# Patient Record
Sex: MALE | Race: WHITE | Employment: FULL TIME | URBAN - METROPOLITAN AREA
[De-identification: names, ages, dates, MRNs, and addresses within clinical notes are randomized per-mention and may not be internally consistent; named-entity substitution may affect disease eponyms.]

---

## 2018-06-09 ENCOUNTER — HOSPITAL ENCOUNTER (OUTPATIENT)
Dept: ULTRASOUND IMAGING | Age: 43
Discharge: HOME | End: 2018-06-09
Attending: INTERNAL MEDICINE
Payer: COMMERCIAL

## 2018-06-09 DIAGNOSIS — R79.89 ABNORMAL LIVER FUNCTION TESTS: ICD-10-CM

## 2018-06-09 PROCEDURE — 76705 ECHO EXAM OF ABDOMEN: CPT

## 2018-08-13 ENCOUNTER — OFFICE VISIT (OUTPATIENT)
Dept: INTERNAL MEDICINE CLINIC | Age: 43
End: 2018-08-13

## 2018-08-13 VITALS
HEART RATE: 74 BPM | SYSTOLIC BLOOD PRESSURE: 118 MMHG | DIASTOLIC BLOOD PRESSURE: 88 MMHG | TEMPERATURE: 98.3 F | OXYGEN SATURATION: 92 % | WEIGHT: 281 LBS | BODY MASS INDEX: 38.06 KG/M2 | HEIGHT: 72 IN | RESPIRATION RATE: 14 BRPM

## 2018-08-13 DIAGNOSIS — E78.5 HYPERLIPIDEMIA, UNSPECIFIED HYPERLIPIDEMIA TYPE: ICD-10-CM

## 2018-08-13 DIAGNOSIS — K76.0 FATTY LIVER: ICD-10-CM

## 2018-08-13 DIAGNOSIS — D17.9 LIPOMA, UNSPECIFIED SITE: ICD-10-CM

## 2018-08-13 DIAGNOSIS — Z00.00 WELLNESS EXAMINATION: ICD-10-CM

## 2018-08-13 DIAGNOSIS — R79.89 ELEVATED LFTS: ICD-10-CM

## 2018-08-13 DIAGNOSIS — E11.9 CONTROLLED TYPE 2 DIABETES MELLITUS WITHOUT COMPLICATION, WITHOUT LONG-TERM CURRENT USE OF INSULIN (HCC): ICD-10-CM

## 2018-08-13 DIAGNOSIS — L40.50 PSORIATIC ARTHRITIS (HCC): ICD-10-CM

## 2018-08-13 DIAGNOSIS — D86.9 SARCOIDOSIS: ICD-10-CM

## 2018-08-13 DIAGNOSIS — E66.01 MORBID OBESITY (HCC): Primary | ICD-10-CM

## 2018-08-13 RX ORDER — PRAVASTATIN SODIUM 20 MG/1
20 TABLET ORAL
COMMUNITY
End: 2019-02-15 | Stop reason: ALTCHOICE

## 2018-08-13 RX ORDER — MELOXICAM 15 MG/1
15 TABLET ORAL DAILY
COMMUNITY
End: 2021-02-19 | Stop reason: ALTCHOICE

## 2018-08-13 RX ORDER — LISINOPRIL 20 MG/1
TABLET ORAL DAILY
COMMUNITY
End: 2019-02-01 | Stop reason: SDUPTHER

## 2018-08-13 RX ORDER — METAXALONE 800 MG/1
800 TABLET ORAL 3 TIMES DAILY
COMMUNITY
End: 2021-02-19 | Stop reason: ALTCHOICE

## 2018-08-13 RX ORDER — PILOCARPINE HYDROCHLORIDE 5 MG/1
5 TABLET, FILM COATED ORAL 3 TIMES DAILY
COMMUNITY
End: 2018-10-02

## 2018-08-13 NOTE — PROGRESS NOTES
Chief Complaint   Patient presents with   [address] patient present to establish care with a PCP, due because the last PCP retired. Patient would also like to be on the weight loss program.       Health Maintenance Due   Topic Date Due    DTaP/Tdap/Td series (1 - Tdap) 02/15/1996    Influenza Age 5 to Adult  08/01/2018     Patient states that he got a tetanus vaccine 10 years ago due to him being bit by a dog. 1. Have you been to the ER, urgent care clinic or hospitalized within the last 6 months? NO.     2. Have you seen or consulted any other health care providers outside of the [address] [place] [address] within the last 6 months (Include any pap smears or colon screening)? YES, Dr. Lina Parker, rheumatologist, patient states he was last seen 2 weeks ago. Do you have an Advanced Directive? NO    Would you like information on Advanced Directives?  NO

## 2018-08-13 NOTE — PROGRESS NOTES
ALISON Monahan is a 37 y.o. male with relevant past medical history of morbid obesity, DM2, HTN, HLD, psoriatic arthritis, sarcoidosis, who presents today to establish care. The patient reports no acute symptoms. He is concerned about palpable nodules in his abdomen, left flank, back and arm. He tells me the one in his flank and back bother him some time with movement, he noticed this lesions multiple years ago, does not believe they are bigger in size. He says he once had a large LN biosied on his left elbow that was consistent with sarcoidosis. He tells me given his h/o arthritis he has had to take multiple courses of steroids which have contributed to him developing diabetes and gaining weight. He says he was diagnosed with psoriatic arthritis a few years ago (approx 5-7 yo). He used to be on Embrel, but for the past 3 months he has been taking Humira. He follows with rheumatology. He also take mobic, tramadol and metaxalone with adequate control of his pain. He states the joints that most often affected are his hands, shoulders and knees. He takes metformin for diabetes, pravastatin for cholesterol, and lisinopril for HTN. He reports compliance with all medications. He does not take aspirin. He has his eyes checked every year, last 3 months ago, denies any eye disorders due to DM2 or HTN. He denies any kidney problems in the past.  He reports h/o GIB due to stomach ulcers, had a colonoscopy about 2 years ago, per patient unremarkable. He takes OTC PPIs as needed. Reports great affection for eating spicy food. Patient is interested in losing weight and would like medical assistance. He has h/o elevated LFTs presumably secondary to fatty liver. He denies any abdominal pain, N/V/D/C. He denies any CP, SOB, dizziness, HA, leg swelling. He denies any h/o tobacco, drinks socially occasionally and denies h/o illicit drug use currently. 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  He says many years ago he had a hand infection due to exposure to sewage water. He has FH of CVA (Father), colon CA ( MGF in his 76s) and DM (MGM). He has history of childhood asthma resolved in adulthood. He has surgical history of CTS release. ROS  As above included in HPI. Otherwise 11 point review of systems negative including constitutional, skin, HENT, eyes, respiratory, cardiovascular, gastrointestinal, genitourinary, musculoskeletal, endocrine, hematologic, allergy, and neurologic. Past Medical History  Past Medical History:   Diagnosis Date    Arthritis     Asthma     Diabetes mellitus     Essential hypertension      Past Surgical History:   Procedure Laterality Date    HX CARPAL TUNNEL RELEASE          Family History  Family History   Problem Relation Age of Onset    Stroke Father     Diabetes Maternal Grandmother     Cancer Maternal Grandfather 79     prostate cancer       Social History  He  reports that he has never smoked. He has never used smokeless tobacco. History   Alcohol Use    1.2 oz/week    2 Shots of liquor per week       Immunization History    There is no immunization history on file for this patient. Allergies  No Known Allergies    Medications  Current Outpatient Prescriptions   Medication Sig    metaxalone (SKELAXIN) 800 mg tablet Take 800 mg by mouth three (3) times daily.  pilocarpine (SALAGEN) 5 mg tablet Take 5 mg by mouth three (3) times daily.  pravastatin (PRAVACHOL) 20 mg tablet Take 20 mg by mouth nightly.  lisinopril (PRINIVIL, ZESTRIL) 20 mg tablet Take  by mouth daily.  meloxicam (MOBIC) 15 mg tablet Take 15 mg by mouth daily.  adalimumab (HUMIRA PEN SC) by SubCUTAneous route.  metFORMIN (GLUCOPHAGE) 500 mg tablet     traMADol (ULTRAM) 50 mg tablet Take 50 mg by mouth. Indications: 2 pills three times daily     No current facility-administered medications for this visit.           Visit Vitals    /88 (BP 1 Location: Left arm, BP Patient Position: Sitting)    Pulse 74    Temp 98.3 °F (36.8 °C) (Oral)    Resp 14    Ht 6' (1.829 m)    Wt 281 lb (127.5 kg)    SpO2 92%    BMI 38.11 kg/m2     Body mass index is 38.11 kg/(m^2). Physical Exam   Constitutional: He is oriented to person, place, and time and well-developed, well-nourished, and in no distress. HENT:   Head: Normocephalic and atraumatic. Right Ear: External ear normal.   Left Ear: External ear normal.   Nose: Nose normal.   Mouth/Throat: Oropharynx is clear and moist.   Eyes: Conjunctivae and EOM are normal. Pupils are equal, round, and reactive to light. Neck: Normal range of motion. Neck supple. Cardiovascular: Normal rate, regular rhythm, normal heart sounds and intact distal pulses. Pulmonary/Chest: Effort normal and breath sounds normal. He exhibits no tenderness. Abdominal: Soft. Bowel sounds are normal.   Musculoskeletal: Normal range of motion. He exhibits no edema, tenderness or deformity. Neurological: He is alert and oriented to person, place, and time. Skin: Skin is warm. No rash noted. Psychiatric: Mood and affect normal.   Vitals reviewed. REVIEW OF DATA    Labs  No visits with results within 1 Month(s) from this visit. Latest known visit with results is:    Results on 01/19/2010   Component Date Value Ref Range Status    BUN 01/19/2010 25* 7 - 18 MG/DL Final    Creatinine 01/19/2010 1.3  0.6 - 1.3 MG/DL Final    GFR est AA 01/19/2010 >60  >60 ml/min/1.73m2 Final    GFR est non-AA 01/19/2010 >60  >60 ml/min/1.73m2 Final    Hemoglobin A1c 01/19/2010 5.5  4.8 - 6.0 % Final         CT Results (most recent):  No results found for this or any previous visit. XR Results (most recent):    Results from Hospital Encounter encounter on 05/31/16   XR HAND RT AP/LAT   Narrative EXAM:  XR HAND BILATERAL AP/LAT    INDICATION:  arthritis    COMPARISON: None. FINDINGS: Bilateral AP and slightly oblique views of the hands were performed.   Mild bilateral degenerative change at the radial carpal joint as well as DIP and  PIP joints. Normal bone mineralization. No erosive changes. No significant  hyperostotic changes. No acute fracture or dislocation. Impression IMPRESSION:  Mild bilateral degenerative changes. No erosive or hyperostotic  changes. CT   All Micro Results     None             HCM  Screening:    Colorectal 2 years ago, done due to +FOBT due to PUD, per patient colonoscopy was unremarkable    DIAGNOSIS AND PLAN  Patient Active Problem List   Diagnosis Code    Neuropathy G62.9    Severe obesity (BMI 35.0-39.9) ([address] E66.01     1. Morbid obesity ([address]  Weight loss encouraged, diet and regular exercise recommended, literature recommendation given for a healthier diet. Per patient request referred to weight loss clinic.   - TSH AND FREE T4  - REFERRAL TO WEIGHT LOSS    2. Hyperlipidemia, unspecified hyperlipidemia type  Will adjust medication as needed. Weight loss encouraged. Recommended to start ASA 81 mg daily given multiple risk factors for cardiovascular disease.  - LIPID PANEL  - REFERRAL TO WEIGHT LOSS    3. Controlled type 2 diabetes mellitus without complication, without long-term current use of insulin ([facility])  Will check HbA1c, adjust medication as needed  Weight loss recommended, regular exercise and ADA diet recommended. Literature recommendations given to improve diet quality. 4. Psoriatic arthritis ([address]  On Humira (on embrel in the past). Takes mobic, tramadol and muscle relaxants as well. Reports adequate control. 5. Sarcoidosis  Per patient diagnosed on LN biopsy in the past.  Following with rheumatology. Denies any pulmonary compromise    6. Lipoma, unspecified site   Patient does report some local discomfort, has lesions in back, and abdomen near waist line. Another lesion in his left forearm.  - REFERRAL TO SURGERY    7. Elevated LFTs  8. Fatty liver  Will monitor LFTs, lipid panel, weight loss encouraged.  Will check hep panel    9. Wellness examination  Physical exam done, including feet- minor calluses, good distal perfusion and gross sensory. Screening labs as ordered  Up to date with immunizations including Tdap and influenza last season      Follow-up Disposition: Not on File     Sherrell Gil MD

## 2018-08-13 NOTE — MR AVS SNAPSHOT
[address] [id number] [address] 
[phone] Patient: Scottie Price MRN: [id number] UCO:6/80/[id number] Visit Information Date & Time Provider Department Dept. Phone Encounter #  
 8/13/2018 12:30 PM Abbi Ward MD [facility] [address] [place] [address] [phone] Upcoming Health Maintenance Date Due DTaP/Tdap/Td series (1 - Tdap) 2/15/1996 Influenza Age 5 to Adult 8/1/2018 Allergies as of 8/13/2018  Review Complete On: 8/13/2018 By: Apolonia Cabello No Known Allergies Current Immunizations  Never Reviewed No immunizations on file. Not reviewed this visit You Were Diagnosed With   
  
 Codes Comments Morbid obesity ([facility]    -  Primary ICD-10-CM: E66.01 
ICD-9-CM: 278.01 Hyperlipidemia, unspecified hyperlipidemia type     ICD-10-CM: E78.5 ICD-9-CM: 272.4 Controlled type 2 diabetes mellitus without complication, without long-term current use of insulin ([facility]     ICD-10-CM: E11.9 ICD-9-CM: 250.00 Psoriatic arthritis ([facility]     ICD-10-CM: L40.50 ICD-9-CM: 696.0 Sarcoidosis     ICD-10-CM: D86.9 ICD-9-CM: 135 Lipoma, unspecified site     ICD-10-CM: D17.9 ICD-9-CM: 214.9 Elevated LFTs     ICD-10-CM: R79.89 ICD-9-CM: 790.6 Fatty liver     ICD-10-CM: K76.0 ICD-9-CM: 571.8 Wellness examination     ICD-10-CM: Z00.00 ICD-9-CM: V70.0 Vitals BP Pulse Temp Resp Height(growth percentile) Weight(growth percentile) 118/88 (BP 1 Location: Left arm, BP Patient Position: Sitting) 74 98.3 °F (36.8 °C) (Oral) 14 6' (1.829 m) 281 lb (127.5 kg) SpO2 BMI Smoking Status 92% 38.11 kg/m2 Never Smoker Vitals History BMI and BSA Data Body Mass Index Body Surface Area  
 38.11 kg/m 2 2.54 m 2 Your Updated Medication List  
  
   
This list is accurate as of 8/13/18  1:46 PM.  Always use your most recent med list.  
  
  
  
 HUMIRA PEN SC  
by SubCUTAneous route. lisinopril 20 mg tablet Commonly known as:  Bowen Edman Take  by mouth daily. meloxicam 15 mg tablet Commonly known as:  MOBIC Take 15 mg by mouth daily. metaxalone 800 mg tablet Commonly known as:  SKELAXIN Take 800 mg by mouth three (3) times daily. metFORMIN 500 mg tablet Commonly known as:  GLUCOPHAGE  
  
 pilocarpine 5 mg tablet Commonly known as:  Chiquis Clock Take 5 mg by mouth three (3) times daily. pravastatin 20 mg tablet Commonly known as:  PRAVACHOL Take 20 mg by mouth nightly. traMADol 50 mg tablet Commonly known as:  ULTRAM  
Take 50 mg by mouth. Indications: 2 pills three times daily We Performed the Following CBC WITH AUTOMATED DIFF [33370 CPT(R)] HEPATITIS PANEL, ACUTE [64975 CPT(R)] HIV 1/2 AG/AB, 4TH GENERATION,W RFLX CONFIRM [id number] CPT(R)] LIPID PANEL [52063 CPT(R)] METABOLIC PANEL, COMPREHENSIVE [95453 CPT(R)] REFERRAL TO SURGERY [THK537 Custom] REFERRAL TO WEIGHT LOSS [KRY185 Custom] TSH AND FREE T4 [84522 CPT(R)] To-Do List   
 Around 08/13/2018 Lab:  HEMOGLOBIN A1C W/O EAG   
  
 08/13/2018 Lab:  MICROALBUMIN, UR, RAND W/ MICROALB/CREAT RATIO   
  
 08/13/2018 Lab:  URINALYSIS W/ RFLX MICROSCOPIC Referral Information Referral ID Referred By Referred To  
  
 [id number] ANANT ROSSI Not Available Visits Status Start Date End Date 1 New Request 8/13/18 8/13/19 If your referral has a status of pending review or denied, additional information will be sent to support the outcome of this decision. Referral ID Referred By Referred To  
 [id number] ANANT ROSSI Not Available Visits Status Start Date End Date 1 New Request 8/13/18 8/13/19  If your referral has a status of pending review or denied, additional information will be sent to support the outcome of this decision. Introducing [facility] & HE[facility]! [employer] introduces [employer] patient portal. Now you can access parts of your medical record, email your doctor's office, and request medication refills online. 1. In your internet browser, go to [email or web address] 2. Click on the First Time User? Click Here link in the Sign In box. You will see the New Member Sign Up page. 3. Enter your [employer] Access Code exactly as it appears below. You will not need to use this code after youve completed the sign-up process. If you do not sign up before the expiration date, you must request a new code. · [employer] Access Code: [email or web address] Expires: 11/11/2018 12:10 PM 
 
4. Enter the last four digits of your Social Security Number (xxxx) and Date of Birth (mm/dd/yyyy) as indicated and click Submit. You will be taken to the next sign-up page. 5. Create a [employer]ler ID. This will be your [employer]ler login ID and cannot be changed, so think of one that is secure and easy to remember. 6. Create a [employer]ler password. You can change your password at any time. 7. Enter your Password Reset Question and Answer. This can be used at a later time if you forget your password. 8. Enter your e-mail address. You will receive e-mail notification when new information is available in [address] 9. Click Sign Up. You can now view and download portions of your medical record. 10. Click the Download Summary menu link to download a portable copy of your medical information. If you have questions, please visit the Frequently Asked Questions section of the [employer] website. Remember, [employer] is NOT to be used for urgent needs. For medical emergencies, dial 911. Now available from your iPhone and Android! Please provide this summary of care documentation to your next provider. Your primary care clinician is listed as Ana Capps.  If you have any questions after today's visit, please call [phone].

## 2018-08-14 LAB
ALBUMIN SERPL-MCNC: 5.2 G/DL (ref 3.5–5.5)
ALBUMIN/CREAT UR: 5.9 MG/G CREAT (ref 0–30)
ALBUMIN/GLOB SERPL: 2.2 {RATIO} (ref 1.2–2.2)
ALP SERPL-CCNC: 83 IU/L (ref 39–117)
ALT SERPL-CCNC: 116 IU/L (ref 0–44)
APPEARANCE UR: CLEAR
AST SERPL-CCNC: 70 IU/L (ref 0–40)
BASOPHILS # BLD AUTO: 0 X10E3/UL (ref 0–0.2)
BASOPHILS NFR BLD AUTO: 0 %
BILIRUB SERPL-MCNC: 0.5 MG/DL (ref 0–1.2)
BILIRUB UR QL STRIP: NEGATIVE
BUN SERPL-MCNC: 9 MG/DL (ref 6–24)
BUN/CREAT SERPL: 8 (ref 9–20)
CALCIUM SERPL-MCNC: 10.1 MG/DL (ref 8.7–10.2)
CHLORIDE SERPL-SCNC: 97 MMOL/L (ref 96–106)
CHOLEST SERPL-MCNC: 182 MG/DL (ref 100–199)
CO2 SERPL-SCNC: 25 MMOL/L (ref 20–29)
COLOR UR: YELLOW
CREAT SERPL-MCNC: 1.17 MG/DL (ref 0.76–1.27)
CREAT UR-MCNC: 102.9 MG/DL
EOSINOPHIL # BLD AUTO: 0.2 X10E3/UL (ref 0–0.4)
EOSINOPHIL NFR BLD AUTO: 3 %
ERYTHROCYTE [DISTWIDTH] IN BLOOD BY AUTOMATED COUNT: 13.5 % (ref 12.3–15.4)
GLOBULIN SER CALC-MCNC: 2.4 G/DL (ref 1.5–4.5)
GLUCOSE SERPL-MCNC: 197 MG/DL (ref 65–99)
GLUCOSE UR QL: ABNORMAL
HAV IGM SERPL QL IA: NEGATIVE
HBA1C MFR BLD: 9.5 % (ref 4.8–5.6)
HBV CORE IGM SERPL QL IA: NEGATIVE
HBV SURFACE AG SERPL QL IA: NEGATIVE
HCT VFR BLD AUTO: 46.5 % (ref 37.5–51)
HCV AB S/CO SERPL IA: <0.1 S/CO RATIO (ref 0–0.9)
HDLC SERPL-MCNC: 47 MG/DL
HGB BLD-MCNC: 15.9 G/DL (ref 13–17.7)
HGB UR QL STRIP: NEGATIVE
HIV 1+2 AB+HIV1 P24 AG SERPL QL IA: NON REACTIVE
IMM GRANULOCYTES # BLD: 0 X10E3/UL (ref 0–0.1)
IMM GRANULOCYTES NFR BLD: 0 %
INTERPRETATION, 910389: NORMAL
KETONES UR QL STRIP: NEGATIVE
LDLC SERPL CALC-MCNC: 100 MG/DL (ref 0–99)
LEUKOCYTE ESTERASE UR QL STRIP: NEGATIVE
LYMPHOCYTES # BLD AUTO: 3.9 X10E3/UL (ref 0.7–3.1)
LYMPHOCYTES NFR BLD AUTO: 45 %
Lab: NORMAL
MCH RBC QN AUTO: 31.2 PG (ref 26.6–33)
MCHC RBC AUTO-ENTMCNC: 34.2 G/DL (ref 31.5–35.7)
MCV RBC AUTO: 91 FL (ref 79–97)
MICRO URNS: ABNORMAL
MICROALBUMIN UR-MCNC: 6.1 UG/ML
MONOCYTES # BLD AUTO: 0.3 X10E3/UL (ref 0.1–0.9)
MONOCYTES NFR BLD AUTO: 3 %
NEUTROPHILS # BLD AUTO: 4.3 X10E3/UL (ref 1.4–7)
NEUTROPHILS NFR BLD AUTO: 49 %
NITRITE UR QL STRIP: NEGATIVE
PH UR STRIP: 5 [PH] (ref 5–7.5)
PLATELET # BLD AUTO: 273 X10E3/UL (ref 150–379)
POTASSIUM SERPL-SCNC: 4.6 MMOL/L (ref 3.5–5.2)
PROT SERPL-MCNC: 7.6 G/DL (ref 6–8.5)
PROT UR QL STRIP: NEGATIVE
RBC # BLD AUTO: 5.09 X10E6/UL (ref 4.14–5.8)
SODIUM SERPL-SCNC: 137 MMOL/L (ref 134–144)
SP GR UR: 1.02 (ref 1–1.03)
T4 FREE SERPL-MCNC: 0.98 NG/DL (ref 0.82–1.77)
TRIGL SERPL-MCNC: 174 MG/DL (ref 0–149)
TSH SERPL DL<=0.005 MIU/L-ACNC: 1.93 UIU/ML (ref 0.45–4.5)
UROBILINOGEN UR STRIP-MCNC: 0.2 MG/DL (ref 0.2–1)
VLDLC SERPL CALC-MCNC: 35 MG/DL (ref 5–40)
WBC # BLD AUTO: 8.7 X10E3/UL (ref 3.4–10.8)

## 2018-08-15 NOTE — PROGRESS NOTES
Labs discussed with patient. Recommended to increase metformin dose to 1000mg PO BID. Weight loss encouraged, healthy diet and portion control discussed. Regular exercise as tolerated recommended. Patient was referred by his request to weight loss clinic. Will monitor HbA1c in 3 months.

## 2018-08-24 ENCOUNTER — TELEPHONE (OUTPATIENT)
Dept: INTERNAL MEDICINE CLINIC | Age: 43
End: 2018-08-24

## 2018-08-27 ENCOUNTER — TELEPHONE (OUTPATIENT)
Dept: INTERNAL MEDICINE CLINIC | Age: 43
End: 2018-08-27

## 2018-08-27 DIAGNOSIS — E66.01 SEVERE OBESITY (BMI 35.0-39.9): Primary | ICD-10-CM

## 2018-08-27 NOTE — TELEPHONE ENCOUNTER
Hi, could you please give him a call back. It is reassuring an eye specialist examined him, most likely presenting with subconjunctival erythema. If he is having headaches due to elevated BP. Please have him schedule a visit with us. If he is feeling fine otherwise, just let him know it usually takes about a week for the blood to go away. If it doesn't to return to the eye specialist for further evaluation.  Thanks

## 2018-08-27 NOTE — TELEPHONE ENCOUNTER
Pt called and stated tht when he woke up his right eye was blood red and he has some pain in his right leg/sensitive to touch in his lower leg he went to the eye dr and was told he just has a broken vein in his eye, pls call to discuss, MARC

## 2018-08-28 NOTE — TELEPHONE ENCOUNTER
Spoke with pt, he said he isnt too worried about his eye but his right lower leg is painful and the eye doctor mentioned he needed to get checked to be sure he doesn't have a clot. He said its not really swollen but it hurts from his ankle upward about 8 inches or so. No redness, no known injury. He also asked about the referral to the weight loss program, did you want him to see a bariatric surgeon or our supervised weight loss program here?

## 2018-08-29 NOTE — TELEPHONE ENCOUNTER
Sounds like the problem in his leg is a new problem. I don't recall discussing this. He may need to make an appointment to be evaluated. In terms of the weight loss clinic, I new referral was placed yesterday to [facility], as our clinic here is no longer active. He should receive a call soon for scheduling.  Thanks

## 2018-09-05 ENCOUNTER — OFFICE VISIT (OUTPATIENT)
Dept: INTERNAL MEDICINE CLINIC | Age: 43
End: 2018-09-05

## 2018-09-05 VITALS
WEIGHT: 278 LBS | HEART RATE: 89 BPM | SYSTOLIC BLOOD PRESSURE: 110 MMHG | TEMPERATURE: 97.8 F | DIASTOLIC BLOOD PRESSURE: 74 MMHG | RESPIRATION RATE: 14 BRPM | BODY MASS INDEX: 37.65 KG/M2 | OXYGEN SATURATION: 97 % | HEIGHT: 72 IN

## 2018-09-05 DIAGNOSIS — E11.8 UNCONTROLLED TYPE 2 DIABETES MELLITUS WITH COMPLICATION, UNSPECIFIED WHETHER LONG TERM INSULIN USE: ICD-10-CM

## 2018-09-05 DIAGNOSIS — E11.65 UNCONTROLLED TYPE 2 DIABETES MELLITUS WITH COMPLICATION, UNSPECIFIED WHETHER LONG TERM INSULIN USE: ICD-10-CM

## 2018-09-05 DIAGNOSIS — R20.2 PARESTHESIA OF BOTH LOWER EXTREMITIES: Primary | ICD-10-CM

## 2018-09-05 NOTE — PROGRESS NOTES
HPI/History Maxine Allen is a 37 y.o.  male who presents for evaluation. Pt reports intermittent \"pins and needles\" sensation of right distal leg near ankle starting 8/29 then left lower leg from knee to about ankle on 9/2, both of which have resolved. Denies any signs of thrombosis. Pt with uncontrolled DM with A1c of 9.5 on 8/13; electrolytes normal, TSH/FT4 normal, no anemia at the time. DM regimen was adjusted and pt referred for weight loss. Noted hx of neuropathy but no specifics known, after questioning, may have been due to past ulnar neuropathy. Pt admits to more sitting than usual at work which may suggest some compressive effects, but again, no signs of thrombosis. No other sxs or complaints. Patient Active Problem List  
Diagnosis Code  Neuropathy G62.9  Severe obesity (BMI 35.0-39.9) ([facility]) E66.01 Past Medical History:  
Diagnosis Date  Arthritis  Asthma  Diabetes mellitus  Essential hypertension Past Surgical History:  
Procedure Laterality Date  HX CARPAL TUNNEL RELEASE Social History Social History  Marital status: UNKNOWN Spouse name: N/A  
 Number of children: N/A  
 Years of education: N/A Occupational History  Not on file. Social History Main Topics  Smoking status: Never Smoker  Smokeless tobacco: Never Used  Alcohol use 1.2 oz/week 2 Shots of liquor per week  Drug use: No  
 Sexual activity: Yes  
  Partners: Female Birth control/ protection: Condom Other Topics Concern  Not on file Social History Narrative Family History Problem Relation Age of Onset  Stroke Father  Diabetes Maternal Grandmother  Cancer Maternal Grandfather 79  
  prostate cancer Current Outpatient Prescriptions Medication Sig  
 metaxalone (SKELAXIN) 800 mg tablet Take 800 mg by mouth three (3) times daily.  pilocarpine (SALAGEN) 5 mg tablet Take 5 mg by mouth three (3) times daily.  pravastatin (PRAVACHOL) 20 mg tablet Take 20 mg by mouth nightly.  lisinopril (PRINIVIL, ZESTRIL) 20 mg tablet Take  by mouth daily.  meloxicam (MOBIC) 15 mg tablet Take 15 mg by mouth daily.  adalimumab (HUMIRA PEN SC) by SubCUTAneous route.  metFORMIN (GLUCOPHAGE) 500 mg tablet 1,000 mg two (2) times daily (with meals).  traMADol (ULTRAM) 50 mg tablet Take 50 mg by mouth. Indications: 2 pills three times daily No current facility-administered medications for this visit. No Known Allergies Review of Systems Aside from those included in HPI, remainder of complete ROS negative. Physical Examination Visit Vitals  /74 (BP 1 Location: Right arm, BP Patient Position: Sitting)  Pulse 89  Temp 97.8 °F (36.6 °C) (Oral)  Resp 14  
 Ht 6' (1.829 m)  Wt 278 lb (126.1 kg)  SpO2 97%  BMI 37.7 kg/m2 General - Alert and in no acute distress. Pt appears well, comfortable, and in good spirits. Pleasant, engaging. Nontoxic. Not anxious, non-diaphoretic. Mental status - Appropriate mood, behavior, speech content, dress, and thought processes. Pulm - No tachypnea, retractions, or cyanosis. Good respiratory effort. Clear to auscultation bilat. Cardiovascular - Normal rate, regular rhythm. LEs - No swelling, erythema/discoloration, warmth. No tenderness. Negative Lamont's bilat. Good hip/knee/ankle ROM. Good and symmetric strength. Normal gait and station. Sensation intact. Performed diabetic foot exam as noted. Left Foot: 
 Visual Exam: normal  
 Pulse DP: 2+ (normal) Filament test: normal sensation Vibratory sensation: normal 
Right Foot: 
 Visual Exam: normal  
 Pulse DP: 2+ (normal) Filament test: normal sensation Vibratory sensation: normal 
 
 
Assessment and Plan 1. Paresthesias of bilat lower extremities - Right started 8/29 and left 9/2; both have now resolved. Discussed differentials.  Uncontrolled diabetes a strong potential but regimen recently adjusted and was also referred for weight loss. Self-limited and compressive effects also strong possibilities. Recent labs unremarkable from electrolyte, thyroid, and anemia standpoints. Given the short duration and above considerations/changes/etc, will continue above and plan to monitor for now. Discussed considerations and conservative measures. He will address with Dr. Meredith Mustafa if recurs/persists or developments, further planning at her discretion. Pt happily agrees with plan. More than 25 mins spent during visit with more than 50% discussing above issues, potential causes/contributing factors, eval/tx, results, plan, and questions. PLEASE NOTE:  
This document has been produced using voice recognition software. Unrecognized errors in transcription may be present. Torito Fay PA-C [facility] 
[phone] 
9/5/2018

## 2018-09-05 NOTE — MR AVS SNAPSHOT
[address] [facility] 
 
 
 [address] [facility], [address] [facility] 
[phone] Patient: Johnnie Langston MRN: [id number] SPQ:[id number] Visit Information Date & Time Provider Department Dept. Phone Encounter #  
 9/5/2018  3:30 PM [facility] [address] [place] [address] [phone] [id number] Your Appointments 9/28/2018  2:30 PM  
New Patient with Layne Irizarry MD  
[facility] ([address] [place] [address]) Appt Note: Re: Lipoma, unspecified site / Referred by Addi Jackson MD / [id number] [facility] [id number]  
  
    
 10/4/2018 10:00 AM  
Nurse Visit with [address] [facility]  
[facility] ([address] [place] [address]) Appt Note: Ref from Dr Choudhury [facility] ([address] [place] [address]) [phone] Upcoming Health Maintenance Date Due DTaP/Tdap/Td series (1 - Tdap) 2/15/1996 Influenza Age 5 to Adult 8/1/2018 Allergies as of 9/5/2018  Review Complete On: 9/5/2018 By: Maria Ines Castillo LPN No Known Allergies Current Immunizations  Never Reviewed No immunizations on file. Not reviewed this visit Vitals BP Pulse Temp Resp Height(growth percentile) Weight(growth percentile) 110/74 (BP 1 Location: Right arm, BP Patient Position: Sitting) 89 97.8 °F (36.6 °C) (Oral) 14 6' (1.829 m) 278 lb (126.1 kg) SpO2 BMI Smoking Status 97% 37.7 kg/m2 Never Smoker Vitals History BMI and BSA Data Body Mass Index Body Surface Area 37.7 kg/m 2 2.53 m 2 Your Updated Medication List  
  
   
This list is accurate as of 9/5/18  3:59 PM.  Always use your most recent med list.  
  
  
  
  
 HUMIRA PEN SC  
by SubCUTAneous route. lisinopril 20 mg tablet Commonly known as:  Zamudio Alonzo Take  by mouth daily. meloxicam 15 mg tablet Commonly known as:  MOBIC Take 15 mg by mouth daily. metaxalone 800 mg tablet Commonly known as:  SKELAXIN Take 800 mg by mouth three (3) times daily. metFORMIN 500 mg tablet Commonly known as:  GLUCOPHAGE  
1,000 mg two (2) times daily (with meals). pilocarpine 5 mg tablet Commonly known as:  Voncile Dynes Take 5 mg by mouth three (3) times daily. pravastatin 20 mg tablet Commonly known as:  PRAVACHOL Take 20 mg by mouth nightly. traMADol 50 mg tablet Commonly known as:  ULTRAM  
Take 50 mg by mouth. Indications: 2 pills three times daily Introducing [facility]! [employer] introduces [email or web address] patient portal. Now you can access parts of your medical record, email your doctor's office, and request medication refills online. 1. In your internet browser, go to [email or web address] 2. Click on the First Time User? Click Here link in the Sign In box. You will see the New Member Sign Up page. 3. Enter your [email or web address] Access Code exactly as it appears below. You will not need to use this code after youve completed the sign-up process. If you do not sign up before the expiration date, you must request a new code. · [email or web address] Access Code: [email or web address] Expires: 11/11/2018 12:10 PM 
 
4. Enter the last four digits of your Social Security Number (xxxx) and Date of Birth (mm/dd/yyyy) as indicated and click Submit. You will be taken to the next sign-up page. 5. Create a [email or web address] ID. This will be your [email or web address] login ID and cannot be changed, so think of one that is secure and easy to remember. 6. Create a [email or web address] password. You can change your password at any time. 7. Enter your Password Reset Question and Answer. This can be used at a later time if you forget your password. 8. Enter your e-mail address.  You will receive e-mail notification when new information is available in [employer]. 9. Click Sign Up. You can now view and download portions of your medical record. 10. Click the Download Summary menu link to download a portable copy of your medical information. If you have questions, please visit the Frequently Asked Questions section of the [employer] website. Remember, [employer] is NOT to be used for urgent needs. For medical emergencies, dial 911. Now available from your iPhone and Android! Please provide this summary of care documentation to your next provider. Your primary care clinician is listed as Armando Charmco. If you have any questions after today's visit, please call [phone].

## 2018-09-05 NOTE — PROGRESS NOTES
1. Have you been to the ER, urgent care clinic or hospitalized since your last visit? NO.  
 
2. Have you seen or consulted any other health care providers outside of the [address] [place] [address] since your last visit (Include any pap smears or colon screening)? NO Do you have an Advanced Directive? NO Would you like information on Advanced Directives?  NO

## 2018-09-20 ENCOUNTER — TELEPHONE (OUTPATIENT)
Dept: INTERNAL MEDICINE CLINIC | Age: 43
End: 2018-09-20

## 2018-09-20 NOTE — TELEPHONE ENCOUNTER
I have not declined giving the patient tramadol to my knowledge. If he is in need for a refill have him contact our office.  Thanks

## 2018-09-20 NOTE — TELEPHONE ENCOUNTER
Pt calling, says Dr. Jatin Coronado is asking if Dr. Heidi Martin will start giving the patient tramadol? He says he takes if for pain for his arthritis. He says he is not sure why she doesn't want to give it anymore. Something about they don't run the test that needs to be done for him to keep taking the med. Says Dr. Heidi Martin can call [facility] if she needs more information.

## 2018-09-28 ENCOUNTER — OFFICE VISIT (OUTPATIENT)
Dept: SURGERY | Age: 43
End: 2018-09-28

## 2018-09-28 VITALS
HEART RATE: 89 BPM | WEIGHT: 284 LBS | DIASTOLIC BLOOD PRESSURE: 68 MMHG | RESPIRATION RATE: 18 BRPM | SYSTOLIC BLOOD PRESSURE: 112 MMHG | HEIGHT: 72 IN | BODY MASS INDEX: 38.47 KG/M2 | TEMPERATURE: 98.2 F

## 2018-09-28 DIAGNOSIS — M79.89 SOFT TISSUE MASS: Primary | ICD-10-CM

## 2018-09-28 RX ORDER — SODIUM CHLORIDE 0.9 % (FLUSH) 0.9 %
5-10 SYRINGE (ML) INJECTION AS NEEDED
Status: CANCELLED | OUTPATIENT
Start: 2018-09-28

## 2018-09-28 RX ORDER — SODIUM CHLORIDE 0.9 % (FLUSH) 0.9 %
5-10 SYRINGE (ML) INJECTION EVERY 8 HOURS
Status: CANCELLED | OUTPATIENT
Start: 2018-09-28

## 2018-10-02 RX ORDER — BISMUTH SUBSALICYLATE 262 MG
1 TABLET,CHEWABLE ORAL DAILY
COMMUNITY
End: 2021-02-19 | Stop reason: ALTCHOICE

## 2018-10-04 ENCOUNTER — CLINICAL SUPPORT (OUTPATIENT)
Dept: FAMILY MEDICINE CLINIC | Age: 43
End: 2018-10-04

## 2018-10-04 DIAGNOSIS — E66.9 OBESITY, UNSPECIFIED CLASSIFICATION, UNSPECIFIED OBESITY TYPE, UNSPECIFIED WHETHER SERIOUS COMORBIDITY PRESENT: Primary | ICD-10-CM

## 2018-10-04 NOTE — PROGRESS NOTES
Patient attended a Medically Supervised Weight Loss New Patient Orientation today where we discussed:  - New Direction Very Low Calorie Diet details  - Medical Supervision  - Nutrition education  - Cost of Meal Replacements  - Policies and compliance required for program enrollment.      Patients initial consultation with physician is tentatively scheduled for:  Future Appointments  Date Time Provider Kamari Rodriguez   11/9/2018 9:00 AM Nelda Jackson NP [facility]

## 2018-10-05 DIAGNOSIS — Z01.812 BLOOD TESTS PRIOR TO TREATMENT OR PROCEDURE: ICD-10-CM

## 2018-10-05 DIAGNOSIS — E66.9 OBESITY (BMI 30-39.9): Primary | ICD-10-CM

## 2018-10-05 NOTE — PROGRESS NOTES
Labs and EKG ordered in preparation for medical weight loss consult. Would not recommend restricting calorie intake until pt has recovered from upcoming procedure with Dr. Mimi Cornell.

## 2018-10-09 ENCOUNTER — HOSPITAL ENCOUNTER (OUTPATIENT)
Dept: LAB | Age: 43
Discharge: HOME | End: 2018-10-09
Payer: COMMERCIAL

## 2018-10-09 ENCOUNTER — TELEPHONE (OUTPATIENT)
Dept: SURGERY | Age: 43
End: 2018-10-09

## 2018-10-09 DIAGNOSIS — E66.9 OBESITY (BMI 30-39.9): ICD-10-CM

## 2018-10-09 DIAGNOSIS — Z01.812 BLOOD TESTS PRIOR TO TREATMENT OR PROCEDURE: ICD-10-CM

## 2018-10-09 LAB
ALBUMIN SERPL-MCNC: 4.5 G/DL (ref 3.4–5)
ALBUMIN/GLOB SERPL: 1.6 {RATIO} (ref 0.8–1.7)
ALP SERPL-CCNC: 84 U/L (ref 45–117)
ALT SERPL-CCNC: 148 U/L (ref 16–61)
ANION GAP SERPL CALC-SCNC: 10 MMOL/L (ref 3–18)
APPEARANCE UR: CLEAR
AST SERPL-CCNC: 80 U/L (ref 15–37)
ATRIAL RATE: 80 BPM
BACTERIA URNS QL MICRO: NEGATIVE /HPF
BASOPHILS # BLD: 0 K/UL (ref 0–0.1)
BASOPHILS NFR BLD: 0 % (ref 0–2)
BILIRUB SERPL-MCNC: 0.7 MG/DL (ref 0.2–1)
BILIRUB UR QL: NEGATIVE
BUN SERPL-MCNC: 15 MG/DL (ref 7–18)
BUN/CREAT SERPL: 13 (ref 12–20)
CALCIUM SERPL-MCNC: 9.6 MG/DL (ref 8.5–10.1)
CALCULATED P AXIS, ECG09: 38 DEGREES
CALCULATED R AXIS, ECG10: -6 DEGREES
CALCULATED T AXIS, ECG11: 28 DEGREES
CHLORIDE SERPL-SCNC: 99 MMOL/L (ref 100–108)
CHOLEST SERPL-MCNC: 172 MG/DL
CO2 SERPL-SCNC: 28 MMOL/L (ref 21–32)
COLOR UR: YELLOW
CREAT SERPL-MCNC: 1.12 MG/DL (ref 0.6–1.3)
DIAGNOSIS, 93000: NORMAL
DIFFERENTIAL METHOD BLD: ABNORMAL
EOSINOPHIL # BLD: 0.2 K/UL (ref 0–0.4)
EOSINOPHIL NFR BLD: 2 % (ref 0–5)
EPITH CASTS URNS QL MICRO: NEGATIVE /LPF (ref 0–5)
ERYTHROCYTE [DISTWIDTH] IN BLOOD BY AUTOMATED COUNT: 12.4 % (ref 11.6–14.5)
GLOBULIN SER CALC-MCNC: 2.9 G/DL (ref 2–4)
GLUCOSE SERPL-MCNC: 280 MG/DL (ref 74–99)
GLUCOSE UR STRIP.AUTO-MCNC: >1000 MG/DL
HCT VFR BLD AUTO: 45.3 % (ref 36–48)
HDLC SERPL-MCNC: 40 MG/DL (ref 40–60)
HDLC SERPL: 4.3 {RATIO} (ref 0–5)
HGB BLD-MCNC: 16.3 G/DL (ref 13–16)
HGB UR QL STRIP: NEGATIVE
KETONES UR QL STRIP.AUTO: ABNORMAL MG/DL
LDLC SERPL CALC-MCNC: 91 MG/DL (ref 0–100)
LEUKOCYTE ESTERASE UR QL STRIP.AUTO: NEGATIVE
LIPID PROFILE,FLP: ABNORMAL
LYMPHOCYTES # BLD: 4.7 K/UL (ref 0.9–3.6)
LYMPHOCYTES NFR BLD: 46 % (ref 21–52)
MAGNESIUM SERPL-MCNC: 2 MG/DL (ref 1.6–2.6)
MCH RBC QN AUTO: 31.2 PG (ref 24–34)
MCHC RBC AUTO-ENTMCNC: 36 G/DL (ref 31–37)
MCV RBC AUTO: 86.8 FL (ref 74–97)
MONOCYTES # BLD: 0.6 K/UL (ref 0.05–1.2)
MONOCYTES NFR BLD: 6 % (ref 3–10)
NEUTS SEG # BLD: 4.7 K/UL (ref 1.8–8)
NEUTS SEG NFR BLD: 46 % (ref 40–73)
NITRITE UR QL STRIP.AUTO: NEGATIVE
P-R INTERVAL, ECG05: 140 MS
PH UR STRIP: 5 [PH] (ref 5–8)
PLATELET # BLD AUTO: 272 K/UL (ref 135–420)
PMV BLD AUTO: 10.8 FL (ref 9.2–11.8)
POTASSIUM SERPL-SCNC: 4.4 MMOL/L (ref 3.5–5.5)
PROT SERPL-MCNC: 7.4 G/DL (ref 6.4–8.2)
PROT UR STRIP-MCNC: NEGATIVE MG/DL
Q-T INTERVAL, ECG07: 378 MS
QRS DURATION, ECG06: 84 MS
QTC CALCULATION (BEZET), ECG08: 435 MS
RBC # BLD AUTO: 5.22 M/UL (ref 4.7–5.5)
RBC #/AREA URNS HPF: NEGATIVE /HPF (ref 0–5)
SODIUM SERPL-SCNC: 137 MMOL/L (ref 136–145)
SP GR UR REFRACTOMETRY: >1.03 (ref 1–1.03)
TRIGL SERPL-MCNC: 205 MG/DL (ref ?–150)
TSH SERPL DL<=0.05 MIU/L-ACNC: 1.33 UIU/ML (ref 0.36–3.74)
URATE CRY URNS QL MICRO: ABNORMAL
URATE SERPL-MCNC: 4.3 MG/DL (ref 2.6–7.2)
UROBILINOGEN UR QL STRIP.AUTO: 1 EU/DL (ref 0.2–1)
VENTRICULAR RATE, ECG03: 80 BPM
VLDLC SERPL CALC-MCNC: 41 MG/DL
WBC # BLD AUTO: 10.2 K/UL (ref 4.6–13.2)
WBC URNS QL MICRO: NEGATIVE /HPF (ref 0–4)

## 2018-10-09 PROCEDURE — 93005 ELECTROCARDIOGRAM TRACING: CPT

## 2018-10-09 PROCEDURE — 80053 COMPREHEN METABOLIC PANEL: CPT | Performed by: NURSE PRACTITIONER

## 2018-10-09 PROCEDURE — 36415 COLL VENOUS BLD VENIPUNCTURE: CPT | Performed by: NURSE PRACTITIONER

## 2018-10-09 PROCEDURE — 83735 ASSAY OF MAGNESIUM: CPT | Performed by: NURSE PRACTITIONER

## 2018-10-09 PROCEDURE — 80061 LIPID PANEL: CPT | Performed by: NURSE PRACTITIONER

## 2018-10-09 PROCEDURE — 85025 COMPLETE CBC W/AUTO DIFF WBC: CPT | Performed by: NURSE PRACTITIONER

## 2018-10-09 PROCEDURE — 81001 URINALYSIS AUTO W/SCOPE: CPT | Performed by: NURSE PRACTITIONER

## 2018-10-09 PROCEDURE — 84550 ASSAY OF BLOOD/URIC ACID: CPT | Performed by: NURSE PRACTITIONER

## 2018-10-09 PROCEDURE — 84443 ASSAY THYROID STIM HORMONE: CPT | Performed by: NURSE PRACTITIONER

## 2018-10-10 ENCOUNTER — ANESTHESIA EVENT (OUTPATIENT)
Dept: SURGERY | Age: 43
End: 2018-10-10
Payer: COMMERCIAL

## 2018-10-10 ENCOUNTER — OFFICE VISIT (OUTPATIENT)
Dept: INTERNAL MEDICINE CLINIC | Age: 43
End: 2018-10-10

## 2018-10-10 VITALS
TEMPERATURE: 98.3 F | RESPIRATION RATE: 14 BRPM | WEIGHT: 277.8 LBS | DIASTOLIC BLOOD PRESSURE: 74 MMHG | HEART RATE: 80 BPM | OXYGEN SATURATION: 94 % | BODY MASS INDEX: 36.82 KG/M2 | HEIGHT: 73 IN | SYSTOLIC BLOOD PRESSURE: 100 MMHG

## 2018-10-10 DIAGNOSIS — Z01.818 PREOP EXAM FOR INTERNAL MEDICINE: Primary | ICD-10-CM

## 2018-10-10 DIAGNOSIS — R22.9 SUBCUTANEOUS NODULES: ICD-10-CM

## 2018-10-10 NOTE — TELEPHONE ENCOUNTER
I called and left a message on Mr. Sirisha Guerra on 10/10/2018 at 6:16 pm, to inform him that his surgery was rescheduled for an earlier time on 10/11/2018.  Mr. Abdirizak Mccurdy arrival time is scheduled for 05:30 am on 10/11/2018 and surgery will begin at 07:30 am.

## 2018-10-10 NOTE — PROGRESS NOTES
1. Have you been to the ER, urgent care clinic or hospitalized since your last visit? NO           2. Have you seen or consulted any other health care providers outside of the [address] [place] [address] since your last visit (Include any pap smears or colon screening)? NO    Do you have an Advanced Directive? NO    Would you like information on Advanced Directives?  NO

## 2018-10-10 NOTE — PROGRESS NOTES
Jazmín [place] 1975, is a 37 y.o. male, who is seen today for preoperative evaluation prior to surgery to be done tomorrow by Dr. Beverley Ca for removal of subcutaneous nodules. He has noticed several subcutaneous nodules fairly recently. He feels well in general.  He is diabetic and is starting to work much more in earnest on weight control and his metformin was increased recently. He has no history of any type of heart problem. He has started exercising recently and is having no dyspnea or chest pain with that. Past Medical History:   Diagnosis Date    Asthma     Diabetes mellitus     Essential hypertension     Ill-defined condition     neuropathy    Psoriatic arthritis, destructive type ([facility] [address]     Sarcoidosis      Past Surgical History:   Procedure Laterality Date    HX CARPAL TUNNEL RELEASE      HX ORTHOPAEDIC      fx skull/ broken jaw     HX OTHER SURGICAL      lipoma removed      Current Outpatient Prescriptions   Medication Sig Dispense Refill    multivitamin (ONE A DAY) tablet Take 1 Tab by mouth daily.  metaxalone (SKELAXIN) 800 mg tablet Take 800 mg by mouth three (3) times daily.  pravastatin (PRAVACHOL) 20 mg tablet Take 20 mg by mouth nightly.  lisinopril (PRINIVIL, ZESTRIL) 20 mg tablet Take  by mouth daily.  meloxicam (MOBIC) 15 mg tablet Take 15 mg by mouth daily.  adalimumab (HUMIRA PEN SC) by SubCUTAneous route every seven (7) days.  metFORMIN (GLUCOPHAGE) 500 mg tablet 1,000 mg two (2) times daily (with meals).  traMADol (ULTRAM) 50 mg tablet Take 50 mg by mouth.  Indications: 2 pills three times daily       No Known Allergies    Social History     Social History    Marital status: SINGLE     Spouse name: N/A    Number of children: N/A    Years of education: N/A     Social History Main Topics    Smoking status: Never Smoker    Smokeless tobacco: Never Used    Alcohol use No    Drug use: No    Sexual activity: Yes Partners: Female     Birth control/ protection: Condom     Other Topics Concern    None     Social History Narrative     Review of systems: He denies exertional discomfort in the chest neck jaw back or arm. He denies dyspnea on exertion PND orthopnea or edema. He denies syncope or presyncope. He has never had any type of chest pain. Visit Vitals    /74 (BP 1 Location: Left arm, BP Patient Position: Sitting)    Pulse 80    Temp 98.3 °F (36.8 °C) (Oral)    Resp 14    Ht 6' 1\" (1.854 m)    Wt 277 lb 12.8 oz (126 kg)    SpO2 94%    BMI 36.65 kg/m2     Carotids are 2+ without bruits. Lungs are clear to percussion. Good breath sounds with no wheezing or crackles. Heart reveals a regular rhythm with somewhat distant heart tones, no murmur gallop click or rub. Apical impulse is nonpalpable. Abdomen is obese soft nontender with no hepatosplenomegaly or masses but he has a few small somewhat firm subcutaneous nodules in the back and abdomen. Extremities reveal no clubbing cyanosis or edema. Pulses are 2+. Results for orders placed or performed during the hospital encounter of 10/09/18   CBC WITH AUTOMATED DIFF   Result Value Ref Range    WBC 10.2 4.6 - 13.2 K/uL    RBC 5.22 4.70 - 5.50 M/uL    HGB 16.3 (H) 13.0 - 16.0 g/dL    HCT 45.3 36.0 - 48.0 %    MCV 86.8 74.0 - 97.0 FL    MCH 31.2 24.0 - 34.0 PG    MCHC 36.0 31.0 - 37.0 g/dL    RDW 12.4 11.6 - 14.5 %    PLATELET 123 058 - 951 K/uL    MPV 10.8 9.2 - 11.8 FL    NEUTROPHILS 46 40 - 73 %    LYMPHOCYTES 46 21 - 52 %    MONOCYTES 6 3 - 10 %    EOSINOPHILS 2 0 - 5 %    BASOPHILS 0 0 - 2 %    ABS. NEUTROPHILS 4.7 1.8 - 8.0 K/UL    ABS. LYMPHOCYTES 4.7 (H) 0.9 - 3.6 K/UL    ABS. MONOCYTES 0.6 0.05 - 1.2 K/UL    ABS. EOSINOPHILS 0.2 0.0 - 0.4 K/UL    ABS.  BASOPHILS 0.0 0.0 - 0.1 K/UL    DF AUTOMATED     LIPID PANEL   Result Value Ref Range    LIPID PROFILE          Cholesterol, total 172 <200 MG/DL    Triglyceride 205 (H) <150 MG/DL    HDL Cholesterol 40 40 - 60 MG/DL    LDL, calculated 91 0 - 100 MG/DL    VLDL, calculated 41 MG/DL    CHOL/HDL Ratio 4.3 0 - 5.0     MAGNESIUM   Result Value Ref Range    Magnesium 2.0 1.6 - 2.6 mg/dL   METABOLIC PANEL, COMPREHENSIVE   Result Value Ref Range    Sodium 137 136 - 145 mmol/L    Potassium 4.4 3.5 - 5.5 mmol/L    Chloride 99 (L) 100 - 108 mmol/L    CO2 28 21 - 32 mmol/L    Anion gap 10 3.0 - 18 mmol/L    Glucose 280 (H) 74 - 99 mg/dL    BUN 15 7.0 - 18 MG/DL    Creatinine 1.12 0.6 - 1.3 MG/DL    BUN/Creatinine ratio 13 12 - 20      GFR est AA >60 >60 ml/min/1.73m2    GFR est non-AA >60 >60 ml/min/1.73m2    Calcium 9.6 8.5 - 10.1 MG/DL    Bilirubin, total 0.7 0.2 - 1.0 MG/DL    ALT (SGPT) 148 (H) 16 - 61 U/L    AST (SGOT) 80 (H) 15 - 37 U/L    Alk. phosphatase 84 45 - 117 U/L    Protein, total 7.4 6.4 - 8.2 g/dL    Albumin 4.5 3.4 - 5.0 g/dL    Globulin 2.9 2.0 - 4.0 g/dL    A-G Ratio 1.6 0.8 - 1.7     TSH 3RD GENERATION   Result Value Ref Range    TSH 1.33 0.36 - 3.74 uIU/mL   URIC ACID   Result Value Ref Range    Uric acid 4.3 2.6 - 7.2 MG/DL   URINALYSIS W/MICROSCOPIC   Result Value Ref Range    Color YELLOW      Appearance CLEAR      Specific gravity >1.030 (H) 1.005 - 1.030    pH (UA) 5.0 5.0 - 8.0      Protein NEGATIVE  NEG mg/dL    Glucose >1000 (A) NEG mg/dL    Ketone TRACE (A) NEG mg/dL    Bilirubin NEGATIVE  NEG      Blood NEGATIVE  NEG      Urobilinogen 1.0 0.2 - 1.0 EU/dL    Nitrites NEGATIVE  NEG      Leukocyte Esterase NEGATIVE  NEG      WBC NEGATIVE  0 - 4 /hpf    RBC NEGATIVE  0 - 5 /hpf    Epithelial cells NEGATIVE  0 - 5 /lpf    Bacteria NEGATIVE  NEG /hpf    Uric acid crystals 1+ (A) NEG     Laboratory from yesterday shows fasting glucose 280, electrolytes are normal, hemoglobin 16.3,  and AST 80. EKG shows normal sinus rhythm at 80 bpm, very low voltage QRSs in lead aVF, very slight upstroke and very slight downstroke in different beats.   This finding is not good evidence for prior inferior infarction. I would read the EKG is borderline and not abnormal.    Assessment: Patient is at low risk for upcoming surgery tomorrow and is medically cleared for removal of subcutaneous nodules under general anesthetic. #2. Diabetes at this time is fairly poorly controlled, it is not a contraindication for his surgery and he will be working on diet and medication adjustments postop. #3. Severe obesity, he is starting a new diet and starting to exercise. #4.  Increasing ALT and AST probably related to fatty liver, and an ultrasound of the liver a few months ago that showed probable steatosis, I will defer further evaluation of elevated liver enzymes to his primary care doctor, Dr. Borrero Head. Spencer Ramsey MD FACP    Please note: This document has been produced using voice recognition software. Unrecognized errors in transcription may be present.

## 2018-10-11 ENCOUNTER — HOSPITAL ENCOUNTER (OUTPATIENT)
Age: 43
Setting detail: OUTPATIENT SURGERY
Discharge: HOME | End: 2018-10-11
Attending: SURGERY | Admitting: SURGERY
Payer: COMMERCIAL

## 2018-10-11 ENCOUNTER — ANESTHESIA (OUTPATIENT)
Dept: SURGERY | Age: 43
End: 2018-10-11
Payer: COMMERCIAL

## 2018-10-11 VITALS
TEMPERATURE: 96.3 F | HEIGHT: 72 IN | DIASTOLIC BLOOD PRESSURE: 63 MMHG | RESPIRATION RATE: 15 BRPM | HEART RATE: 65 BPM | OXYGEN SATURATION: 94 % | SYSTOLIC BLOOD PRESSURE: 114 MMHG | BODY MASS INDEX: 37.52 KG/M2 | WEIGHT: 277 LBS

## 2018-10-11 DIAGNOSIS — G89.18 POSTOPERATIVE PAIN: Primary | ICD-10-CM

## 2018-10-11 DIAGNOSIS — M79.89 SOFT TISSUE MASS: ICD-10-CM

## 2018-10-11 LAB
GLUCOSE BLD STRIP.AUTO-MCNC: 219 MG/DL (ref 70–110)
GLUCOSE BLD STRIP.AUTO-MCNC: 221 MG/DL (ref 70–110)
GLUCOSE BLD STRIP.AUTO-MCNC: 235 MG/DL (ref 70–110)
GLUCOSE BLD STRIP.AUTO-MCNC: 250 MG/DL (ref 70–110)

## 2018-10-11 PROCEDURE — 77030031139 HC SUT VCRL2 J&J -A: Performed by: SURGERY

## 2018-10-11 PROCEDURE — 77030020782 HC GWN BAIR PAWS FLX 3M -B: Performed by: SURGERY

## 2018-10-11 PROCEDURE — 77030002933 HC SUT MCRYL J&J -A: Performed by: SURGERY

## 2018-10-11 PROCEDURE — 74011250637 HC RX REV CODE- 250/637: Performed by: NURSE ANESTHETIST, CERTIFIED REGISTERED

## 2018-10-11 PROCEDURE — 74011000250 HC RX REV CODE- 250

## 2018-10-11 PROCEDURE — 76010000153 HC OR TIME 1.5 TO 2 HR: Performed by: SURGERY

## 2018-10-11 PROCEDURE — 74011636637 HC RX REV CODE- 636/637: Performed by: NURSE ANESTHETIST, CERTIFIED REGISTERED

## 2018-10-11 PROCEDURE — 77030039266 HC ADH SKN EXOFIN S2SG -A: Performed by: SURGERY

## 2018-10-11 PROCEDURE — 76210000006 HC OR PH I REC 0.5 TO 1 HR: Performed by: SURGERY

## 2018-10-11 PROCEDURE — 74011250636 HC RX REV CODE- 250/636: Performed by: NURSE ANESTHETIST, CERTIFIED REGISTERED

## 2018-10-11 PROCEDURE — 88307 TISSUE EXAM BY PATHOLOGIST: CPT | Performed by: SURGERY

## 2018-10-11 PROCEDURE — 74011250636 HC RX REV CODE- 250/636

## 2018-10-11 PROCEDURE — 77030008683 HC TU ET CUF COVD -A: Performed by: ANESTHESIOLOGY

## 2018-10-11 PROCEDURE — 74011000250 HC RX REV CODE- 250: Performed by: SURGERY

## 2018-10-11 PROCEDURE — 77030013079 HC BLNKT BAIR HGGR 3M -A: Performed by: ANESTHESIOLOGY

## 2018-10-11 PROCEDURE — 76210000021 HC REC RM PH II 0.5 TO 1 HR: Performed by: SURGERY

## 2018-10-11 PROCEDURE — 82962 GLUCOSE BLOOD TEST: CPT

## 2018-10-11 PROCEDURE — 76060000034 HC ANESTHESIA 1.5 TO 2 HR: Performed by: SURGERY

## 2018-10-11 PROCEDURE — 74011250636 HC RX REV CODE- 250/636: Performed by: SURGERY

## 2018-10-11 RX ORDER — MAGNESIUM SULFATE 100 %
4 CRYSTALS MISCELLANEOUS AS NEEDED
Status: DISCONTINUED | OUTPATIENT
Start: 2018-10-11 | End: 2018-10-11 | Stop reason: HOSPADM

## 2018-10-11 RX ORDER — MIDAZOLAM HYDROCHLORIDE 1 MG/ML
INJECTION, SOLUTION INTRAMUSCULAR; INTRAVENOUS AS NEEDED
Status: DISCONTINUED | OUTPATIENT
Start: 2018-10-11 | End: 2018-10-11 | Stop reason: HOSPADM

## 2018-10-11 RX ORDER — INSULIN LISPRO 100 [IU]/ML
INJECTION, SOLUTION INTRAVENOUS; SUBCUTANEOUS ONCE
Status: COMPLETED | OUTPATIENT
Start: 2018-10-11 | End: 2018-10-11

## 2018-10-11 RX ORDER — SODIUM CHLORIDE 0.9 % (FLUSH) 0.9 %
5-10 SYRINGE (ML) INJECTION AS NEEDED
Status: DISCONTINUED | OUTPATIENT
Start: 2018-10-11 | End: 2018-10-11 | Stop reason: HOSPADM

## 2018-10-11 RX ORDER — NALOXONE HYDROCHLORIDE 0.4 MG/ML
0.04 INJECTION, SOLUTION INTRAMUSCULAR; INTRAVENOUS; SUBCUTANEOUS AS NEEDED
Status: DISCONTINUED | OUTPATIENT
Start: 2018-10-11 | End: 2018-10-11 | Stop reason: HOSPADM

## 2018-10-11 RX ORDER — ONDANSETRON 2 MG/ML
INJECTION INTRAMUSCULAR; INTRAVENOUS AS NEEDED
Status: DISCONTINUED | OUTPATIENT
Start: 2018-10-11 | End: 2018-10-11 | Stop reason: HOSPADM

## 2018-10-11 RX ORDER — HYDROMORPHONE HYDROCHLORIDE 2 MG/ML
0.5 INJECTION, SOLUTION INTRAMUSCULAR; INTRAVENOUS; SUBCUTANEOUS
Status: DISCONTINUED | OUTPATIENT
Start: 2018-10-11 | End: 2018-10-11 | Stop reason: HOSPADM

## 2018-10-11 RX ORDER — DEXAMETHASONE SODIUM PHOSPHATE 4 MG/ML
INJECTION, SOLUTION INTRA-ARTICULAR; INTRALESIONAL; INTRAMUSCULAR; INTRAVENOUS; SOFT TISSUE AS NEEDED
Status: DISCONTINUED | OUTPATIENT
Start: 2018-10-11 | End: 2018-10-11 | Stop reason: HOSPADM

## 2018-10-11 RX ORDER — NEOSTIGMINE METHYLSULFATE 1 MG/ML
INJECTION INTRAVENOUS AS NEEDED
Status: DISCONTINUED | OUTPATIENT
Start: 2018-10-11 | End: 2018-10-11 | Stop reason: HOSPADM

## 2018-10-11 RX ORDER — DOCUSATE SODIUM 100 MG/1
100 CAPSULE, LIQUID FILLED ORAL 2 TIMES DAILY
Qty: 60 CAP | Refills: 2 | Status: SHIPPED | OUTPATIENT
Start: 2018-10-11 | End: 2019-01-09

## 2018-10-11 RX ORDER — SODIUM CHLORIDE, SODIUM LACTATE, POTASSIUM CHLORIDE, CALCIUM CHLORIDE 600; 310; 30; 20 MG/100ML; MG/100ML; MG/100ML; MG/100ML
50 INJECTION, SOLUTION INTRAVENOUS CONTINUOUS
Status: DISCONTINUED | OUTPATIENT
Start: 2018-10-11 | End: 2018-10-11 | Stop reason: HOSPADM

## 2018-10-11 RX ORDER — PROPOFOL 10 MG/ML
INJECTION, EMULSION INTRAVENOUS AS NEEDED
Status: DISCONTINUED | OUTPATIENT
Start: 2018-10-11 | End: 2018-10-11 | Stop reason: HOSPADM

## 2018-10-11 RX ORDER — FAMOTIDINE 20 MG/1
20 TABLET, FILM COATED ORAL ONCE
Status: COMPLETED | OUTPATIENT
Start: 2018-10-11 | End: 2018-10-11

## 2018-10-11 RX ORDER — SODIUM CHLORIDE 0.9 % (FLUSH) 0.9 %
5-10 SYRINGE (ML) INJECTION EVERY 8 HOURS
Status: DISCONTINUED | OUTPATIENT
Start: 2018-10-11 | End: 2018-10-11 | Stop reason: HOSPADM

## 2018-10-11 RX ORDER — GLYCOPYRROLATE 0.2 MG/ML
INJECTION INTRAMUSCULAR; INTRAVENOUS AS NEEDED
Status: DISCONTINUED | OUTPATIENT
Start: 2018-10-11 | End: 2018-10-11 | Stop reason: HOSPADM

## 2018-10-11 RX ORDER — DIPHENHYDRAMINE HYDROCHLORIDE 50 MG/ML
12.5 INJECTION, SOLUTION INTRAMUSCULAR; INTRAVENOUS
Status: DISCONTINUED | OUTPATIENT
Start: 2018-10-11 | End: 2018-10-11 | Stop reason: HOSPADM

## 2018-10-11 RX ORDER — BUPIVACAINE HYDROCHLORIDE AND EPINEPHRINE 2.5; 5 MG/ML; UG/ML
INJECTION, SOLUTION EPIDURAL; INFILTRATION; INTRACAUDAL; PERINEURAL AS NEEDED
Status: DISCONTINUED | OUTPATIENT
Start: 2018-10-11 | End: 2018-10-11 | Stop reason: HOSPADM

## 2018-10-11 RX ORDER — LIDOCAINE HYDROCHLORIDE 20 MG/ML
INJECTION, SOLUTION EPIDURAL; INFILTRATION; INTRACAUDAL; PERINEURAL AS NEEDED
Status: DISCONTINUED | OUTPATIENT
Start: 2018-10-11 | End: 2018-10-11 | Stop reason: HOSPADM

## 2018-10-11 RX ORDER — HYDROCODONE BITARTRATE AND ACETAMINOPHEN 5; 325 MG/1; MG/1
TABLET ORAL
Qty: 40 TAB | Refills: 0 | Status: SHIPPED | OUTPATIENT
Start: 2018-10-11 | End: 2018-11-09

## 2018-10-11 RX ORDER — METOPROLOL TARTRATE 5 MG/5ML
INJECTION INTRAVENOUS AS NEEDED
Status: DISCONTINUED | OUTPATIENT
Start: 2018-10-11 | End: 2018-10-11 | Stop reason: HOSPADM

## 2018-10-11 RX ORDER — ROCURONIUM BROMIDE 10 MG/ML
INJECTION, SOLUTION INTRAVENOUS AS NEEDED
Status: DISCONTINUED | OUTPATIENT
Start: 2018-10-11 | End: 2018-10-11 | Stop reason: HOSPADM

## 2018-10-11 RX ORDER — ALBUTEROL SULFATE 0.83 MG/ML
2.5 SOLUTION RESPIRATORY (INHALATION) AS NEEDED
Status: DISCONTINUED | OUTPATIENT
Start: 2018-10-11 | End: 2018-10-11 | Stop reason: HOSPADM

## 2018-10-11 RX ORDER — SODIUM CHLORIDE, SODIUM LACTATE, POTASSIUM CHLORIDE, CALCIUM CHLORIDE 600; 310; 30; 20 MG/100ML; MG/100ML; MG/100ML; MG/100ML
75 INJECTION, SOLUTION INTRAVENOUS CONTINUOUS
Status: DISCONTINUED | OUTPATIENT
Start: 2018-10-11 | End: 2018-10-11 | Stop reason: HOSPADM

## 2018-10-11 RX ORDER — SUCCINYLCHOLINE CHLORIDE 20 MG/ML
INJECTION INTRAMUSCULAR; INTRAVENOUS AS NEEDED
Status: DISCONTINUED | OUTPATIENT
Start: 2018-10-11 | End: 2018-10-11 | Stop reason: HOSPADM

## 2018-10-11 RX ORDER — FENTANYL CITRATE 50 UG/ML
INJECTION, SOLUTION INTRAMUSCULAR; INTRAVENOUS AS NEEDED
Status: DISCONTINUED | OUTPATIENT
Start: 2018-10-11 | End: 2018-10-11 | Stop reason: HOSPADM

## 2018-10-11 RX ORDER — DEXTROSE 50 % IN WATER (D50W) INTRAVENOUS SYRINGE
25-50 AS NEEDED
Status: DISCONTINUED | OUTPATIENT
Start: 2018-10-11 | End: 2018-10-11 | Stop reason: HOSPADM

## 2018-10-11 RX ORDER — ONDANSETRON 2 MG/ML
4 INJECTION INTRAMUSCULAR; INTRAVENOUS ONCE
Status: DISCONTINUED | OUTPATIENT
Start: 2018-10-11 | End: 2018-10-11 | Stop reason: HOSPADM

## 2018-10-11 RX ADMIN — MIDAZOLAM HYDROCHLORIDE 2 MG: 1 INJECTION, SOLUTION INTRAMUSCULAR; INTRAVENOUS at 07:27

## 2018-10-11 RX ADMIN — FENTANYL CITRATE 50 MCG: 50 INJECTION, SOLUTION INTRAMUSCULAR; INTRAVENOUS at 08:22

## 2018-10-11 RX ADMIN — NEOSTIGMINE METHYLSULFATE 3 MG: 1 INJECTION INTRAVENOUS at 09:07

## 2018-10-11 RX ADMIN — PROPOFOL 50 MG: 10 INJECTION, EMULSION INTRAVENOUS at 07:44

## 2018-10-11 RX ADMIN — INSULIN LISPRO 6 UNITS: 100 INJECTION, SOLUTION INTRAVENOUS; SUBCUTANEOUS at 06:58

## 2018-10-11 RX ADMIN — SODIUM CHLORIDE, SODIUM LACTATE, POTASSIUM CHLORIDE, AND CALCIUM CHLORIDE 50 ML/HR: 600; 310; 30; 20 INJECTION, SOLUTION INTRAVENOUS at 09:35

## 2018-10-11 RX ADMIN — ROCURONIUM BROMIDE 15 MG: 10 INJECTION, SOLUTION INTRAVENOUS at 07:50

## 2018-10-11 RX ADMIN — ONDANSETRON 4 MG: 2 INJECTION INTRAMUSCULAR; INTRAVENOUS at 07:27

## 2018-10-11 RX ADMIN — GLYCOPYRROLATE 0.2 MG: 0.2 INJECTION INTRAMUSCULAR; INTRAVENOUS at 07:27

## 2018-10-11 RX ADMIN — FENTANYL CITRATE 150 MCG: 50 INJECTION, SOLUTION INTRAMUSCULAR; INTRAVENOUS at 07:33

## 2018-10-11 RX ADMIN — GLYCOPYRROLATE 0.6 MG: 0.2 INJECTION INTRAMUSCULAR; INTRAVENOUS at 09:07

## 2018-10-11 RX ADMIN — CEFAZOLIN SODIUM IN SODIUM CHLORIDE 0.9% IV SOLN 3 GM/100ML 2 G: 3-0.9/1 SOLUTION at 07:27

## 2018-10-11 RX ADMIN — ROCURONIUM BROMIDE 5 MG: 10 INJECTION, SOLUTION INTRAVENOUS at 07:33

## 2018-10-11 RX ADMIN — LIDOCAINE HYDROCHLORIDE 100 MG: 20 INJECTION, SOLUTION EPIDURAL; INFILTRATION; INTRACAUDAL; PERINEURAL at 07:33

## 2018-10-11 RX ADMIN — FENTANYL CITRATE 50 MCG: 50 INJECTION, SOLUTION INTRAMUSCULAR; INTRAVENOUS at 08:52

## 2018-10-11 RX ADMIN — SODIUM CHLORIDE, SODIUM LACTATE, POTASSIUM CHLORIDE, AND CALCIUM CHLORIDE 75 ML/HR: 600; 310; 30; 20 INJECTION, SOLUTION INTRAVENOUS at 06:27

## 2018-10-11 RX ADMIN — SUCCINYLCHOLINE CHLORIDE 140 MG: 20 INJECTION INTRAMUSCULAR; INTRAVENOUS at 07:33

## 2018-10-11 RX ADMIN — FAMOTIDINE 20 MG: 20 TABLET ORAL at 06:28

## 2018-10-11 RX ADMIN — METOPROLOL TARTRATE 2.5 MG: 5 INJECTION INTRAVENOUS at 07:41

## 2018-10-11 RX ADMIN — INSULIN LISPRO 6 UNITS: 100 INJECTION, SOLUTION INTRAVENOUS; SUBCUTANEOUS at 09:58

## 2018-10-11 RX ADMIN — PROPOFOL 200 MG: 10 INJECTION, EMULSION INTRAVENOUS at 07:33

## 2018-10-11 RX ADMIN — FENTANYL CITRATE 50 MCG: 50 INJECTION, SOLUTION INTRAMUSCULAR; INTRAVENOUS at 07:44

## 2018-10-11 RX ADMIN — DEXAMETHASONE SODIUM PHOSPHATE 4 MG: 4 INJECTION, SOLUTION INTRA-ARTICULAR; INTRALESIONAL; INTRAMUSCULAR; INTRAVENOUS; SOFT TISSUE at 07:33

## 2018-10-11 RX ADMIN — SODIUM CHLORIDE, SODIUM LACTATE, POTASSIUM CHLORIDE, AND CALCIUM CHLORIDE: 600; 310; 30; 20 INJECTION, SOLUTION INTRAVENOUS at 07:27

## 2018-10-11 NOTE — ANESTHESIA PREPROCEDURE EVALUATION
Anesthetic History No history of anesthetic complications Review of Systems / Medical History Patient summary reviewed and pertinent labs reviewed Pulmonary Asthma Neuro/Psych Within defined limits Cardiovascular Hypertension GI/Hepatic/Renal 
Within defined limits Endo/Other Diabetes: well controlled, type 2 Morbid obesity and arthritis Other Findings Physical Exam 
 
Airway Mallampati: II 
TM Distance: 4 - 6 cm Neck ROM: normal range of motion Mouth opening: Normal 
 
 Cardiovascular Dental 
No notable dental hx Pulmonary Abdominal 
GI exam deferred Other Findings Anesthetic Plan ASA: 3 Anesthesia type: general 
 
 
 
 
Induction: Intravenous Anesthetic plan and risks discussed with: Patient

## 2018-10-11 NOTE — IP AVS SNAPSHOT
[address] [id number] [address] Patient: Ward Camp MRN: [id number] About your hospitalization You were admitted on:  October 11, 2018 You last received care in the:  [facility] PHASE 2 RECOVERY You were discharged on:  October 11, 2018 Why you were hospitalized Your primary diagnosis was:  Not on File Your diagnoses also included:  Soft Tissue Mass Follow-up Information Follow up With Details Comments Contact Info MD Karlie Carmen [address] [facility] 
[phone] Adamaris Givens MD Follow up in 2 week(s)  [address] [facility] [address] 200 [facility] 
[phone] Your Scheduled Appointments Friday November 09, 2018  9:00 AM EST METABOLIC PROGRAM 60 with Talisha Bolden NP [facility] ([facility]) [address] [facility] Syble Dragon Maximiliano [address] 200 [facility]  
[phone] Discharge Orders None A check harman indicates which time of day the medication should be taken. My Medications START taking these medications Instructions Each Dose to Equal  
 Morning Noon Evening Bedtime  
 docusate sodium 100 mg capsule Commonly known as:  Tomma Hamel Your last dose was: Your next dose is: Take 1 Cap by mouth two (2) times a day for 90 days. 100 mg HYDROcodone-acetaminophen 5-325 mg per tablet Commonly known as:  Liza Travis Your last dose was: Your next dose is:    
   
   
 1-2 tablets every 4-6 hours prn pain CONTINUE taking these medications Instructions Each Dose to Equal  
 Morning Noon Evening Bedtime HUMIRA PEN SC Your last dose was: Your next dose is:    
   
   
 by SubCUTAneous route every seven (7) days. lisinopril 20 mg tablet Commonly known as:  Yamileth Gaines Your last dose was: Your next dose is: Take  by mouth daily. meloxicam 15 mg tablet Commonly known as:  MOBIC Your last dose was: Your next dose is: Take 15 mg by mouth daily. 15 mg  
    
   
   
   
  
 metaxalone 800 mg tablet Commonly known as:  SKELAXIN Your last dose was: Your next dose is: Take 800 mg by mouth three (3) times daily. 800 mg  
    
   
   
   
  
 metFORMIN 500 mg tablet Commonly known as:  GLUCOPHAGE Your last dose was: Your next dose is:    
   
   
 1,000 mg two (2) times daily (with meals). 1000 mg  
    
   
   
   
  
 multivitamin tablet Commonly known as:  ONE A DAY Your last dose was: Your next dose is: Take 1 Tab by mouth daily. 1 Tab  
    
   
   
   
  
 pravastatin 20 mg tablet Commonly known as:  PRAVACHOL Your last dose was: Your next dose is: Take 20 mg by mouth nightly. 20 mg  
    
   
   
   
  
 traMADol 50 mg tablet Commonly known as:  ULTRAM  
   
Your last dose was: Your next dose is: Take 50 mg by mouth. Indications: 2 pills three times daily 50 mg Where to Get Your Medications Information on where to get these meds will be given to you by the nurse or doctor. ! Ask your nurse or doctor about these medications  
  docusate sodium 100 mg capsule HYDROcodone-acetaminophen 5-325 mg per tablet Opioid Education Prescription Opioids: What You Need to Know: 
 
Prescription opioids can be used to help relieve moderate-to-severe pain and are often prescribed following a surgery or injury, or for certain health conditions. These medications can be an important part of treatment but also come with serious risks.   Opioids are strong pain medicines. Examples include hydrocodone, oxycodone, fentanyl, and morphine. Heroin is an example of an illegal opioid. It is important to work with your health care provider to make sure you are getting the safest, most effective care. WHAT ARE THE RISKS AND SIDE EFFECTS OF OPIOID USE? Prescription opioids carry serious risks of addiction and overdose, especially with prolonged use. An opioid overdose, often marked by slow breathing, can cause sudden death. The use of prescription opioids can have a number of side effects as well, even when taken as directed. · Tolerance-meaning you might need to take more of a medication for the same pain relief · Physical dependence-meaning you have symptoms of withdrawal when the medication is stopped. Withdrawal symptoms can include nausea, sweating, chills, diarrhea, stomach cramps, and muscle aches. Withdrawal can last up to several weeks, depending on which drug you took and how long you took it. · Increased sensitivity to pain · Constipation · Nausea, vomiting, and dry mouth · Sleepiness and dizziness · Confusion · Depression · Low levels of testosterone that can result in lower sex drive, energy, and strength · Itching and sweating RISKS ARE GREATER WITH:      
· History of drug misuse, substance use disorder, or overdose · Mental health conditions (such as depression or anxiety) · Sleep apnea · Older age (72 years or older) · Pregnancy Avoid alcohol while taking prescription opioids. Also, unless specifically advised by your health care provider, medications to avoid include: · Benzodiazepines (such as Xanax or Valium) · Muscle relaxants (such as Soma or Flexeril) · Hypnotics (such as Ambien or Lunesta) · Other prescription opioids KNOW YOUR OPTIONS Talk to your health care provider about ways to manage your pain that don't involve prescription opioids.   Some of these options may actually work better and have fewer risks and side effects. Consult your physician before adding or stopping any medications, treatments, or physical activity. Options may include: 
· Pain relievers such as acetaminophen, ibuprofen, and naproxen · Some medications that are also used for depression or seizures · Physical therapy and exercise · Counseling to help patients learn how to cope better with triggers of pain and stress. · Application of heat or cold compress · Massage therapy · Relaxation techniques Be Informed Make sure you know the name of your medication, how much and how often to take it, and its potential risks & side effects. IF YOU ARE PRESCRIBED OPIOIDS FOR PAIN: 
· Never take opioids in greater amounts or more often than prescribed. Remember the goal is not to be pain-free but to manage your pain at a tolerable level. · Follow up with your primary care provider to: · Work together to create a plan on how to manage your pain. · Talk about ways to help manage your pain that don't involve prescription opioids. · Talk about any and all concerns and side effects. · Help prevent misuse and abuse. · Never sell or share prescription opioids · Help prevent misuse and abuse. · Store prescription opioids in a secure place and out of reach of others (this may include visitors, children, friends, and family). · Safely dispose of unused/unwanted prescription opioids: Find your community drug take-back program or your pharmacy mail-back program, or flush them down the toilet, following guidance from the Food and Drug Administration (www.fda.gov/Drugs/ResourcesForYou). · Visit www.cdc.gov/drugoverdose to learn about the risks of opioid abuse and overdose. · If you believe you may be struggling with addiction, tell your health care provider and ask for guidance or call [address] [place], [place] [address] at 1-183-675-TTAE. Discharge Instructions [employer] [facility] Anila Alvarez MD, FACS General Surgery Pt may shower. Allow soap and water to run over the incision. No driving or operating heavy machinery while on narcotic pain medications. No strenuous activity or contact sports for two weeks. No lifting greater than 15 lbs for 2 weeks. Call MD for any redness, swelling, bleeding or pus at the incision. Also call for any nausea, vomiting, increased pain or pain uncontrolled by pain medicine. Lipoma: Care Instructions Your Care Instructions A lipoma is a growth of fat just below the skin. It may feel soft and rubbery. Lipomas can occur anywhere on the body. But they are most common on the torso, neck, upper thighs, upper arms, and armpits. A lipoma does not turn into cancer. Lipomas usually are not treated, because most of them don't hurt or cause problems. But your doctor may remove a lipoma if it is painful, gets infected, or bothers you. Follow-up care is a key part of your treatment and safety. Be sure to make and go to all appointments, and call your doctor if you are having problems. It's also a good idea to know your test results and keep a list of the medicines you take. How can you care for yourself at home? · A lipoma usually needs no care at home unless your doctor made a cut (incision) to remove it. · If your doctor told you how to care for your incision, follow your doctor's instructions. If you did not get instructions, follow this general advice: ¨ Wash around the incision with clean water 2 times a day. Don't use hydrogen peroxide or alcohol. These can slow healing. ¨ You may cover the incision with a thin layer of petroleum jelly, such as Vaseline, and a nonstick bandage. ¨ Apply more petroleum jelly and replace the bandage as needed. When should you call for help? Call your doctor now or seek immediate medical care if:   · You have signs of infection, such as: 
¨ Increased pain, swelling, warmth, or redness. ¨ Red streaks leading from the lipoma. ¨ Pus draining from the lipoma. ¨ A fever.  
 Watch closely for changes in your health, and be sure to contact your doctor if: 
  · The lipoma is growing or changing.  
  · You do not get better as expected. Where can you learn more? Go to http://dahlia-duke.info/. Enter Y220 in the search box to learn more about \"Lipoma: Care Instructions. \" Current as of: April 18, 2018 Content Version: 11.8 © 1816-2670 [employer]. Care instructions adapted under license by [employer] (which disclaims liability or warranty for this information). If you have questions about a medical condition or this instruction, always ask your healthcare professional. [address] any warranty or liability for your use of this information. Narcotic-Analgesic/Acetaminophen (Percocet, Norco, Lorcet HD, Lortab 10/325) - (By mouth) Why this medicine is used:  
Relieves pain. Contact a nurse or doctor right away if you have: 
· Extreme weakness, shallow breathing, slow heartbeat · Severe confusion, lightheadedness, dizziness, fainting · Yellow skin or eyes, dark urine or pale stools · Severe constipation, severe stomach pain, nausea, vomiting, loss of appetite · Sweating or cold, clammy skin Common side effects: · Mild constipation, nausea, vomiting · Sleepiness, tiredness · Itching, rash © 2017 [address] Information is for End User's use only and may not be sold, redistributed or otherwise used for commercial purposes. Laxative, Stool Softeners (Doculax, Colace, Colace Clear, DSS) - (By mouth) Why this medicine is used:  
Treats constipation by helping you have a bowel movement. Contact a nurse or doctor right away if you have: · Dark urine or pale stools · Vomiting, loss of appetite, stomach pain · Yellow skin or eyes Common side effects: 
· Nausea, diarrhea, stomach cramps, bitter taste in mouth © 2017 [facility] Information is for End User's use only and may not be sold, redistributed or otherwise used for commercial purposes. DISCHARGE SUMMARY from Nurse PATIENT INSTRUCTIONS: 
 
 
F-face looks uneven A-arms unable to move or move unevenly S-speech slurred or non-existent T-time-call 911 as soon as signs and symptoms begin-DO NOT go Back to bed or wait to see if you get better-TIME IS BRAIN. Warning Signs of HEART ATTACK Call 911 if you have these symptoms: 
? Chest discomfort. Most heart attacks involve discomfort in the center of the chest that lasts more than a few minutes, or that goes away and comes back. It can feel like uncomfortable pressure, squeezing, fullness, or pain. ? Discomfort in other areas of the upper body. Symptoms can include pain or discomfort in one or both arms, the back, neck, jaw, or stomach. ? Shortness of breath with or without chest discomfort. ? Other signs may include breaking out in a cold sweat, nausea, or lightheadedness. Don't wait more than five minutes to call [address]! Fast action can save your life. Calling 911 is almost always the fastest way to get lifesaving treatment. Emergency Medical Services staff can begin treatment when they arrive  up to an hour sooner than if someone gets to the hospital by car. The discharge information has been reviewed with the patient and parent. The patient and parent verbalized understanding. Discharge medications reviewed with the patient and appropriate educational materials and side effects teaching were provided. ___________________________________________________________________________________________________________________________________ MyChart Announcement We are excited to announce that we are making your provider's discharge notes available to you in [employer]. You will see these notes when they are completed and signed by the physician that discharged you from your recent hospital stay. If you have any questions or concerns about any information you see in [employer], please call the Health Information Department where you were seen or reach out to your Primary Care Provider for more information about your plan of care. Introducing [facility]! [employer] introduces [employer] patient portal. Now you can access parts of your medical record, email your doctor's office, and request medication refills online. 1. In your internet browser, go to [email or web address] 2. Click on the First Time User? Click Here link in the Sign In box. You will see the New Member Sign Up page. 3. Enter your [employer] Access Code exactly as it appears below. You will not need to use this code after youve completed the sign-up process. If you do not sign up before the expiration date, you must request a new code. · [employer] Access Code: [email or web address] Expires: 11/11/2018 12:10 PM 
 
4. Enter the last four digits of your Social Security Number (xxxx) and Date of Birth (mm/dd/yyyy) as indicated and click Submit. You will be taken to the next sign-up page. 5. Create a [employer] ID. This will be your [employer] login ID and cannot be changed, so think of one that is secure and easy to remember. 6. Create a [employer] password. You can change your password at any time. 7. Enter your Password Reset Question and Answer. This can be used at a later time if you forget your password. 8. Enter your e-mail address. You will receive e-mail notification when new information is available in [address] 9. Click Sign Up. You can now view and download portions of your medical record.  
10. Click the Download Summary menu link to download a portable copy of your medical information. If you have questions, please visit the Frequently Asked Questions section of the [email or web address] website. Remember, [employer] is NOT to be used for urgent needs. For medical emergencies, dial 911. Now available from your iPhone and Android! Introducing Chris Carty As a [facility] patient, I wanted to make you aware of our electronic visit tool called Chris Carty. [employer] Oy allows you to connect within minutes with a medical provider 24 hours a day, seven days a week via a mobile device or tablet or logging into a secure website from your computer. You can access Chris Carty from anywhere in the [place]. A virtual visit might be right for you when you have a simple condition and feel like you just dont want to get out of bed, or cant get away from work for an appointment, when your regular [facility] provider is not available (evenings, weekends or holidays), or when youre out of town and need minor care. Electronic visits cost only $49 and if the [employer] provider determines a prescription is needed to treat your condition, one can be electronically transmitted to a nearby pharmacy*. Please take a moment to enroll today if you have not already done so. The enrollment process is free and takes just a few minutes. To enroll, please download the BeMyGuest samara to your tablet or phone, or visit [email or web address] to enroll on your computer. And, as an [address] [place] [address] patient with a [employer] account, the results of your visits will be scanned into your electronic medical record and your primary care provider will be able to view the scanned results. We urge you to continue to see your regular [facility] provider for your ongoing medical care.   And while your primary care provider may not be the one available when you seek a Chris Carty virtual visit, the peace of mind you get from getting a real diagnosis real time can be priceless. For more information on Chris Carty, view our Frequently Asked Questions (FAQs) at [email or web address]. Sincerely, 
 
Twanna Lefort, MD 
Chief Medical Officer Ricarda Lopes *:  certain medications cannot be prescribed via Chris Carty Providers Seen During Your Hospitalization Provider Specialty Primary office phone Jj Adler, [address] [facility] [phone] Your Primary Care Physician (PCP) Primary Care Physician Office Phone Office Fax [address], [address] [facility] [phone] You are allergic to the following No active allergies Recent Documentation Height Weight BMI Smoking Status 1.829 m 125.6 kg 36.55 kg/m2 Never Smoker Emergency Contacts Name Discharge Info Relation Home Work Mobile NereydaPiqqualsivan  CAREGIVER [3] Mother [14] [phone] Patient Belongings The following personal items are in your possession at time of discharge: 
  Dental Appliances: None  Visual Aid: Glasses      Home Medications: None   Jewelry: None  Clothing: Shorts, Shirt, Undergarments, Footwear    Other Valuables: Eyeglasses, [employer] Please provide this summary of care documentation to your next provider. Signatures-by signing, you are acknowledging that this After Visit Summary has been reviewed with you and you have received a copy. Patient Signature:  ____________________________________________________________ Date:  ____________________________________________________________  
  
Mae Myers Provider Signature:  ____________________________________________________________ Date:  ____________________________________________________________

## 2018-10-11 NOTE — PROGRESS NOTES
[facility]  General Surgery    Subjective:      HPI:  Pt is a very pleasant 38 yo male with a PMHx htn, dm, sarcoidosis and asthma. He is referred by Dr. Lorena Lopez with c/o multiple soft tissue masses of the trunk. The patient reports that the masses have been present for several years but they are increasing in size. Patient Active Problem List    Diagnosis Date Noted    Soft tissue mass 10/11/2018    Severe obesity (BMI 35.0-39.9) 08/13/2018    Neuropathy 02/02/2012     Past Medical History:   Diagnosis Date    Asthma     Diabetes mellitus     Essential hypertension     Ill-defined condition     neuropathy    Psoriatic arthritis, destructive type ([facility] [address]     Sarcoidosis       Past Surgical History:   Procedure Laterality Date    HX CARPAL TUNNEL RELEASE      HX ORTHOPAEDIC      fx skull/ broken jaw     HX OTHER SURGICAL      lipoma removed       Family History   Problem Relation Age of Onset    Stroke Father     Diabetes Maternal Grandmother     Cancer Maternal Grandfather 79     prostate cancer      Social History   Substance Use Topics    Smoking status: Never Smoker    Smokeless tobacco: Never Used    Alcohol use No      No Known Allergies    Prior to Admission medications    Medication Sig Start Date End Date Taking? Authorizing Provider   metaxalone (SKELAXIN) 800 mg tablet Take 800 mg by mouth three (3) times daily. Yes Historical Provider   pravastatin (PRAVACHOL) 20 mg tablet Take 20 mg by mouth nightly. Yes Historical Provider   lisinopril (PRINIVIL, ZESTRIL) 20 mg tablet Take  by mouth daily. Yes Historical Provider   meloxicam (MOBIC) 15 mg tablet Take 15 mg by mouth daily. Yes Historical Provider   adalimumab (HUMIRA PEN SC) by SubCUTAneous route every seven (7) days. Yes Historical Provider   metFORMIN (GLUCOPHAGE) 500 mg tablet 1,000 mg two (2) times daily (with meals).  1/31/12  Yes Historical Provider   traMADol (ULTRAM) 50 mg tablet Take 50 mg by mouth. Indications: 2 pills three times daily 1/31/12  Yes Historical Provider   multivitamin (ONE A DAY) tablet Take 1 Tab by mouth daily. Historical Provider       Review of Systems:    14 systems were reviewed. The results are as above in the HPI and otherwise negative. Objective:     Vitals:    09/28/18 1439   BP: 112/68   Pulse: 89   Resp: 18   Temp: 98.2 °F (36.8 °C)   Weight: 128.8 kg (284 lb)   Height: 6' (1.829 m)       Physical Exam:  GENERAL: alert, cooperative, no distress, appears stated age,   EYE: conjunctivae/corneas clear. PERRL, EOM's intact. THROAT & NECK: normal and no erythema or exudates noted. ,    LYMPHATIC: Cervical, supraclavicular, and axillary nodes normal. ,   LUNG: clear to auscultation bilaterally,   HEART: regular rate and rhythm, S1, S2 normal, no murmur, click, rub or gallop,   ABDOMEN: soft, non-tender. Bowel sounds normal. No masses,  no organomegaly,   EXTREMITIES:  extremities normal, atraumatic, no cyanosis or edema,   SKIN: Multiple soft tissue nodules of the left upper abdominal wall, left flank and left back  NEUROLOGIC: AOx3. Cranial nerves 2-12 and sensation grossly intact. ,     Data Review:  to be done    Mr. Manuel Vasquez has a reminder for a \"due or due soon\" health maintenance. I have asked that he contact his primary care provider for follow-up on this health maintenance. Impression:     · Pt with multiple soft tissue nodules of uncertain etiology in a patient with sarcoidosis.      Plan:     · Excisional biopsy of soft tissue masses of the abdominal wall, torso and back  · Consent on chart   · Preoperative orders written    Signed By: Juan Mckeon MD     October 11, 2018

## 2018-10-11 NOTE — H&P (VIEW-ONLY)
[facility] General Surgery Subjective: HPI:  Pt is a very pleasant 36 yo male with a PMHx htn, dm, sarcoidosis and asthma. He is referred by Dr. Frantz Causey with c/o multiple soft tissue masses of the trunk. The patient reports that the masses have been present for several years but they are increasing in size. Patient Active Problem List  
 Diagnosis Date Noted  Soft tissue mass 10/11/2018  Severe obesity (BMI 35.0-39.9) 08/13/2018  Neuropathy 02/02/2012 Past Medical History:  
Diagnosis Date  Asthma  Diabetes mellitus  Essential hypertension  Ill-defined condition   
 neuropathy  Psoriatic arthritis, destructive type ([facility] [address]  Sarcoidosis Past Surgical History:  
Procedure Laterality Date  HX CARPAL TUNNEL RELEASE  HX ORTHOPAEDIC    
 fx skull/ broken jaw  HX OTHER SURGICAL    
 lipoma removed Family History Problem Relation Age of Onset  Stroke Father  Diabetes Maternal Grandmother  Cancer Maternal Grandfather 79  
  prostate cancer Social History Substance Use Topics  Smoking status: Never Smoker  Smokeless tobacco: Never Used  Alcohol use No  
  
No Known Allergies Prior to Admission medications Medication Sig Start Date End Date Taking? Authorizing Provider  
metaxalone (SKELAXIN) 800 mg tablet Take 800 mg by mouth three (3) times daily. Yes Historical Provider  
pravastatin (PRAVACHOL) 20 mg tablet Take 20 mg by mouth nightly. Yes Historical Provider  
lisinopril (PRINIVIL, ZESTRIL) 20 mg tablet Take  by mouth daily. Yes Historical Provider  
meloxicam (MOBIC) 15 mg tablet Take 15 mg by mouth daily. Yes Historical Provider  
adalimumab (HUMIRA PEN SC) by SubCUTAneous route every seven (7) days. Yes Historical Provider  
metFORMIN (GLUCOPHAGE) 500 mg tablet 1,000 mg two (2) times daily (with meals). 1/31/12  Yes Historical Provider traMADol (ULTRAM) 50 mg tablet Take 50 mg by mouth. Indications: 2 pills three times daily 1/31/12  Yes Historical Provider  
multivitamin (ONE A DAY) tablet Take 1 Tab by mouth daily. Historical Provider Review of Systems:   
14 systems were reviewed. The results are as above in the HPI and otherwise negative. Objective:  
 
Vitals:  
 09/28/18 1439 BP: 112/68 Pulse: 89 Resp: 18 Temp: 98.2 °F (36.8 °C) Weight: 128.8 kg (284 lb) Height: 6' (1.829 m) Physical Exam: 
GENERAL: alert, cooperative, no distress, appears stated age, EYE: conjunctivae/corneas clear. PERRL, EOM's intact. THROAT & NECK: normal and no erythema or exudates noted. ,   
LYMPHATIC: Cervical, supraclavicular, and axillary nodes normal. ,  
LUNG: clear to auscultation bilaterally, HEART: regular rate and rhythm, S1, S2 normal, no murmur, click, rub or gallop, ABDOMEN: soft, non-tender. Bowel sounds normal. No masses,  no organomegaly, EXTREMITIES:  extremities normal, atraumatic, no cyanosis or edema, SKIN: Multiple soft tissue nodules of the left upper abdominal wall, left flank and left back NEUROLOGIC: AOx3. Cranial nerves 2-12 and sensation grossly intact. ,  
 
Data Review:  to be done Mr. Ej Flores has a reminder for a \"due or due soon\" health maintenance. I have asked that he contact his primary care provider for follow-up on this health maintenance. Impression: · Pt with multiple soft tissue nodules of uncertain etiology in a patient with sarcoidosis. Plan:  
 
· Excisional biopsy of soft tissue masses of the abdominal wall, torso and back · Consent on chart · Preoperative orders written Signed By: Violet Hutchinson MD   
 October 11, 2018

## 2018-10-11 NOTE — DISCHARGE SUMMARY
[facility]  Tiffany Adames MD, [facility]  General Surgery  Discharge Summary     Patient ID:  Debbie Rae  [id number]  52 y.o.  1975    Admit Date: 10/11/2018    Discharge Date: 10/11/2018    Admission Diagnoses: Soft tissue mass [M79.9]    Discharge Diagnoses:    Problem List as of 10/11/2018  Date Reviewed: 10/11/2018          Codes Class Noted - Resolved    Soft tissue mass ICD-10-CM: M79.9  ICD-9-CM: 729.90  10/11/2018 - Present        Severe obesity (BMI 35.0-39. 9) ICD-10-CM: E66.01  ICD-9-CM: 278.01  8/13/2018 - Present        Neuropathy ICD-10-CM: G62.9  ICD-9-CM: 355.9  2/2/2012 - Present               Admission Condition: Good    Discharge Condition: Good    Last Procedure: Procedure(s):  EXCISIONAL BIOPSY OF SOFT TISSUE MASSES UPPER LEFT ABDOMEN, LEFT FLANK, LUMBAR SPINE (LEFT LATERAL DECUBITUS POSITION)    Hospital Course:   Normal hospital course for this procedure. Consults: None    Significant Diagnostic Studies: None    Disposition: home    Patient Instructions:   Current Discharge Medication List      START taking these medications    Details   HYDROcodone-acetaminophen (NORCO) 5-325 mg per tablet 1-2 tablets every 4-6 hours prn pain  Qty: 40 Tab, Refills: 0    Associated Diagnoses: Postoperative pain      docusate sodium (COLACE) 100 mg capsule Take 1 Cap by mouth two (2) times a day for 90 days. Qty: 60 Cap, Refills: 2    Associated Diagnoses: Postoperative pain         CONTINUE these medications which have NOT CHANGED    Details   multivitamin (ONE A DAY) tablet Take 1 Tab by mouth daily. metaxalone (SKELAXIN) 800 mg tablet Take 800 mg by mouth three (3) times daily. pravastatin (PRAVACHOL) 20 mg tablet Take 20 mg by mouth nightly. lisinopril (PRINIVIL, ZESTRIL) 20 mg tablet Take  by mouth daily. meloxicam (MOBIC) 15 mg tablet Take 15 mg by mouth daily. adalimumab (HUMIRA PEN SC) by SubCUTAneous route every seven (7) days. metFORMIN (GLUCOPHAGE) 500 mg tablet 1,000 mg two (2) times daily (with meals). traMADol (ULTRAM) 50 mg tablet Take 50 mg by mouth. Indications: 2 pills three times daily           Activity: See surgical instructions  Diet: Low fat, Low cholesterol  Wound Care: As directed    Follow-up with Dr. Toney Liu in 2 weeks.   Follow-up tests/labs None    Signed:  Jefferson Soto MD  10/11/2018  9:13 AM

## 2018-10-11 NOTE — DISCHARGE INSTRUCTIONS
[facility]  Jesus Leo MD, FACS  General Surgery    Pt may shower. Allow soap and water to run over the incision. No driving or operating heavy machinery while on narcotic pain medications. No strenuous activity or contact sports for two weeks. No lifting greater than 15 lbs for 2 weeks. Call MD for any redness, swelling, bleeding or pus at the incision. Also call for any nausea, vomiting, increased pain or pain uncontrolled by pain medicine. Lipoma: Care Instructions  Your Care Instructions  A lipoma is a growth of fat just below the skin. It may feel soft and rubbery. Lipomas can occur anywhere on the body. But they are most common on the torso, neck, upper thighs, upper arms, and armpits. A lipoma does not turn into cancer. Lipomas usually are not treated, because most of them don't hurt or cause problems. But your doctor may remove a lipoma if it is painful, gets infected, or bothers you. Follow-up care is a key part of your treatment and safety. Be sure to make and go to all appointments, and call your doctor if you are having problems. It's also a good idea to know your test results and keep a list of the medicines you take. How can you care for yourself at home? · A lipoma usually needs no care at home unless your doctor made a cut (incision) to remove it. · If your doctor told you how to care for your incision, follow your doctor's instructions. If you did not get instructions, follow this general advice:  ¨ Wash around the incision with clean water 2 times a day. Don't use hydrogen peroxide or alcohol. These can slow healing. ¨ You may cover the incision with a thin layer of petroleum jelly, such as Vaseline, and a nonstick bandage. ¨ Apply more petroleum jelly and replace the bandage as needed. When should you call for help?   Call your doctor now or seek immediate medical care if:    · You have signs of infection, such as:  ¨ Increased pain, swelling, warmth, or redness. ¨ Red streaks leading from the lipoma. ¨ Pus draining from the lipoma. ¨ A fever.    Watch closely for changes in your health, and be sure to contact your doctor if:    · The lipoma is growing or changing.     · You do not get better as expected. Where can you learn more? Go to http://dahlia-duke.info/. Enter C758 in the search box to learn more about \"Lipoma: Care Instructions. \"  Current as of: April 18, 2018  Content Version: 11.8  © 5416-9580 [employer]. Care instructions adapted under license by [employer] (which disclaims liability or warranty for this information). If you have questions about a medical condition or this instruction, always ask your healthcare professional. [address] any warranty or liability for your use of this information. Narcotic-Analgesic/Acetaminophen (Percocet, Norco, Lorcet HD, Lortab 10/325) - (By mouth)   Why this medicine is used:   Relieves pain. Contact a nurse or doctor right away if you have:  · Extreme weakness, shallow breathing, slow heartbeat  · Severe confusion, lightheadedness, dizziness, fainting  · Yellow skin or eyes, dark urine or pale stools  · Severe constipation, severe stomach pain, nausea, vomiting, loss of appetite  · Sweating or cold, clammy skin     Common side effects:  · Mild constipation, nausea, vomiting  · Sleepiness, tiredness  · Itching, rash  © 2017 [facility] Information is for End User's use only and may not be sold, redistributed or otherwise used for commercial purposes. Laxative, Stool Softeners (Doculax, Colace, Colace Clear, DSS) - (By mouth)   Why this medicine is used:   Treats constipation by helping you have a bowel movement.   Contact a nurse or doctor right away if you have:  · Dark urine or pale stools  · Vomiting, loss of appetite, stomach pain  · Yellow skin or eyes     Common side effects:  · Nausea, diarrhea, stomach cramps, bitter taste in mouth  © 2017 [facility] Information is for End User's use only and may not be sold, redistributed or otherwise used for commercial purposes. DISCHARGE SUMMARY from Nurse    PATIENT INSTRUCTIONS:    After general anesthesia or intravenous sedation, for 24 hours or while taking prescription Narcotics:  · Limit your activities  · Do not drive and operate hazardous machinery  · Do not make important personal or business decisions  · Do  not drink alcoholic beverages  · If you have not urinated within 8 hours after discharge, please contact your surgeon on call. Report the following to your surgeon:  · Excessive pain, swelling, redness or odor of or around the surgical area  · Temperature over 100.5  · Nausea and vomiting lasting longer than 4 hours or if unable to take medications  · Any signs of decreased circulation or nerve impairment to extremity: change in color, persistent  numbness, tingling, coldness or increase pain  · Any questions      *  Please give a list of your current medications to your Primary Care Provider. *  Please update this list whenever your medications are discontinued, doses are      changed, or new medications (including over-the-counter products) are added. *  Please carry medication information at all times in case of emergency situations. These are general instructions for a healthy lifestyle:    No smoking/ No tobacco products/ Avoid exposure to second hand smoke  Surgeon General's Warning:  Quitting smoking now greatly reduces serious risk to your health.     Obesity, smoking, and sedentary lifestyle greatly increases your risk for illness    A healthy diet, regular physical exercise & weight monitoring are important for maintaining a healthy lifestyle    You may be retaining fluid if you have a history of heart failure or if you experience any of the following symptoms:  Weight gain of 3 pounds or more overnight or 5 pounds in a week, increased swelling in our hands or feet or shortness of breath while lying flat in bed. Please call your doctor as soon as you notice any of these symptoms; do not wait until your next office visit. Recognize signs and symptoms of STROKE:    F-face looks uneven    A-arms unable to move or move unevenly    S-speech slurred or non-existent    T-time-call 911 as soon as signs and symptoms begin-DO NOT go       Back to bed or wait to see if you get better-TIME IS BRAIN. Warning Signs of HEART ATTACK     Call 911 if you have these symptoms:   Chest discomfort. Most heart attacks involve discomfort in the center of the chest that lasts more than a few minutes, or that goes away and comes back. It can feel like uncomfortable pressure, squeezing, fullness, or pain.  Discomfort in other areas of the upper body. Symptoms can include pain or discomfort in one or both arms, the back, neck, jaw, or stomach.  Shortness of breath with or without chest discomfort.  Other signs may include breaking out in a cold sweat, nausea, or lightheadedness. Don't wait more than five minutes to call 911 - MINUTES MATTER! Fast action can save your life. Calling 911 is almost always the fastest way to get lifesaving treatment. Emergency Medical Services staff can begin treatment when they arrive -- up to an hour sooner than if someone gets to the hospital by car. The discharge information has been reviewed with the patient and parent. The patient and parent verbalized understanding. Discharge medications reviewed with the patient and appropriate educational materials and side effects teaching were provided.   ___________________________________________________________________________________________________________________________________

## 2018-10-11 NOTE — ANESTHESIA POSTPROCEDURE EVALUATION
Post-Anesthesia Evaluation and Assessment Patient: Tung Gong MRN: [id number]  SSN: [id number] YOB: 1975  Age: 37 y.o. Sex: male Data from PACU flowsheet Cardiovascular Function/Vital Signs Visit Vitals  /70  Pulse 67  Temp 37 °C (98.6 °F)  Resp 16  
 Ht 6' (1.829 m)  Wt 125.6 kg (277 lb)  SpO2 95%  BMI 36.55 kg/m2 Patient is status post general anesthesia for Procedure(s): EXCISIONAL BIOPSY OF SOFT TISSUE MASSES UPPER LEFT ABDOMEN, LEFT FLANK, LUMBAR SPINE (LEFT LATERAL DECUBITUS POSITION). Nausea/Vomiting: controlled Postoperative hydration reviewed and adequate. Pain: 
Pain Scale 1: Numeric (0 - 10) (10/11/18 7416) Pain Intensity 1: 0 (10/11/18 4994) Managed Mental Status and Level of Consciousness: Alert and oriented Pulmonary Status:  
O2 Device: Room air (10/11/18 8861) Adequate oxygenation and airway patent Complications related to anesthesia: None Post-anesthesia assessment completed. No concerns Signed By: Oracio Villagomez MD   
 October 11, 2018

## 2018-10-11 NOTE — OP NOTES
[address] [place] [address] Dr Velez [facility], [address]                                 OPERATIVE REPORT    PATIENT:    Alex Hutchins  MRN:           [id number]   DATE:   10/11/2018  BILLIN  ROOM:       OR/PL  ATTENDING:   Anurag Andrews MD  DICTATING:   Anurag Andrews MD      Preoperative Dx: Soft tissue masses of the trunk-anterior abdominal wall, left flank, back    Postoperative Dx: Same     Procedure: Excisional biopsy of soft tissue masses of the trunk-anterior abdominal wall x3, left flank x2, back x2    Surgeon:  Dara Shelley MD    Assistant: Constantino Funk SA    Anesthesia:  General and Local -  .25% Marcaine plain    Findings:   Fatty tissue tumors of the trunk    Specimen: Fatty tissue tumors of the trunk x7    EBL: 10  mL    Fluid: 0964 mL    Complications:  None    Brief Operative Indication:   Soft tissue tumors of the trunk    Description of operation :  The patient was identified in the preoperative area. Informed consent was obtained from the patient. The patient was positioned right decubitus position on the table. The skin was prepped with chlorhexidine and sterile drape applied. Time out was performed to insure correct procedure. The local anesthetic was infiltrated into the skin and subcutaneous tissues at each incision prior to creating the incision. The mass in the right lower abdominal wall was removed first.  A 2 cm incision was created using the 15 blade. Electrocautery was used to dissect through subcutaneous tissue and expose the lipoma. Electrocautery and blunt dissection were used to remove the fatty tissue tumor which was approximately 1-1/2 cm x 1/2 cm in size. Electrocautery was used for hemostasis within the wound. Attention was then turned to the left upper quadrant abdominal wall soft tissue mass.   Again a 15 blade was used to create an incision approximately 1 cm in length through skin and subcutaneous tissue. The lipoma was grasped with a Allis clamp. Electrocautery was used to excise the lipoma which was 1 cm x 1 cm. Electrocautery was used for hemostasis within the incision. The mass in the left mid abdomen was then removed. The 15 blade was used to create incision through skin and subcutaneous tissue which was approximately 1-1/2 cm in length. This was carried down through skin and subcutaneous tissue with a 15 blade. The electrocautery was then used to dissect the lipoma from the surrounding fatty tissues. The lipoma was approximately 1.5 cm x 1.5 cm in size. Attention was then turned to the excision of the 2.5 x 1.5 cm lipoma from the flank in the mid axillary line left side. The 15 blade was used to create incision through skin and subcutaneous tissues into the lipoma. Electrocautery was then used to remove the lipoma from the surrounding tissues down to the underlying muscle fascia. Electrocautery was used for hemostasis within the incision. A second lipoma was located in the flank near the posterior axillary line. A 15 blade was used to create a 1.5 cm incision through skin and subcutaneous tissue. The electrocautery was used to excise the 1/2 cm lipoma from the surrounding fatty tissue. Electrocautery was used for hemostasis within the wound. The patient had a tattoo of his last name Dony Lópezing on his lumbar lower back. The soft tissue mass located under the Z was removed using the 15 blade to create a 2 cm incision through skin and subcutaneous tissue. The Allis was used to clamp the lipoma. The 2 cm x 1 cm lipoma was excised using electrocautery. Electrocautery was also used for hemostasis. The second lipoma in the lower back was located within the R of fistula. This was on the right side of the spine. The 15 blade was used to create incision through skin and subcutaneous tissue.   The 2-1/2 cm incision was used to remove the 2.5 cm x 2.5 cm lipoma from the underlying fatty tissue with electrocautery. The wounds were cleaned with sterile saline. The deep dermal tissues at each incision were re approximated using 3-0 Vicryl suture with interrupted simple stitches. The skin at each incision was re approximated using 4-0 Vicryl suture in a running subcuticular closure. Dermabond was used as a wound sealant. He tolerated the procedure well. He was stable transport to the recovery.

## 2018-10-11 NOTE — INTERVAL H&P NOTE
H&P Update:  Zayra Ward was seen and examined. History and physical has been reviewed. The patient has been examined.  There have been no significant clinical changes since the completion of the originally dated History and Physical.    Signed By: Ina Hicks MD     October 11, 2018 7:13 AM

## 2018-10-12 ENCOUNTER — TELEPHONE (OUTPATIENT)
Dept: SURGERY | Age: 43
End: 2018-10-12

## 2018-10-12 NOTE — PROGRESS NOTES
Please advise pt will need PCP clearance prior to starting VLCD. Lab results forwarded to PCP office.

## 2018-10-12 NOTE — TELEPHONE ENCOUNTER
Patient called complaining of pain at all the incision sites. He has been taking the Norco, 2 tabs, Q4hrs, pain level 4 to an 8. Dr. Vahe Corrales advised that the patient use Ice to those areas with a barrier for 30 minutes at a time, 3 times a day. He was also advised to obtain an abdominal binder from a Medical Supplier, such as [employer]. He understood and will call back if no relief.   PGC,LPN, CN, BC

## 2018-10-17 ENCOUNTER — TELEPHONE (OUTPATIENT)
Dept: INTERNAL MEDICINE CLINIC | Age: 43
End: 2018-10-17

## 2018-10-17 NOTE — TELEPHONE ENCOUNTER
The weight loss program wants you to look over his labs and see if its ok for him to do the program. Please advise

## 2018-10-19 NOTE — TELEPHONE ENCOUNTER
Please let them know it should be fine to start the weight loss program after review of his recent lab work. Thanks.

## 2018-11-05 ENCOUNTER — TELEPHONE (OUTPATIENT)
Dept: INTERNAL MEDICINE CLINIC | Age: 43
End: 2018-11-05

## 2018-11-05 RX ORDER — TRAMADOL HYDROCHLORIDE 50 MG/1
25-100 TABLET ORAL
Qty: 180 TAB | Refills: 0 | OUTPATIENT
Start: 2018-11-05

## 2018-11-05 NOTE — TELEPHONE ENCOUNTER
1 [facility] reports the last fill date for Ultram as 10/15/2018. The patient received this medication from Lexie Angeles DO in the past. There appears to be no inconsistencies in regards to the prescribing of this medication. Last Visit: 10/10/2018 with MD Lily Le; 08/13/2018 with MD Kadie Walker   Next Appointment: noted to f/u in 4 months     Requested Prescriptions     Pending Prescriptions Disp Refills    traMADol (ULTRAM) 50 mg tablet       Sig: Take 1 Tab by mouth.

## 2018-11-05 NOTE — TELEPHONE ENCOUNTER
Patient is calling back stating they need a letter from Dr. Sheldon Engel stating it is ok for him to do the weight loss program.

## 2018-11-06 NOTE — TELEPHONE ENCOUNTER
PT had a 30 day supply filled on 10/15 not due to fill until 11/14  He can request it next week     LM on pt voicemail to call us Monday or Tuesday to request it again

## 2018-11-07 NOTE — TELEPHONE ENCOUNTER
Patient needs follow up in clinic. Please provide assistance with appointment.  After exam we will be able to assess if patient is ready for weight loss program.     Thanks    Ghanshyam Salas MD

## 2018-11-09 ENCOUNTER — OFFICE VISIT (OUTPATIENT)
Dept: SURGERY | Age: 43
End: 2018-11-09

## 2018-11-09 VITALS
OXYGEN SATURATION: 95 % | RESPIRATION RATE: 18 BRPM | TEMPERATURE: 98.1 F | HEIGHT: 72 IN | HEART RATE: 75 BPM | BODY MASS INDEX: 37.9 KG/M2 | SYSTOLIC BLOOD PRESSURE: 114 MMHG | WEIGHT: 279.8 LBS | DIASTOLIC BLOOD PRESSURE: 88 MMHG

## 2018-11-09 DIAGNOSIS — K21.9 GASTROESOPHAGEAL REFLUX DISEASE, ESOPHAGITIS PRESENCE NOT SPECIFIED: ICD-10-CM

## 2018-11-09 DIAGNOSIS — E66.01 SEVERE OBESITY WITH BODY MASS INDEX (BMI) OF 35.0 TO 39.9 WITH SERIOUS COMORBIDITY (HCC): Primary | ICD-10-CM

## 2018-11-09 DIAGNOSIS — E78.00 HYPERCHOLESTEROLEMIA: ICD-10-CM

## 2018-11-09 DIAGNOSIS — Z79.1 LONG TERM CURRENT USE OF NON-STEROIDAL ANTI-INFLAMMATORIES (NSAID): ICD-10-CM

## 2018-11-09 DIAGNOSIS — E11.8 TYPE 2 DIABETES MELLITUS WITH COMPLICATION, WITHOUT LONG-TERM CURRENT USE OF INSULIN (HCC): ICD-10-CM

## 2018-11-09 DIAGNOSIS — R10.9 ABDOMINAL PAIN, UNSPECIFIED ABDOMINAL LOCATION: ICD-10-CM

## 2018-11-09 RX ORDER — SUCRALFATE 1 G/10ML
1 SUSPENSION ORAL 4 TIMES DAILY
Qty: 1200 ML | Refills: 1 | Status: SHIPPED | OUTPATIENT
Start: 2018-11-09 | End: 2019-01-08

## 2018-11-09 NOTE — PROGRESS NOTES
Chief Complaint   Patient presents with    Weight Management     consult     Pt waist measurement 48.5\" has questions about diet vs diabetes.

## 2018-11-09 NOTE — PROGRESS NOTES
Initial Consultation for Weight Loss    Teodora Johnson is a 37 y.o. male who comes into the office today for initial consultation for the options for the treatment of obesity. The patient initially identified obesity at the age of 45 and at age 25 weighed 190 lbs. He has tried a variety of unsupervised weight-loss attempts including self imposed, but has yet to meet with lasting success. Maximum weight lost on a diet is about 40 lbs, but that the weight loss always seems to return. Today, the patient is  Height: 6' (182.9 cm) tall, Weight: 126.9 kg (279 lb 12.8 oz) lbs for a Body mass index is 37.95 kg/m². It is due to the patient's obesity, which is further complicated by diabetes, hypertension, hyperlipidemia, GERD and weight related arthopathies  that the patient is now seeking out medically supervised VLCD. Ideal body weight: 77.6 kg (171 lb 1.2 oz)  Adjusted ideal body weight: 97.3 kg (214 lb 9.1 oz) (Based on [employer] Tables)    Highest wt:  286lbs   Goal wt: 200lbs   Start wt: 279lbs   EKG due: 229lbs     Goals: come off DM medications   Starting A1c:    Ref. Range 8/13/2018 13:40   Hemoglobin A1c, (calculated) Latest Ref Range: 4.8 - 5.6 % 9.5 (H)       Wt Readings from Last 10 Encounters:   11/09/18 126.9 kg (279 lb 12.8 oz)   10/11/18 125.6 kg (277 lb)   10/10/18 126 kg (277 lb 12.8 oz)   09/28/18 128.8 kg (284 lb)   09/05/18 126.1 kg (278 lb)   08/13/18 127.5 kg (281 lb)   02/01/12 113.4 kg (250 lb)       Weight Metrics 11/9/2018 11/9/2018 10/11/2018 10/10/2018 9/28/2018 9/5/2018 8/13/2018   Weight - 279 lb 12.8 oz 277 lb 277 lb 12.8 oz 284 lb 278 lb 281 lb   Waist Measure Inches 48.5 - - - - - -   BMI - 37.95 kg/m2 36.55 kg/m2 36.65 kg/m2 38.52 kg/m2 37.7 kg/m2 38.11 kg/m2     History of binge eating: no    History of purging: no    Major lifestyle changes: no   Other commitments: yes;  Xmas party   Any potential unsupportive: no; work goes to eat a lot     Has HIPPERHOLME ever been told by a physician not to exercise: yes; just post op from mass removal   Does RADHA know of any reason they shouldn't exercise: no  If yes, why? Does RADHA have any food allergies or sensitivities: no      MWL questionnaire reviewed. If female:  No LMP for male patient. Past Medical History:   Diagnosis Date    Asthma     Diabetes mellitus     Essential hypertension     Ill-defined condition     neuropathy    Psoriatic arthritis, destructive type ([address]     Sarcoidosis        Past Surgical History:   Procedure Laterality Date    HX CARPAL TUNNEL RELEASE      HX ORTHOPAEDIC      fx skull/ broken jaw     HX OTHER SURGICAL      lipoma removed     [facility] EXC SKIN BENIG 0.6-1CM TRUNK,ARM,LEG N/A 10/11/2018    Dr. Reid Gutierrez 1.1-2CM TRUNK,ARM,LEG N/A 10/11/2018    Dr. Reid Gutierrez 2.1-3CM TRUNK,ARM,LEG N/A 10/11/2018    Dr. Jaxson Fortune       Current Outpatient Medications   Medication Sig Dispense Refill    docusate sodium (COLACE) 100 mg capsule Take 1 Cap by mouth two (2) times a day for 90 days. 60 Cap 2    multivitamin (ONE A DAY) tablet Take 1 Tab by mouth daily.  metaxalone (SKELAXIN) 800 mg tablet Take 800 mg by mouth three (3) times daily.  pravastatin (PRAVACHOL) 20 mg tablet Take 20 mg by mouth nightly.  lisinopril (PRINIVIL, ZESTRIL) 20 mg tablet Take  by mouth daily.  meloxicam (MOBIC) 15 mg tablet Take 15 mg by mouth daily.  adalimumab (HUMIRA PEN SC) by SubCUTAneous route every seven (7) days.  metFORMIN (GLUCOPHAGE) 500 mg tablet 1,000 mg two (2) times daily (with meals).  traMADol (ULTRAM) 50 mg tablet Take 50 mg by mouth.  Indications: 2 pills three times daily      HYDROcodone-acetaminophen (NORCO) 5-325 mg per tablet 1-2 tablets every 4-6 hours prn pain 40 Tab 0       No Known Allergies    Social History     Tobacco Use    Smoking status: Never Smoker    Smokeless tobacco: Never Used   Substance Use Topics    Alcohol use: Yes     Alcohol/week: 1.2 oz     Types: 2 Shots of liquor per week     Comment: social    Drug use: No       Family History   Problem Relation Age of Onset    Stroke Father     Diabetes Maternal Grandmother     Cancer Maternal Grandfather 79        prostate cancer       Family Status   Relation Name Status    Mother  Alive    Father      Sister  Alive    MGM  Alive    MGF  Alive       Review of Systems:   Positive in BOLD  CONST: Fever, weight loss, fatigue or chills  GI: Nausea, vomiting, abdominal pain- med side effect, change in bowel habits constipation/loose stool-- meds, hematochezia, melena, and GERD   INTEG: Dermatitis, abnormal moles  HEENT: Recent changes in vision, vertigo, epistaxis, dysphagia and hoarseness  CV: Chest pain, palpitations, HTN, edema and varicosities  RESP: Cough, shortness of breath, wheezing, hemoptysis, snoring and reactive airway disease  : Hematuria, dysuria, frequency, urgency, nocturia and stress urinary incontinence   MS: Weakness, joint pain and arthritis  ENDO: Diabetes, thyroid disease, polyuria, polydipsia, polyphagia, poor wound healing, heat intolerance, cold intolerance  LYMPH/HEME: Anemia, bruising and history of blood transfusions  NEURO: Dizziness, headache, fainting, seizures and stroke  PSYCH: Anxiety and depression      Physical Exam    Visit Vitals  /88 (BP 1 Location: Right arm, BP Patient Position: Sitting)   Pulse 75   Temp 98.1 °F (36.7 °C) (Oral)   Resp 18   Ht 6' (1.829 m)   Wt 126.9 kg (279 lb 12.8 oz)   SpO2 95%   BMI 37.95 kg/m²         Physical Exam   Constitutional: He is oriented to person, place, and time and well-developed, well-nourished, and in no distress. HENT:   Head: Normocephalic. Cardiovascular: Normal rate, regular rhythm and normal heart sounds. Pulmonary/Chest: Effort normal and breath sounds normal.   Abdominal: Soft. Bowel sounds are normal. He exhibits no distension.  There is no tenderness. Incisions healing well post op for Dr. Mary Rawls; mild redness from itching/ glue no signs of infection    Musculoskeletal: Normal range of motion. Neurological: He is alert and oriented to person, place, and time. Skin: Skin is warm and dry. Psychiatric: Affect normal.       Lab results reviewed. For significant abnormal values and values requiring intervention, see assessment and plan. Assessment/Plan  Carol Lowe is a 37 y.o. male who is suffering from obesity with a BMI of 37.95  and comorbidities including diabetes, hypertension, dyslipidemia, GERD and weight related arthopathies  who would benefit from weight loss. Elevated LFT's watch these closely. PCP cleared to start VLCD   Diet regimen   # of meal replacements prescribed: 4-5 MR or 4 MR + chicken/boiled egg/greek yogurt     Monthly Goal   -15lbs     Medical monitoring schedule:   Weekly BP/Weight checks   Monthly provider appointments              Monthly CMP, uric acid checks    DM: check BS daily, monitor for s/s hypoglycemia-- follow up asap if s/s or low BS     Abdominal pain/longer term use of NSAID's: rx carafate for 6-8 weeks follow up 1 mo if s/s persist will consult with GI-- recommend long term use of PPI     I have reviewed/discussed the above normal BMI with the patient. I have recommended the following interventions: dietary management education, guidance, and counseling, monitor weight and prescribed dietary intake . .      Mr. Oracio Kirk has a reminder for a \"due or due soon\" health maintenance. I have asked that he contact his primary care provider for follow-up on this health maintenance.     Esther Grimalod, ROBER-BC

## 2018-11-09 NOTE — PATIENT INSTRUCTIONS
Monthly goal:      4 -5meal replacements daily, 4oz chicken/greek yogurt/boiled egg. First one within about 1 hour of waking up to get metabolism started. Don't go more than 6 hours between meals. No more than 1 soup a day, this is too much salt. No more than 1 bar a day, this not enough protein. You are allotted 10 carbs extra a day. Recommendations       - Consume your 4 daily meal replacements equally spaced over the day. Dont go more than 6 hours between each meal. Breakfast is especially important!      - Get the support of family and friends. - Snack-proof your home. - Have strategies for social situations, meetings that run over or vacation.       - If you fall off the plan; just start right back. Reflect on those days into examples of what to change or avoid next time. Program Compliance      We do not expect perfection. However, we do ask for your persistence and your willingness to do the work of growing yourself. You will hit plateaus in your weight loss. You will run into situations that test your will power. You will encounter times when you feel frustrated. How your respond to your slip ups and, the adjustments you make to prevent future slip ups will determine you long-term success. If you find yourself temporarily slipping in the program we will gently nudge you forward and encourage you to do the work of identifying and move beyond your stuck areas. However, if you find yourself wavering in the program for a prolonged time (more than 3 months) we will ask that you take a break from the program. At that time you will have 3 options:       1. Consult with one of our weight loss specialists to explore additional weight loss options. 2. Work with a counselor to do some focused work in order to identify and break the patterns that are holding you back. 3. A combination of both these.       Once you, your counselor and/or your weight loss specialist feel that you have moved past those patterns and want to give the program another chance, we will gladly work with you to determine if you're ready to start back in the program.      When returning after a break, if it has been over 3 months you will required to repeat an orientation and, if greater than 6 months, you will be required to repeat an orientation, labs and an EKG. Homework for [employer]        Exercise:   - Daily starting slow, gradually increase your time by 10- 20 % per week     - To prevent injury, take a recovery week every 4 weeks (reduce your exercise time and intensity by 1/2 during this time)     - Your goal is to work up to 150 min a week; hard enough that you can't whistle or sing. It may take 6 months to work up to this. - Call the Health  at [facility] labs for exercise ideas ([phone])       Diet:                Common Side Effects     -Constipation  -Fatigue  -Hunger  -Low sodium  -Hair Loss        1. Constipation: It can be prevented by Drinking at least 8-10 glasses of water a day, fiber, and exercise. If you're prone to constipation:  - Take a Stool Softener daily. Example: Dulcolax or Miralax   - Eat Plenty of Fiber                        Get the [employer] products with fiber added                        Keep your fiber intake to at least 20 grams a day                        Use SUGAR-FREE products: Fiber One, Benefiber or Metamucil     If you get constipated:  1. Start with a Stool Softener (produces a bowel movement in 72 hr)              2.  If you still have constipation after 2 to 3 days, add a Stimulant Laxative to the Stool Softener (results usually in 6-12 hr):  Examples:  Exlax (1 small square) for up to 4 days, Dulcolax stimulant laxative, or Magnesium citrate pill 500 mg start once a day and increase to 3 times a day if needed. 3. If conditions or symptoms persist contact your provider.      2.  Fatigue, most people experience this:  More common during the first 2 weeks, associated with your body adjusting to the Ketogenic diet. If symptoms persist contact your provider. 3.  Hunger:  More common in the first few days often disappears after body adjusts to the Ketogenic diet. 4.  Low blood levels of sodium, less common:   If you develop a headache, feel fatigued, light-headed or dizziness try adding 1/2 cup of bouillon broth every day. If symptoms persist contact your provider. 5.  Hair loss, you may see more than a usual amount of hair in your brush or the shower drain:   This can happen with physical stress (surgery, trauma or calorie restriction) or psychological stress. Although is it very concerning, it is often temporary and will resolve within 3 months. Some people notice a benefit from taking a daily dose of Biotin 2,500 mcg and increasing their Fish Oil intake. If symptoms persist contact your provider. For further information on where carbs hide in our foods:  1. Our Registered Dietitians     2. Www. Atkins. com/tools     3. Read food labels     4. Books       - The Calorie Sheyla Parmar       - The Mario System Diet for a New You       - Heena Burkett's NEW Carb and Calorie 4th Edition (my favorite)     5. Smart phone and Internet-based apps       - [employer]        - Carb Manager     Helpful Information on behavior change:   Change Anything by Modesto Delarosa, Craig Nevarez, and Rebecca Delay     Mindless Eating by Yesenia Noel     Perfectly Yourself by Yakelin Cabello     Me, Myself and I - 28 Days to Self-Love by Cal Hoskins       Long-term Healthy Weight / Body Fat  Different ways to determining your ideal weight:  1. Keep your waist to less than 1/2 of your height   2. Keep your % body fat to under 30% for female and under 20% if male  3.  Keep your BMI around 25      Fun Facts About Carbs     - The Typical American Diet = 450 grams a day     - The Reducing Phase of the VLCD = 40 grams a day     - Target for weight loss maintenance:                        - Eat no more than 30 grams per meal                        - Eat no more than 10 grams per snack (mid-morning and mid-afternoon snack)

## 2018-11-13 NOTE — TELEPHONE ENCOUNTER
PHONE IN Stephanie Massey    Per Jayshree's message below, the patient previously received Ultram from Dr Oralia Barkley office. He states the medication should be continued through Dr[facility] Myrtle Hammond. [facility]/2018 for a 30 d/s (#180). There appears to be no inconsistencies in regards to the prescribing of this medication. Please input desired frequency and quantity. Last Visit: 10/10/2018 with MD Myrtle Hammond; 08/13/2018 with MD Staci Deal  Next Appointment: noted to RTC around 12/13/2018    Requested Prescriptions     Pending Prescriptions Disp Refills    traMADol (ULTRAM) 50 mg tablet  0     Sig: Take  by mouth.

## 2018-11-14 ENCOUNTER — CLINICAL SUPPORT (OUTPATIENT)
Dept: FAMILY MEDICINE CLINIC | Age: 43
End: 2018-11-14

## 2018-11-14 DIAGNOSIS — E66.9 OBESITY, UNSPECIFIED CLASSIFICATION, UNSPECIFIED OBESITY TYPE, UNSPECIFIED WHETHER SERIOUS COMORBIDITY PRESENT: Primary | ICD-10-CM

## 2018-11-14 RX ORDER — TRAMADOL HYDROCHLORIDE 50 MG/1
50 TABLET ORAL
Qty: 90 TAB | Refills: 0 | OUTPATIENT
Start: 2018-11-14

## 2018-11-14 NOTE — TELEPHONE ENCOUNTER
The patient needed a later appointment because of work. I put him on her schedule on Wednesday, 12/19/18 @ 2pm.    [facility] class at [facility], prescribed by Dr. Abran Davila.

## 2018-11-15 ENCOUNTER — OFFICE VISIT (OUTPATIENT)
Dept: INTERNAL MEDICINE CLINIC | Age: 43
End: 2018-11-15

## 2018-11-15 VITALS
WEIGHT: 275 LBS | HEART RATE: 82 BPM | HEIGHT: 72 IN | DIASTOLIC BLOOD PRESSURE: 77 MMHG | BODY MASS INDEX: 37.25 KG/M2 | SYSTOLIC BLOOD PRESSURE: 110 MMHG

## 2018-11-15 VITALS
TEMPERATURE: 98.3 F | WEIGHT: 275 LBS | RESPIRATION RATE: 14 BRPM | OXYGEN SATURATION: 95 % | BODY MASS INDEX: 37.25 KG/M2 | HEART RATE: 83 BPM | SYSTOLIC BLOOD PRESSURE: 100 MMHG | DIASTOLIC BLOOD PRESSURE: 80 MMHG | HEIGHT: 72 IN

## 2018-11-15 DIAGNOSIS — Z23 ENCOUNTER FOR IMMUNIZATION: ICD-10-CM

## 2018-11-15 DIAGNOSIS — L40.50 PSORIATIC ARTHRITIS (HCC): ICD-10-CM

## 2018-11-15 DIAGNOSIS — E66.01 SEVERE OBESITY WITH BODY MASS INDEX (BMI) OF 35.0 TO 39.9 WITH SERIOUS COMORBIDITY (HCC): ICD-10-CM

## 2018-11-15 DIAGNOSIS — E11.8 TYPE 2 DIABETES MELLITUS WITH COMPLICATION, WITHOUT LONG-TERM CURRENT USE OF INSULIN (HCC): Primary | ICD-10-CM

## 2018-11-15 LAB — HBA1C MFR BLD HPLC: 8.5 %

## 2018-11-15 RX ORDER — TRAMADOL HYDROCHLORIDE 50 MG/1
100 TABLET ORAL
Qty: 180 TAB | Refills: 3 | Status: SHIPPED | OUTPATIENT
Start: 2018-11-15 | End: 2019-03-11 | Stop reason: SDUPTHER

## 2018-11-15 NOTE — PROGRESS NOTES
ROOM # 6 
 
Tung Gong presents today for Chief Complaint Patient presents with  Medication Evaluation Tung Gong preferred language for health care discussion is english/other. Is someone accompanying this pt? No 
 
Is the patient using any DME equipment during OV? No 
 
Depression Screening: PHQ over the last two weeks 11/15/2018 9/5/2018 8/13/2018 Little interest or pleasure in doing things Not at all Not at all Not at all Feeling down, depressed, irritable, or hopeless Not at all Not at all Not at all Total Score PHQ 2 0 0 0 Learning Assessment: 
Learning Assessment 11/9/2018 PRIMARY LEARNER Patient HIGHEST LEVEL OF EDUCATION - PRIMARY LEARNER  4 YEARS OF COLLEGE  
BARRIERS PRIMARY LEARNER NONE  
CO-LEARNER CAREGIVER No  
PRIMARY LANGUAGE ENGLISH  
LEARNER PREFERENCE PRIMARY DEMONSTRATION  
ANSWERED BY patient RELATIONSHIP SELF Abuse Screening: No flowsheet data found. Fall Risk No flowsheet data found. Visit Vitals /80 (BP 1 Location: Left arm, BP Patient Position: Sitting) Pulse 83 Temp 98.3 °F (36.8 °C) (Oral) Resp 14 Ht 6' (1.829 m) Wt 275 lb (124.7 kg) SpO2 95% BMI 37.30 kg/m² Health Maintenance reviewed and discussed per provider. Yes 
 
Tung Gong is due for Health Maintenance Due Topic Date Due  
 EYE EXAM RETINAL OR DILATED Q1  02/15/1985  Pneumococcal 19-64 Medium Risk (1 of 1 - PPSV23) 02/15/1994  
 DTaP/Tdap/Td series (1 - Tdap) 02/15/1996  Influenza Age 5 to Adult  08/01/2018 Please order/place referral if appropriate. Advance Directive: 1. Do you have an advance directive in place? Patient Reply: No 
 
2. If not, would you like material regarding how to put one in place? Patient Reply: NO 
 
Coordination of Care: 1. Have you been to the ER, urgent care clinic since your last visit? Hospitalized since your last visit?  No 
 
 2. Have you seen or consulted any other health care providers outside of the [address] [place] [address] since your last visit? Include any pap smears or colon screening.  No

## 2018-11-15 NOTE — PATIENT INSTRUCTIONS
Vaccine Information Statement Influenza (Flu) Vaccine (Inactivated or Recombinant): What you need to know Many Vaccine Information Statements are available in [place] and other languages. See www.immunize.org/vis Hojas de Información Sobre Vacunas están disponibles en Español y en muchos otros idiomas. Visite www.immunize.org/vis 1. Why get vaccinated? Influenza (flu) is a contagious disease that spreads around the [place] every year, usually between October and May. Flu is caused by influenza viruses, and is spread mainly by coughing, sneezing, and close contact. Anyone can get flu. Flu strikes suddenly and can last several days. Symptoms vary by age, but can include: 
 fever/chills  sore throat  muscle aches  fatigue  cough  headache  runny or stuffy nose Flu can also lead to pneumonia and blood infections, and cause diarrhea and seizures in children. If you have a medical condition, such as heart or lung disease, flu can make it worse. Flu is more dangerous for some people. Infants and young children, people 72years of age and older, pregnant women, and people with certain health conditions or a weakened immune system are at greatest risk. Each year thousands of people in the [facility] die from flu, and many more are hospitalized. Flu vaccine can: 
 keep you from getting flu, 
 make flu less severe if you do get it, and 
 keep you from spreading flu to your family and other people. 2. Inactivated and recombinant flu vaccines A dose of flu vaccine is recommended every flu season. Children 6 months through 6years of age may need two doses during the same flu season. Everyone else needs only one dose each flu season.   
 
 
Some inactivated flu vaccines contain a very small amount of a mercury-based preservative called thimerosal. Studies have not shown thimerosal in vaccines to be harmful, but flu vaccines that do not contain thimerosal are available. There is no live flu virus in flu shots. They cannot cause the flu. There are many flu viruses, and they are always changing. Each year a new flu vaccine is made to protect against three or four viruses that are likely to cause disease in the upcoming flu season. But even when the vaccine doesnt exactly match these viruses, it may still provide some protection Flu vaccine cannot prevent: 
 flu that is caused by a virus not covered by the vaccine, or 
 illnesses that look like flu but are not. It takes about 2 weeks for protection to develop after vaccination, and protection lasts through the flu season. 3. Some people should not get this vaccine Tell the person who is giving you the vaccine:  If you have any severe, life-threatening allergies. If you ever had a life-threatening allergic reaction after a dose of flu vaccine, or have a severe allergy to any part of this vaccine, you may be advised not to get vaccinated. Most, but not all, types of flu vaccine contain a small amount of egg protein.  If you ever had Guillain-Barré Syndrome (also called GBS). Some people with a history of GBS should not get this vaccine. This should be discussed with your doctor.  If you are not feeling well. It is usually okay to get flu vaccine when you have a mild illness, but you might be asked to come back when you feel better. 4. Risks of a vaccine reaction With any medicine, including vaccines, there is a chance of reactions. These are usually mild and go away on their own, but serious reactions are also possible. Most people who get a flu shot do not have any problems with it. Minor problems following a flu shot include:  
 soreness, redness, or swelling where the shot was given  hoarseness  sore, red or itchy eyes  cough  fever  aches  headache  itching  fatigue If these problems occur, they usually begin soon after the shot and last 1 or 2 days. More serious problems following a flu shot can include the following:  There may be a small increased risk of Guillain-Barré Syndrome (GBS) after inactivated flu vaccine. This risk has been estimated at 1 or 2 additional cases per million people vaccinated. This is much lower than the risk of severe complications from flu, which can be prevented by flu vaccine.  Young children who get the flu shot along with pneumococcal vaccine (PCV13) and/or DTaP vaccine at the same time might be slightly more likely to have a seizure caused by fever. Ask your doctor for more information. Tell your doctor if a child who is getting flu vaccine has ever had a seizure. Problems that could happen after any injected vaccine:  People sometimes faint after a medical procedure, including vaccination. Sitting or lying down for about 15 minutes can help prevent fainting, and injuries caused by a fall. Tell your doctor if you feel dizzy, or have vision changes or ringing in the ears.  Some people get severe pain in the shoulder and have difficulty moving the arm where a shot was given. This happens very rarely.  Any medication can cause a severe allergic reaction. Such reactions from a vaccine are very rare, estimated at about 1 in a million doses, and would happen within a few minutes to a few hours after the vaccination. As with any medicine, there is a very remote chance of a vaccine causing a serious injury or death. The safety of vaccines is always being monitored. For more information, visit: www.cdc.gov/vaccinesafety/ 
 
5. What if there is a serious reaction? What should I look for?  Look for anything that concerns you, such as signs of a severe allergic reaction, very high fever, or unusual behavior.  
 
Signs of a severe allergic reaction can include hives, swelling of the face and throat, difficulty breathing, a fast heartbeat, dizziness, and weakness  usually within a few minutes to a few hours after the vaccination. What should I do?  If you think it is a severe allergic reaction or other emergency that cant wait, call 9-1-1 and get the person to the nearest hospital. Otherwise, call your doctor.  Reactions should be reported to the Vaccine Adverse Event Reporting System (VAERS). Your doctor should file this report, or you can do it yourself through  the VAERS web site at www.vaers. [facility].gov, or by calling 0-539.817.9164. VAERS does not give medical advice. 6. The National Vaccine Injury Compensation Program 
 
The [facility], located within [facility] Vaccine Injury Compensation Program (VICP) is a federal program that was created to compensate people who may have been injured by certain vaccines. Persons who believe they may have been injured by a vaccine can learn about the program and about filing a claim by calling 5-328.914.7033 or visiting the [address] [place] [employer] website at www.[employer].gov/vaccinecompensation. There is a time limit to file a claim for compensation. 7. How can I learn more?  Ask your healthcare provider. He or she can give you the vaccine package insert or suggest other sources of information.  Call your local or state health department.  Contact the Centers for Disease Control and Prevention (CDC): 
- Call 4-116.172.9149 (1-800-CDC-INFO) or 
- Visit CDCs website at www.cdc.gov/flu Vaccine Information Statement Inactivated Influenza Vaccine 8/7/2015 
[address] [id number] Department of Health and Click Contact Centers for Disease Control and Prevention Office Use Only

## 2018-11-15 NOTE — PROGRESS NOTES
Chief Complaint   Patient presents with    Weight Management     Progress Note: Weekly Medical Monitoring in the [facility] Loss Program    Is there anything that you or the patient needs to let the supervising provider know about? no    Over the past week, have you experienced any side-effects? no    Wily Andrew is a 37 y.o. male who is enrolled in [facility]    Wily Andrew was prescribed the VLCD / LCD. Visit Vitals  /77   Pulse 82   Ht 6' (1.829 m)   Wt 275 lb (124.7 kg)   BMI 37.30 kg/m²     Weight Metrics 11/15/2018 11/14/2018 11/9/2018 11/9/2018 10/11/2018 10/10/2018 9/28/2018   Weight - 275 lb - 279 lb 12.8 oz 277 lb 277 lb 12.8 oz 284 lb   Waist Measure Inches 48.5 - 48.5 - - - -   BMI - 37.3 kg/m2 - 37.95 kg/m2 36.55 kg/m2 36.65 kg/m2 38.52 kg/m2         Have you received any other medical care this week? no  If yes, where and for what? Have you had any change in your medications since your last visit? no  If yes what? Did you have any problems adhering to the program last week? no  If yes, please explain:       Eating Habits Over Last Week:  Did you take in 64 oz of non-caloric fluids?  yes     Did you consume your prescribed meal replacement regimen each day? no       Physical Activity Over the Past Week:    Aerobic exercise: 120 min  Resistance exercise: no workouts / week

## 2018-11-28 ENCOUNTER — CLINICAL SUPPORT (OUTPATIENT)
Dept: FAMILY MEDICINE CLINIC | Age: 43
End: 2018-11-28

## 2018-11-28 DIAGNOSIS — E66.9 OBESITY, UNSPECIFIED CLASSIFICATION, UNSPECIFIED OBESITY TYPE, UNSPECIFIED WHETHER SERIOUS COMORBIDITY PRESENT: Primary | ICD-10-CM

## 2018-11-28 RX ORDER — METFORMIN HYDROCHLORIDE 500 MG/1
1000 TABLET ORAL 2 TIMES DAILY WITH MEALS
Qty: 360 TAB | Refills: 3 | Status: SHIPPED | OUTPATIENT
Start: 2018-11-28 | End: 2019-02-15 | Stop reason: ALTCHOICE

## 2018-11-28 NOTE — TELEPHONE ENCOUNTER
Last Visit: 11/15/2018 with MD Malika Alcala  Next Appointment: 02/15/2019 with MD Laury Mendenhall Prescriptions     Pending Prescriptions Disp Refills    metFORMIN (GLUCOPHAGE) 500 mg tablet 360 Tab 3     Sig: Take 2 Tabs by mouth two (2) times daily (with meals).

## 2018-11-29 VITALS
SYSTOLIC BLOOD PRESSURE: 106 MMHG | WEIGHT: 268.2 LBS | HEIGHT: 72 IN | DIASTOLIC BLOOD PRESSURE: 72 MMHG | BODY MASS INDEX: 36.33 KG/M2 | HEART RATE: 86 BPM

## 2018-12-05 ENCOUNTER — CLINICAL SUPPORT (OUTPATIENT)
Dept: FAMILY MEDICINE CLINIC | Age: 43
End: 2018-12-05

## 2018-12-05 DIAGNOSIS — E66.9 OBESITY, UNSPECIFIED CLASSIFICATION, UNSPECIFIED OBESITY TYPE, UNSPECIFIED WHETHER SERIOUS COMORBIDITY PRESENT: Primary | ICD-10-CM

## 2018-12-05 NOTE — PROGRESS NOTES
HPI  
 
Tomer Childs is a 37 y.o. male with relevant past medical history of  Psoriatic arthritis, asthma, DM2, morbid obesity, here for follow up. The patient was seen today as he was requesting pain medication- tramadol refill, and was due for medical follow up of diabetes as well. Her reports feeling well, except for chronic pain in multiple joints that he attributes to his psoriatic arthritis. No active skin lesions at this time. No flares of arthritis at this time. No systemic symptoms reported including CP, SOB, malaise, F/C, N/V. The patient has started a weight loss program, that is restricting calories to 800 a day, and close weekly follow up for progress and motivation. He reports no adverse events to efforts so far. He is optimistic and working hard to maintain compliance. ROS As above included in HPI. Otherwise 11 point review of systems negative including constitutional, skin, HENT, eyes, respiratory, cardiovascular, gastrointestinal, genitourinary, musculoskeletal, endocrine, hematologic, allergy, and neurologic. Past Medical History Past Medical History:  
Diagnosis Date  Asthma  Diabetes mellitus  Dupuytren's contracture  Essential hypertension  Ill-defined condition   
 neuropathy  Psoriatic arthritis, destructive type ([facility] [address]  Sarcoidosis Past Surgical History:  
Procedure Laterality Date  HX CARPAL TUNNEL RELEASE  HX ORTHOPAEDIC    
 fx skull/ broken jaw  HX OTHER SURGICAL    
 lipoma removed  [place] EXC SKIN BENIG 0.6-1CM TRUNK,ARM,LEG N/A 10/11/2018 Dr. Kulwinder Huynh  [place] EXC SKIN BENIG 1.1-2CM TRUNK,ARM,LEG N/A 10/11/2018 Dr. Kulwinder Huynh  [place] EXC SKIN BENIG 2.1-3CM TRUNK,ARM,LEG N/A 10/11/2018 Dr. Kulwinder Huynh Family History Family History Problem Relation Age of Onset  Stroke Father  Diabetes Maternal Grandmother  Cancer Maternal Grandfather 79  
     prostate cancer Social History He  reports that  has never smoked. he has never used smokeless tobacco.  
Social History Substance and Sexual Activity Alcohol Use Yes  Alcohol/week: 1.2 oz  Types: 2 Shots of liquor per week Comment: social  
 
 
Immunization History Immunization History Administered Date(s) Administered  Influenza Vaccine 10/04/2017  Influenza Vaccine (Quad) PF 11/15/2018 Allergies No Known Allergies Medications Current Outpatient Medications Medication Sig  
 traMADol (ULTRAM) 50 mg tablet Take 2 Tabs by mouth every eight (8) hours as needed for Pain. Max Daily Amount: 300 mg.  
 sucralfate (CARAFATE) 100 mg/mL suspension Take 10 mL by mouth four (4) times daily for 60 days.  multivitamin (ONE A DAY) tablet Take 1 Tab by mouth daily.  metaxalone (SKELAXIN) 800 mg tablet Take 800 mg by mouth three (3) times daily.  pravastatin (PRAVACHOL) 20 mg tablet Take 20 mg by mouth nightly.  lisinopril (PRINIVIL, ZESTRIL) 20 mg tablet Take  by mouth daily.  meloxicam (MOBIC) 15 mg tablet Take 15 mg by mouth daily.  adalimumab (HUMIRA PEN SC) by SubCUTAneous route every seven (7) days.  metFORMIN (GLUCOPHAGE) 500 mg tablet Take 2 Tabs by mouth two (2) times daily (with meals).  docusate sodium (COLACE) 100 mg capsule Take 1 Cap by mouth two (2) times a day for 90 days. No current facility-administered medications for this visit. Visit Vitals /80 (BP 1 Location: Left arm, BP Patient Position: Sitting) Pulse 83 Temp 98.3 °F (36.8 °C) (Oral) Resp 14 Ht 6' (1.829 m) Wt 275 lb (124.7 kg) SpO2 95% BMI 37.30 kg/m² Body mass index is 37.3 kg/m². Physical Exam  
Constitutional: He is well-developed, well-nourished, and in no distress. HENT:  
Head: Normocephalic and atraumatic. Eyes: Pupils are equal, round, and reactive to light. Neck: Neck supple. Cardiovascular: Normal rate and regular rhythm. Pulmonary/Chest: Effort normal and breath sounds normal.  
Musculoskeletal: Normal range of motion. He exhibits no edema. Neurological: He is alert. Nursing note and vitals reviewed. REVIEW OF DATA Labs No visits with results within 1 Month(s) from this visit. Latest known visit with results is:  
Admission on 10/11/2018, Discharged on 10/11/2018 Component Date Value Ref Range Status  Glucose (POC) 10/11/2018 221* 70 - 110 mg/dL Final  
 Glucose (POC) 10/11/2018 219* 70 - 110 mg/dL Final  
 Glucose (POC) 10/11/2018 235* 70 - 110 mg/dL Final  
 Glucose (POC) 10/11/2018 250* 70 - 110 mg/dL Final  
 
 
 
CT Results (most recent): No results found for this or any previous visit. XR Results (most recent): 
Results from Hospital Encounter encounter on 05/31/16 XR HAND RT AP/LAT Narrative EXAM:  XR HAND BILATERAL AP/LAT INDICATION:  arthritis COMPARISON: None. FINDINGS: Bilateral AP and slightly oblique views of the hands were performed. Mild bilateral degenerative change at the radial carpal joint as well as DIP and 
PIP joints. Normal bone mineralization. No erosive changes. No significant 
hyperostotic changes. No acute fracture or dislocation. Impression IMPRESSION:  Mild bilateral degenerative changes. No erosive or hyperostotic 
changes. CT All Micro Results None DIAGNOSIS AND PLAN Patient Active Problem List  
Diagnosis Code  Neuropathy G62.9  Severe obesity with body mass index (BMI) of 35.0 to 39.9 with serious comorbidity (HCC) E66.01  
 Soft tissue mass M79.9  Diabetes mellitus ([address] E11.9  Hypercholesterolemia E78.00  Psoriatic arthritis ([address] L40.50  Sarcoidosis D86.9 1. Type 2 diabetes mellitus with complication, without long-term current use of insulin ([address] Will adjust medication dose based on findings. - AMB POC HEMOGLOBIN A1C 
- traMADol (ULTRAM) 50 mg tablet;  Take 2 Tabs by mouth every eight (8) hours as needed for Pain. Max Daily Amount: 300 mg. Dispense: 180 Tab; Refill: 3 
 
2. Psoriatic arthritis ([facility] [address] Following with rheum 
- traMADol (ULTRAM) 50 mg tablet; Take 2 Tabs by mouth every eight (8) hours as needed for Pain. Max Daily Amount: 300 mg. Dispense: 180 Tab; Refill: 3 3. Severe obesity with body mass index (BMI) of 35.0 to 39.9 with serious comorbidity ([facility] [address] Patient is following with weight loss clinic. 4. Encounter for immunization 
- INFLUENZA VIRUS VAC QUAD,SPLIT,PRESV FREE SYRINGE IM Follow-up Disposition: Not on File Sherrell Taylor MD

## 2018-12-07 VITALS
HEART RATE: 80 BPM | BODY MASS INDEX: 36.35 KG/M2 | SYSTOLIC BLOOD PRESSURE: 102 MMHG | DIASTOLIC BLOOD PRESSURE: 68 MMHG | WEIGHT: 268.4 LBS | HEIGHT: 72 IN

## 2018-12-07 NOTE — PROGRESS NOTES
Progress Note: Weekly Medical Monitoring in the [facility] Weight Loss Program    Is there anything that you or the patient needs to let the supervising provider know about? no    Over the past week, have you experienced any side-effects? no    Aaron Bedolla is a 37 y.o. male who is enrolled in [facility]    Aaron Bedolla was prescribed the VLCD / LCD. Visit Vitals  /68   Pulse 80   Ht 6' (1.829 m)   Wt 121.7 kg (268 lb 6.4 oz)   BMI 36.40 kg/m²     Weight Metrics 12/7/2018 12/5/2018 11/29/2018 11/28/2018 11/15/2018 11/15/2018 11/14/2018   Weight - 268 lb 6.4 oz - 268 lb 3.2 oz - 275 lb 275 lb   Waist Measure Inches 48.5 - 48.5 - 48.5 - -   BMI - 36.4 kg/m2 - 36.37 kg/m2 - 37.3 kg/m2 37.3 kg/m2         Have you received any other medical care this week? no  If yes, where and for what? Have you had any change in your medications since your last visit? no  If yes what? Did you have any problems adhering to the program last week? no  If yes, please explain:       Eating Habits Over Last Week:  Did you take in 64 oz of non-caloric fluids?  yes    Did you consume your prescribed meal replacement regimen each day? no but all but one      Physical Activity Over the Past Week:    Aerobic exercise: 210 min  Resistance exercise:1 workouts / week

## 2018-12-12 ENCOUNTER — CLINICAL SUPPORT (OUTPATIENT)
Dept: FAMILY MEDICINE CLINIC | Age: 43
End: 2018-12-12

## 2018-12-12 DIAGNOSIS — E66.9 OBESITY, UNSPECIFIED CLASSIFICATION, UNSPECIFIED OBESITY TYPE, UNSPECIFIED WHETHER SERIOUS COMORBIDITY PRESENT: Primary | ICD-10-CM

## 2018-12-13 VITALS
WEIGHT: 268.4 LBS | BODY MASS INDEX: 36.4 KG/M2 | HEART RATE: 68 BPM | DIASTOLIC BLOOD PRESSURE: 74 MMHG | SYSTOLIC BLOOD PRESSURE: 118 MMHG

## 2018-12-13 NOTE — PROGRESS NOTES
Progress Note: Weekly Medical Monitoring in the [facility] Weight Loss Program    Is there anything that you or the patient needs to let the supervising provider know about? no    Over the past week, have you experienced any side-effects? no    Sly Britt is a 37 y.o. male who is enrolled in [facility]    Sly Britt was prescribed the VLCD / LCD. Visit Vitals  /74   Pulse 68   Wt 121.7 kg (268 lb 6.4 oz)   BMI 36.40 kg/m²     Weight Metrics 12/13/2018 12/12/2018 12/7/2018 12/5/2018 11/29/2018 11/28/2018 11/15/2018   Weight - 268 lb 6.4 oz - 268 lb 6.4 oz - 268 lb 3.2 oz -   Waist Measure Inches 48.75 - 48.5 - 48.5 - 48.5   BMI - 36.4 kg/m2 - 36.4 kg/m2 - 36.37 kg/m2 -         Have you received any other medical care this week? no  If yes, where and for what? Have you had any change in your medications since your last visit? no  If yes what? Did you have any problems adhering to the program last week? no  If yes, please explain:       Eating Habits Over Last Week:  Did you take in 64 oz of non-caloric fluids? no     Did you consume your prescribed meal replacement regimen each day?  yes       Physical Activity Over the Past Week:    Aerobic exercise: 145 min  Resistance exercise: 0 workouts / week

## 2018-12-14 ENCOUNTER — OFFICE VISIT (OUTPATIENT)
Dept: INTERNAL MEDICINE CLINIC | Age: 43
End: 2018-12-14

## 2018-12-14 VITALS
RESPIRATION RATE: 14 BRPM | TEMPERATURE: 98.8 F | HEART RATE: 69 BPM | BODY MASS INDEX: 36.57 KG/M2 | WEIGHT: 270 LBS | SYSTOLIC BLOOD PRESSURE: 114 MMHG | DIASTOLIC BLOOD PRESSURE: 78 MMHG | OXYGEN SATURATION: 97 % | HEIGHT: 72 IN

## 2018-12-14 DIAGNOSIS — J06.9 ACUTE URI: Primary | ICD-10-CM

## 2018-12-14 RX ORDER — DOXYCYCLINE 100 MG/1
100 CAPSULE ORAL 2 TIMES DAILY
Qty: 20 CAP | Refills: 0 | Status: SHIPPED | OUTPATIENT
Start: 2018-12-14 | End: 2018-12-24

## 2018-12-14 NOTE — PROGRESS NOTES
HPI/History  Jeannine Ji is a 37 y.o.  male who presents for evaluation. Pt with nasal congestion starting about 3 days ago and cough starting 1-1.5 days ago. Some popping mostly left ear. Some bloody nasal secretions which has tinged cough production infrequently; no signs of actual hemoptysis. Nasal congestion disturbing sleep. No fevers or other sxs. Hx noted below. Taking humira but stopped during this time.      Patient Active Problem List   Diagnosis Code    Neuropathy G62.9    Severe obesity with body mass index (BMI) of 35.0 to 39.9 with serious comorbidity (HCC) E66.01    Soft tissue mass M79.9    Diabetes mellitus (HCC) E11.9    Hypercholesterolemia E78.00    Psoriatic arthritis ([address] L40.50    Sarcoidosis D86.9     Past Medical History:   Diagnosis Date    Asthma     Diabetes mellitus     Dupuytren's contracture     Essential hypertension     Ill-defined condition     neuropathy    Psoriatic arthritis, destructive type ([facility] [address]     Sarcoidosis      Past Surgical History:   Procedure Laterality Date    HX CARPAL TUNNEL RELEASE      HX ORTHOPAEDIC      fx skull/ broken jaw     HX OTHER SURGICAL      lipoma removed     [place] EXC SKIN BENIG 0.6-1CM TRUNK,ARM,LEG N/A 10/11/2018    Dr. Corbin Richards 1.1-2CM TRUNK,ARM,LEG N/A 10/11/2018    Dr. Corbin Richards 2.1-3CM Michelle Buffy N/A 10/11/2018    Dr. Gabriel Cantu History     Socioeconomic History    Marital status: SINGLE     Spouse name: Not on file    Number of children: Not on file    Years of education: Not on file    Highest education level: Not on file   Social Needs    Financial resource strain: Not on file    Food insecurity - worry: Not on file    Food insecurity - inability: Not on file   [email or web address] needs - medical: Not on file   [email or web address] needs - non-medical: Not on file   Occupational History    Not on file   Tobacco Use    Smoking status: Never Smoker    Smokeless tobacco: Never Used   Substance and Sexual Activity    Alcohol use: Yes     Alcohol/week: 1.2 oz     Types: 2 Shots of liquor per week     Comment: social    Drug use: No    Sexual activity: Yes     Partners: Female     Birth control/protection: Condom   Other Topics Concern    Not on file   Social History Narrative    Not on file     Family History   Problem Relation Age of Onset    Stroke Father     Diabetes Maternal Grandmother     Cancer Maternal Grandfather 79        prostate cancer     Current Outpatient Medications   Medication Sig    doxycycline (VIBRAMYCIN) 100 mg capsule Take 1 Cap by mouth two (2) times a day for 10 days.  metFORMIN (GLUCOPHAGE) 500 mg tablet Take 2 Tabs by mouth two (2) times daily (with meals).  traMADol (ULTRAM) 50 mg tablet Take 2 Tabs by mouth every eight (8) hours as needed for Pain. Max Daily Amount: 300 mg.    multivitamin (ONE A DAY) tablet Take 1 Tab by mouth daily.  metaxalone (SKELAXIN) 800 mg tablet Take 800 mg by mouth three (3) times daily.  pravastatin (PRAVACHOL) 20 mg tablet Take 20 mg by mouth nightly.  lisinopril (PRINIVIL, ZESTRIL) 20 mg tablet Take  by mouth daily.  meloxicam (MOBIC) 15 mg tablet Take 15 mg by mouth daily.  adalimumab (HUMIRA PEN SC) by SubCUTAneous route every seven (7) days.  sucralfate (CARAFATE) 100 mg/mL suspension Take 10 mL by mouth four (4) times daily for 60 days.  docusate sodium (COLACE) 100 mg capsule Take 1 Cap by mouth two (2) times a day for 90 days. No current facility-administered medications for this visit. No Known Allergies    Review of Systems  Aside from those included in HPI, remainder of ROS negative.     Physical Examination  Visit Vitals  /78 (BP 1 Location: Right arm, BP Patient Position: Sitting)   Pulse 69   Temp 98.8 °F (37.1 °C) (Oral)   Resp 14   Ht 6' (1.829 m)   Wt 270 lb (122.5 kg)   SpO2 97%   BMI 36.62 kg/m²       General - Alert and in no acute distress. Pt appears well, comfortable, and in good spirits. Pleasant, engaging. Nontoxic. Not anxious, non-diaphoretic. Mental status - Appropriate mood, behavior, speech content, dress, and thought processes. Eyes - No periorbital findings. Pupils equal and reactive, extraocular movements intact. No erythema or discharge. Ears - Auditory canals and TMs unremarkable. Nose - No erythema. Small amount of dried mucus. Evidence of past bleeding noted around external nares; no mucosal lesion/source identified. Mouth - Mucous membranes moist. Pharynx without lesions, swelling, erythema, or exudate. Normal phonation. No trismus. Neck - Supple without rigidity. Lymph - No periauricular, perimandibular, or ant/post cervical tenderness or swelling. Pulm - No cough during entire visit. Full, complete sentences. No tachypnea, retractions, or cyanosis. Good respiratory effort. Clear to auscultation bilat. No appreciable wheezes, rales, or rhonchi. Cardiovascular - Normal rate, regular rhythm. Assessment and Plan  1. Acute URI - Very early in course. Likely viral with no signs of bacterial process. Discussed supportive measures, course, and prognosis including potential for worsening before improvement. Printed doxycycline if continuous worsening or develops fevers given Humira. Return/call if needed. Further planning as warranted. Pt happily agrees with plan. PLEASE NOTE:   This document has been produced using voice recognition software. Unrecognized errors in transcription may be present.     Anila Mayer [employer] of [address] [place] [address]  [phone]  12/14/2018

## 2018-12-14 NOTE — PROGRESS NOTES
1. Have you been to the ER, urgent care clinic or hospitalized since your last visit? NO.     2. Have you seen or consulted any other health care providers outside of the [address] [place] [address] since your last visit (Include any pap smears or colon screening)? NO      Do you have an Advanced Directive? NO    Would you like information on Advanced Directives?  NO

## 2018-12-18 ENCOUNTER — OFFICE VISIT (OUTPATIENT)
Dept: SURGERY | Age: 43
End: 2018-12-18

## 2018-12-18 VITALS
HEIGHT: 72 IN | RESPIRATION RATE: 18 BRPM | HEART RATE: 88 BPM | TEMPERATURE: 98.4 F | BODY MASS INDEX: 35.49 KG/M2 | SYSTOLIC BLOOD PRESSURE: 114 MMHG | WEIGHT: 262 LBS | DIASTOLIC BLOOD PRESSURE: 82 MMHG

## 2018-12-18 DIAGNOSIS — R63.4 RAPID WEIGHT LOSS: ICD-10-CM

## 2018-12-18 DIAGNOSIS — E11.8 TYPE 2 DIABETES MELLITUS WITH COMPLICATION, WITHOUT LONG-TERM CURRENT USE OF INSULIN (HCC): ICD-10-CM

## 2018-12-18 DIAGNOSIS — E66.01 SEVERE OBESITY WITH BODY MASS INDEX (BMI) OF 35.0 TO 39.9 WITH SERIOUS COMORBIDITY (HCC): Primary | ICD-10-CM

## 2018-12-18 DIAGNOSIS — Z71.3 WEIGHT LOSS COUNSELING, ENCOUNTER FOR: ICD-10-CM

## 2018-12-18 NOTE — PROGRESS NOTES
Nursing Note for Medical Monitoring in the [facility] Weight Loss Program      Julisa Peacock is a 37 y.o. male who is enrolled in [facility]    Julisa Peacock was prescribed the VLCD / LCD. Did you have any problems adhering to the program last week? no  If yes, please explain:     Since your last visit, have you experienced any complications? no     Have you received any other medical care this week? yes  If yes, where and for what? pcp for upper respiratoru infection    Have you had any change in your medications since your last visit? yes  If yes what? Doxycycline added  Are you taking an appetite suppressant? no   If yes:  Do you need a refill? BP Readings from Last 3 Encounters:   12/18/18 114/82   12/14/18 114/78   12/13/18 118/74        Eating Habits Over Last Week:  Did you take in 64 oz of non-caloric fluids? yes     Did you consume your prescribed meal replacement regimen each day?  yes       Physical Activity Over the Past Week:    Aerobic exercise: 120 min  Resistance exercise: 3 workouts / week

## 2018-12-18 NOTE — PROGRESS NOTES
[facility] Progress Note:   F/up Provider Visit    CC: Weight Management    Yessica Cox is a 37 y.o. male who is here for his  f/up medical provider visit for the VLCD Program. he did attend class last week. Doing great, feels good, recovering from URI. Feels like has more energy and mental clarity increased. BS in low 100's   Cut metformin down form 2 BID to 1 BID. Weight History  Weight Metrics 12/18/2018 12/18/2018 12/14/2018 12/13/2018 12/12/2018 12/7/2018 12/5/2018   Weight - 262 lb 270 lb - 268 lb 6.4 oz - 268 lb 6.4 oz   Waist Measure Inches 48 - - 48.75 - 48.5 -   BMI - 35.53 kg/m2 36.62 kg/m2 - 36.4 kg/m2 - 36.4 kg/m2     Highest wt:  286lbs   Goal wt: 200lbs   Start wt: 279lbs   EKG due: 229lbs     Ideal body weight: 77.6 kg (171 lb 1.2 oz)  Adjusted ideal body weight: 94.1 kg (207 lb 7.1 oz)  Body mass index is 35.53 kg/m². History    Past Medical History:   Diagnosis Date    Asthma     Diabetes mellitus     Dupuytren's contracture     Essential hypertension     Ill-defined condition     neuropathy    Psoriatic arthritis, destructive type ([facility] [address]     sarcoidosis    Sarcoidosis        Past Surgical History:   Procedure Laterality Date    HX CARPAL TUNNEL RELEASE      HX ORTHOPAEDIC      fx skull/ broken jaw     HX ORTHOPAEDIC      carpal tunnel    HX OTHER SURGICAL      lipoma removed     [place] EXC SKIN JIM[place] 0.6-1CM TRUNK,ARM,LEG N/A 10/11/2018    Dr. Codey Hobson 1.1-2CM TRUNK,ARM,LEG N/A 10/11/2018    Dr. Codey Hobson 2.1-3CM TRUNK,ARM,LEG N/A 10/11/2018    Dr. Otoniel Kwon       Current Outpatient Medications   Medication Sig Dispense Refill    doxycycline (VIBRAMYCIN) 100 mg capsule Take 1 Cap by mouth two (2) times a day for 10 days. 20 Cap 0    metFORMIN (GLUCOPHAGE) 500 mg tablet Take 2 Tabs by mouth two (2) times daily (with meals).  (Patient taking differently: Take 1,000 mg by mouth two (2) times daily (with meals). ) 360 Tab 3    traMADol (ULTRAM) 50 mg tablet Take 2 Tabs by mouth every eight (8) hours as needed for Pain. Max Daily Amount: 300 mg. 180 Tab 3    multivitamin (ONE A DAY) tablet Take 1 Tab by mouth daily.  metaxalone (SKELAXIN) 800 mg tablet Take 800 mg by mouth three (3) times daily.  pravastatin (PRAVACHOL) 20 mg tablet Take 20 mg by mouth nightly.  lisinopril (PRINIVIL, ZESTRIL) 20 mg tablet Take  by mouth daily.  meloxicam (MOBIC) 15 mg tablet Take 15 mg by mouth daily.  adalimumab (HUMIRA PEN SC) by SubCUTAneous route every seven (7) days.  sucralfate (CARAFATE) 100 mg/mL suspension Take 10 mL by mouth four (4) times daily for 60 days. 1200 mL 1    docusate sodium (COLACE) 100 mg capsule Take 1 Cap by mouth two (2) times a day for 90 days. 60 Cap 2       No Known Allergies    Social History     Tobacco Use    Smoking status: Never Smoker    Smokeless tobacco: Never Used   Substance Use Topics    Alcohol use: No     Alcohol/week: 1.2 oz     Types: 2 Shots of liquor per week     Frequency: Never     Comment: social    Drug use: No       Family History   Problem Relation Age of Onset    Stroke Father     Diabetes Maternal Grandmother     Cancer Maternal Grandfather 79        prostate cancer       Family Status   Relation Name Status    Mother  Alive    Father      Sister  Alive    MGM  Alive    MGF  Alive         Medications:  Outpatient Medications Marked as Taking for the 18 encounter (Office Visit) with Shannon Pisano NP   Medication Sig Dispense Refill    doxycycline (VIBRAMYCIN) 100 mg capsule Take 1 Cap by mouth two (2) times a day for 10 days. 20 Cap 0    metFORMIN (GLUCOPHAGE) 500 mg tablet Take 2 Tabs by mouth two (2) times daily (with meals). (Patient taking differently: Take 1,000 mg by mouth two (2) times daily (with meals). ) 360 Tab 3    traMADol (ULTRAM) 50 mg tablet Take 2 Tabs by mouth every eight (8) hours as needed for Pain. Max Daily Amount: 300 mg. 180 Tab 3    multivitamin (ONE A DAY) tablet Take 1 Tab by mouth daily.  metaxalone (SKELAXIN) 800 mg tablet Take 800 mg by mouth three (3) times daily.  pravastatin (PRAVACHOL) 20 mg tablet Take 20 mg by mouth nightly.  lisinopril (PRINIVIL, ZESTRIL) 20 mg tablet Take  by mouth daily.  meloxicam (MOBIC) 15 mg tablet Take 15 mg by mouth daily.  adalimumab (HUMIRA PEN SC) by SubCUTAneous route every seven (7) days. Review of Systems  Review of Systems   Constitutional: Positive for weight loss. Negative for malaise/fatigue. Neurological: Negative for weakness. All other systems reviewed and are negative. Objective  Visit Vitals  /82   Pulse 88   Temp 98.4 °F (36.9 °C)   Resp 18   Ht 6' (1.829 m)   Wt 118.8 kg (262 lb)   BMI 35.53 kg/m²     No LMP for male patient. Physical Exam  Physical Exam   Constitutional: He is oriented to person, place, and time. He appears well-developed and well-nourished. HENT:   Head: Normocephalic. Cardiovascular: Normal rate and normal heart sounds. Pulmonary/Chest: Effort normal and breath sounds normal.   Musculoskeletal: Normal range of motion. Neurological: He is alert and oriented to person, place, and time. Skin: Skin is warm and dry. Psychiatric: He has a normal mood and affect. His behavior is normal.   Vitals reviewed. Assessment / Plan      1. Weight management    Degree of control: great    Progress was reviewed with patient. Goal(s) for next appointment:   -10lbs       2.   Labs    Labs pending    Routine monitoring labs ordered      Follow up 1 mo or sooner prn.      >50% of 25 min visit spent counseling     ROBER Camargo-BC

## 2019-01-02 ENCOUNTER — CLINICAL SUPPORT (OUTPATIENT)
Dept: FAMILY MEDICINE CLINIC | Age: 44
End: 2019-01-02

## 2019-01-02 VITALS
WEIGHT: 256 LBS | DIASTOLIC BLOOD PRESSURE: 70 MMHG | SYSTOLIC BLOOD PRESSURE: 104 MMHG | HEIGHT: 72 IN | HEART RATE: 88 BPM | BODY MASS INDEX: 34.67 KG/M2

## 2019-01-02 DIAGNOSIS — E66.9 OBESITY, UNSPECIFIED CLASSIFICATION, UNSPECIFIED OBESITY TYPE, UNSPECIFIED WHETHER SERIOUS COMORBIDITY PRESENT: Primary | ICD-10-CM

## 2019-01-02 NOTE — PROGRESS NOTES
Progress Note: Weekly Medical Monitoring in the [facility] Weight Loss Program    Is there anything that you or the patient needs to let the supervising provider know about? no    Over the past week, have you experienced any side-effects? no    Mami Deleon is a 37 y.o. male who is enrolled in [facility] Weight Loss Program    Mami Deleon was prescribed the VLCD / LCD. Visit Vitals  /70   Pulse 88   Ht 6' (1.829 m)   Wt 116.1 kg (256 lb)   BMI 34.72 kg/m²     Weight Metrics 1/2/2019 12/18/2018 12/18/2018 12/14/2018 12/13/2018 12/12/2018 12/7/2018   Weight 256 lb - 262 lb 270 lb - 268 lb 6.4 oz -   Waist Measure Inches 46.75 48 - - 48.75 - 48.5   BMI 34.72 kg/m2 - 35.53 kg/m2 36.62 kg/m2 - 36.4 kg/m2 -         Have you received any other medical care this week? no  If yes, where and for what? Have you had any change in your medications since your last visit? no  If yes what? Did you have any problems adhering to the program last week? no  If yes, please explain:       Eating Habits Over Last Week:  Did you take in 64 oz of non-caloric fluids?  no     Did you consume your prescribed meal replacement regimen each day? no       Physical Activity Over the Past Week:    Aerobic exercise: 0 min  Resistance exercise: 0 workouts / week

## 2019-01-09 ENCOUNTER — CLINICAL SUPPORT (OUTPATIENT)
Dept: FAMILY MEDICINE CLINIC | Age: 44
End: 2019-01-09

## 2019-01-09 DIAGNOSIS — E66.9 OBESITY, UNSPECIFIED CLASSIFICATION, UNSPECIFIED OBESITY TYPE, UNSPECIFIED WHETHER SERIOUS COMORBIDITY PRESENT: Primary | ICD-10-CM

## 2019-01-10 VITALS
BODY MASS INDEX: 33.97 KG/M2 | WEIGHT: 250.8 LBS | DIASTOLIC BLOOD PRESSURE: 72 MMHG | SYSTOLIC BLOOD PRESSURE: 110 MMHG | HEIGHT: 72 IN | HEART RATE: 88 BPM

## 2019-01-10 NOTE — PROGRESS NOTES
Progress Note: Weekly Medical Monitoring in the [facility] Weight Loss Program    Is there anything that you or the patient needs to let the supervising provider know about? no    Over the past week, have you experienced any side-effects? no    Carola Root is a 37 y.o. male who is enrolled in [facility] Weight Loss Program    Carola Root was prescribed the VLCD / LCD. Visit Vitals  /72   Pulse 88   Ht 6' (1.829 m)   Wt 113.8 kg (250 lb 12.8 oz)   BMI 34.01 kg/m²     Weight Metrics 1/10/2019 1/9/2019 1/2/2019 12/18/2018 12/18/2018 12/14/2018 12/13/2018   Weight - 250 lb 12.8 oz 256 lb - 262 lb 270 lb -   Waist Measure Inches 46.5 - 46.75 48 - - 48.75   BMI - 34.01 kg/m2 34.72 kg/m2 - 35.53 kg/m2 36.62 kg/m2 -         Have you received any other medical care this week? no  If yes, where and for what? Have you had any change in your medications since your last visit? no  If yes what? Did you have any problems adhering to the program last week? no  If yes, please explain:       Eating Habits Over Last Week:  Did you take in 64 oz of non-caloric fluids? yes     Did you consume your prescribed meal replacement regimen each day?  yes       Physical Activity Over the Past Week:    Aerobic exercise: 90 min  Resistance exercise: 2 workouts / week

## 2019-01-16 ENCOUNTER — CLINICAL SUPPORT (OUTPATIENT)
Dept: FAMILY MEDICINE CLINIC | Age: 44
End: 2019-01-16

## 2019-01-16 DIAGNOSIS — E66.9 OBESITY, UNSPECIFIED CLASSIFICATION, UNSPECIFIED OBESITY TYPE, UNSPECIFIED WHETHER SERIOUS COMORBIDITY PRESENT: Primary | ICD-10-CM

## 2019-01-17 VITALS
BODY MASS INDEX: 34.27 KG/M2 | SYSTOLIC BLOOD PRESSURE: 108 MMHG | HEART RATE: 80 BPM | WEIGHT: 253 LBS | HEIGHT: 72 IN | DIASTOLIC BLOOD PRESSURE: 72 MMHG

## 2019-01-17 NOTE — PROGRESS NOTES
Progress Note: Weekly Medical Monitoring in the [facility] Weight Loss Program    Is there anything that you or the patient needs to let the supervising provider know about? no    Over the past week, have you experienced any side-effects? no    Mami Deleon is a 37 y.o. male who is enrolled in [facility] Loss [facility]    Mami Deleon was prescribed the VLCD / LCD. Visit Vitals  /72   Pulse 80   Ht 6' (1.829 m)   Wt 114.8 kg (253 lb)   BMI 34.31 kg/m²     Weight Metrics 1/16/2019 1/10/2019 1/9/2019 1/2/2019 12/18/2018 12/18/2018 12/14/2018   Weight 253 lb - 250 lb 12.8 oz 256 lb - 262 lb 270 lb   Waist Measure Inches - 46.5 - 46.75 48 - -   BMI 34.31 kg/m2 - 34.01 kg/m2 34.72 kg/m2 - 35.53 kg/m2 36.62 kg/m2         Have you received any other medical care this week? no  If yes, where and for what? Have you had any change in your medications since your last visit? no  If yes what? Did you have any problems adhering to the program last week? no  If yes, please explain:       Eating Habits Over Last Week:  Did you take in 64 oz of non-caloric fluids? yes    Did you consume your prescribed meal replacement regimen each day?  Yes, all but one      Physical Activity Over the Past Week:    Aerobic exercise: 90 min  Resistance exercise: 2 workouts / week

## 2019-01-23 ENCOUNTER — CLINICAL SUPPORT (OUTPATIENT)
Dept: FAMILY MEDICINE CLINIC | Age: 44
End: 2019-01-23

## 2019-01-23 DIAGNOSIS — E66.9 OBESITY, UNSPECIFIED CLASSIFICATION, UNSPECIFIED OBESITY TYPE, UNSPECIFIED WHETHER SERIOUS COMORBIDITY PRESENT: Primary | ICD-10-CM

## 2019-01-25 VITALS
SYSTOLIC BLOOD PRESSURE: 102 MMHG | BODY MASS INDEX: 33.69 KG/M2 | DIASTOLIC BLOOD PRESSURE: 70 MMHG | HEART RATE: 68 BPM | HEIGHT: 72 IN | WEIGHT: 248.7 LBS

## 2019-01-25 NOTE — PROGRESS NOTES
Progress Note: Weekly Medical Monitoring in the [facility] Weight Loss Program    Is there anything that you or the patient needs to let the supervising provider know about? no    Over the past week, have you experienced any side-effects? no    Kate Starks is a 37 y.o. male who is enrolled in [facility] Loss Program    Kate Starks was prescribed the VLCD / LCD. Visit Vitals  /70   Pulse 68   Ht 6' (1.829 m)   Wt 112.8 kg (248 lb 11.2 oz)   BMI 33.73 kg/m²     Weight Metrics 1/25/2019 1/23/2019 1/16/2019 1/10/2019 1/9/2019 1/2/2019 12/18/2018   Weight - 248 lb 11.2 oz 253 lb - 250 lb 12.8 oz 256 lb -   Waist Measure Inches 46.25 - - 46.5 - 46.75 48   BMI - 33.73 kg/m2 34.31 kg/m2 - 34.01 kg/m2 34.72 kg/m2 -         Have you received any other medical care this week? no  If yes, where and for what? Have you had any change in your medications since your last visit? no  If yes what? Did you have any problems adhering to the program last week? no  If yes, please explain:       Eating Habits Over Last Week:  Did you take in 64 oz of non-caloric fluids? yes     Did you consume your prescribed meal replacement regimen each day?  yes       Physical Activity Over the Past Week:    Aerobic exercise: 60 min  Resistance exercise: 3 workouts / week

## 2019-01-30 ENCOUNTER — CLINICAL SUPPORT (OUTPATIENT)
Dept: FAMILY MEDICINE CLINIC | Age: 44
End: 2019-01-30

## 2019-01-30 DIAGNOSIS — E66.9 OBESITY, UNSPECIFIED CLASSIFICATION, UNSPECIFIED OBESITY TYPE, UNSPECIFIED WHETHER SERIOUS COMORBIDITY PRESENT: Primary | ICD-10-CM

## 2019-02-01 VITALS
HEART RATE: 72 BPM | SYSTOLIC BLOOD PRESSURE: 100 MMHG | BODY MASS INDEX: 33.02 KG/M2 | WEIGHT: 243.5 LBS | DIASTOLIC BLOOD PRESSURE: 70 MMHG

## 2019-02-01 RX ORDER — LISINOPRIL 20 MG/1
20 TABLET ORAL DAILY
Qty: 90 TAB | Refills: 0 | Status: SHIPPED | OUTPATIENT
Start: 2019-02-01 | End: 2019-02-15 | Stop reason: DRUGHIGH

## 2019-02-01 NOTE — PROGRESS NOTES
Progress Note: Weekly Medical Monitoring in the [facility] Weight Loss Program    Is there anything that you or the patient needs to let the supervising provider know about? no    Over the past week, have you experienced any side-effects? no    Maura Quinteros is a 37 y.o. male who is enrolled in [facility] Weight Loss Program    Maura Quinteros was prescribed the VLCD / LCD. Visit Vitals  /70   Pulse 72   Wt 110.5 kg (243 lb 8 oz)   BMI 33.02 kg/m²     Weight Metrics 1/30/2019 1/25/2019 1/23/2019 1/16/2019 1/10/2019 1/9/2019 1/2/2019   Weight 243 lb 8 oz - 248 lb 11.2 oz 253 lb - 250 lb 12.8 oz 256 lb   Waist Measure Inches - 46.25 - - 46.5 - 46.75   BMI 33.02 kg/m2 - 33.73 kg/m2 34.31 kg/m2 - 34.01 kg/m2 34.72 kg/m2         Have you received any other medical care this week? no  If yes, where and for what? Have you had any change in your medications since your last visit? no  If yes what? Did you have any problems adhering to the program last week? no  If yes, please explain:       Eating Habits Over Last Week:  Did you take in 64 oz of non-caloric fluids? yes     Did you consume your prescribed meal replacement regimen each day?  yes       Physical Activity Over the Past Week:    Aerobic exercise: 90 min  Resistance exercise: 2 workouts / week

## 2019-02-01 NOTE — TELEPHONE ENCOUNTER
Last Visit: 12/14  Next Appt: 2/15  Previous Refill Encounter: 8/13    Requested Prescriptions     Pending Prescriptions Disp Refills    lisinopril (PRINIVIL, ZESTRIL) 20 mg tablet 90 Tab 0     Sig: Take 1 Tab by mouth daily.

## 2019-02-06 ENCOUNTER — CLINICAL SUPPORT (OUTPATIENT)
Dept: FAMILY MEDICINE CLINIC | Age: 44
End: 2019-02-06

## 2019-02-06 VITALS
WEIGHT: 245 LBS | DIASTOLIC BLOOD PRESSURE: 70 MMHG | HEIGHT: 72 IN | BODY MASS INDEX: 33.18 KG/M2 | SYSTOLIC BLOOD PRESSURE: 102 MMHG | HEART RATE: 78 BPM

## 2019-02-06 DIAGNOSIS — E66.9 OBESITY, UNSPECIFIED CLASSIFICATION, UNSPECIFIED OBESITY TYPE, UNSPECIFIED WHETHER SERIOUS COMORBIDITY PRESENT: Primary | ICD-10-CM

## 2019-02-06 NOTE — PROGRESS NOTES
Progress Note: Weekly Medical Monitoring in the [facility] Weight Loss Program    Is there anything that you or the patient needs to let the supervising provider know about? no    Over the past week, have you experienced any side-effects? no    Carola Root is a 37 y.o. male who is enrolled in [facility] Weight Loss Program    Carola Root was prescribed the VLCD / LCD. Visit Vitals  /70   Pulse 78   Ht 6' (1.829 m)   Wt 111.1 kg (245 lb)   BMI 33.23 kg/m²     Weight Metrics 2/6/2019 2/1/2019 1/30/2019 1/25/2019 1/23/2019 1/16/2019 1/10/2019   Weight 245 lb - 243 lb 8 oz - 248 lb 11.2 oz 253 lb -   Waist Measure Inches 45.5 45 - 46.25 - - 46.5   BMI 33.23 kg/m2 - 33.02 kg/m2 - 33.73 kg/m2 34.31 kg/m2 -         Have you received any other medical care this week? no  If yes, where and for what? Have you had any change in your medications since your last visit? no  If yes what? Did you have any problems adhering to the program last week? no  If yes, please explain:       Eating Habits Over Last Week:  Did you take in 64 oz of non-caloric fluids?  yes    Did you consume your prescribed meal replacement regimen each day? no       Physical Activity Over the Past Week:    Aerobic exercise: 120 min  Resistance exercise: 2 workouts / week

## 2019-02-13 ENCOUNTER — HOSPITAL ENCOUNTER (OUTPATIENT)
Dept: LAB | Age: 44
Discharge: HOME | End: 2019-02-13

## 2019-02-13 ENCOUNTER — CLINICAL SUPPORT (OUTPATIENT)
Dept: FAMILY MEDICINE CLINIC | Age: 44
End: 2019-02-13

## 2019-02-13 VITALS
HEART RATE: 66 BPM | HEIGHT: 72 IN | SYSTOLIC BLOOD PRESSURE: 98 MMHG | WEIGHT: 240.7 LBS | BODY MASS INDEX: 32.6 KG/M2 | DIASTOLIC BLOOD PRESSURE: 66 MMHG

## 2019-02-13 DIAGNOSIS — E66.9 OBESITY, UNSPECIFIED CLASSIFICATION, UNSPECIFIED OBESITY TYPE, UNSPECIFIED WHETHER SERIOUS COMORBIDITY PRESENT: Primary | ICD-10-CM

## 2019-02-13 LAB — XX-LABCORP SPECIMEN COL,LCBCF: NORMAL

## 2019-02-13 PROCEDURE — 99001 SPECIMEN HANDLING PT-LAB: CPT

## 2019-02-14 LAB
ALBUMIN SERPL-MCNC: 4.9 G/DL (ref 3.5–5.5)
ALBUMIN/GLOB SERPL: 1.9 {RATIO} (ref 1.2–2.2)
ALP SERPL-CCNC: 65 IU/L (ref 39–117)
ALT SERPL-CCNC: 33 IU/L (ref 0–44)
AST SERPL-CCNC: 23 IU/L (ref 0–40)
BILIRUB SERPL-MCNC: 0.4 MG/DL (ref 0–1.2)
BUN SERPL-MCNC: 23 MG/DL (ref 6–24)
BUN/CREAT SERPL: 24 (ref 9–20)
CALCIUM SERPL-MCNC: 10.5 MG/DL (ref 8.7–10.2)
CHLORIDE SERPL-SCNC: 99 MMOL/L (ref 96–106)
CO2 SERPL-SCNC: 25 MMOL/L (ref 20–29)
CREAT SERPL-MCNC: 0.97 MG/DL (ref 0.76–1.27)
GLOBULIN SER CALC-MCNC: 2.6 G/DL (ref 1.5–4.5)
GLUCOSE SERPL-MCNC: 99 MG/DL (ref 65–99)
POTASSIUM SERPL-SCNC: 4.8 MMOL/L (ref 3.5–5.2)
PROT SERPL-MCNC: 7.5 G/DL (ref 6–8.5)
SODIUM SERPL-SCNC: 141 MMOL/L (ref 134–144)
URATE SERPL-MCNC: 3.7 MG/DL (ref 3.7–8.6)

## 2019-02-15 ENCOUNTER — OFFICE VISIT (OUTPATIENT)
Dept: INTERNAL MEDICINE CLINIC | Age: 44
End: 2019-02-15

## 2019-02-15 VITALS
BODY MASS INDEX: 32.91 KG/M2 | HEART RATE: 71 BPM | OXYGEN SATURATION: 97 % | TEMPERATURE: 98.5 F | WEIGHT: 243 LBS | HEIGHT: 72 IN | RESPIRATION RATE: 18 BRPM | SYSTOLIC BLOOD PRESSURE: 102 MMHG | DIASTOLIC BLOOD PRESSURE: 64 MMHG

## 2019-02-15 DIAGNOSIS — R63.4 WEIGHT LOSS: ICD-10-CM

## 2019-02-15 DIAGNOSIS — E66.01 SEVERE OBESITY WITH BODY MASS INDEX (BMI) OF 35.0 TO 39.9 WITH SERIOUS COMORBIDITY (HCC): ICD-10-CM

## 2019-02-15 DIAGNOSIS — E78.5 HYPERLIPIDEMIA, UNSPECIFIED HYPERLIPIDEMIA TYPE: ICD-10-CM

## 2019-02-15 DIAGNOSIS — E66.9 OBESITY, UNSPECIFIED CLASSIFICATION, UNSPECIFIED OBESITY TYPE, UNSPECIFIED WHETHER SERIOUS COMORBIDITY PRESENT: ICD-10-CM

## 2019-02-15 DIAGNOSIS — I10 ESSENTIAL HYPERTENSION: ICD-10-CM

## 2019-02-15 DIAGNOSIS — L40.50 PSORIATIC ARTHRITIS (HCC): ICD-10-CM

## 2019-02-15 DIAGNOSIS — E11.8 TYPE 2 DIABETES MELLITUS WITH COMPLICATION, WITHOUT LONG-TERM CURRENT USE OF INSULIN (HCC): Primary | ICD-10-CM

## 2019-02-15 PROBLEM — E11.40 TYPE 2 DIABETES MELLITUS WITH DIABETIC NEUROPATHY (HCC): Status: ACTIVE | Noted: 2019-02-15

## 2019-02-15 LAB — HBA1C MFR BLD HPLC: 5.1 %

## 2019-02-15 RX ORDER — LISINOPRIL 10 MG/1
10 TABLET ORAL DAILY
Qty: 90 TAB | Refills: 2 | Status: SHIPPED | OUTPATIENT
Start: 2019-02-15 | End: 2019-05-14 | Stop reason: ALTCHOICE

## 2019-02-15 NOTE — PROGRESS NOTES
HPI     Ned Roper is a 40 y.o. male with relevant past medical history of DM2, HTN, HLD, obesity, psoriatic arthritis. Here for follow up. Undergoing weight loss clinic program, has lost 35lb since 3 months ago. Pt. Is motivated and very compliant with recommendations, exercising regularly. Last labs from a few weeks ago reviewed with patient. The patient tells me he has had to cut down on metformin dose due to low blood sugar values as he has been losing weight, currently on 500 mg PO BID. HbA1c today 5.3. Denies any CP, palpitations, dizziness, malaise, fevers, nausea, vomiting, abdominal pain, diarrhea, headaches, or any other symptoms. ROS  As above included in HPI. Otherwise 11 point review of systems negative including constitutional, skin, HENT, eyes, respiratory, cardiovascular, gastrointestinal, genitourinary, musculoskeletal, endocrine, hematologic, allergy, and neurologic. Past Medical History  Past Medical History:   Diagnosis Date    Asthma     Diabetes mellitus     Dupuytren's contracture     Essential hypertension     Ill-defined condition     neuropathy    Psoriatic arthritis, destructive type ([facility] [address]     sarcoidosis    Sarcoidosis      Past Surgical History:   Procedure Laterality Date    HX CARPAL TUNNEL RELEASE      HX ORTHOPAEDIC      fx skull/ broken jaw     HX ORTHOPAEDIC      carpal tunnel    HX OTHER SURGICAL      lipoma removed     [place] EXC SKIN BENIG 0.6-1CM TRUNK,ARM,LEG N/A 10/11/2018    Dr. Pedersen Ahr 1.1-2CM TRUNK,ARM,LEG N/A 10/11/2018    Dr. Pedersen Ahr 2.1-3CM TRUNK,ARM,LEG N/A 10/11/2018    Dr. Waldron Ran        Family History  Family History   Problem Relation Age of Onset    Stroke Father     Diabetes Maternal Grandmother     Cancer Maternal Grandfather 79        prostate cancer       Social History  He  reports that  has never smoked.  he has never used smokeless tobacco.   Social History     Substance and Sexual Activity   Alcohol Use No    Alcohol/week: 1.2 oz    Types: 2 Shots of liquor per week    Frequency: Never    Comment: social       Immunization History  Immunization History   Administered Date(s) Administered    Influenza Vaccine 10/04/2017    Influenza Vaccine (Quad) PF 11/15/2018       Allergies  No Known Allergies    Medications  Current Outpatient Medications   Medication Sig    lisinopril (PRINIVIL, ZESTRIL) 10 mg tablet Take 1 Tab by mouth daily.  traMADol (ULTRAM) 50 mg tablet Take 2 Tabs by mouth every eight (8) hours as needed for Pain. Max Daily Amount: 300 mg.    multivitamin (ONE A DAY) tablet Take 1 Tab by mouth daily.  metaxalone (SKELAXIN) 800 mg tablet Take 800 mg by mouth three (3) times daily.  meloxicam (MOBIC) 15 mg tablet Take 15 mg by mouth daily.  adalimumab (HUMIRA PEN SC) by SubCUTAneous route every seven (7) days. No current facility-administered medications for this visit. Visit Vitals  /64 (BP 1 Location: Right arm, BP Patient Position: Sitting)   Pulse 71   Temp 98.5 °F (36.9 °C) (Oral)   Resp 18   Ht 6' (1.829 m)   Wt 243 lb (110.2 kg)   SpO2 97%   BMI 32.96 kg/m²     Body mass index is 32.96 kg/m². Physical Exam   Constitutional: He is oriented to person, place, and time and well-developed, well-nourished, and in no distress. No distress. HENT:   Head: Normocephalic. Cardiovascular: Normal rate and regular rhythm. Pulmonary/Chest: Effort normal and breath sounds normal.   Abdominal: Soft. Musculoskeletal: Normal range of motion. He exhibits no edema. Neurological: He is alert and oriented to person, place, and time. Skin: Skin is warm. He is not diaphoretic. Psychiatric: Affect normal.   Nursing note and vitals reviewed.         [address] Outpatient Visit on 02/13/2019   Component Date Value Ref Range Status    [facility] SPEC[facility]. 02/13/2019 Specimens collected/sent to [facility]. Please direct inquiries to ([phone]). Final         CT Results (most recent):  No results found for this or any previous visit. XR Results (most recent):  Results from Hospital Encounter encounter on 05/31/16   XR HAND RT AP/LAT    Narrative EXAM:  XR HAND BILATERAL AP/LAT    INDICATION:  arthritis    COMPARISON: None. FINDINGS: Bilateral AP and slightly oblique views of the hands were performed. Mild bilateral degenerative change at the radial carpal joint as well as DIP and  PIP joints. Normal bone mineralization. No erosive changes. No significant  hyperostotic changes. No acute fracture or dislocation. Impression IMPRESSION:  Mild bilateral degenerative changes. No erosive or hyperostotic  changes. CT   All Micro Results     None                DIAGNOSIS AND PLAN  Patient Active Problem List   Diagnosis Code    Neuropathy G62.9    Severe obesity with body mass index (BMI) of 35.0 to 39.9 with serious comorbidity ([facility]) E66.01    Soft tissue mass M79.9    Diabetes mellitus ([facility]) E11.9    Hypercholesterolemia E78.00    Psoriatic arthritis ([facility] [address] L40.50    Sarcoidosis D86.9     1. Type 2 diabetes mellitus with complication, without long-term current use of insulin ([facility])  HbA1c today 5.3. Recommended to stop metformin, as patient is planning to continue losing weigh towards goal of 180 lb approx. Told to re-start metformin 500 mg once daily only if fasting BS is persistently above 150s. - AMB POC HEMOGLOBIN A1C  - CBC WITH AUTOMATED DIFF; Future  - METABOLIC PANEL, COMPREHENSIVE; Future  - HEMOGLOBIN A1C W/O EAG; Future    2. Hyperlipidemia, unspecified hyperlipidemia type  Stop low dose pravastatin, lipid panel before next visit  - LIPID PANEL; Future    3. Essential hypertension  Decrease lisinopril to 10 mg daily    4. Obesity, unspecified classification, unspecified obesity type, unspecified whether serious comorbidity present  5.  Weight loss  Patient has lost 35 lb since last visit in Nov. 2018 through weight loss clinic. Reports 100% adherence to diet recommendations and regular exercise. Feels well. Denies any CP, SOB, dizziness. Reports drinking water frequently. Trying to cut back on pain medication. Follow-up Disposition:  Return in about 3 months (around 5/15/2019). Sherrell Pop MD

## 2019-02-20 ENCOUNTER — CLINICAL SUPPORT (OUTPATIENT)
Dept: FAMILY MEDICINE CLINIC | Age: 44
End: 2019-02-20

## 2019-02-20 VITALS
HEIGHT: 72 IN | SYSTOLIC BLOOD PRESSURE: 116 MMHG | DIASTOLIC BLOOD PRESSURE: 74 MMHG | HEART RATE: 70 BPM | BODY MASS INDEX: 32.7 KG/M2 | WEIGHT: 241.4 LBS

## 2019-02-20 DIAGNOSIS — E66.9 OBESITY, UNSPECIFIED CLASSIFICATION, UNSPECIFIED OBESITY TYPE, UNSPECIFIED WHETHER SERIOUS COMORBIDITY PRESENT: Primary | ICD-10-CM

## 2019-02-20 NOTE — PROGRESS NOTES
Chief Complaint   Patient presents with    Weight Management     Progress Note: Weekly Medical Monitoring in the [facility] Weight Loss Program    Is there anything that you or the patient needs to let the supervising provider know about? no    Over the past week, have you experienced any side-effects? no    Khloe Lawler is a 40 y.o. male who is enrolled in [facility] Weight Loss Program    Khloe Lawler was prescribed the VLCD / LCD. Visit Vitals  /74   Pulse 70   Ht 6' (1.829 m)   Wt 241 lb 6.4 oz (109.5 kg)   BMI 32.74 kg/m²     Weight Metrics 2/20/2019 2/15/2019 2/13/2019 2/6/2019 2/1/2019 1/30/2019 1/25/2019   Weight 241 lb 6.4 oz 243 lb 240 lb 11.2 oz 245 lb - 243 lb 8 oz -   Waist Measure Inches 44.5 - 44.5 45.5 45 - 46.25   BMI 32.74 kg/m2 32.96 kg/m2 32.64 kg/m2 33.23 kg/m2 - 33.02 kg/m2 -         Have you received any other medical care this week? yes  If yes, where and for what? \"check up\"    Have you had any change in your medications since your last visit? yes  If yes what? \"no diabetes or HBP meds anymore\"    Did you have any problems adhering to the program last week? no  If yes, please explain:       Eating Habits Over Last Week:  Did you take in 64 oz of non-caloric fluids? no     Did you consume your prescribed meal replacement regimen each day?  yes       Physical Activity Over the Past Week:    Aerobic exercise: 90 min  Resistance exercise: 60 workouts / week

## 2019-02-22 NOTE — PROGRESS NOTES
Please remind Pt to hydrate, calcium slightly increased, is he taking any additional supplements? Thank you.

## 2019-02-26 ENCOUNTER — OFFICE VISIT (OUTPATIENT)
Dept: SURGERY | Age: 44
End: 2019-02-26

## 2019-02-26 VITALS
HEART RATE: 80 BPM | TEMPERATURE: 98 F | RESPIRATION RATE: 18 BRPM | DIASTOLIC BLOOD PRESSURE: 72 MMHG | WEIGHT: 242 LBS | OXYGEN SATURATION: 98 % | HEIGHT: 72 IN | BODY MASS INDEX: 32.78 KG/M2 | SYSTOLIC BLOOD PRESSURE: 96 MMHG

## 2019-02-26 DIAGNOSIS — Z91.119 DIETARY NONCOMPLIANCE: ICD-10-CM

## 2019-02-26 DIAGNOSIS — I10 HYPERTENSION, UNSPECIFIED TYPE: ICD-10-CM

## 2019-02-26 DIAGNOSIS — E66.9 OBESITY (BMI 30-39.9): Primary | ICD-10-CM

## 2019-02-26 NOTE — PROGRESS NOTES
Nursing Note for Medical Monitoring in the [facility] Weight Loss Program      Akhil Murray is a 40 y.o. male who is enrolled in [facility] Loss Program    Akhil Murray was prescribed the VLCD / LCD. Did you have any problems adhering to the program last week? no  If yes, please explain:     Since your last visit, have you experienced any complications? no    Have you received any other medical care this week? Yes If yes, where and for what? PCP    Have you had any change in your medications since your last visit? yes  If yes what? Lisinopril decreased to 10 milligrams  Are you taking an appetite suppressant? no   If yes:  Do you need a refill? BP Readings from Last 3 Encounters:   02/20/19 116/74   02/15/19 102/64   02/13/19 98/66        Eating Habits Over Last Week:  Did you take in 64 oz of non-caloric fluids? yes     Did you consume your prescribed meal replacement regimen each day?  yes       Physical Activity Over the Past Week:    Aerobic exercise: 0 min  Resistance exercise: 0 workouts / week

## 2019-02-26 NOTE — PROGRESS NOTES
[place] [facility] Progress Note:   F/up Provider Visit    CC: Weight Management    August Tejada is a 40 y.o. male who is here for his  f/up medical provider visit for the VLCD Program. he did attend class last week. He has reduced his lisinopril to 10mg. Maintaining     Weight History  Weight Metrics 2/26/2019 2/26/2019 2/20/2019 2/15/2019 2/13/2019 2/6/2019 2/1/2019   Weight - 242 lb 241 lb 6.4 oz 243 lb 240 lb 11.2 oz 245 lb -   Waist Measure Inches 44.5 - 44.5 - 44.5 45.5 45   BMI - 32.82 kg/m2 32.74 kg/m2 32.96 kg/m2 32.64 kg/m2 33.23 kg/m2 -       Highest wt:  286lbs   Goal wt: 200lbs (now wants to push to 180lbs)  Start wt: 279lbs   EKG due: 229lbs       Ideal body weight: 77.6 kg (171 lb 1.2 oz)  Adjusted ideal body weight: 90.5 kg (199 lb 7.1 oz)  Body mass index is 32.82 kg/m². 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 Bandar Irvin 1.1-2CM TRUNK,ARM,LEG N/A 10/11/2018    Dr. Bandar Irvin 2.1-3CM TRUNK,ARM,LEG N/A 10/11/2018    Dr. Maynor Jha       Current Outpatient Medications   Medication Sig Dispense Refill    lisinopril (PRINIVIL, ZESTRIL) 10 mg tablet Take 1 Tab by mouth daily. 90 Tab 2    traMADol (ULTRAM) 50 mg tablet Take 2 Tabs by mouth every eight (8) hours as needed for Pain. Max Daily Amount: 300 mg.  (Patient taking differently: Take 50 mg by mouth two (2) times a day.) 180 Tab 3    multivitamin (ONE A DAY) tablet Take 1 Tab by mouth daily.  metaxalone (SKELAXIN) 800 mg tablet Take 800 mg by mouth three (3) times daily.  meloxicam (MOBIC) 15 mg tablet Take 15 mg by mouth daily.  adalimumab (HUMIRA PEN SC) by SubCUTAneous route every seven (7) days. No Known Allergies    Social History     Tobacco Use    Smoking status: Never Smoker    Smokeless tobacco: Never Used   Substance Use Topics    Alcohol use: No     Alcohol/week: 1.2 oz     Types: 2 Shots of liquor per week     Frequency: Never     Comment: social    Drug use: No       Family History   Problem Relation Age of Onset    Stroke Father     Diabetes Maternal Grandmother     Cancer Maternal Grandfather 79        prostate cancer       Family Status   Relation Name Status    Mother  Alive    Father      Sister  Alive    MGM  Alive    MGF  Alive         Medications:  Outpatient Medications Marked as Taking for the 19 encounter (Office Visit) with Jerardo Hicks NP   Medication Sig Dispense Refill    lisinopril (PRINIVIL, ZESTRIL) 10 mg tablet Take 1 Tab by mouth daily. 90 Tab 2    traMADol (ULTRAM) 50 mg tablet Take 2 Tabs by mouth every eight (8) hours as needed for Pain. Max Daily Amount: 300 mg. (Patient taking differently: Take 50 mg by mouth two (2) times a day.) 180 Tab 3    multivitamin (ONE A DAY) tablet Take 1 Tab by mouth daily.  metaxalone (SKELAXIN) 800 mg tablet Take 800 mg by mouth three (3) times daily.  meloxicam (MOBIC) 15 mg tablet Take 15 mg by mouth daily.  adalimumab (HUMIRA PEN SC) by SubCUTAneous route every seven (7) days. Review of Systems  Review of Systems   Constitutional: Positive for weight loss. All other systems reviewed and are negative. Objective  Visit Vitals  BP 96/72   Pulse 80   Temp 98 °F (36.7 °C)   Resp 18   Ht 6' (1.829 m)   Wt 109.8 kg (242 lb)   SpO2 98%   BMI 32.82 kg/m²     No LMP for male patient.       Physical Exam  Physical Exam Constitutional: He is oriented to person, place, and time. He appears well-developed and well-nourished. HENT:   Head: Normocephalic. Cardiovascular: Normal rate and regular rhythm. Pulmonary/Chest: Effort normal and breath sounds normal.   Abdominal: Soft. Neurological: He is alert and oriented to person, place, and time. Skin: Skin is warm and dry. Psychiatric: He has a normal mood and affect. Nursing note and vitals reviewed. Assessment / Plan    1. Weight management    Degree of control: fair. Maintaining    Progress was reviewed with patient. Goal(s) for next appointment:   -5lbs       2. Labs    Latest results reviewed with patient   Routine monitoring labs ordered    BP consistently 90's/60's even after BP med reduced, trial 5mg daily for remainder of week, check bp am and pm follow up by phone on Friday with results .       Follow up 1 mo    >50% of 30 min visit spent counseling     AMANDA Krishnan

## 2019-03-06 ENCOUNTER — CLINICAL SUPPORT (OUTPATIENT)
Dept: FAMILY MEDICINE CLINIC | Age: 44
End: 2019-03-06

## 2019-03-06 VITALS
HEART RATE: 82 BPM | SYSTOLIC BLOOD PRESSURE: 112 MMHG | HEIGHT: 72 IN | BODY MASS INDEX: 31.87 KG/M2 | DIASTOLIC BLOOD PRESSURE: 77 MMHG | WEIGHT: 235.3 LBS

## 2019-03-06 DIAGNOSIS — E66.9 OBESITY, UNSPECIFIED CLASSIFICATION, UNSPECIFIED OBESITY TYPE, UNSPECIFIED WHETHER SERIOUS COMORBIDITY PRESENT: Primary | ICD-10-CM

## 2019-03-06 NOTE — PROGRESS NOTES
Progress Note: Weekly Medical Monitoring in the [facility] Weight Loss Program    Is there anything that you or the patient needs to let the supervising provider know about? no    Over the past week, have you experienced any side-effects? no    Edith Talavera is a 40 y.o. male who is enrolled in [facility] Loss Program    Edith Talavera was prescribed the VLCD / LCD. Visit Vitals  /77   Pulse 82   Ht 6' (1.829 m)   Wt 106.7 kg (235 lb 4.8 oz)   BMI 31.91 kg/m²     Weight Metrics 3/6/2019 2/26/2019 2/26/2019 2/20/2019 2/15/2019 2/13/2019 2/6/2019   Weight 235 lb 4.8 oz - 242 lb 241 lb 6.4 oz 243 lb 240 lb 11.2 oz 245 lb   Waist Measure Inches 44 44.5 - 44.5 - 44.5 45.5   BMI 31.91 kg/m2 - 32.82 kg/m2 32.74 kg/m2 32.96 kg/m2 32.64 kg/m2 33.23 kg/m2         Have you received any other medical care this week? no  If yes, where and for what? Have you had any change in your medications since your last visit? no  If yes what? Did you have any problems adhering to the program last week? no  If yes, please explain:       Eating Habits Over Last Week:  Did you take in 64 oz of non-caloric fluids? yes     Did you consume your prescribed meal replacement regimen each day?  yes       Physical Activity Over the Past Week:    Aerobic exercise: 90 min  Resistance exercise: two, 30 min workouts / week

## 2019-03-11 DIAGNOSIS — L40.50 PSORIATIC ARTHRITIS (HCC): ICD-10-CM

## 2019-03-11 DIAGNOSIS — E11.8 TYPE 2 DIABETES MELLITUS WITH COMPLICATION, WITHOUT LONG-TERM CURRENT USE OF INSULIN (HCC): ICD-10-CM

## 2019-03-11 NOTE — TELEPHONE ENCOUNTER
PHONE IN RX    Last Visit: 02/15/2019 with MD Daniela Alexandre  Next Appointment: 05/17/2019 with MD Daniela Alexandre  Previous Refill Encounter(s): 11/15/2018 per MD Daniela Alexandre #[id number] with 3 refills    Requested Prescriptions     Pending Prescriptions Disp Refills    traMADol (ULTRAM) 50 mg tablet 180 Tab 3     Sig: Take 2 Tabs by mouth every eight (8) hours as needed for Pain for up to 30 days. Max Daily Amount: 300 mg.

## 2019-03-12 RX ORDER — TRAMADOL HYDROCHLORIDE 50 MG/1
100 TABLET ORAL
Qty: 180 TAB | Refills: 3 | OUTPATIENT
Start: 2019-03-12 | End: 2019-12-24 | Stop reason: SDUPTHER

## 2019-03-13 ENCOUNTER — CLINICAL SUPPORT (OUTPATIENT)
Dept: FAMILY MEDICINE CLINIC | Age: 44
End: 2019-03-13

## 2019-03-13 VITALS
DIASTOLIC BLOOD PRESSURE: 68 MMHG | BODY MASS INDEX: 32.05 KG/M2 | HEART RATE: 72 BPM | WEIGHT: 236.3 LBS | SYSTOLIC BLOOD PRESSURE: 100 MMHG

## 2019-03-13 DIAGNOSIS — E66.9 OBESITY, UNSPECIFIED CLASSIFICATION, UNSPECIFIED OBESITY TYPE, UNSPECIFIED WHETHER SERIOUS COMORBIDITY PRESENT: Primary | ICD-10-CM

## 2019-03-13 NOTE — PROGRESS NOTES
Progress Note: Weekly Medical Monitoring in the [facility] Loss Program    Is there anything that you or the patient needs to let the supervising provider know about? no    Over the past week, have you experienced any side-effects? no    August Tejada is a 40 y.o. male who is enrolled in [facility]    August Tejada was prescribed the VLCD / LCD. Visit Vitals  /68   Pulse 72   Wt 107.2 kg (236 lb 4.8 oz)   BMI 32.05 kg/m²     Weight Metrics 3/13/2019 3/6/2019 2/26/2019 2/26/2019 2/20/2019 2/15/2019 2/13/2019   Weight 236 lb 4.8 oz 235 lb 4.8 oz - 242 lb 241 lb 6.4 oz 243 lb 240 lb 11.2 oz   Waist Measure Inches - 44 44.5 - 44.5 - 44.5   BMI 32.05 kg/m2 31.91 kg/m2 - 32.82 kg/m2 32.74 kg/m2 32.96 kg/m2 32.64 kg/m2         Have you received any other medical care this week? no  If yes, where and for what? Have you had any change in your medications since your last visit? no  If yes what? Did you have any problems adhering to the program last week? no  If yes, please explain:       Eating Habits Over Last Week:  Did you take in 64 oz of non-caloric fluids? yes     Did you consume your prescribed meal replacement regimen each day?  yes       Physical Activity Over the Past Week:    Aerobic exercise: 120 min  Resistance exercise: 3 workouts / week

## 2019-03-20 ENCOUNTER — CLINICAL SUPPORT (OUTPATIENT)
Dept: FAMILY MEDICINE CLINIC | Age: 44
End: 2019-03-20

## 2019-03-20 VITALS
HEART RATE: 68 BPM | WEIGHT: 234.4 LBS | BODY MASS INDEX: 31.75 KG/M2 | HEIGHT: 72 IN | SYSTOLIC BLOOD PRESSURE: 110 MMHG | DIASTOLIC BLOOD PRESSURE: 74 MMHG

## 2019-03-20 DIAGNOSIS — E66.9 OBESITY, UNSPECIFIED CLASSIFICATION, UNSPECIFIED OBESITY TYPE, UNSPECIFIED WHETHER SERIOUS COMORBIDITY PRESENT: Primary | ICD-10-CM

## 2019-03-20 NOTE — PROGRESS NOTES
Progress Note: Weekly Medical Monitoring in the [facility] Loss [facility]    Is there anything that you or the patient needs to let the supervising provider know about? no    Over the past week, have you experienced any side-effects? no    Regulo Craven is a 40 y.o. male who is enrolled in [facility]    Regulo Craven was prescribed the VLCD / LCD. Visit Vitals  /74   Pulse 68   Ht 6' (1.829 m)   Wt 106.3 kg (234 lb 6.4 oz)   BMI 31.79 kg/m²     Weight Metrics 3/20/2019 3/13/2019 3/6/2019 2/26/2019 2/26/2019 2/20/2019 2/15/2019   Weight 234 lb 6.4 oz 236 lb 4.8 oz 235 lb 4.8 oz - 242 lb 241 lb 6.4 oz 243 lb   Waist Measure Inches 43.5 43.75 44 44.5 - 44.5 -   BMI 31.79 kg/m2 32.05 kg/m2 31.91 kg/m2 - 32.82 kg/m2 32.74 kg/m2 32.96 kg/m2         Have you received any other medical care this week? no  If yes, where and for what? Have you had any change in your medications since your last visit? no  If yes what? Did you have any problems adhering to the program last week? no  If yes, please explain:       Eating Habits Over Last Week:  Did you take in 64 oz of non-caloric fluids?  yes     Did you consume your prescribed meal replacement regimen each day? yes all but one day      Physical Activity Over the Past Week:    Aerobic exercise: 180 min  Resistance exercise: 2 workouts / week

## 2019-03-27 ENCOUNTER — HOSPITAL ENCOUNTER (OUTPATIENT)
Dept: LAB | Age: 44
Discharge: HOME | End: 2019-03-27

## 2019-03-27 LAB — XX-LABCORP SPECIMEN COL,LCBCF: NORMAL

## 2019-03-27 PROCEDURE — 99001 SPECIMEN HANDLING PT-LAB: CPT

## 2019-03-28 LAB
ALBUMIN SERPL-MCNC: 4.6 G/DL (ref 3.5–5.5)
ALBUMIN/GLOB SERPL: 1.9 {RATIO} (ref 1.2–2.2)
ALP SERPL-CCNC: 60 IU/L (ref 39–117)
ALT SERPL-CCNC: 26 IU/L (ref 0–44)
AST SERPL-CCNC: 21 IU/L (ref 0–40)
BILIRUB SERPL-MCNC: 0.3 MG/DL (ref 0–1.2)
BUN SERPL-MCNC: 24 MG/DL (ref 6–24)
BUN/CREAT SERPL: 26 (ref 9–20)
CALCIUM SERPL-MCNC: 9.7 MG/DL (ref 8.7–10.2)
CHLORIDE SERPL-SCNC: 102 MMOL/L (ref 96–106)
CO2 SERPL-SCNC: 27 MMOL/L (ref 20–29)
CREAT SERPL-MCNC: 0.93 MG/DL (ref 0.76–1.27)
GLOBULIN SER CALC-MCNC: 2.4 G/DL (ref 1.5–4.5)
GLUCOSE SERPL-MCNC: 85 MG/DL (ref 65–99)
POTASSIUM SERPL-SCNC: 4.3 MMOL/L (ref 3.5–5.2)
PROT SERPL-MCNC: 7 G/DL (ref 6–8.5)
SODIUM SERPL-SCNC: 141 MMOL/L (ref 134–144)
URATE SERPL-MCNC: 4 MG/DL (ref 3.7–8.6)

## 2019-03-29 ENCOUNTER — OFFICE VISIT (OUTPATIENT)
Dept: SURGERY | Age: 44
End: 2019-03-29

## 2019-03-29 VITALS
SYSTOLIC BLOOD PRESSURE: 103 MMHG | TEMPERATURE: 98.6 F | WEIGHT: 236 LBS | BODY MASS INDEX: 31.97 KG/M2 | HEART RATE: 70 BPM | HEIGHT: 72 IN | OXYGEN SATURATION: 97 % | DIASTOLIC BLOOD PRESSURE: 69 MMHG | RESPIRATION RATE: 18 BRPM

## 2019-03-29 DIAGNOSIS — Z71.3 WEIGHT LOSS COUNSELING, ENCOUNTER FOR: ICD-10-CM

## 2019-03-29 DIAGNOSIS — E78.00 HYPERCHOLESTEROLEMIA: ICD-10-CM

## 2019-03-29 DIAGNOSIS — E66.9 OBESITY (BMI 30-39.9): ICD-10-CM

## 2019-03-29 DIAGNOSIS — R63.4 RAPID WEIGHT LOSS: ICD-10-CM

## 2019-03-29 DIAGNOSIS — E11.8 TYPE 2 DIABETES MELLITUS WITH COMPLICATION, WITHOUT LONG-TERM CURRENT USE OF INSULIN (HCC): ICD-10-CM

## 2019-03-29 DIAGNOSIS — E66.9 OBESITY (BMI 30-39.9): Primary | ICD-10-CM

## 2019-03-29 NOTE — PROGRESS NOTES
Chief Complaint   Patient presents with    Weight Management     follow up     Nursing Note for Medical Monitoring in the [facility] Loss Program      Citlali Springer is a 40 y.o. male who is enrolled in [facility]    Citlali Springer was prescribed the VLCD / LCD. Did you have any problems adhering to the program last week? no  If yes, please explain:     Since your last visit, have you experienced any complications? no    Have you received any other medical care this week? no  If yes, where and for what? Have you had any change in your medications since your last visit? no  If yes what? Are you taking an appetite suppressant? no   If yes:  Do you need a refill? BP Readings from Last 3 Encounters:   03/29/19 103/69   03/20/19 110/74   03/13/19 100/68        Eating Habits Over Last Week:  Did you take in 64 oz of non-caloric fluids? yes     Did you consume your prescribed meal replacement regimen each day? yes       Physical Activity Over the Past Week:    Aerobic exercise: 0 min  Resistance exercise: no workouts / week    Body mass index is 32.01 kg/m².

## 2019-03-29 NOTE — PROGRESS NOTES
[facility] Progress Note:   F/up Provider Visit    CC: Weight Management    Haresh Rivers is a 40 y.o. male who is here for his  f/up medical provider visit for the VLCD Program. he did attend class last week. -6 lbs back on track with weight loss, eating some outside foods due to job functions but otherwise doing well, continues to suffer from obesity complicated by DM, hypercolesterolemia     Weight History  Weight Metrics 3/29/2019 3/29/2019 3/20/2019 3/13/2019 3/6/2019 2/26/2019 2/26/2019   Weight - 236 lb 234 lb 6.4 oz 236 lb 4.8 oz 235 lb 4.8 oz - 242 lb   Waist Measure Inches 44 - 43.5 43.75 44 44.5 -   BMI - 32.01 kg/m2 31.79 kg/m2 32.05 kg/m2 31.91 kg/m2 - 32.82 kg/m2       Highest wt:  286lbs   Goal wt: 200lbs (now wants to push to 180lbs)  Start wt: 279lbs   EKG due: 229lbs       Ideal body weight: 77.6 kg (171 lb 1.2 oz)  Adjusted ideal body weight: 89.4 kg (197 lb 0.7 oz)  Body mass index is 32.01 kg/m². History    Past Medical History:   Diagnosis Date    Asthma     Diabetes mellitus     no meds    Dupuytren's contracture     Essential hypertension     Ill-defined condition     neuropathy    Psoriatic arthritis, destructive type ([facility] [address]     sarcoidosis    Sarcoidosis        Past Surgical History:   Procedure Laterality Date    HX CARPAL TUNNEL RELEASE      HX ORTHOPAEDIC      fx skull/ broken jaw     HX ORTHOPAEDIC      carpal tunnel    HX OTHER SURGICAL      lipoma removed     WI EXC SKIN BENIG 0.6-1CM TRUNK,ARM,LEG N/A 10/11/2018    Dr. Hilda Maria 1.1-2CM TRUNK,ARM,LEG N/A 10/11/2018    Dr. Hilda Maria 2.1-3CM TRUNK,ARM,LEG N/A 10/11/2018    Dr. Aaron Jackson       Current Outpatient Medications   Medication Sig Dispense Refill    traMADol (ULTRAM) 50 mg tablet Take 2 Tabs by mouth every eight (8) hours as needed for Pain for up to 30 days.  Max Daily Amount: 300 mg. 180 Tab 3    lisinopril (PRINIVIL, ZESTRIL) 10 mg tablet Take 1 Tab by mouth daily. (Patient taking differently: Take 10 mg by mouth daily. Indications: taking half tab per day) 90 Tab 2    multivitamin (ONE A DAY) tablet Take 1 Tab by mouth daily.  metaxalone (SKELAXIN) 800 mg tablet Take 800 mg by mouth three (3) times daily.  meloxicam (MOBIC) 15 mg tablet Take 15 mg by mouth daily.  adalimumab (HUMIRA PEN SC) by SubCUTAneous route every seven (7) days. No Known Allergies    Social History     Tobacco Use    Smoking status: Never Smoker    Smokeless tobacco: Never Used   Substance Use Topics    Alcohol use: No     Alcohol/week: 1.2 oz     Types: 2 Shots of liquor per week     Frequency: Never     Comment: social    Drug use: No       Family History   Problem Relation Age of Onset    Stroke Father     Diabetes Maternal Grandmother     Cancer Maternal Grandfather 79        prostate cancer       Family Status   Relation Name Status    Mother  Alive    Father      Sister  Alive    MGM  Alive    MGF  Alive         Medications:  Outpatient Medications Marked as Taking for the 3/29/19 encounter (Office Visit) with Robert Mast NP   Medication Sig Dispense Refill    traMADol (ULTRAM) 50 mg tablet Take 2 Tabs by mouth every eight (8) hours as needed for Pain for up to 30 days. Max Daily Amount: 300 mg. 180 Tab 3    lisinopril (PRINIVIL, ZESTRIL) 10 mg tablet Take 1 Tab by mouth daily. (Patient taking differently: Take 10 mg by mouth daily. Indications: taking half tab per day) 90 Tab 2    multivitamin (ONE A DAY) tablet Take 1 Tab by mouth daily.  metaxalone (SKELAXIN) 800 mg tablet Take 800 mg by mouth three (3) times daily.  meloxicam (MOBIC) 15 mg tablet Take 15 mg by mouth daily.  adalimumab (HUMIRA PEN SC) by SubCUTAneous route every seven (7) days. Review of Systems  Review of Systems   Constitutional: Positive for weight loss.    All other systems reviewed and are negative. Objective  Visit Vitals  /69 (BP 1 Location: Left arm, BP Patient Position: Sitting)   Pulse 70   Temp 98.6 °F (37 °C) (Oral)   Resp 18   Ht 6' (1.829 m)   Wt 107 kg (236 lb)   SpO2 97%   BMI 32.01 kg/m²     No LMP for male patient. Physical Exam  Physical Exam   Constitutional: He is oriented to person, place, and time. He appears well-developed and well-nourished. HENT:   Head: Normocephalic. Cardiovascular: Normal rate and regular rhythm. Pulmonary/Chest: Effort normal and breath sounds normal.   Abdominal: Soft. Neurological: He is alert and oriented to person, place, and time. Skin: Skin is warm and dry. Psychiatric: He has a normal mood and affect. Nursing note and vitals reviewed. Assessment / Plan    1. Weight management    Degree of control: great continue VLCD 4 MR    Progress was reviewed with patient. Goal(s) for next appointment:   -5lbs       2.   Labs    Latest results reviewed with patient   Routine monitoring labs ordered          Follow up 1 mo    >50% of 20 min visit spent counseling     AMANDA Peterson

## 2019-04-03 ENCOUNTER — CLINICAL SUPPORT (OUTPATIENT)
Dept: FAMILY MEDICINE CLINIC | Age: 44
End: 2019-04-03

## 2019-04-03 DIAGNOSIS — E66.9 OBESITY, UNSPECIFIED CLASSIFICATION, UNSPECIFIED OBESITY TYPE, UNSPECIFIED WHETHER SERIOUS COMORBIDITY PRESENT: Primary | ICD-10-CM

## 2019-04-04 VITALS
SYSTOLIC BLOOD PRESSURE: 108 MMHG | BODY MASS INDEX: 31.72 KG/M2 | DIASTOLIC BLOOD PRESSURE: 72 MMHG | HEART RATE: 76 BPM | HEIGHT: 72 IN | WEIGHT: 234.2 LBS

## 2019-04-04 NOTE — PROGRESS NOTES
Progress Note: Weekly Medical Monitoring in the [facility] Loss Program    Is there anything that you or the patient needs to let the supervising provider know about? no    Over the past week, have you experienced any side-effects? no    Mendel Canales is a 40 y.o. male who is enrolled in [facility]    Mendel Canales was prescribed the VLCD / LCD. Visit Vitals  /72   Pulse 76   Ht 6' (1.829 m)   Wt 106.2 kg (234 lb 3.2 oz)   BMI 31.76 kg/m²     Weight Metrics 4/4/2019 4/3/2019 3/29/2019 3/29/2019 3/20/2019 3/13/2019 3/6/2019   Weight - 234 lb 3.2 oz - 236 lb 234 lb 6.4 oz 236 lb 4.8 oz 235 lb 4.8 oz   Waist Measure Inches 43 - 44 - 43.5 43.75 44   BMI - 31.76 kg/m2 - 32.01 kg/m2 31.79 kg/m2 32.05 kg/m2 31.91 kg/m2         Have you received any other medical care this week? no  If yes, where and for what? Have you had any change in your medications since your last visit? no  If yes what? Did you have any problems adhering to the program last week? no  If yes, please explain:       Eating Habits Over Last Week:  Did you take in 64 oz of non-caloric fluids? yes     Did you consume your prescribed meal replacement regimen each day?  yes       Physical Activity Over the Past Week:    Aerobic exercise: 120 min  Resistance exercise: 2 workouts / week

## 2019-04-17 ENCOUNTER — CLINICAL SUPPORT (OUTPATIENT)
Dept: FAMILY MEDICINE CLINIC | Age: 44
End: 2019-04-17

## 2019-04-17 DIAGNOSIS — E66.9 OBESITY, UNSPECIFIED CLASSIFICATION, UNSPECIFIED OBESITY TYPE, UNSPECIFIED WHETHER SERIOUS COMORBIDITY PRESENT: Primary | ICD-10-CM

## 2019-04-18 VITALS
HEART RATE: 60 BPM | WEIGHT: 234.3 LBS | SYSTOLIC BLOOD PRESSURE: 112 MMHG | BODY MASS INDEX: 31.78 KG/M2 | DIASTOLIC BLOOD PRESSURE: 76 MMHG

## 2019-04-24 ENCOUNTER — OFFICE VISIT (OUTPATIENT)
Dept: SURGERY | Age: 44
End: 2019-04-24

## 2019-04-24 VITALS
SYSTOLIC BLOOD PRESSURE: 104 MMHG | WEIGHT: 233 LBS | BODY MASS INDEX: 31.56 KG/M2 | RESPIRATION RATE: 18 BRPM | DIASTOLIC BLOOD PRESSURE: 74 MMHG | TEMPERATURE: 98.3 F | HEIGHT: 72 IN | HEART RATE: 66 BPM

## 2019-04-24 DIAGNOSIS — E78.00 HYPERCHOLESTEROLEMIA: ICD-10-CM

## 2019-04-24 DIAGNOSIS — I10 HYPERTENSION, UNSPECIFIED TYPE: ICD-10-CM

## 2019-04-24 DIAGNOSIS — Z71.3 WEIGHT LOSS COUNSELING, ENCOUNTER FOR: ICD-10-CM

## 2019-04-24 DIAGNOSIS — E66.9 OBESITY (BMI 30-39.9): Primary | ICD-10-CM

## 2019-04-24 DIAGNOSIS — R63.4 RAPID WEIGHT LOSS: ICD-10-CM

## 2019-04-24 RX ORDER — TRAMADOL HYDROCHLORIDE 50 MG/1
100 TABLET ORAL
COMMUNITY
End: 2019-05-14 | Stop reason: SDUPTHER

## 2019-04-24 NOTE — PROGRESS NOTES
[facility] Progress Note:   F/up Provider Visit    CC: Weight Management    Jem Garcia is a 40 y.o. male who is here for his  f/up medical provider visit for the VLCD Program. he did attend class last week.   -3lbs slow eight loss, otherwise doing well, continues to suffer from obesity complicated by DM, hypercolesterolemia     EKG due now     Weight History  Weight Metrics 4/24/2019 4/18/2019 4/17/2019 4/4/2019 4/3/2019 3/29/2019 3/29/2019   Weight 233 lb - 234 lb 4.8 oz - 234 lb 3.2 oz - 236 lb   Waist Measure Inches 43.25 43 - 43 - 44 -   BMI 31.6 kg/m2 - 31.78 kg/m2 - 31.76 kg/m2 - 32.01 kg/m2       Highest wt:  286lbs   Goal wt: 200lbs (now wants to push to 180lbs)  Start wt: 279lbs   EKG due: 229lbs       Ideal body weight: 77.6 kg (171 lb 1.2 oz)  Adjusted ideal body weight: 88.8 kg (195 lb 13.5 oz)  Body mass index is 31.6 kg/m². History    Past Medical History:   Diagnosis Date    Asthma     Diabetes mellitus     no meds    Dupuytren's contracture     Essential hypertension     Ill-defined condition     neuropathy    Psoriatic arthritis, destructive type ([facility] [address]     sarcoidosis    Sarcoidosis        Past Surgical History:   Procedure Laterality Date    HX CARPAL TUNNEL RELEASE      HX ORTHOPAEDIC      fx skull/ broken jaw     HX ORTHOPAEDIC      carpal tunnel    HX OTHER SURGICAL      lipoma removed     [place] EXC SKIN BENIG 0.6-1CM TRUNK,ARM,LEG N/A 10/11/2018    Dr. Beth Mcmullen 1.1-2CM TRUNK,ARM,LEG N/A 10/11/2018    Dr. Beth Mcmullen 2.1-3CM TRUNK,ARM,LEG N/A 10/11/2018    Dr. Dee Page       Current Outpatient Medications   Medication Sig Dispense Refill    lisinopril (PRINIVIL, ZESTRIL) 10 mg tablet Take 1 Tab by mouth daily. (Patient taking differently: Take 10 mg by mouth daily. Indications: taking half tab per day) 90 Tab 2    multivitamin (ONE A DAY) tablet Take 1 Tab by mouth daily.       metaxalone (SKELAXIN) 800 mg tablet Take 800 mg by mouth three (3) times daily.  meloxicam (MOBIC) 15 mg tablet Take 15 mg by mouth daily.  adalimumab (HUMIRA PEN SC) by SubCUTAneous route every seven (7) days. No Known Allergies    Social History     Tobacco Use    Smoking status: Never Smoker    Smokeless tobacco: Never Used   Substance Use Topics    Alcohol use: No     Alcohol/week: 1.2 oz     Types: 2 Shots of liquor per week     Frequency: Never     Comment: social    Drug use: No       Family History   Problem Relation Age of Onset    Stroke Father     Diabetes Maternal Grandmother     Cancer Maternal Grandfather 79        prostate cancer       Family Status   Relation Name Status    Mother  Alive    Father     Vertie Amis Sister  Alive    MGM  Alive    MGF  Alive         Medications:        Review of Systems  Review of Systems   Constitutional: Positive for weight loss. All other systems reviewed and are negative. Objective  Visit Vitals  Temp 98.3 °F (36.8 °C)   Resp 18   Ht 6' (1.829 m)   Wt 105.7 kg (233 lb)   BMI 31.60 kg/m²     No LMP for male patient. Physical Exam  Physical Exam   Constitutional: He is oriented to person, place, and time. He appears well-developed and well-nourished. HENT:   Head: Normocephalic. Cardiovascular: Normal rate and regular rhythm. Pulmonary/Chest: Effort normal and breath sounds normal.   Abdominal: Soft. Neurological: He is alert and oriented to person, place, and time. Skin: Skin is warm and dry. Psychiatric: He has a normal mood and affect. Nursing note and vitals reviewed. Assessment / Plan    1. Weight management    Degree of control: great continue VLCD 4 MR    Progress was reviewed with patient. Goal(s) for next appointment:   -Eluterio Stabs!!       2.   Labs    Latest results reviewed with patient   Routine monitoring labs ordered  EKG due now for rapid weight loss; 50lbs over past 4-5 mo timeframe eval for QT interval changes Orders Placed This Encounter    METABOLIC PANEL, COMPREHENSIVE     Standing Status:   Future     Standing Expiration Date:   4/29/2020    URIC ACID     Standing Status:   Future     Standing Expiration Date:   4/29/2020    EKG, 12 LEAD, INITIAL     Standing Status:   Future     Standing Expiration Date:   7/24/2019     Order Specific Question:   Reason for Exam:     Answer:   medically mon. weight loss program, eval for changes to QT    traMADol (ULTRAM) 50 mg tablet     Sig: Take 100 mg by mouth.            Follow up 1 mo    >50% of 20 min visit spent counseling     AMANDA Diaz

## 2019-04-24 NOTE — PROGRESS NOTES
Chief Complaint   Patient presents with    Weight Management   1. Have you been to the ER, urgent care clinic since your last visit? Hospitalized since your last visit? No    2. Have you seen or consulted any other health care providers outside of the [address] [place] [address] since your last visit? Include any pap smears or colon screening. No               Nursing Note for Medical Monitoring in the [facility]      Myrtle Graf is a 40 y.o. male who is enrolled in [facility]    Myrtle Graf was prescribed the VLCD / LCD. Did you have any problems adhering to the program last week? no  If yes, please explain:     Since your last visit, have you experienced any complications? no    Have you received any other medical care this week? yesIf yes, where and for what? pcp  Have you had any change in your medications since your last visit? yes  If yes what? Metformin discontinued    Are you taking an appetite suppressant? no   If yes:  Do you need a refill? BP Readings from Last 3 Encounters:   04/24/19 104/74   04/18/19 112/76   04/04/19 108/72        Eating Habits Over Last Week:  Did you take in 64 oz of non-caloric fluids? yes     Did you consume your prescribed meal replacement regimen each day?  yes       Physical Activity Over the Past Week:    Aerobic exercise: 90 min  Resistance exercise: 2 workouts / week

## 2019-04-29 DIAGNOSIS — I10 HYPERTENSION, UNSPECIFIED TYPE: ICD-10-CM

## 2019-04-29 DIAGNOSIS — E78.00 HYPERCHOLESTEROLEMIA: ICD-10-CM

## 2019-04-29 DIAGNOSIS — Z71.3 WEIGHT LOSS COUNSELING, ENCOUNTER FOR: ICD-10-CM

## 2019-04-29 DIAGNOSIS — E66.9 OBESITY (BMI 30-39.9): ICD-10-CM

## 2019-05-01 ENCOUNTER — CLINICAL SUPPORT (OUTPATIENT)
Dept: FAMILY MEDICINE CLINIC | Age: 44
End: 2019-05-01

## 2019-05-01 ENCOUNTER — HOSPITAL ENCOUNTER (OUTPATIENT)
Dept: LAB | Age: 44
Discharge: HOME | End: 2019-05-01

## 2019-05-01 ENCOUNTER — HOSPITAL ENCOUNTER (OUTPATIENT)
Dept: LAB | Age: 44
Discharge: HOME | End: 2019-05-01
Payer: COMMERCIAL

## 2019-05-01 VITALS
HEART RATE: 64 BPM | BODY MASS INDEX: 31.48 KG/M2 | WEIGHT: 232.1 LBS | SYSTOLIC BLOOD PRESSURE: 108 MMHG | DIASTOLIC BLOOD PRESSURE: 72 MMHG

## 2019-05-01 DIAGNOSIS — E66.9 OBESITY, UNSPECIFIED CLASSIFICATION, UNSPECIFIED OBESITY TYPE, UNSPECIFIED WHETHER SERIOUS COMORBIDITY PRESENT: Primary | ICD-10-CM

## 2019-05-01 DIAGNOSIS — I10 HYPERTENSION, UNSPECIFIED TYPE: ICD-10-CM

## 2019-05-01 DIAGNOSIS — E66.9 OBESITY (BMI 30-39.9): ICD-10-CM

## 2019-05-01 DIAGNOSIS — E78.00 HYPERCHOLESTEROLEMIA: ICD-10-CM

## 2019-05-01 DIAGNOSIS — R63.4 RAPID WEIGHT LOSS: ICD-10-CM

## 2019-05-01 DIAGNOSIS — Z71.3 WEIGHT LOSS COUNSELING, ENCOUNTER FOR: ICD-10-CM

## 2019-05-01 LAB — XX-LABCORP SPECIMEN COL,LCBCF: NORMAL

## 2019-05-01 PROCEDURE — 99001 SPECIMEN HANDLING PT-LAB: CPT

## 2019-05-01 PROCEDURE — 93005 ELECTROCARDIOGRAM TRACING: CPT

## 2019-05-01 NOTE — PROGRESS NOTES
Progress Note: Weekly Medical Monitoring in the [facility] Loss Program    Is there anything that you or the patient needs to let the supervising provider know about? no    Over the past week, have you experienced any side-effects? no    Karolina Sandy is a 40 y.o. male who is enrolled in [facility]    Karolina Sandy was prescribed the VLCD / LCD. Visit Vitals  /72   Pulse 64   Wt 105.3 kg (232 lb 1.6 oz)   BMI 31.48 kg/m²     Weight Metrics 5/1/2019 4/24/2019 4/24/2019 4/18/2019 4/17/2019 4/4/2019 4/3/2019   Weight 232 lb 1.6 oz - 233 lb - 234 lb 4.8 oz - 234 lb 3.2 oz   Waist Measure Inches 43 43.25 - 43 - 43 -   BMI 31.48 kg/m2 - 31.6 kg/m2 - 31.78 kg/m2 - 31.76 kg/m2         Have you received any other medical care this week? no  If yes, where and for what? Have you had any change in your medications since your last visit? no  If yes what? Did you have any problems adhering to the program last week? no  If yes, please explain:       Eating Habits Over Last Week:  Did you take in 64 oz of non-caloric fluids?  yes     Did you consume your prescribed meal replacement regimen each day? no       Physical Activity Over the Past Week:    Aerobic exercise: 60 min  Resistance exercise: 45min x 4 days workouts / week

## 2019-05-02 LAB
ALBUMIN SERPL-MCNC: 4.8 G/DL (ref 3.5–5.5)
ALBUMIN/GLOB SERPL: 1.8 {RATIO} (ref 1.2–2.2)
ALP SERPL-CCNC: 60 IU/L (ref 39–117)
ALT SERPL-CCNC: 77 IU/L (ref 0–44)
AST SERPL-CCNC: 177 IU/L (ref 0–40)
ATRIAL RATE: 59 BPM
BILIRUB SERPL-MCNC: 0.6 MG/DL (ref 0–1.2)
BUN SERPL-MCNC: 25 MG/DL (ref 6–24)
BUN/CREAT SERPL: 27 (ref 9–20)
CALCIUM SERPL-MCNC: 9.9 MG/DL (ref 8.7–10.2)
CALCULATED P AXIS, ECG09: 20 DEGREES
CALCULATED R AXIS, ECG10: 14 DEGREES
CALCULATED T AXIS, ECG11: 43 DEGREES
CHLORIDE SERPL-SCNC: 99 MMOL/L (ref 96–106)
CO2 SERPL-SCNC: 24 MMOL/L (ref 20–29)
CREAT SERPL-MCNC: 0.91 MG/DL (ref 0.76–1.27)
DIAGNOSIS, 93000: NORMAL
GLOBULIN SER CALC-MCNC: 2.6 G/DL (ref 1.5–4.5)
GLUCOSE SERPL-MCNC: 98 MG/DL (ref 65–99)
P-R INTERVAL, ECG05: 130 MS
POTASSIUM SERPL-SCNC: 4.4 MMOL/L (ref 3.5–5.2)
PROT SERPL-MCNC: 7.4 G/DL (ref 6–8.5)
Q-T INTERVAL, ECG07: 428 MS
QRS DURATION, ECG06: 84 MS
QTC CALCULATION (BEZET), ECG08: 423 MS
SODIUM SERPL-SCNC: 140 MMOL/L (ref 134–144)
URATE SERPL-MCNC: 4.6 MG/DL (ref 3.7–8.6)
VENTRICULAR RATE, ECG03: 59 BPM

## 2019-05-08 ENCOUNTER — CLINICAL SUPPORT (OUTPATIENT)
Dept: FAMILY MEDICINE CLINIC | Age: 44
End: 2019-05-08

## 2019-05-08 DIAGNOSIS — E66.9 OBESITY, UNSPECIFIED CLASSIFICATION, UNSPECIFIED OBESITY TYPE, UNSPECIFIED WHETHER SERIOUS COMORBIDITY PRESENT: Primary | ICD-10-CM

## 2019-05-09 VITALS
WEIGHT: 236.5 LBS | SYSTOLIC BLOOD PRESSURE: 106 MMHG | HEIGHT: 72 IN | BODY MASS INDEX: 32.03 KG/M2 | HEART RATE: 74 BPM | DIASTOLIC BLOOD PRESSURE: 74 MMHG

## 2019-05-09 NOTE — PROGRESS NOTES
Progress Note: Weekly Medical Monitoring in the [facility] Program    Is there anything that you or the patient needs to let the supervising provider know about? no    Over the past week, have you experienced any side-effects? no    Ashley Garibay is a 40 y.o. male who is enrolled in [facility]    Ashley Garibay was prescribed the VLCD / LCD. Visit Vitals  /74   Pulse 74   Ht 6' (1.829 m)   Wt 107.3 kg (236 lb 8 oz)   BMI 32.08 kg/m²     Weight Metrics 5/9/2019 5/8/2019 5/1/2019 4/24/2019 4/24/2019 4/18/2019 4/17/2019   Weight - 236 lb 8 oz 232 lb 1.6 oz - 233 lb - 234 lb 4.8 oz   Waist Measure Inches 43 - 43 43.25 - 43 -   BMI - 32.08 kg/m2 31.48 kg/m2 - 31.6 kg/m2 - 31.78 kg/m2         Have you received any other medical care this week? no  If yes, where and for what? Have you had any change in your medications since your last visit? no  If yes what? Did you have any problems adhering to the program last week? no  If yes, please explain:       Eating Habits Over Last Week:  Did you take in 64 oz of non-caloric fluids? yes     Did you consume your prescribed meal replacement regimen each day?  yes       Physical Activity Over the Past Week:    Aerobic exercise: 210 min  Resistance exercise: 7 workouts / week

## 2019-05-14 ENCOUNTER — OFFICE VISIT (OUTPATIENT)
Dept: INTERNAL MEDICINE CLINIC | Age: 44
End: 2019-05-14

## 2019-05-14 VITALS
DIASTOLIC BLOOD PRESSURE: 70 MMHG | BODY MASS INDEX: 32.18 KG/M2 | TEMPERATURE: 98.2 F | WEIGHT: 237.6 LBS | SYSTOLIC BLOOD PRESSURE: 96 MMHG | RESPIRATION RATE: 12 BRPM | HEIGHT: 72 IN | OXYGEN SATURATION: 98 % | HEART RATE: 68 BPM

## 2019-05-14 DIAGNOSIS — L40.50 PSORIATIC ARTHRITIS (HCC): Primary | ICD-10-CM

## 2019-05-14 DIAGNOSIS — E78.5 HYPERLIPIDEMIA, UNSPECIFIED HYPERLIPIDEMIA TYPE: ICD-10-CM

## 2019-05-14 DIAGNOSIS — Z79.891 CHRONIC PRESCRIPTION OPIATE USE: ICD-10-CM

## 2019-05-14 DIAGNOSIS — E11.8 TYPE 2 DIABETES MELLITUS WITH COMPLICATION, WITHOUT LONG-TERM CURRENT USE OF INSULIN (HCC): ICD-10-CM

## 2019-05-14 DIAGNOSIS — I10 ESSENTIAL HYPERTENSION: ICD-10-CM

## 2019-05-14 DIAGNOSIS — E66.9 OBESITY, UNSPECIFIED CLASSIFICATION, UNSPECIFIED OBESITY TYPE, UNSPECIFIED WHETHER SERIOUS COMORBIDITY PRESENT: ICD-10-CM

## 2019-05-14 RX ORDER — TRAMADOL HYDROCHLORIDE 50 MG/1
100 TABLET ORAL
Qty: 180 TAB | Refills: 0 | Status: SHIPPED | OUTPATIENT
Start: 2019-05-14 | End: 2019-06-13

## 2019-05-15 ENCOUNTER — CLINICAL SUPPORT (OUTPATIENT)
Dept: FAMILY MEDICINE CLINIC | Age: 44
End: 2019-05-15

## 2019-05-15 DIAGNOSIS — E66.9 OBESITY, UNSPECIFIED CLASSIFICATION, UNSPECIFIED OBESITY TYPE, UNSPECIFIED WHETHER SERIOUS COMORBIDITY PRESENT: Primary | ICD-10-CM

## 2019-05-16 VITALS
WEIGHT: 237.1 LBS | DIASTOLIC BLOOD PRESSURE: 72 MMHG | HEART RATE: 60 BPM | BODY MASS INDEX: 32.12 KG/M2 | SYSTOLIC BLOOD PRESSURE: 108 MMHG | HEIGHT: 72 IN

## 2019-05-16 NOTE — PROGRESS NOTES
Patient attended a weekly class as part of the [facility] Weight Loss Program.  This class was facilitated by a Registered Dietitian. Topics taught at class focused on diet, particularly carbohydrate counting and portion control. Classes also focused on behavior changes and the importance of establishing a daily exercise routine. Progress Note: Weekly Medical Monitoring in the [facility]    Is there anything that you or the patient needs to let the supervising provider know about? no    Over the past week, have you experienced any side-effects? no    Scottie Price is a 40 y.o. male who is enrolled in [facility]    Scottie Price was prescribed the VLCD / LCD. Visit Vitals  /72   Pulse 60   Ht 6' (1.829 m)   Wt 107.5 kg (237 lb 1.6 oz)   BMI 32.16 kg/m²     Weight Metrics 5/16/2019 5/15/2019 5/14/2019 5/9/2019 5/8/2019 5/1/2019 4/24/2019   Weight - 237 lb 1.6 oz 237 lb 9.6 oz - 236 lb 8 oz 232 lb 1.6 oz -   Waist Measure Inches 43 - - 43 - 43 43.25   BMI - 32.16 kg/m2 32.22 kg/m2 - 32.08 kg/m2 31.48 kg/m2 -         Have you received any other medical care this week? yes  If yes, where and for what? PCP Jemma Burger    Have you had any change in your medications since your last visit? no  If yes what? Did you have any problems adhering to the program last week? no  If yes, please explain:       Eating Habits Over Last Week:  Did you take in 64 oz of non-caloric fluids? yes     Did you consume your prescribed meal replacement regimen each day?  yes       Physical Activity Over the Past Week:    Aerobic exercise: 60 min  Resistance exercise: 5 workouts / week

## 2019-05-17 DIAGNOSIS — E66.9 OBESITY (BMI 30-39.9): Primary | ICD-10-CM

## 2019-05-17 DIAGNOSIS — R79.89 ELEVATED LIVER FUNCTION TESTS: ICD-10-CM

## 2019-05-17 DIAGNOSIS — R63.4 WEIGHT LOSS: ICD-10-CM

## 2019-05-17 NOTE — PROGRESS NOTES
Please call and remind pt to have labs done prior to next visit, watching his liver fx closely, also received message his  would like to discuss care with me? I will need the pt to sign a HIPPA allowing me to discuss care with  if he wishes me to.    Thanks

## 2019-05-21 ENCOUNTER — TELEPHONE (OUTPATIENT)
Dept: SURGERY | Age: 44
End: 2019-05-21

## 2019-05-21 NOTE — TELEPHONE ENCOUNTER
Spoke to pt he had labs done 5/1/2019 and ekg and will sign release when here in orderto speak with

## 2019-05-21 NOTE — TELEPHONE ENCOUNTER
----- Message from Mansi Hill NP sent at 5/17/2019  5:50 PM EDT -----  Please call and remind pt to have labs done prior to next visit, watching his liver fx closely, also received message his  would like to discuss care with me? I will need the pt to sign a HIPPA allowing me to discuss care with  if he wishes me to.    Thanks

## 2019-05-24 ENCOUNTER — OFFICE VISIT (OUTPATIENT)
Dept: SURGERY | Age: 44
End: 2019-05-24

## 2019-05-24 DIAGNOSIS — E11.40 TYPE 2 DIABETES MELLITUS WITH DIABETIC NEUROPATHY, UNSPECIFIED WHETHER LONG TERM INSULIN USE (HCC): ICD-10-CM

## 2019-05-24 DIAGNOSIS — Z71.3 WEIGHT LOSS COUNSELING, ENCOUNTER FOR: ICD-10-CM

## 2019-05-24 DIAGNOSIS — E78.00 HYPERCHOLESTEROLEMIA: ICD-10-CM

## 2019-05-24 DIAGNOSIS — E66.9 OBESITY (BMI 30-39.9): Primary | ICD-10-CM

## 2019-05-24 NOTE — PROGRESS NOTES
Nursing Note for Medical Monitoring in the [facility] Weight Loss Program      Lisha Mendoza is a 40 y.o. male who is enrolled in [facility]    Lisha Mendoza was prescribed the VLCD / LCD. Did you have any problems adhering to the program last week? no  If yes, please explain:     Since your last visit, have you experienced any complications? Yes, fatigue    Have you received any other medical care this week? yes  If yes, where and for what? Eye doctor    Have you had any change in your medications since your last visit? yes  If yes what? Off blood pressure medication    Are you taking an appetite suppressant? no   If yes:  Do you need a refill? BP Readings from Last 3 Encounters:   05/24/19 107/72   05/16/19 108/72   05/14/19 96/70        Eating Habits Over Last Week:  Did you take in 64 oz of non-caloric fluids? yes     Did you consume your prescribed meal replacement regimen each day?  yes       Physical Activity Over the Past Week:    Aerobic exercise: 2 hours a day 5 x a week  Resistance exercise: 1 hour a day workouts / week

## 2019-05-24 NOTE — Clinical Note
Looking for some dietary advice exercising 1 to 3 hours cannot maintain V LCD alone would like to continue supplements has been adding 3 eggs I suggested adding 1 more protein and/or 1 more meal replacement while waiting for more specific instructions  Please advise

## 2019-05-24 NOTE — PATIENT INSTRUCTIONS
Recent Results (from the past 672 hour(s))   METABOLIC PANEL, COMPREHENSIVE    Collection Time: 05/01/19 12:00 AM   Result Value Ref Range    Glucose 98 65 - 99 mg/dL    BUN 25 (H) 6 - 24 mg/dL    Creatinine 0.91 0.76 - 1.27 mg/dL    GFR est non- >59 mL/min/1.73    GFR est  >59 mL/min/1.73    BUN/Creatinine ratio 27 (H) 9 - 20    Sodium 140 134 - 144 mmol/L    Potassium 4.4 3.5 - 5.2 mmol/L    Chloride 99 96 - 106 mmol/L    CO2 24 20 - 29 mmol/L    Calcium 9.9 8.7 - 10.2 mg/dL    Protein, total 7.4 6.0 - 8.5 g/dL    Albumin 4.8 3.5 - 5.5 g/dL    GLOBULIN, TOTAL 2.6 1.5 - 4.5 g/dL    A-G Ratio 1.8 1.2 - 2.2    Bilirubin, total 0.6 0.0 - 1.2 mg/dL    Alk. phosphatase 60 39 - 117 IU/L    AST (SGOT) 177 (H) 0 - 40 IU/L    ALT (SGPT) 77 (H) 0 - 44 IU/L   URIC ACID    Collection Time: 05/01/19 12:00 AM   Result Value Ref Range    Uric acid 4.6 3.7 - 8.6 mg/dL   [facility] SPECIMEN COL    Collection Time: 05/01/19  3:56 PM   Result Value Ref Range    [facility] SPEC[facility]. Specimens collected/sent to [facility]. Please direct inquiries to ([phone]).    EKG, 12 LEAD, INITIAL    Collection Time: 05/01/19  4:06 PM   Result Value Ref Range    Ventricular Rate 59 BPM    Atrial Rate 59 BPM    P-R Interval 130 ms    QRS Duration 84 ms    Q-T Interval 428 ms    QTC Calculation (Bezet) 423 ms    Calculated P Axis 20 degrees    Calculated R Axis 14 degrees    Calculated T Axis 43 degrees    Diagnosis       Poor data quality, interpretation may be adversely affected  Sinus bradycardia  Otherwise normal ECG  When compared with ECG of 09-OCT-2018 13:37,  Borderline criteria for Inferior infarct are no longer present  Confirmed by Kelsi Harper ([id number]) on 5/2/2019 4:09:18 PM

## 2019-05-28 VITALS
DIASTOLIC BLOOD PRESSURE: 72 MMHG | HEIGHT: 72 IN | BODY MASS INDEX: 31.8 KG/M2 | HEART RATE: 64 BPM | WEIGHT: 234.8 LBS | RESPIRATION RATE: 18 BRPM | TEMPERATURE: 98.5 F | OXYGEN SATURATION: 95 % | SYSTOLIC BLOOD PRESSURE: 107 MMHG

## 2019-05-28 NOTE — PROGRESS NOTES
[facility] Progress Note:   F/up Provider Visit    CC: Weight Management    Brady Lan is a 40 y.o. male who is here for his  f/up medical provider visit for the VLCD Program. he did attend class last week. + 1lb but doing extensive physical activity with professional . Will activity daily consist of 60 minutes of circuit/weight training followed later in the day by 1 to 2 hours of cardio. Body composition report evaluation performed today. Weight History  Weight Metrics 5/24/2019 5/16/2019 5/15/2019 5/14/2019 5/9/2019 5/8/2019 5/1/2019   Weight 234 lb 12.8 oz - 237 lb 1.6 oz 237 lb 9.6 oz - 236 lb 8 oz 232 lb 1.6 oz   Waist Measure Inches - 43 - - 43 - 43   BMI 31.84 kg/m2 - 32.16 kg/m2 32.22 kg/m2 - 32.08 kg/m2 31.48 kg/m2       Highest wt:  286lbs   Goal wt: 200lbs (now wants to push to 180lbs)  Start wt: 279lbs   EKG due: 183 pounds      Ideal body weight: 77.6 kg (171 lb 1.2 oz)  Adjusted ideal body weight: 89.2 kg (196 lb 9.1 oz)  Body mass index is 31.84 kg/m². History    Past Medical History:   Diagnosis Date    Asthma     Diabetes mellitus     no meds    Dupuytren's contracture     Essential hypertension     Ill-defined condition     neuropathy    Psoriatic arthritis, destructive type ([address]     sarcoidosis    Sarcoidosis        Past Surgical History:   Procedure Laterality Date    HX CARPAL TUNNEL RELEASE      HX ORTHOPAEDIC      fx skull/ broken jaw     HX ORTHOPAEDIC Right     carpal tunnel    HX OTHER SURGICAL      lipoma removed     OR EXC SKIN JIMMY 0.6-1CM TRUNK,ARM,LEG N/A 10/11/2018    Dr. Hilario Brown 1.1-2CM TRUNK,ARM,LEG N/A 10/11/2018    Dr. Hilario Brown 2.1-3CM TRUNK,ARM,LEG N/A 10/11/2018    Dr. Minna Michelle       Current Outpatient Medications   Medication Sig Dispense Refill    traMADol (ULTRAM) 50 mg tablet Take 2 Tabs by mouth every eight (8) hours as needed for Pain for up to 30 days.  Max Daily Amount: 300 mg. 180 Tab 0    multivitamin (ONE A DAY) tablet Take 1 Tab by mouth daily.  metaxalone (SKELAXIN) 800 mg tablet Take 800 mg by mouth three (3) times daily.  meloxicam (MOBIC) 15 mg tablet Take 15 mg by mouth daily.  adalimumab (HUMIRA PEN SC) by SubCUTAneous route every seven (7) days. No Known Allergies    Social History     Tobacco Use    Smoking status: Never Smoker    Smokeless tobacco: Never Used   Substance Use Topics    Alcohol use: No     Alcohol/week: 1.2 oz     Types: 2 Shots of liquor per week     Frequency: Never     Comment: social    Drug use: No       Family History   Problem Relation Age of Onset    Stroke Father     Diabetes Maternal Grandmother     Cancer Maternal Grandfather 79        prostate cancer       Family Status   Relation Name Status    Mother  Alive    Father      Sister  Alive    MGM  Alive    MGF  Alive       Review of Systems  Review of Systems   Constitutional: Positive for malaise/fatigue. Musculoskeletal: Positive for joint pain. All other systems reviewed and are negative. Objective  Visit Vitals  /72   Pulse 64   Temp 98.5 °F (36.9 °C) (Oral)   Resp 18   Ht 6' (1.829 m)   Wt 106.5 kg (234 lb 12.8 oz)   SpO2 95%   BMI 31.84 kg/m²     No LMP for male patient. Physical Exam  Physical Exam   Constitutional: He is oriented to person, place, and time. He appears well-developed and well-nourished. HENT:   Head: Normocephalic. Cardiovascular: Normal rate and regular rhythm. Pulmonary/Chest: Effort normal and breath sounds normal.   Abdominal: Soft. Neurological: He is alert and oriented to person, place, and time. Skin: Skin is warm and dry. Psychiatric: He has a normal mood and affect. Nursing note and vitals reviewed. Assessment / Plan    1.   Weight management    Degree of control: Doing well might need to adjust nutritional intake based on calorie expenditure during physical activity, 4-5 MR + 3 eggs (1-2 protein servings) will consult RD for additional recommendations. Progress was reviewed with patient. Goal(s) for next appointment:   -5lbs; monitor percent total body fat and muscle mass closely in conjunction with total weight, may not see changes body weight due to decrease of fat and increase of muscle both simultaneously       2. Labs    Latest results reviewed with patient   Routine monitoring labs ordered  EKG reviewed QT interval from 378 now 429, new directions literature review recommends adding magnesium, continue to monitor for QT interval change QT interval change she will not exceed 444 per literature of new directions program.     Closely monitor LFTs and BUN  Encourage hydration  Patient did report he was taking around-the-clock ibuprofen during the time of his last lab work was drawn he will discontinue his ibuprofen use at this time for recheck.      Follow up 1 mo    >50% of 30 min visit spent counseling     AMANDA Olea

## 2019-05-29 ENCOUNTER — CLINICAL SUPPORT (OUTPATIENT)
Dept: FAMILY MEDICINE CLINIC | Age: 44
End: 2019-05-29

## 2019-05-29 ENCOUNTER — DOCUMENTATION ONLY (OUTPATIENT)
Dept: BARIATRICS/WEIGHT MGMT | Age: 44
End: 2019-05-29

## 2019-05-29 VITALS
BODY MASS INDEX: 32.4 KG/M2 | DIASTOLIC BLOOD PRESSURE: 76 MMHG | WEIGHT: 238.9 LBS | SYSTOLIC BLOOD PRESSURE: 112 MMHG | HEART RATE: 60 BPM

## 2019-05-29 DIAGNOSIS — E66.9 OBESITY, UNSPECIFIED CLASSIFICATION, UNSPECIFIED OBESITY TYPE, UNSPECIFIED WHETHER SERIOUS COMORBIDITY PRESENT: Primary | ICD-10-CM

## 2019-05-29 NOTE — PROGRESS NOTES
5/29/19:  Patient is doing 1-3 hours of activity per day and can't sustain just being on VLCD. I have spoken to Shayla So NP, and have recommended the patient to do 3 MR per day and 2 meals focusing on 3 ounces of protein and a serving of vegetables. Patient may contact me with additional questions or clarifications.     Kendal Magallanes MS RD

## 2019-05-29 NOTE — PROGRESS NOTES
Patient attended a weekly class as part of the Medically Supervised Weight Loss Program.  This class was facilitated by a Registered Dietitian. Topics taught at class focused on diet, particularly carbohydrate counting and portion control. Classes also focused on behavior changes and the importance of establishing a daily exercise routine. Progress Note: Weekly Medical Monitoring in the [facility]    Is there anything that you or the patient needs to let the supervising provider know about? no    Over the past week, have you experienced any side-effects? no    Jem Garcia is a 40 y.o. male who is enrolled in [facility]    Jem Garcia was prescribed the VLCD / LCD. Visit Vitals  /76   Pulse 60   Wt 108.4 kg (238 lb 14.4 oz)   BMI 32.40 kg/m²     Weight Metrics 5/29/2019 5/24/2019 5/16/2019 5/15/2019 5/14/2019 5/9/2019 5/8/2019   Weight 238 lb 14.4 oz 234 lb 12.8 oz - 237 lb 1.6 oz 237 lb 9.6 oz - 236 lb 8 oz   Waist Measure Inches 44 - 43 - - 43 -   BMI 32.4 kg/m2 31.84 kg/m2 - 32.16 kg/m2 32.22 kg/m2 - 32.08 kg/m2         Have you received any other medical care this week? no  If yes, where and for what? Have you had any change in your medications since your last visit? no  If yes what? Did you have any problems adhering to the program last week? no  If yes, please explain:       Eating Habits Over Last Week:  Did you take in 64 oz of non-caloric fluids? yes     Did you consume your prescribed meal replacement regimen each day?  yes       Physical Activity Over the Past Week:    Aerobic exercise: 30 min a day x 6 days   Resistance exercise: 30 min workouts x 6 days / week

## 2019-06-05 ENCOUNTER — CLINICAL SUPPORT (OUTPATIENT)
Dept: FAMILY MEDICINE CLINIC | Age: 44
End: 2019-06-05

## 2019-06-05 DIAGNOSIS — E66.9 OBESITY, UNSPECIFIED CLASSIFICATION, UNSPECIFIED OBESITY TYPE, UNSPECIFIED WHETHER SERIOUS COMORBIDITY PRESENT: Primary | ICD-10-CM

## 2019-06-07 VITALS
WEIGHT: 242.3 LBS | DIASTOLIC BLOOD PRESSURE: 76 MMHG | HEIGHT: 72 IN | SYSTOLIC BLOOD PRESSURE: 118 MMHG | HEART RATE: 70 BPM | BODY MASS INDEX: 32.82 KG/M2

## 2019-06-07 NOTE — PROGRESS NOTES
Patient attended a weekly class as part of the Medically Supervised Weight Loss Program.  This class was facilitated by a Registered Dietitian. Topics taught at class focused on diet, particularly carbohydrate counting and portion control. Classes also focused on behavior changes and the importance of establishing a daily exercise routine. Progress Note: Weekly Medical Monitoring in the [facility] Program    Is there anything that you or the patient needs to let the supervising provider know about? no    Over the past week, have you experienced any side-effects? no    Jem Garcia is a 40 y.o. male who is enrolled in [facility]    Jem Garcia was prescribed the VLCD / LCD. Visit Vitals  /76   Pulse 70   Ht 6' (1.829 m)   Wt 109.9 kg (242 lb 4.8 oz)   BMI 32.86 kg/m²     Weight Metrics 6/7/2019 6/5/2019 5/29/2019 5/24/2019 5/16/2019 5/15/2019 5/14/2019   Weight - 242 lb 4.8 oz 238 lb 14.4 oz 234 lb 12.8 oz - 237 lb 1.6 oz 237 lb 9.6 oz   Waist Measure Inches 43.25 - 44 - 43 - -   BMI - 32.86 kg/m2 32.4 kg/m2 31.84 kg/m2 - 32.16 kg/m2 32.22 kg/m2         Have you received any other medical care this week? no  If yes, where and for what? Have you had any change in your medications since your last visit? no  If yes what? Did you have any problems adhering to the program last week? no  If yes, please explain:       Eating Habits Over Last Week:  Did you take in 64 oz of non-caloric fluids?  yes     Did you consume your prescribed meal replacement regimen each day? no       Physical Activity Over the Past Week:    Aerobic exercise: 150 min  Resistance exercise: 150 workouts / week

## 2019-06-12 ENCOUNTER — CLINICAL SUPPORT (OUTPATIENT)
Dept: FAMILY MEDICINE CLINIC | Age: 44
End: 2019-06-12

## 2019-06-12 VITALS
SYSTOLIC BLOOD PRESSURE: 114 MMHG | BODY MASS INDEX: 32.85 KG/M2 | WEIGHT: 242.2 LBS | HEART RATE: 73 BPM | DIASTOLIC BLOOD PRESSURE: 82 MMHG

## 2019-06-12 DIAGNOSIS — E66.9 OBESITY, UNSPECIFIED CLASSIFICATION, UNSPECIFIED OBESITY TYPE, UNSPECIFIED WHETHER SERIOUS COMORBIDITY PRESENT: Primary | ICD-10-CM

## 2019-06-12 NOTE — PROGRESS NOTES
Patient attended a weekly class as part of the Medically Supervised Weight Loss Program.  This class was facilitated by a Registered Dietitian. Topics taught at class focused on diet, particularly carbohydrate counting and portion control. Classes also focused on behavior changes and the importance of establishing a daily exercise routine. Progress Note: Weekly Medical Monitoring in the [facility] Program    Is there anything that you or the patient needs to let the supervising provider know about? no    Over the past week, have you experienced any side-effects? no    Jerson Keating is a 40 y.o. male who is enrolled in [facility]    Jerson Keating was prescribed the VLCD / LCD. Visit Vitals  /82   Pulse 73   Wt 109.9 kg (242 lb 3.2 oz)   BMI 32.85 kg/m²     Weight Metrics 6/12/2019 6/7/2019 6/5/2019 5/29/2019 5/24/2019 5/16/2019 5/15/2019   Weight 242 lb 3.2 oz - 242 lb 4.8 oz 238 lb 14.4 oz 234 lb 12.8 oz - 237 lb 1.6 oz   Waist Measure Inches 44 43.25 - 44 - 43 -   BMI 32.85 kg/m2 - 32.86 kg/m2 32.4 kg/m2 31.84 kg/m2 - 32.16 kg/m2         Have you received any other medical care this week? no  If yes, where and for what? Have you had any change in your medications since your last visit? no  If yes what? Did you have any problems adhering to the program last week? no  If yes, please explain:       Eating Habits Over Last Week:  Did you take in 64 oz of non-caloric fluids? yes     Did you consume your prescribed meal replacement regimen each day?  yes       Physical Activity Over the Past Week:    Aerobic exercise: 150 min  Resistance exercise: 150min workouts / week

## 2019-06-19 ENCOUNTER — CLINICAL SUPPORT (OUTPATIENT)
Dept: FAMILY MEDICINE CLINIC | Age: 44
End: 2019-06-19

## 2019-06-19 DIAGNOSIS — E66.9 OBESITY, UNSPECIFIED CLASSIFICATION, UNSPECIFIED OBESITY TYPE, UNSPECIFIED WHETHER SERIOUS COMORBIDITY PRESENT: Primary | ICD-10-CM

## 2019-06-20 VITALS
WEIGHT: 244 LBS | HEART RATE: 70 BPM | SYSTOLIC BLOOD PRESSURE: 114 MMHG | BODY MASS INDEX: 33.09 KG/M2 | DIASTOLIC BLOOD PRESSURE: 72 MMHG

## 2019-06-20 NOTE — PROGRESS NOTES
Patient attended a weekly class as part of the Medically Supervised Weight Loss Program.  This class was facilitated by a Registered Dietitian. Topics taught at class focused on diet, particularly carbohydrate counting and portion control. Classes also focused on behavior changes and the importance of establishing a daily exercise routine. Progress Note: Weekly Medical Monitoring in the [facility]    Is there anything that you or the patient needs to let the supervising provider know about? no    Over the past week, have you experienced any side-effects? no    Alo Cloud is a 40 y.o. male who is enrolled in [facility]    Alo Parent was prescribed the VLCD / LCD. Visit Vitals  /72   Pulse 70   Wt 110.7 kg (244 lb)   BMI 33.09 kg/m²     Weight Metrics 6/20/2019 6/19/2019 6/12/2019 6/7/2019 6/5/2019 5/29/2019 5/24/2019   Weight - 244 lb 242 lb 3.2 oz - 242 lb 4.8 oz 238 lb 14.4 oz 234 lb 12.8 oz   Waist Measure Inches 44 - 44 43.25 - 44 -   BMI - 33.09 kg/m2 32.85 kg/m2 - 32.86 kg/m2 32.4 kg/m2 31.84 kg/m2         Have you received any other medical care this week? no  If yes, where and for what? Have you had any change in your medications since your last visit? no  If yes what? Did you have any problems adhering to the program last week? no  If yes, please explain:       Eating Habits Over Last Week:  Did you take in 64 oz of non-caloric fluids? yes     Did you consume your prescribed meal replacement regimen each day?  yes       Physical Activity Over the Past Week:    Aerobic exercise: 90 min  Resistance exercise: 5 workouts / week

## 2019-06-26 ENCOUNTER — HOSPITAL ENCOUNTER (OUTPATIENT)
Dept: LAB | Age: 44
Discharge: HOME | End: 2019-06-26

## 2019-06-26 LAB — XX-LABCORP SPECIMEN COL,LCBCF: NORMAL

## 2019-06-26 PROCEDURE — 99001 SPECIMEN HANDLING PT-LAB: CPT

## 2019-06-27 LAB
ALBUMIN SERPL-MCNC: 4.6 G/DL (ref 3.5–5.5)
ALBUMIN/GLOB SERPL: 1.9 {RATIO} (ref 1.2–2.2)
ALP SERPL-CCNC: 66 IU/L (ref 39–117)
ALT SERPL-CCNC: 25 IU/L (ref 0–44)
AST SERPL-CCNC: 26 IU/L (ref 0–40)
BILIRUB SERPL-MCNC: 0.5 MG/DL (ref 0–1.2)
BUN SERPL-MCNC: 29 MG/DL (ref 6–24)
BUN/CREAT SERPL: 31 (ref 9–20)
CALCIUM SERPL-MCNC: 9.9 MG/DL (ref 8.7–10.2)
CHLORIDE SERPL-SCNC: 100 MMOL/L (ref 96–106)
CO2 SERPL-SCNC: 23 MMOL/L (ref 20–29)
CREAT SERPL-MCNC: 0.93 MG/DL (ref 0.76–1.27)
GLOBULIN SER CALC-MCNC: 2.4 G/DL (ref 1.5–4.5)
GLUCOSE SERPL-MCNC: 86 MG/DL (ref 65–99)
POTASSIUM SERPL-SCNC: 4.4 MMOL/L (ref 3.5–5.2)
PROT SERPL-MCNC: 7 G/DL (ref 6–8.5)
SODIUM SERPL-SCNC: 141 MMOL/L (ref 134–144)
URATE SERPL-MCNC: 4.9 MG/DL (ref 3.7–8.6)

## 2019-06-28 ENCOUNTER — OFFICE VISIT (OUTPATIENT)
Dept: SURGERY | Age: 44
End: 2019-06-28

## 2019-06-28 VITALS
HEIGHT: 72 IN | BODY MASS INDEX: 32.51 KG/M2 | SYSTOLIC BLOOD PRESSURE: 108 MMHG | HEART RATE: 86 BPM | DIASTOLIC BLOOD PRESSURE: 72 MMHG | WEIGHT: 240 LBS | RESPIRATION RATE: 18 BRPM | TEMPERATURE: 97.5 F

## 2019-06-28 DIAGNOSIS — E66.9 OBESITY (BMI 30-39.9): ICD-10-CM

## 2019-06-28 DIAGNOSIS — I10 HYPERTENSION, UNSPECIFIED TYPE: ICD-10-CM

## 2019-06-28 DIAGNOSIS — E11.40 TYPE 2 DIABETES MELLITUS WITH DIABETIC NEUROPATHY, UNSPECIFIED WHETHER LONG TERM INSULIN USE (HCC): ICD-10-CM

## 2019-06-28 DIAGNOSIS — Z71.3 WEIGHT LOSS COUNSELING, ENCOUNTER FOR: ICD-10-CM

## 2019-06-28 DIAGNOSIS — E66.9 OBESITY (BMI 30-39.9): Primary | ICD-10-CM

## 2019-06-28 RX ORDER — TRAMADOL HYDROCHLORIDE 50 MG/1
100 TABLET ORAL 3 TIMES DAILY
COMMUNITY
End: 2019-12-24

## 2019-06-28 NOTE — PROGRESS NOTES
[facility] Progress Note:   F/up Provider Visit    CC: Weight Management    Aaron Zapien is a 40 y.o. male who is here for his  f/up medical provider visit for the VL Program. he did attend class last week. + 6lb   Has gone back to 3 days/week lifting and 2 days cardio 30 mins/ workout and dec back to VLCD. Notes weight gain without known dietary indiscretions. Body composition report evaluation performed today. Weight History  Weight Metrics 6/28/2019 6/28/2019 6/20/2019 6/19/2019 6/12/2019 6/7/2019 6/5/2019   Weight - 240 lb - 244 lb 242 lb 3.2 oz - 242 lb 4.8 oz   Waist Measure Inches 43 - 44 - 44 43.25 -   BMI - 32.55 kg/m2 - 33.09 kg/m2 32.85 kg/m2 - 32.86 kg/m2       Highest wt:  286lbs   Goal wt: 200lbs (now wants to push to 180lbs)  Start wt: 279lbs   EKG due: 183 pounds      Ideal body weight: 77.6 kg (171 lb 1.2 oz)  Adjusted ideal body weight: 90.1 kg (198 lb 10.3 oz)  Body mass index is 32.55 kg/m². History    Past Medical History:   Diagnosis Date    Asthma     Diabetes mellitus     no meds    Dupuytren's contracture     Essential hypertension     Ill-defined condition     neuropathy    Psoriatic arthritis, destructive type ([facility] [address]     sarcoidosis    Sarcoidosis        Past Surgical History:   Procedure Laterality Date    HX CARPAL TUNNEL RELEASE      HX ORTHOPAEDIC      fx skull/ broken jaw     HX ORTHOPAEDIC Right     carpal tunnel    HX OTHER SURGICAL      lipoma removed     FL EXC SKIN BENIG 0.6-1CM TRUNK,ARM,LEG N/A 10/11/2018    Dr. George Becker 1.1-2CM TRUNK,ARM,LEG N/A 10/11/2018    Dr. George Becker 2.1-3CM TRUNK,ARM,LEG N/A 10/11/2018    Dr. Lyssa Graves       Current Outpatient Medications   Medication Sig Dispense Refill    traMADol (ULTRAM) 50 mg tablet Take  by mouth daily.  multivitamin (ONE A DAY) tablet Take 1 Tab by mouth daily.       metaxalone (SKELAXIN) 800 mg tablet Take 800 mg by mouth two (2) times a day.  meloxicam (MOBIC) 15 mg tablet Take 15 mg by mouth daily.  adalimumab (HUMIRA PEN SC) by SubCUTAneous route every seven (7) days. No Known Allergies    Social History     Tobacco Use    Smoking status: Never Smoker    Smokeless tobacco: Never Used   Substance Use Topics    Alcohol use: No     Alcohol/week: 1.2 oz     Types: 2 Shots of liquor per week     Frequency: Never     Comment: social    Drug use: No       Family History   Problem Relation Age of Onset    Stroke Father     Diabetes Maternal Grandmother     Cancer Maternal Grandfather 79        prostate cancer       Family Status   Relation Name Status    Mother  Alive    Father      Sister  Alive    MGM  Alive    MGF  Alive       Review of Systems  Review of Systems   Constitutional: Positive for malaise/fatigue. Weight gain      Musculoskeletal: Positive for joint pain. All other systems reviewed and are negative. Objective  Visit Vitals  /72   Pulse 86   Temp 97.5 °F (36.4 °C)   Resp 18   Ht 6' (1.829 m)   Wt 108.9 kg (240 lb)   BMI 32.55 kg/m²     No LMP for male patient. Physical Exam  Physical Exam   Constitutional: He is oriented to person, place, and time. He appears well-developed and well-nourished. HENT:   Head: Normocephalic. Cardiovascular: Normal rate and regular rhythm. Pulmonary/Chest: Effort normal and breath sounds normal.   Abdominal: Soft. Neurological: He is alert and oriented to person, place, and time. Skin: Skin is warm and dry. Psychiatric: He has a normal mood and affect. Nursing note and vitals reviewed. Assessment / Plan    1.   Weight management    Degree of control: Weight gain this month resulting of gain of 1 pound of fat, gain of 1 pound a muscle gain of body water, has reduced to 30 minutes a day of physical activity and gone back to very low calorie diet, I am concerned that the patient needs to come out of ketosis temporarily and return to ketosis in order to restart weight loss, patient is agreeable with this plan reviewed will take in 3 meals over the weekend that contain carbohydrates, patient does have a history of diabetes so appropriate quantity of carbs discussed with him, would love for him to follow-up with the dietitian phone email or in person visit the beginning of next week to evaluate how the patient is feeling, patient will return to return to  CD on Monday. Do not add an additional workouts at this time continue with 30 minutes daily. Progress was reviewed with patient. Goal(s) for next appointment:   -5lbs; monitor percent total body fat and muscle mass closely in conjunction with total weight, may not see changes body weight due to decrease of fat and increase of muscle both simultaneously       2.   Labs    Latest results reviewed with patient   Routine monitoring labs ordered  Repeat EKG   QT interval change not exceed 444 per literature of new directions program.     LFTs improved     Encourage hydration     Follow up 1 mo    >50% of 30 min visit spent counseling     Kori Sevilla, ROBER-BC

## 2019-06-28 NOTE — PROGRESS NOTES
Chief Complaint   Patient presents with    Weight Management   1. Have you been to the ER, urgent care clinic since your last visit? Hospitalized since your last visit? No    2. Have you seen or consulted any other health care providers outside of the [address] [place] [address] since your last visit? Include any pap smears or colon screening. No            Nursing Note for Medical Monitoring in the [facility] Program      Alirio Dumont is a 40 y.o. male who is enrolled in [facility]    Alirio Dumont was prescribed the VLCD / LCD. Did you have any problems adhering to the program last week? no  If yes, please explain:     Since your last visit, have you experienced any complications? no    Have you received any other medical care this week? no  If yes, where and for what? Have you had any change in your medications since your last visit? no  If yes what? Are you taking an appetite suppressant? no   If yes:  Do you need a refill? BP Readings from Last 3 Encounters:   06/28/19 108/72   06/20/19 114/72   06/12/19 114/82        Eating Habits Over Last Week:  Did you take in 64 oz of non-caloric fluids? yes     Did you consume your prescribed meal replacement regimen each day?  yes       Physical Activity Over the Past Week:    Aerobic exercise: 60 min  Resistance exercise: 3 workouts / week

## 2019-07-15 PROBLEM — M79.89 SOFT TISSUE MASS: Status: RESOLVED | Noted: 2018-10-11 | Resolved: 2019-07-15

## 2019-07-15 PROBLEM — M50.90 CERVICAL DISC DISEASE: Status: ACTIVE | Noted: 2019-07-15

## 2019-07-15 PROBLEM — K75.81 NASH (NONALCOHOLIC STEATOHEPATITIS): Status: ACTIVE | Noted: 2019-07-15

## 2019-07-15 PROBLEM — E55.9 VITAMIN D DEFICIENCY: Status: ACTIVE | Noted: 2019-07-15

## 2019-07-16 ENCOUNTER — OFFICE VISIT (OUTPATIENT)
Dept: INTERNAL MEDICINE CLINIC | Age: 44
End: 2019-07-16

## 2019-07-16 VITALS
OXYGEN SATURATION: 96 % | RESPIRATION RATE: 14 BRPM | WEIGHT: 246.6 LBS | BODY MASS INDEX: 33.4 KG/M2 | DIASTOLIC BLOOD PRESSURE: 79 MMHG | HEIGHT: 72 IN | HEART RATE: 70 BPM | SYSTOLIC BLOOD PRESSURE: 118 MMHG | TEMPERATURE: 98 F

## 2019-07-16 DIAGNOSIS — K75.81 NASH (NONALCOHOLIC STEATOHEPATITIS): ICD-10-CM

## 2019-07-16 DIAGNOSIS — E66.01 SEVERE OBESITY WITH BODY MASS INDEX (BMI) OF 35.0 TO 39.9 WITH SERIOUS COMORBIDITY (HCC): ICD-10-CM

## 2019-07-16 DIAGNOSIS — L40.50 PSORIATIC ARTHRITIS (HCC): ICD-10-CM

## 2019-07-16 DIAGNOSIS — E78.00 HYPERCHOLESTEROLEMIA: ICD-10-CM

## 2019-07-16 DIAGNOSIS — D86.9 SARCOIDOSIS: ICD-10-CM

## 2019-07-16 DIAGNOSIS — E11.40 TYPE 2 DIABETES MELLITUS WITH DIABETIC NEUROPATHY, UNSPECIFIED WHETHER LONG TERM INSULIN USE (HCC): Primary | ICD-10-CM

## 2019-07-16 DIAGNOSIS — Z23 ENCOUNTER FOR IMMUNIZATION: ICD-10-CM

## 2019-07-16 NOTE — PROGRESS NOTES
INTERNISTS OF [facility]:  7/16/2019, MRN: [id number]      Delta Gamboa is a 40 y.o. male and presents to clinic for Diabetes (follow up  ROOM 2) and Hypertension (follow up)    Subjective: The patient is a 55-year-old male with history of SHEPPARD, cervical disc disease, vitamin D deficiency, type 2 diabetes, obesity, HLD, psoriatic arthritis (not responsive to Enbrel per pt hx), and sarcoidosis (per skin biopsy) per EHR. 1. Type 2 DM: His last A1c was 5.1 earlier this year. He is working on losing weight. His weight today is 246lbs. He is not presently on any medication for diabetes. He exercises regularly. No formal eye exam. He is in the star maintenance program in the medically supervised weight loss program.    2.  Psoriatic Arthritis: Taking Humira. Followed by Yana Deal. No affected skin areas today. He reports pain along the following areas: hands, left shoulder, and b/l hip pain +H/o Dupuytren's contracture. Enbrel didn't help to relieve his sx in the past. No fever/chills. 3. SHEPPARD: No excessive alcoholic beverage consumption. His most recent LFTs were unremarkable. 4. HLD: His last cholesterol was checked in October 2018. Findings are listed below. He is not on cholesterol-lowering medication. 5. Sarcoidosis: Diagnoses skin biopsy. A chest x-ray in 2014 obtained and unremarkable incidentally. He denies shortness of breath and cough symptoms.       Patient Active Problem List    Diagnosis Date Noted    SHEPPARD (nonalcoholic steatohepatitis) 07/15/2019    Cervical disc disease 07/15/2019    Vitamin D deficiency 07/15/2019    Type 2 diabetes mellitus with diabetic neuropathy ([facility] [address] 02/15/2019    Severe obesity with body mass index (BMI) of 35.0 to 39.9 with serious comorbidity ([facility] [address] 08/13/2018    Hypercholesterolemia 11/01/2013    Psoriatic arthritis ([facility] [address] 11/01/2013    Sarcoidosis 11/01/2013       Current Outpatient Medications   Medication Sig Dispense Refill    traMADol (ULTRAM) 50 mg tablet Take 50 mg by mouth two (2) times a day.  multivitamin (ONE A DAY) tablet Take 1 Tab by mouth daily.  metaxalone (SKELAXIN) 800 mg tablet Take 800 mg by mouth two (2) times a day.  meloxicam (MOBIC) 15 mg tablet Take 15 mg by mouth daily.  adalimumab (HUMIRA PEN SC) by SubCUTAneous route every seven (7) days. No Known Allergies    Past Medical History:   Diagnosis Date    Asthma     Diabetes mellitus     no meds    Dupuytren's contracture     Essential hypertension     Ill-defined condition     neuropathy    Psoriatic arthritis, destructive type ([facility] [address]     sarcoidosis    Sarcoidosis        Past Surgical History:   Procedure Laterality Date    HX CARPAL TUNNEL RELEASE      HX ORTHOPAEDIC      fx skull/ broken jaw     HX ORTHOPAEDIC Right     carpal tunnel    HX OTHER SURGICAL      lipoma removed     MS EXC SKIN BENIG 0.6-1CM TRUNK,ARM,LEG N/A 10/11/2018    Dr. Cornejo Cleaves 1.1-2CM TRUNK,ARM,LEG N/A 10/11/2018    Dr. Chantal Ruth 2.1-3CM TRUNK,ARM,LEG N/A 10/11/2018    Dr. Alee Boldne       Family History   Problem Relation Age of Onset    Stroke Father     Diabetes Maternal Grandmother     Cancer Maternal Grandfather 79        prostate cancer       Social History     Tobacco Use    Smoking status: Never Smoker    Smokeless tobacco: Never Used   Substance Use Topics    Alcohol use: No     Alcohol/week: 1.2 oz     Types: 2 Shots of liquor per week     Frequency: Never     Comment: social       ROS   Review of Systems   Constitutional: Negative for chills and fever. HENT: Negative for ear pain and sore throat. Eyes: Negative for blurred vision and pain. Respiratory: Negative for cough and shortness of breath. Cardiovascular: Negative for chest pain. Gastrointestinal: Negative for abdominal pain. Genitourinary: Negative for dysuria and hematuria. Musculoskeletal: Positive for joint pain.  Negative for myalgias. Skin: Negative for rash. Neurological: Positive for tingling (off/on along hid right arm). Negative for focal weakness and headaches. Endo/Heme/Allergies: Does not bruise/bleed easily. Psychiatric/Behavioral: Negative for substance abuse. Objective     Vitals:    07/16/19 1346   BP: 118/79   Pulse: 70   Resp: 14   Temp: 98 °F (36.7 °C)   TempSrc: Oral   SpO2: 96%   Weight: 246 lb 9.6 oz (111.9 kg)   Height: 6' (1.829 m)   PainSc:   3   PainLoc: Hip       Physical Exam   Constitutional: He is oriented to person, place, and time and well-developed, well-nourished, and in no distress. HENT:   Head: Normocephalic and atraumatic. Right Ear: External ear normal.   Left Ear: External ear normal.   Nose: Nose normal.   Mouth/Throat: Oropharynx is clear and moist. No oropharyngeal exudate. Eyes: Conjunctivae and EOM are normal. Right eye exhibits no discharge. Left eye exhibits no discharge. No scleral icterus. Neck: Neck supple. Cardiovascular: Normal rate, regular rhythm, normal heart sounds and intact distal pulses. Pulmonary/Chest: Effort normal and breath sounds normal. No respiratory distress. He has no wheezes. He has no rales. Abdominal: Soft. Bowel sounds are normal. He exhibits no distension. There is no tenderness. There is no rebound and no guarding. Musculoskeletal: He exhibits no edema or tenderness (Bue except along MCP jts b/l). Lymphadenopathy:     He has no cervical adenopathy. Neurological: He is alert and oriented to person, place, and time. He exhibits normal muscle tone. Gait normal.   Skin: Skin is warm and dry. No erythema. Psychiatric: Affect normal.   Nursing note and vitals reviewed.       LABS   Data Review:   Lab Results   Component Value Date/Time    WBC 10.2 10/09/2018 01:13 PM    HGB 16.3 (H) 10/09/2018 01:13 PM    HCT 45.3 10/09/2018 01:13 PM    PLATELET 445 11/90/9820 01:13 PM    MCV 86.8 10/09/2018 01:13 PM       Lab Results   Component Value Date/Time    Sodium 141 06/26/2019 12:00 AM    Potassium 4.4 06/26/2019 12:00 AM    Chloride 100 06/26/2019 12:00 AM    CO2 23 06/26/2019 12:00 AM    Anion gap 10 10/09/2018 01:13 PM    Glucose 86 06/26/2019 12:00 AM    BUN 29 (H) 06/26/2019 12:00 AM    Creatinine 0.93 06/26/2019 12:00 AM    BUN/Creatinine ratio 31 (H) 06/26/2019 12:00 AM    GFR est  06/26/2019 12:00 AM    GFR est non-AA 99 06/26/2019 12:00 AM    Calcium 9.9 06/26/2019 12:00 AM       Lab Results   Component Value Date/Time    Cholesterol, total 172 10/09/2018 01:13 PM    HDL Cholesterol 40 10/09/2018 01:13 PM    LDL, calculated 91 10/09/2018 01:13 PM    VLDL, calculated 41 10/09/2018 01:13 PM    Triglyceride 205 (H) 10/09/2018 01:13 PM    CHOL/HDL Ratio 4.3 10/09/2018 01:13 PM       Lab Results   Component Value Date/Time    Hemoglobin A1c 9.5 (H) 08/13/2018 01:40 PM    Hemoglobin A1c (POC) 5.1 02/15/2019 01:50 PM       Assessment/Plan:   1. Type 2 diabetes mellitus with diabetic neuropathy: Well controlled. Placing a referral for a formal eye exam  Continue with dietary measures to control his diabetes. I encouraged him to continue with the medically supervised weight loss program.  I encouraged him to continue with his weight loss journey. I will recheck his weight at his follow-up appointment. Checking a urine protein screen. ORDERS:  - REFERRAL TO OPHTHALMOLOGY  - MICROALBUMIN, UR, RAND W/ MICROALB/CREAT RATIO; Future  - AMB POC HEMOGLOBIN A1C  - MICROALBUMIN, UR, RAND W/ MICROALB/CREAT RATIO    2. Hypercholesterolemia/SHEPPARD:   Elvis Gomez will check LFTs at least once a year which was discussed with him today.  I encouraged him to exercise but as tolerated.  We discussed the need for him to have a cholesterol panel checked. If he has not had this by the time of his follow-up appointment, I will order it. 3. General:   Pneumococcal 23 vaccine was administered today.     ORDERS:  - PNEUMOCOCCAL POLYSACCHARIDE VACCINE, 23-VALENT, ADULT OR IMMUNOSUPPRESSED PT DOSE,    4.  Psoriatic Arthritis:  Continue with Humira. Continue follow-up with Rheumatology. 5.  Sarcoidosis: Per skin biopsy in the past.  Observation. Health Maintenance Due   Topic Date Due    EYE EXAM RETINAL OR DILATED  02/15/1985    DTaP/Tdap/Td series (1 - Tdap) 02/15/1996    MICROALBUMIN Q1  08/13/2019    HEMOGLOBIN A1C Q6M  08/15/2019     Lab review: labs are reviewed in the EHR and ordered as mentioned above    I have discussed the diagnosis with the patient and the intended plan as seen in the above orders. The patient has received an after-visit summary and questions were answered concerning future plans. I have discussed medication side effects and warnings with the patient as well. I have reviewed the plan of care with the patient, accepted their input and they are in agreement with the treatment goals. All questions were answered. The patient understands the plan of care. Handouts provided today with above information. Pt instructed if symptoms worsen to call the office or report to the ED for continued care. Greater than 50% of the visit time was spent in counseling and/or coordination of care. Voice recognition was used to generate this report, which may have resulted in some phonetic based errors in grammar and contents. Even though attempts were made to correct all the mistakes, some may have been missed, and remained in the body of the document. Follow-up and Dispositions    · Return in about 6 months (around 1/16/2020) for DM check, weight check.          Eulalia Higuera MD

## 2019-07-16 NOTE — PROGRESS NOTES
Magdalene Corry 1975 male who presents for routine immunizations. Patient denies any symptoms , reactions or allergies that would exclude them from being immunized today. Risks and adverse reactions were discussed and the VIS was given to them. All questions were addressed. Order placed for PPSV23,  per Verbal Order from DR. Esteban Phoenix with read back. Patient was observed for 15 min post injection. There were no reactions observed.     Justin Harris LPN

## 2019-07-16 NOTE — PROGRESS NOTES
Chief Complaint   Patient presents with    Diabetes     follow up  ROOM 2    Hypertension     follow up       1. Have you been to the ER, urgent care clinic since your last visit? Hospitalized since your last visit? No    2. Have you seen or consulted any other health care providers outside of the [address] [place] [address] since your last visit? Include any pap smears or colon screening. No    Patient was given a copy of the Advanced Directive and understands to bring it in once completed.   Health Maintenance Due   Topic Date Due    Pneumococcal 0-64 years (1 of 1 - PPSV23) 02/15/1981    EYE EXAM RETINAL OR DILATED  02/15/1985    DTaP/Tdap/Td series (1 - Tdap) 02/15/1996    MICROALBUMIN Q1  08/13/2019    HEMOGLOBIN A1C Q6M  08/15/2019

## 2019-07-16 NOTE — PATIENT INSTRUCTIONS
Health Maintenance Due Topic Date Due  Pneumococcal 0-64 years (1 of 1 - PPSV23) 02/15/1981  
 EYE EXAM RETINAL OR DILATED  02/15/1985  
 DTaP/Tdap/Td series (1 - Tdap) 02/15/1996  MICROALBUMIN Q1  08/13/2019  
 HEMOGLOBIN A1C Q6M  08/15/2019 Body Mass Index: Care Instructions Your Care Instructions Body mass index (BMI) can help you see if your weight is raising your risk for health problems. It uses a formula to compare how much you weigh with how tall you are. · A BMI lower than 18.5 is considered underweight. · A BMI between 18.5 and 24.9 is considered healthy. · A BMI between 25 and 29.9 is considered overweight. A BMI of 30 or higher is considered obese. If your BMI is in the normal range, it means that you have a lower risk for weight-related health problems. If your BMI is in the overweight or obese range, you may be at increased risk for weight-related health problems, such as high blood pressure, heart disease, stroke, arthritis or joint pain, and diabetes. If your BMI is in the underweight range, you may be at increased risk for health problems such as fatigue, lower protection (immunity) against illness, muscle loss, bone loss, hair loss, and hormone problems. BMI is just one measure of your risk for weight-related health problems. You may be at higher risk for health problems if you are not active, you eat an unhealthy diet, or you drink too much alcohol or use tobacco products. Follow-up care is a key part of your treatment and safety. Be sure to make and go to all appointments, and call your doctor if you are having problems. It's also a good idea to know your test results and keep a list of the medicines you take. How can you care for yourself at home? · Practice healthy eating habits. This includes eating plenty of fruits, vegetables, whole grains, lean protein, and low-fat dairy. · If your doctor recommends it, get more exercise.  Walking is a good choice. Bit by bit, increase the amount you walk every day. Try for at least 30 minutes on most days of the week. · Do not smoke. Smoking can increase your risk for health problems. If you need help quitting, talk to your doctor about stop-smoking programs and medicines. These can increase your chances of quitting for good. · Limit alcohol to 2 drinks a day for men and 1 drink a day for women. Too much alcohol can cause health problems. If you have a BMI higher than 25 · Your doctor may do other tests to check your risk for weight-related health problems. This may include measuring the distance around your waist. A waist measurement of more than 40 inches in men or 35 inches in women can increase the risk of weight-related health problems. · Talk with your doctor about steps you can take to stay healthy or improve your health. You may need to make lifestyle changes to lose weight and stay healthy, such as changing your diet and getting regular exercise. If you have a BMI lower than 18.5 · Your doctor may do other tests to check your risk for health problems. · Talk with your doctor about steps you can take to stay healthy or improve your health. You may need to make lifestyle changes to gain or maintain weight and stay healthy, such as getting more healthy foods in your diet and doing exercises to build muscle. Where can you learn more? Go to http://dahlia-duke.info/. Enter S176 in the search box to learn more about \"Body Mass Index: Care Instructions. \" Current as of: June 25, 2018 Content Version: 11.9 © 1729-2339 [employer]. Care instructions adapted under license by [employer] (which disclaims liability or warranty for this information). If you have questions about a medical condition or this instruction, always ask your healthcare professional. [place] [address] any warranty or liability for your use of this information. Vaccine Information Statement Pneumococcal Polysaccharide Vaccine: What You Need to Know Many Vaccine Information Statements are available in Swedish and other languages. See www.immunize.org/vis. Hojas de información Sobre Vacunas están disponibles en español y en muchos otros idiomas. Visite Oriana.si. 1. Why get vaccinated? Vaccination can protect older adults (and some children and younger adults) from pneumococcal disease. Pneumococcal disease is caused by bacteria that can spread from person to person through close contact. It can cause ear infections, and it can also lead to more serious infections of the: 
 Lungs (pneumonia),  Blood (bacteremia), and 
 Covering of the brain and spinal cord (meningitis). Meningitis can cause deafness and brain damage, and it can be fatal.   
 
Anyone can get pneumococcal disease, but children under 3years of age, people with certain medical conditions, adults over 72years of age, and cigarette smokers are at the highest risk. About 18,000 older adults die each year from pneumococcal disease in the [place]. Treatment of pneumococcal infections with penicillin and other drugs used to be more effective. But some strains of the disease have become resistant to these drugs. This makes prevention of the disease, through vaccination, even more important. 2. Pneumococcal polysaccharide vaccine (PPSV23) Pneumococcal polysaccharide vaccine (PPSV23) protects against 23 types of pneumococcal bacteria. It will not prevent all pneumococcal disease. PPSV23 is recommended for:  All adults 72years of age and older,  Anyone 2 through 59years of age with certain long-term health problems, 
 Anyone 2 through 59years of age with a weakened immune system, 
 Adults 23 through 59years of age who smoke cigarettes or have asthma. Most people need only one dose of PPSV.   A second dose is recommended for certain high-risk groups. People 72 and older should get a dose even if they have gotten one or more doses of the vaccine before they turned 65. Your healthcare provider can give you more information about these recommendations. Most healthy adults develop protection within 2 to 3 weeks of getting the shot. 3. Some people should not get this vaccine  Anyone who has had a life-threatening allergic reaction to PPSV should not get another dose.  Anyone who has a severe allergy to any component of PPSV should not receive it. Tell your provider if you have any severe allergies.  Anyone who is moderately or severely ill when the shot is scheduled may be asked to wait until they recover before getting the vaccine. Someone with a mild illness can usually be vaccinated.  Children less than 3years of age should not receive this vaccine.  There is no evidence that PPSV is harmful to either a pregnant woman or to her fetus. However, as a precaution, women who need the vaccine should be vaccinated before becoming pregnant, if possible. 4. Risks of a vaccine reaction With any medicine, including vaccines, there is a chance of side effects. These are usually mild and go away on their own, but serious reactions are also possible. About half of people who get PPSV have mild side effects, such as redness or pain where the shot is given, which go away within about two days. Less than 1 out of 100 people develop a fever, muscle aches, or more severe local reactions. Problems that could happen after any vaccine:  People sometimes faint after a medical procedure, including vaccination. Sitting or lying down for about 15 minutes can help prevent fainting, and injuries caused by a fall. Tell your doctor if you feel dizzy, or have vision changes or ringing in the ears.  
 
 Some people get severe pain in the shoulder and have difficulty moving the arm where a shot was given. This happens very rarely.  Any medication can cause a severe allergic reaction. Such reactions from a vaccine are very rare, estimated at about 1 in a million doses, and would happen within a few minutes to a few hours after the vaccination. As with any medicine, there is a very remote chance of a vaccine causing a serious injury or death. The safety of vaccines is always being monitored. For more information, visit: www.cdc.gov/vaccinesafety/  
 
5. What if there is a serious reaction? What should I look for? Look for anything that concerns you, such as signs of a severe allergic reaction, very high fever, or unusual behavior. Signs of a severe allergic reaction can include hives, swelling of the face and throat, difficulty breathing, a fast heartbeat, dizziness, and weakness. These would usually start a few minutes to a few hours after the vaccination. What should I do? If you think it is a severe allergic reaction or other emergency that cant wait, call 9-1-1 or get to the nearest hospital. Otherwise, call your doctor. Afterward, the reaction should be reported to the Vaccine Adverse Event Reporting System (VAERS). Your doctor might file this report, or you can do it yourself through the VAERS web site at www.vaers. hhs.gov, or by calling 9-148.235.8889. VABeautyStat.com does not give medical advice. 6. How can I learn more?  Ask your doctor. He or she can give you the vaccine package insert or suggest other sources of information.  Call your local or state health department.  Contact the Centers for Disease Control and Prevention (CDC): 
- Call 9-761.654.3503 (1-800-CDC-INFO) or 
- Visit CDCs website at www.cdc.gov/vaccines Vaccine Information Statement PPSV  
04/24/2015 Department of [facility] and Collect Centers for Disease Control and Prevention Office Use Only

## 2019-07-17 ENCOUNTER — CLINICAL SUPPORT (OUTPATIENT)
Dept: SURGERY | Age: 44
End: 2019-07-17

## 2019-07-17 VITALS
DIASTOLIC BLOOD PRESSURE: 74 MMHG | HEIGHT: 72 IN | HEART RATE: 62 BPM | SYSTOLIC BLOOD PRESSURE: 106 MMHG | BODY MASS INDEX: 33.41 KG/M2 | WEIGHT: 246.7 LBS

## 2019-07-17 DIAGNOSIS — E66.9 OBESITY, UNSPECIFIED CLASSIFICATION, UNSPECIFIED OBESITY TYPE, UNSPECIFIED WHETHER SERIOUS COMORBIDITY PRESENT: Primary | ICD-10-CM

## 2019-07-17 LAB
ALBUMIN/CREAT UR: <4.2 MG/G CREAT (ref 0–30)
CREAT UR-MCNC: 71.7 MG/DL
MICROALBUMIN UR-MCNC: <3 UG/ML

## 2019-07-17 NOTE — PROGRESS NOTES
Patient attended a weekly class as part of the Medically Supervised Weight Loss Program.  This class was facilitated by a Registered Dietitian. Topics taught at class focused on diet, particularly carbohydrate counting and portion control. Classes also focused on behavior changes and the importance of establishing a daily exercise routine. Progress Note: Weekly Medical Monitoring in the [facility] Weight Loss Program    Is there anything that you or the patient needs to let the supervising provider know about? no    Over the past week, have you experienced any side-effects? no    Ashley Manrique is a 40 y.o. male who is enrolled in [facility] Program    Ashley Manrique was prescribed the VLCD / LCD. Visit Vitals  /74   Pulse 62   Ht 6' (1.829 m)   Wt 111.9 kg (246 lb 11.2 oz)   BMI 33.46 kg/m²     Weight Metrics 7/17/2019 7/16/2019 6/28/2019 6/28/2019 6/20/2019 6/19/2019 6/12/2019   Weight 246 lb 11.2 oz 246 lb 9.6 oz - 240 lb - 244 lb 242 lb 3.2 oz   Waist Measure Inches 43.75 - 43 - 44 - 44   BMI 33.46 kg/m2 33.44 kg/m2 - 32.55 kg/m2 - 33.09 kg/m2 32.85 kg/m2         Have you received any other medical care this week? yes  If yes, where and for what? pcp    Have you had any change in your medications since your last visit? no  If yes what? Did you have any problems adhering to the program last week? no  If yes, please explain:       Eating Habits Over Last Week:  Did you take in 64 oz of non-caloric fluids? yes     Did you consume your prescribed meal replacement regimen each day?  yes       Physical Activity Over the Past Week:    Aerobic exercise: 90 min  Resistance exercise: 2 workouts / week

## 2019-07-24 ENCOUNTER — CLINICAL SUPPORT (OUTPATIENT)
Dept: SURGERY | Age: 44
End: 2019-07-24

## 2019-07-24 VITALS
SYSTOLIC BLOOD PRESSURE: 116 MMHG | HEART RATE: 84 BPM | BODY MASS INDEX: 33.13 KG/M2 | HEIGHT: 72 IN | DIASTOLIC BLOOD PRESSURE: 72 MMHG | WEIGHT: 244.6 LBS

## 2019-07-24 DIAGNOSIS — E66.9 OBESITY, UNSPECIFIED CLASSIFICATION, UNSPECIFIED OBESITY TYPE, UNSPECIFIED WHETHER SERIOUS COMORBIDITY PRESENT: Primary | ICD-10-CM

## 2019-07-24 NOTE — PROGRESS NOTES
Patient attended a weekly class as part of the Medically Supervised Weight Loss Program.  This class was facilitated by a Registered Dietitian. Topics taught at class focused on diet, particularly carbohydrate counting and portion control. Classes also focused on behavior changes and the importance of establishing a daily exercise routine. Progress Note: Weekly Medical Monitoring in the [facility] Weight Loss Program    Is there anything that you or the patient needs to let the supervising provider know about? no    Over the past week, have you experienced any side-effects? no    Marco Gregory is a 40 y.o. male who is enrolled in [facility]    Marco Gregory was prescribed the VLCD / LCD. Visit Vitals  /72   Pulse 84   Ht 6' (1.829 m)   Wt 110.9 kg (244 lb 9.6 oz)   BMI 33.17 kg/m²     Weight Metrics 7/24/2019 7/17/2019 7/16/2019 6/28/2019 6/28/2019 6/20/2019 6/19/2019   Weight 244 lb 9.6 oz 246 lb 11.2 oz 246 lb 9.6 oz - 240 lb - 244 lb   Waist Measure Inches - 43.75 - 43 - 44 -   BMI 33.17 kg/m2 33.46 kg/m2 33.44 kg/m2 - 32.55 kg/m2 - 33.09 kg/m2         Have you received any other medical care this week? no  If yes, where and for what? Have you had any change in your medications since your last visit? no  If yes what? Did you have any problems adhering to the program last week? no  If yes, please explain:       Eating Habits Over Last Week:  Did you take in 64 oz of non-caloric fluids? yes     Did you consume your prescribed meal replacement regimen each day?  yes       Physical Activity Over the Past Week:    Aerobic exercise: 90 min  Resistance exercise: 3 workouts / week

## 2019-07-25 DIAGNOSIS — R79.89 ELEVATED LIVER FUNCTION TESTS: ICD-10-CM

## 2019-07-25 DIAGNOSIS — E66.9 OBESITY (BMI 30-39.9): ICD-10-CM

## 2019-07-25 DIAGNOSIS — R63.4 WEIGHT LOSS: ICD-10-CM

## 2019-07-26 ENCOUNTER — HOSPITAL ENCOUNTER (OUTPATIENT)
Dept: LAB | Age: 44
Discharge: HOME | End: 2019-07-26
Payer: COMMERCIAL

## 2019-07-26 ENCOUNTER — HOSPITAL ENCOUNTER (OUTPATIENT)
Dept: LAB | Age: 44
Discharge: HOME | End: 2019-07-26

## 2019-07-26 DIAGNOSIS — I10 HYPERTENSION, UNSPECIFIED TYPE: ICD-10-CM

## 2019-07-26 DIAGNOSIS — E11.40 TYPE 2 DIABETES MELLITUS WITH DIABETIC NEUROPATHY, UNSPECIFIED WHETHER LONG TERM INSULIN USE (HCC): ICD-10-CM

## 2019-07-26 DIAGNOSIS — E66.9 OBESITY (BMI 30-39.9): ICD-10-CM

## 2019-07-26 DIAGNOSIS — Z71.3 WEIGHT LOSS COUNSELING, ENCOUNTER FOR: ICD-10-CM

## 2019-07-26 LAB — XX-LABCORP SPECIMEN COL,LCBCF: NORMAL

## 2019-07-26 PROCEDURE — 93005 ELECTROCARDIOGRAM TRACING: CPT

## 2019-07-26 PROCEDURE — 99001 SPECIMEN HANDLING PT-LAB: CPT

## 2019-07-27 LAB
ALBUMIN SERPL-MCNC: 4.4 G/DL (ref 3.5–5.5)
ALBUMIN/GLOB SERPL: 2 {RATIO} (ref 1.2–2.2)
ALP SERPL-CCNC: 63 IU/L (ref 39–117)
ALT SERPL-CCNC: 21 IU/L (ref 0–44)
AST SERPL-CCNC: 24 IU/L (ref 0–40)
ATRIAL RATE: 74 BPM
BILIRUB SERPL-MCNC: 0.5 MG/DL (ref 0–1.2)
BUN SERPL-MCNC: 25 MG/DL (ref 6–24)
BUN/CREAT SERPL: 22 (ref 9–20)
CALCIUM SERPL-MCNC: 9.5 MG/DL (ref 8.7–10.2)
CALCULATED P AXIS, ECG09: 59 DEGREES
CALCULATED R AXIS, ECG10: 24 DEGREES
CALCULATED T AXIS, ECG11: 52 DEGREES
CHLORIDE SERPL-SCNC: 98 MMOL/L (ref 96–106)
CO2 SERPL-SCNC: 25 MMOL/L (ref 20–29)
CREAT SERPL-MCNC: 1.12 MG/DL (ref 0.76–1.27)
DIAGNOSIS, 93000: NORMAL
GLOBULIN SER CALC-MCNC: 2.2 G/DL (ref 1.5–4.5)
GLUCOSE SERPL-MCNC: 156 MG/DL (ref 65–99)
P-R INTERVAL, ECG05: 142 MS
POTASSIUM SERPL-SCNC: 4.4 MMOL/L (ref 3.5–5.2)
PROT SERPL-MCNC: 6.6 G/DL (ref 6–8.5)
Q-T INTERVAL, ECG07: 374 MS
QRS DURATION, ECG06: 82 MS
QTC CALCULATION (BEZET), ECG08: 415 MS
SODIUM SERPL-SCNC: 139 MMOL/L (ref 134–144)
VENTRICULAR RATE, ECG03: 74 BPM

## 2019-08-02 ENCOUNTER — OFFICE VISIT (OUTPATIENT)
Dept: SURGERY | Age: 44
End: 2019-08-02

## 2019-08-02 VITALS
TEMPERATURE: 98.5 F | HEIGHT: 72 IN | WEIGHT: 248.6 LBS | DIASTOLIC BLOOD PRESSURE: 70 MMHG | HEART RATE: 76 BPM | BODY MASS INDEX: 33.67 KG/M2 | RESPIRATION RATE: 18 BRPM | SYSTOLIC BLOOD PRESSURE: 130 MMHG

## 2019-08-02 DIAGNOSIS — E66.09 CLASS 1 OBESITY DUE TO EXCESS CALORIES WITHOUT SERIOUS COMORBIDITY WITH BODY MASS INDEX (BMI) OF 33.0 TO 33.9 IN ADULT: ICD-10-CM

## 2019-08-02 DIAGNOSIS — Z71.3 DIETARY SURVEILLANCE AND COUNSELING: ICD-10-CM

## 2019-08-02 DIAGNOSIS — E66.09 CLASS 1 OBESITY DUE TO EXCESS CALORIES WITHOUT SERIOUS COMORBIDITY WITH BODY MASS INDEX (BMI) OF 33.0 TO 33.9 IN ADULT: Primary | ICD-10-CM

## 2019-08-02 NOTE — PROGRESS NOTES
Chief Complaint   Patient presents with    Weight Management   1. Have you been to the ER, urgent care clinic since your last visit? Hospitalized since your last visit? No    2. Have you seen or consulted any other health care providers outside of the [address] [place] [address] since your last visit? Include any pap smears or colon screening. No   Nursing Note for Medical Monitoring in the [facility] Weight Loss Program      Kim Rivera is a 40 y.o. male who is enrolled in [facility]    Kim Rivera was prescribed the VLCD / LCD. Did you have any problems adhering to the program last week? no  If yes, please explain:     Since your last visit, have you experienced any complications? no    Have you received any other medical care this week? no  If yes, where and for what? Have you had any change in your medications since your last visit? no  If yes what? Are you taking an appetite suppressant? no   If yes:  Do you need a refill? BP Readings from Last 3 Encounters:   08/02/19 130/70   07/24/19 116/72   07/17/19 106/74        Eating Habits Over Last Week:  Did you take in 64 oz of non-caloric fluids? yes     Did you consume your prescribed meal replacement regimen each day?  yes       Physical Activity Over the Past Week:    Aerobic exercise: 240 min  Resistance exercise: 3 workouts / week

## 2019-08-02 NOTE — PROGRESS NOTES
[facility] Progress Note:   Follow up Provider Visit    CC: [facility] Provider Visit       Natasha Barnes is a 40 y.o. male who is here for his follow up provider visit for the VLCD Program.     Weight Metrics 8/2/2019 8/2/2019 7/24/2019 7/17/2019 7/16/2019 6/28/2019 6/28/2019   Weight - 248 lb 9.6 oz 244 lb 9.6 oz 246 lb 11.2 oz 246 lb 9.6 oz - 240 lb   Waist Measure Inches 45.5 - - 43.75 - 43 -   BMI - 33.72 kg/m2 33.17 kg/m2 33.46 kg/m2 33.44 kg/m2 - 32.55 kg/m2         Current Outpatient Medications   Medication Sig Dispense Refill    traMADol (ULTRAM) 50 mg tablet Take 50 mg by mouth two (2) times a day.  multivitamin (ONE A DAY) tablet Take 1 Tab by mouth daily.  metaxalone (SKELAXIN) 800 mg tablet Take 800 mg by mouth two (2) times a day.  meloxicam (MOBIC) 15 mg tablet Take 15 mg by mouth daily.  adalimumab (HUMIRA PEN SC) by SubCUTAneous route every seven (7) days. Participation   Did you attend clinic and class last week?  yes    Review of Systems  Since your last visit, have you experienced any complications? no  If yes, please list:     Positive in BOLD  CONST: Fever, weight loss, fatigue or chills  GI: Nausea, vomiting, abdominal pain, change in bowel habits, hematochezia, melena, and GERD   INTEG: Dermatitis, abnormal moles  HEENT: Recent changes in vision, vertigo, epistaxis, dysphagia and hoarseness  CV: Chest pain, palpitations, HTN, edema and varicosities  RESP: Cough, shortness of breath, wheezing, hemoptysis, snoring and reactive airway disease  : Hematuria, dysuria, frequency, urgency, nocturia and stress urinary incontinence   MS: Weakness, joint pain and arthritis  ENDO: Diabetes, thyroid disease, polyuria, polydipsia, polyphagia, poor wound healing, heat intolerance, cold intolerance  LYMPH/HEME: Anemia, bruising and history of blood transfusions  NEURO: Dizziness, headache, fainting, seizures and stroke  PSYCH: Anxiety and depression      Are you taking an appetite suppressant? no  If so, is there any Chest Pain, Palpitations or Dizziness? BP Readings from Last 10 Encounters:   08/02/19 130/70   07/24/19 116/72   07/17/19 106/74   07/16/19 118/79   06/28/19 108/72   06/20/19 114/72   06/12/19 114/82   06/07/19 118/76   05/29/19 112/76   05/24/19 107/72         Have you received any other medical care this week? no  If yes, where and for what? Have you discontinued or changed any medicine or dose of your medicine since your last visit? no  If yes, where and for what? Diet  How many ounces of calorie-free liquids did you consume each day? 64 oz    How many meal replacements did you take each day? 4 Mr     Did you have any problems adhering to the program?  no If yes, please explain: n/a      Exercise  Aerobic exercise: 30-60 minutes of cardio and weights 4-5 times a week       Review of Systems  Complete ROS negative except where noted above    Objective  Visit Vitals  /70   Pulse 76   Temp 98.5 °F (36.9 °C)   Resp 18   Ht 6' (1.829 m)   Wt 112.8 kg (248 lb 9.6 oz)   BMI 33.72 kg/m²     No LMP for male patient. 3 most recent PHQ Screens 7/16/2019   Little interest or pleasure in doing things Not at all   Feeling down, depressed, irritable, or hopeless Not at all   Total Score PHQ 2 0         Waist Circumference: I personally reviewed patient's Weight Management Doc Flowsheet  Neck Circumference: I personally reviewed patient's Weight Management Doc Flowsheet  Percent Body Fat: I personally reviewed patient's Weight Management Doc Flowsheet    Physical Exam     General: 40 y.o.) male in no acute distress. HEENT: Normocephalic, atraumatic, Pupils equal and reactive, nasopharynx clear, oropharynx clear and moist without lesions  NECK: Supple, no lymphadenopathy, thyromegaly, carotid bruits or jugular venous distension.  trachea midline  RESP: Clear to auscultation bilaterally, no wheezes, rhonchi, or rales, normal respiratory excursion  CV: Regular rate and rhythm, no murmurs, rubs or gallops. 3+/4 pulses in bilateral dorsalis pedis and posterior tibialis. No distal edema or varicosities. ABD: Soft, nontender, nondistended, normoactive bowel sounds, no hernias, no hepatosplenomegaly  Extremities: Warm, well perfused, no tenderness or swelling, normal gait/station  Neuro: Sensation and strength grossly intact and symmetrical  Psych: Alert and oriented to person, place, and time. Assessment / Plan    Encounter Diagnoses   Name Primary?  Class 1 obesity due to excess calories without serious comorbidity with body mass index (BMI) of 33.0 to 33.9 in adult Yes    BMI 33.0-33.9,adult        1. Weight management stable   Progress was reviewed with patient   Reviewed diet plan with patient     2. Labs    Latest results reviewed with patient   Lab slip given to pt for f/up HDL labs    3. Diet regimen   # of meal replacements prescribed: 4 MR + 1 high protein, low carb snack ~300 calories due to amount of exercise    If modified LCD-nutritional guidelines:    Monthly Goal   5 pounds      Medical monitoring schedule:   Weekly BP/Weight checks   Monthly provider appointments  I have reviewed/discussed the above normal BMI with the patient. I have recommended the following interventions: dietary management education, guidance, and counseling, encourage exercise, monitor weight and prescribed dietary intake . .              >50% of 30 minute visit spent face to face time with Maday Harrell consisted of counseling & coordinating and/or discussing treatment plans in reference to his The primary encounter diagnosis was Class 1 obesity due to excess calories without serious comorbidity with body mass index (BMI) of 33.0 to 33.9 in adult. A diagnosis of BMI 33.0-33.9,adult was also pertinent to this visit.             Rashaun Villarreal, ROBER-BC

## 2019-08-04 NOTE — PROGRESS NOTES
Please let him know that his urine study is normal.  There is no evidence of kidney damage.     Dr. Vikash Diaz  [facility], [facility], [address] [place], [place] [address] [address]  Phone: [phone]  Fax: [phone]

## 2019-08-05 ENCOUNTER — TELEPHONE (OUTPATIENT)
Dept: INTERNAL MEDICINE CLINIC | Age: 44
End: 2019-08-05

## 2019-08-05 NOTE — TELEPHONE ENCOUNTER
----- Message from Noe Chacon MD sent at 8/4/2019  5:14 PM EDT -----  Please let him know that his urine study is normal.  There is no evidence of kidney damage.     Dr. Cecil Case  [facility] [address], [facility] [address]  Phone: [phone]  Fax: [phone]

## 2019-08-07 ENCOUNTER — CLINICAL SUPPORT (OUTPATIENT)
Dept: SURGERY | Age: 44
End: 2019-08-07

## 2019-08-07 VITALS
HEIGHT: 72 IN | DIASTOLIC BLOOD PRESSURE: 68 MMHG | HEART RATE: 80 BPM | WEIGHT: 250.2 LBS | BODY MASS INDEX: 33.89 KG/M2 | SYSTOLIC BLOOD PRESSURE: 124 MMHG

## 2019-08-07 DIAGNOSIS — E66.9 OBESITY, UNSPECIFIED CLASSIFICATION, UNSPECIFIED OBESITY TYPE, UNSPECIFIED WHETHER SERIOUS COMORBIDITY PRESENT: Primary | ICD-10-CM

## 2019-08-07 NOTE — PROGRESS NOTES
Patient attended a weekly class as part of the Medically Supervised Weight Loss Program.  This class was facilitated by a Registered Dietitian. Topics taught at class focused on diet, particularly carbohydrate counting and portion control. Classes also focused on behavior changes and the importance of establishing a daily exercise routine. Progress Note: Weekly Medical Monitoring in the [facility] Loss Program    Is there anything that you or the patient needs to let the supervising provider know about? no    Over the past week, have you experienced any side-effects? no    Armaan Houston is a 40 y.o. male who is enrolled in [facility]    Armaan Houston was prescribed the VLCD / LCD. Visit Vitals  /68   Pulse 80   Ht 6' (1.829 m)   Wt 113.5 kg (250 lb 3.2 oz)   BMI 33.93 kg/m²     Weight Metrics 8/7/2019 8/2/2019 8/2/2019 7/24/2019 7/17/2019 7/16/2019 6/28/2019   Weight 250 lb 3.2 oz - 248 lb 9.6 oz 244 lb 9.6 oz 246 lb 11.2 oz 246 lb 9.6 oz -   Waist Measure Inches 44.25 45.5 - - 43.75 - 43   BMI 33.93 kg/m2 - 33.72 kg/m2 33.17 kg/m2 33.46 kg/m2 33.44 kg/m2 -         Have you received any other medical care this week? no  If yes, where and for what? Have you had any change in your medications since your last visit? no  If yes what? Did you have any problems adhering to the program last week? no  If yes, please explain:       Eating Habits Over Last Week:  Did you take in 64 oz of non-caloric fluids? yes     Did you consume your prescribed meal replacement regimen each day?  yes       Physical Activity Over the Past Week:    Aerobic exercise: 90 min  Resistance exercise: 3 workouts / week

## 2019-08-14 ENCOUNTER — CLINICAL SUPPORT (OUTPATIENT)
Dept: SURGERY | Age: 44
End: 2019-08-14

## 2019-08-14 VITALS
SYSTOLIC BLOOD PRESSURE: 110 MMHG | WEIGHT: 250.2 LBS | BODY MASS INDEX: 33.93 KG/M2 | DIASTOLIC BLOOD PRESSURE: 81 MMHG | HEART RATE: 71 BPM

## 2019-08-14 DIAGNOSIS — E66.9 OBESITY, UNSPECIFIED CLASSIFICATION, UNSPECIFIED OBESITY TYPE, UNSPECIFIED WHETHER SERIOUS COMORBIDITY PRESENT: Primary | ICD-10-CM

## 2019-08-14 NOTE — PROGRESS NOTES
Patient attended a weekly class as part of the Medically Supervised Weight Loss Program.  This class was facilitated by a Registered Dietitian. Topics taught at class focused on diet, particularly carbohydrate counting and portion control. Classes also focused on behavior changes and the importance of establishing a daily exercise routine. Progress Note: Weekly Medical Monitoring in the [facility] Weight Loss Program    Is there anything that you or the patient needs to let the supervising provider know about? no    Over the past week, have you experienced any side-effects? no    Jairo Kehr is a 40 y.o. male who is enrolled in [facility]    Jairo Kehr was prescribed the VLCD / LCD. Visit Vitals  /81   Pulse 71   Wt 113.5 kg (250 lb 3.2 oz)   BMI 33.93 kg/m²     Weight Metrics 8/14/2019 8/7/2019 8/2/2019 8/2/2019 7/24/2019 7/17/2019 7/16/2019   Weight 250 lb 3.2 oz 250 lb 3.2 oz - 248 lb 9.6 oz 244 lb 9.6 oz 246 lb 11.2 oz 246 lb 9.6 oz   Waist Measure Inches 44.5 44.25 45.5 - - 43.75 -   BMI 33.93 kg/m2 33.93 kg/m2 - 33.72 kg/m2 33.17 kg/m2 33.46 kg/m2 33.44 kg/m2         Have you received any other medical care this week? no  If yes, where and for what? Have you had any change in your medications since your last visit? no  If yes what? Did you have any problems adhering to the program last week? no  If yes, please explain:       Eating Habits Over Last Week:  Did you take in 64 oz of non-caloric fluids? yes     Did you consume your prescribed meal replacement regimen each day?  yes       Physical Activity Over the Past Week:    Aerobic exercise: 90 min  Resistance exercise: 90 workouts / week

## 2019-08-21 ENCOUNTER — CLINICAL SUPPORT (OUTPATIENT)
Dept: SURGERY | Age: 44
End: 2019-08-21

## 2019-08-21 VITALS
BODY MASS INDEX: 33.97 KG/M2 | HEIGHT: 72 IN | DIASTOLIC BLOOD PRESSURE: 80 MMHG | WEIGHT: 250.8 LBS | SYSTOLIC BLOOD PRESSURE: 134 MMHG | HEART RATE: 74 BPM

## 2019-08-21 DIAGNOSIS — E66.9 OBESITY, UNSPECIFIED CLASSIFICATION, UNSPECIFIED OBESITY TYPE, UNSPECIFIED WHETHER SERIOUS COMORBIDITY PRESENT: Primary | ICD-10-CM

## 2019-08-21 NOTE — PROGRESS NOTES
Patient attended a weekly class as part of the Medically Supervised Weight Loss Program.  This class was facilitated by a Registered Dietitian. Topics taught at class focused on diet, particularly carbohydrate counting and portion control. Classes also focused on behavior changes and the importance of establishing a daily exercise routine. Progress Note: Weekly Medical Monitoring in the [facility] Weight Loss Program    Is there anything that you or the patient needs to let the supervising provider know about? no    Over the past week, have you experienced any side-effects? no    Eusebia Ramirez is a 40 y.o. male who is enrolled in [facility] Loss [facility]    Eusebia Ramirez was prescribed the VLCD / LCD. Visit Vitals  /80   Pulse 74   Ht 6' (1.829 m)   Wt 113.8 kg (250 lb 12.8 oz)   BMI 34.01 kg/m²     Weight Metrics 8/21/2019 8/14/2019 8/7/2019 8/2/2019 8/2/2019 7/24/2019 7/17/2019   Weight 250 lb 12.8 oz 250 lb 3.2 oz 250 lb 3.2 oz - 248 lb 9.6 oz 244 lb 9.6 oz 246 lb 11.2 oz   Waist Measure Inches 44 44.5 44.25 45.5 - - 43.75   BMI 34.01 kg/m2 33.93 kg/m2 33.93 kg/m2 - 33.72 kg/m2 33.17 kg/m2 33.46 kg/m2         Have you received any other medical care this week? no  If yes, where and for what? Have you had any change in your medications since your last visit? no  If yes what? Did you have any problems adhering to the program last week? no  If yes, please explain:       Eating Habits Over Last Week:  Did you take in 64 oz of non-caloric fluids? yes     Did you consume your prescribed meal replacement regimen each day?  yes       Physical Activity Over the Past Week:    Aerobic exercise: 60 min  Resistance exercise: 7 workouts / week

## 2019-08-28 ENCOUNTER — CLINICAL SUPPORT (OUTPATIENT)
Dept: SURGERY | Age: 44
End: 2019-08-28

## 2019-08-28 VITALS
DIASTOLIC BLOOD PRESSURE: 80 MMHG | WEIGHT: 253.8 LBS | SYSTOLIC BLOOD PRESSURE: 126 MMHG | BODY MASS INDEX: 34.38 KG/M2 | HEIGHT: 72 IN | HEART RATE: 76 BPM

## 2019-08-28 DIAGNOSIS — E66.9 OBESITY, UNSPECIFIED CLASSIFICATION, UNSPECIFIED OBESITY TYPE, UNSPECIFIED WHETHER SERIOUS COMORBIDITY PRESENT: Primary | ICD-10-CM

## 2019-08-28 NOTE — PROGRESS NOTES
Patient attended a weekly class as part of the Medically Supervised Weight Loss Program.  This class was facilitated by a Registered Dietitian. Topics taught at class focused on diet, particularly carbohydrate counting and portion control. Classes also focused on behavior changes and the importance of establishing a daily exercise routine. Progress Note: Weekly Medical Monitoring in the [facility] Loss Program    Is there anything that you or the patient needs to let the supervising provider know about? no    Over the past week, have you experienced any side-effects? no    Magdalene Wheatley is a 40 y.o. male who is enrolled in [facility]    Magdalene Wheatley was prescribed the VLCD / LCD. Visit Vitals  /80   Pulse 76   Ht 6' (1.829 m)   Wt 115.1 kg (253 lb 12.8 oz)   BMI 34.42 kg/m²     Weight Metrics 8/28/2019 8/21/2019 8/14/2019 8/7/2019 8/2/2019 8/2/2019 7/24/2019   Weight 253 lb 12.8 oz 250 lb 12.8 oz 250 lb 3.2 oz 250 lb 3.2 oz - 248 lb 9.6 oz 244 lb 9.6 oz   Waist Measure Inches 44.5 44 44.5 44.25 45.5 - -   BMI 34.42 kg/m2 34.01 kg/m2 33.93 kg/m2 33.93 kg/m2 - 33.72 kg/m2 33.17 kg/m2         Have you received any other medical care this week? no  If yes, where and for what? Have you had any change in your medications since your last visit? no  If yes what? Did you have any problems adhering to the program last week? no  If yes, please explain:       Eating Habits Over Last Week:  Did you take in 64 oz of non-caloric fluids? yes     Did you consume your prescribed meal replacement regimen each day?  yes       Physical Activity Over the Past Week:    Aerobic exercise: 60 min  Resistance exercise: 4 workouts / week

## 2019-08-29 ENCOUNTER — TELEPHONE (OUTPATIENT)
Dept: INTERNAL MEDICINE CLINIC | Age: 44
End: 2019-08-29

## 2019-08-29 NOTE — TELEPHONE ENCOUNTER
Please have him go to urgent care as there are no availabilities this late in the day per all provider schedules.     Dr. Judie Ramires  [facility] [address], [address] [place] [address] Str.  Phone: [phone]  Fax: [phone]

## 2019-08-29 NOTE — TELEPHONE ENCOUNTER
Returned call to patient and he was notified to go to urgent care for further evaluation per DR. Julianne Carlton. Patient verbalizes understanding.

## 2019-08-29 NOTE — TELEPHONE ENCOUNTER
Pt calling, says last week he wasn't feeling well after working out. Says it is happening again today. Kind of light headed and just not feeling right. Wants to know if he can be seen today or if he should go to urgent care.

## 2019-09-04 ENCOUNTER — OFFICE VISIT (OUTPATIENT)
Dept: INTERNAL MEDICINE CLINIC | Age: 44
End: 2019-09-04

## 2019-09-04 ENCOUNTER — HOSPITAL ENCOUNTER (OUTPATIENT)
Dept: CT IMAGING | Age: 44
Discharge: HOME | End: 2019-09-04
Attending: INTERNAL MEDICINE
Payer: COMMERCIAL

## 2019-09-04 VITALS
RESPIRATION RATE: 18 BRPM | HEIGHT: 72 IN | BODY MASS INDEX: 34.54 KG/M2 | OXYGEN SATURATION: 96 % | TEMPERATURE: 97.6 F | HEART RATE: 67 BPM | WEIGHT: 255 LBS | DIASTOLIC BLOOD PRESSURE: 84 MMHG | SYSTOLIC BLOOD PRESSURE: 126 MMHG

## 2019-09-04 DIAGNOSIS — M54.2 NECK PAIN: ICD-10-CM

## 2019-09-04 DIAGNOSIS — R51.9 INTRACTABLE HEADACHE, UNSPECIFIED CHRONICITY PATTERN, UNSPECIFIED HEADACHE TYPE: ICD-10-CM

## 2019-09-04 DIAGNOSIS — E78.00 HYPERCHOLESTEROLEMIA: ICD-10-CM

## 2019-09-04 DIAGNOSIS — M25.50 ARTHRALGIA, UNSPECIFIED JOINT: ICD-10-CM

## 2019-09-04 DIAGNOSIS — K75.81 NASH (NONALCOHOLIC STEATOHEPATITIS): ICD-10-CM

## 2019-09-04 DIAGNOSIS — E11.40 TYPE 2 DIABETES MELLITUS WITH DIABETIC NEUROPATHY, UNSPECIFIED WHETHER LONG TERM INSULIN USE (HCC): ICD-10-CM

## 2019-09-04 DIAGNOSIS — R51.9 INTRACTABLE HEADACHE, UNSPECIFIED CHRONICITY PATTERN, UNSPECIFIED HEADACHE TYPE: Primary | ICD-10-CM

## 2019-09-04 PROCEDURE — 74011636320 HC RX REV CODE- 636/320: Performed by: INTERNAL MEDICINE

## 2019-09-04 PROCEDURE — 70498 CT ANGIOGRAPHY NECK: CPT

## 2019-09-04 RX ADMIN — IOPAMIDOL 80 ML: 755 INJECTION, SOLUTION INTRAVENOUS at 13:51

## 2019-09-04 NOTE — PROGRESS NOTES
INTERNISTS OF [facility]:  9/4/2019, MRN: [id number]      Natasha Barnes is a 40 y.o. male and presents to clinic for Headache (Patient reports having headache x 1.5 after lifting weights at the gym. Patient also reports having nausea .)    Subjective: The patient is a 15-year-old male with history of SHEPPARD, cervical disc disease, vitamin D deficiency, type 2 diabetes, obesity, HLD, psoriatic arthritis (not responsive to Enbrel per pt hx), and sarcoidosis (per skin biopsy) per EHR. 1. HAs:  He had an unremarkable sleep study in 2012. HAs are constant since last Monday. He has never had sx like this before. Pain quality: throbbing. It hurts to open his eyes. Associated sx: left sided neck pain. Location of pain: base of his head/scalp. A week ago he had a HA after lifting weights and doing the leg press. Sx re-occured with more exercise/weight lifting since then. No paresthesias/weakess. No hearing loss. He is taking ibuprofen \"like candy. \" It does not help to relieve his sx though. He is not sound sensitive. He has some light sensitivity associated with his HAs. Lifting weights is a trigger/aggravating factors. 2. HLD/Type 2 DM/SHEPPARD: No excessive alcohol beverage consumption. No energy drink consumption. Diet controlled. He is not on any medications for cholesterol or diabetes. He is overdue for routine labs.       Patient Active Problem List    Diagnosis Date Noted    SHEPPARD (nonalcoholic steatohepatitis) 07/15/2019    Cervical disc disease 07/15/2019    Vitamin D deficiency 07/15/2019    Type 2 diabetes mellitus with diabetic neuropathy ([facility] 02/15/2019    Severe obesity with body mass index (BMI) of 35.0 to 39.9 with serious comorbidity ([address] 08/13/2018    Hypercholesterolemia 11/01/2013    Psoriatic arthritis ([facility] 11/01/2013    Sarcoidosis 11/01/2013       Current Outpatient Medications   Medication Sig Dispense Refill    traMADol (ULTRAM) 50 mg tablet Take 50 mg by mouth two (2) times a day.      multivitamin (ONE A DAY) tablet Take 1 Tab by mouth daily.  metaxalone (SKELAXIN) 800 mg tablet Take 800 mg by mouth two (2) times a day.  meloxicam (MOBIC) 15 mg tablet Take 15 mg by mouth daily.  adalimumab (HUMIRA PEN SC) by SubCUTAneous route every seven (7) days. No Known Allergies    Past Medical History:   Diagnosis Date    Asthma     Diabetes mellitus     no meds    Dupuytren's contracture     Essential hypertension     no meds    Ill-defined condition     neuropathy    Psoriatic arthritis, destructive type ([facility]     sarcoidosis    Sarcoidosis        Past Surgical History:   Procedure Laterality Date    HX CARPAL TUNNEL RELEASE      HX ORTHOPAEDIC      fx skull/ broken jaw     HX ORTHOPAEDIC Right     carpal tunnel    HX OTHER SURGICAL      lipoma removed     MA EXC SKIN BENIG 0.6-1CM TRUNK,ARM,LEG N/A 10/11/2018    Dr. Mayelin Terrell 1.1-2CM TRUNK,ARM,LEG N/A 10/11/2018    Dr. Mayelin Terrell 2.1-3CM TRUNK,ARM,LEG N/A 10/11/2018    Dr. Anna Marie Dutta       Family History   Problem Relation Age of Onset    Stroke Father     Diabetes Maternal Grandmother     Cancer Maternal Grandfather 79        prostate cancer       Social History     Tobacco Use    Smoking status: Never Smoker    Smokeless tobacco: Never Used   Substance Use Topics    Alcohol use: No     Alcohol/week: 2.0 standard drinks     Types: 2 Shots of liquor per week     Frequency: Never     Comment: social       ROS   Review of Systems   Constitutional: Negative for chills and fever. HENT: Negative for ear pain and sore throat. Eyes: Negative for blurred vision and pain. Respiratory: Negative for cough and shortness of breath. Cardiovascular: Negative for chest pain. Gastrointestinal: Negative for abdominal pain, blood in stool and melena. Genitourinary: Negative for dysuria and hematuria. Musculoskeletal: Positive for neck pain.  Negative for joint pain and myalgias. Neck pain off/on   Skin: Negative for rash. Neurological: Positive for headaches. Negative for tingling and focal weakness. Endo/Heme/Allergies: Does not bruise/bleed easily. Psychiatric/Behavioral: Negative for substance abuse. Objective     Vitals:    09/04/19 1014   BP: 126/84   Pulse: 67   Resp: 18   Temp: 97.6 °F (36.4 °C)   TempSrc: Oral   SpO2: 96%   Weight: 255 lb (115.7 kg)   Height: 6' (1.829 m)   PainSc:   5   PainLoc: Head       Physical Exam   Constitutional: He is oriented to person, place, and time and well-developed, well-nourished, and in no distress. HENT:   Head: Normocephalic and atraumatic. Right Ear: External ear normal.   Left Ear: External ear normal.   Nose: Nose normal.   Mouth/Throat: Oropharynx is clear and moist. No oropharyngeal exudate. Eyes: Pupils are equal, round, and reactive to light. Conjunctivae and EOM are normal. Right eye exhibits no discharge. Left eye exhibits no discharge. No scleral icterus. Neck: Neck supple. Cardiovascular: Normal rate, regular rhythm, normal heart sounds and intact distal pulses. Pulmonary/Chest: Effort normal and breath sounds normal. No respiratory distress. He has no wheezes. Abdominal: Soft. Bowel sounds are normal. He exhibits no distension. There is no tenderness. Musculoskeletal: He exhibits no edema or tenderness (Bue). Cervical spinous processes and trapezius muscle - NTTP. Good ROM along his c spine   Lymphadenopathy:     He has no cervical adenopathy. Neurological: He is alert and oriented to person, place, and time. He exhibits normal muscle tone. Gait normal.   Skin: Skin is warm and dry. No erythema. Psychiatric: Affect normal.   Nursing note and vitals reviewed.       LABS   Data Review:   Lab Results   Component Value Date/Time    WBC 10.2 10/09/2018 01:13 PM    HGB 16.3 (H) 10/09/2018 01:13 PM    HCT 45.3 10/09/2018 01:13 PM    PLATELET 061 02/67/9602 01:13 PM    MCV 86.8 10/09/2018 01:13 PM       Lab Results   Component Value Date/Time    Sodium 139 07/26/2019 12:00 AM    Potassium 4.4 07/26/2019 12:00 AM    Chloride 98 07/26/2019 12:00 AM    CO2 25 07/26/2019 12:00 AM    Anion gap 10 10/09/2018 01:13 PM    Glucose 156 (H) 07/26/2019 12:00 AM    BUN 25 (H) 07/26/2019 12:00 AM    Creatinine 1.12 07/26/2019 12:00 AM    BUN/Creatinine ratio 22 (H) 07/26/2019 12:00 AM    GFR est AA 92 07/26/2019 12:00 AM    GFR est non-AA 79 07/26/2019 12:00 AM    Calcium 9.5 07/26/2019 12:00 AM       Lab Results   Component Value Date/Time    Cholesterol, total 172 10/09/2018 01:13 PM    HDL Cholesterol 40 10/09/2018 01:13 PM    LDL, calculated 91 10/09/2018 01:13 PM    VLDL, calculated 41 10/09/2018 01:13 PM    Triglyceride 205 (H) 10/09/2018 01:13 PM    CHOL/HDL Ratio 4.3 10/09/2018 01:13 PM       Lab Results   Component Value Date/Time    Hemoglobin A1c 9.5 (H) 08/13/2018 01:40 PM    Hemoglobin A1c (POC) 5.1 02/15/2019 01:50 PM       Assessment/Plan:   1. SHEPPARD, Type 2 DM, and HLD: Stable. -Checking A1c, lipid panel, and a CMP. ORDERS:  - HEMOGLOBIN A1C W/O EAG; Future  - LIPID PANEL; Future  - METABOLIC PANEL, COMPREHENSIVE    2. Intractable HA: From a combination of migraine HAs and tension HAs  - Checking a CBC.   - Checking a Head/Neck CTA:   -We will consider ordering Fioricet and or referring the patient to Neurology pending the results-which was discussed with him today. ORDERS:  - CBC WITH AUTOMATED DIFF; Future  - CTA HEAD NECK W WO CONT; Future    3. Health Maintenance:  - Checking a uric acid level given his multiple arthralgias.  -I encouraged him to reduce his processed food intake and exercise as tolerated. I will recheck his weight at his follow-up appointment. ORDERS:  - URIC ACID;  Future      Health Maintenance Due   Topic Date Due    EYE EXAM RETINAL OR DILATED  02/15/1985    DTaP/Tdap/Td series (1 - Tdap) 02/15/1996    Influenza Age 9 to Adult  08/01/2019    HEMOGLOBIN A1C Q6M 08/15/2019     Lab review: labs are reviewed in the EHR and ordered as mentioned above    I have discussed the diagnosis with the patient and the intended plan as seen in the above orders. The patient has received an after-visit summary and questions were answered concerning future plans. I have discussed medication side effects and warnings with the patient as well. I have reviewed the plan of care with the patient, accepted their input and they are in agreement with the treatment goals. All questions were answered. The patient understands the plan of care. Handouts provided today with above information. Pt instructed if symptoms worsen to call the office or report to the ED for continued care. Greater than 50% of the visit time was spent in counseling and/or coordination of care. Voice recognition was used to generate this report, which may have resulted in some phonetic based errors in grammar and contents. Even though attempts were made to correct all the mistakes, some may have been missed, and remained in the body of the document. Follow-up and Dispositions    · Return if symptoms worsen or fail to improve.          Willow Avalos MD

## 2019-09-04 NOTE — PROGRESS NOTES
Елена Murray presents today for   Chief Complaint   Patient presents with    Headache     Patient reports having headache x 1.5 after lifting weights at the gym. Patient also reports having nausea . Depression Screening:  3 most recent PHQ Screens 7/16/2019   Little interest or pleasure in doing things Not at all   Feeling down, depressed, irritable, or hopeless Not at all   Total Score PHQ 2 0       Learning Assessment:  Learning Assessment 7/16/2019   PRIMARY LEARNER Patient   HIGHEST LEVEL OF EDUCATION - PRIMARY LEARNER  4 YEARS OF COLLEGE   BARRIERS PRIMARY LEARNER NONE   CO-LEARNER CAREGIVER No   PRIMARY LANGUAGE ENGLISH   LEARNER PREFERENCE PRIMARY DEMONSTRATION   ANSWERED BY patient   RELATIONSHIP SELF       Abuse Screening:  Abuse Screening Questionnaire 7/16/2019   Do you ever feel afraid of your partner? N   Are you in a relationship with someone who physically or mentally threatens you? N   Is it safe for you to go home? Y       Fall Risk  No flowsheet data found. Coordination of Care:  1. Have you been to the ER, urgent care clinic since your last visit? Hospitalized since your last visit? no    2. Have you seen or consulted any other health care providers outside of the [address] [place] [address] since your last visit? Include any pap smears or colon screening. no      Advance Directive:  1. Do you have an advance directive in place? Patient Reply:no    2. If not, would you like material regarding how to put one in place?  Patient Reply: no no radiation

## 2019-09-04 NOTE — PATIENT INSTRUCTIONS

## 2019-09-05 ENCOUNTER — TELEPHONE (OUTPATIENT)
Dept: INTERNAL MEDICINE CLINIC | Age: 44
End: 2019-09-05

## 2019-09-05 DIAGNOSIS — R51.9 INTRACTABLE HEADACHE, UNSPECIFIED CHRONICITY PATTERN, UNSPECIFIED HEADACHE TYPE: Primary | ICD-10-CM

## 2019-09-05 DIAGNOSIS — M54.2 NECK PAIN: ICD-10-CM

## 2019-09-05 DIAGNOSIS — G44.211 INTRACTABLE EPISODIC TENSION-TYPE HEADACHE: Primary | ICD-10-CM

## 2019-09-05 LAB
ALBUMIN SERPL-MCNC: 4.7 G/DL (ref 3.5–5.5)
ALBUMIN/CREAT UR: <14.9 MG/G CREAT (ref 0–30)
ALBUMIN/GLOB SERPL: 1.7 {RATIO} (ref 1.2–2.2)
ALP SERPL-CCNC: 75 IU/L (ref 39–117)
ALT SERPL-CCNC: 24 IU/L (ref 0–44)
AST SERPL-CCNC: 21 IU/L (ref 0–40)
BASOPHILS # BLD AUTO: 0 X10E3/UL (ref 0–0.2)
BASOPHILS NFR BLD AUTO: 0 %
BILIRUB SERPL-MCNC: 0.3 MG/DL (ref 0–1.2)
BUN SERPL-MCNC: 26 MG/DL (ref 6–24)
BUN/CREAT SERPL: 25 (ref 9–20)
CALCIUM SERPL-MCNC: 10 MG/DL (ref 8.7–10.2)
CHLORIDE SERPL-SCNC: 101 MMOL/L (ref 96–106)
CHOLEST SERPL-MCNC: 185 MG/DL (ref 100–199)
CO2 SERPL-SCNC: 25 MMOL/L (ref 20–29)
CREAT SERPL-MCNC: 1.06 MG/DL (ref 0.76–1.27)
CREAT UR-MCNC: 20.2 MG/DL
EOSINOPHIL # BLD AUTO: 0.2 X10E3/UL (ref 0–0.4)
EOSINOPHIL NFR BLD AUTO: 3 %
ERYTHROCYTE [DISTWIDTH] IN BLOOD BY AUTOMATED COUNT: 13 % (ref 12.3–15.4)
GLOBULIN SER CALC-MCNC: 2.8 G/DL (ref 1.5–4.5)
GLUCOSE SERPL-MCNC: 109 MG/DL (ref 65–99)
HBA1C MFR BLD: 5.6 % (ref 4.8–5.6)
HCT VFR BLD AUTO: 47.2 % (ref 37.5–51)
HDLC SERPL-MCNC: 50 MG/DL
HGB BLD-MCNC: 17.1 G/DL (ref 13–17.7)
IMM GRANULOCYTES # BLD AUTO: 0 X10E3/UL (ref 0–0.1)
IMM GRANULOCYTES NFR BLD AUTO: 0 %
INTERPRETATION, 910389: NORMAL
LDLC SERPL CALC-MCNC: 116 MG/DL (ref 0–99)
LYMPHOCYTES # BLD AUTO: 2.5 X10E3/UL (ref 0.7–3.1)
LYMPHOCYTES NFR BLD AUTO: 43 %
Lab: NORMAL
MCH RBC QN AUTO: 31.8 PG (ref 26.6–33)
MCHC RBC AUTO-ENTMCNC: 36.2 G/DL (ref 31.5–35.7)
MCV RBC AUTO: 88 FL (ref 79–97)
MICROALBUMIN UR-MCNC: <3 UG/ML
MONOCYTES # BLD AUTO: 0.5 X10E3/UL (ref 0.1–0.9)
MONOCYTES NFR BLD AUTO: 9 %
NEUTROPHILS # BLD AUTO: 2.6 X10E3/UL (ref 1.4–7)
NEUTROPHILS NFR BLD AUTO: 45 %
PLATELET # BLD AUTO: 240 X10E3/UL (ref 150–450)
POTASSIUM SERPL-SCNC: 4.6 MMOL/L (ref 3.5–5.2)
PROT SERPL-MCNC: 7.5 G/DL (ref 6–8.5)
RBC # BLD AUTO: 5.38 X10E6/UL (ref 4.14–5.8)
SODIUM SERPL-SCNC: 142 MMOL/L (ref 134–144)
TRIGL SERPL-MCNC: 93 MG/DL (ref 0–149)
URATE SERPL-MCNC: 4.8 MG/DL (ref 3.7–8.6)
VLDLC SERPL CALC-MCNC: 19 MG/DL (ref 5–40)
WBC # BLD AUTO: 5.8 X10E3/UL (ref 3.4–10.8)

## 2019-09-05 RX ORDER — BUTALBITAL, ASPIRIN AND CAFFEINE 50; 325; 40 MG/1; MG/1; MG/1
1 TABLET ORAL
Qty: 20 TAB | Refills: 0 | Status: SHIPPED | OUTPATIENT
Start: 2019-09-05 | End: 2019-12-24

## 2019-09-05 NOTE — TELEPHONE ENCOUNTER
Fiorinal rx prescribed. Please let him know. He will need to  this rx. No additional refills will be given. He will need to f/u with Neurology for long term medication recommendations pending their evaluation.     Dr. Haylee Jimenez  Internists of [address], [address] [place] [address] Str.  Phone: [phone]  Fax: [phone]

## 2019-09-05 NOTE — TELEPHONE ENCOUNTER
----- Message from Maggi Bobo MD sent at 9/5/2019  8:34 AM EDT -----  Please let him know that there is no evidence of cerebral stenoses or aneurysms. Please have him follow-up with Neurology for additional evaluation and recommendations. He already takes tramadol prn prescribed by a different provider. He can take additional tylenol as needed for sx pending f/u with Neurology.     Dr. Selvin Cerda  Internists of [address], [address] [place] [address] Str.  Phone: [phone]  Fax: [phone]

## 2019-09-05 NOTE — TELEPHONE ENCOUNTER
Patient called and stated that Neurology moved his appt from 9-9-19 to 9-24-19, he states that he needs something for his headaches. Please advise.

## 2019-09-05 NOTE — TELEPHONE ENCOUNTER
Patient scheduled with DR. Lupillo Mooney for 9-9-19. Patient made aware of appointment. No authorization required Error Description   Error Type Error Message    The following errors have been detected:   Entry #84-Certification Not Required for this Service (The member's coverage for Sep 5, 5482 and policy [id number] does not require an authorization for service.)      Patients ID # not correct in linda. The correct ID# is [id number].

## 2019-09-05 NOTE — PROGRESS NOTES
Please let him know that there is no evidence of cerebral stenoses or aneurysms. Please have him follow-up with Neurology for additional evaluation and recommendations. He already takes tramadol prn prescribed by a different provider. He can take additional tylenol as needed for sx pending f/u with Neurology.     Dr. Dejon Olea  Internists of [facility], [address] [place] [address], [address] [place] [address] Str.  Phone: [phone]  Fax: [phone]

## 2019-09-05 NOTE — TELEPHONE ENCOUNTER
Patient contacted, patient identified with two identifiers (Name & ). Patient aware of results per DR. Morgan and verbalizes understanding. Patient also aware that a referral was placed for neurology.

## 2019-09-06 ENCOUNTER — TELEPHONE (OUTPATIENT)
Dept: INTERNAL MEDICINE CLINIC | Age: 44
End: 2019-09-06

## 2019-09-06 ENCOUNTER — OFFICE VISIT (OUTPATIENT)
Dept: SURGERY | Age: 44
End: 2019-09-06

## 2019-09-06 VITALS
SYSTOLIC BLOOD PRESSURE: 110 MMHG | WEIGHT: 254.6 LBS | DIASTOLIC BLOOD PRESSURE: 80 MMHG | TEMPERATURE: 97.4 F | RESPIRATION RATE: 18 BRPM | OXYGEN SATURATION: 97 % | HEIGHT: 72 IN | BODY MASS INDEX: 34.48 KG/M2

## 2019-09-06 DIAGNOSIS — E66.01 SEVERE OBESITY WITH BODY MASS INDEX (BMI) OF 35.0 TO 39.9 WITH SERIOUS COMORBIDITY (HCC): Primary | ICD-10-CM

## 2019-09-06 DIAGNOSIS — E66.01 SEVERE OBESITY WITH BODY MASS INDEX (BMI) OF 35.0 TO 39.9 WITH SERIOUS COMORBIDITY (HCC): ICD-10-CM

## 2019-09-06 DIAGNOSIS — Z71.3 DIETARY SURVEILLANCE AND COUNSELING: ICD-10-CM

## 2019-09-06 NOTE — PROGRESS NOTES
Chief Complaint   Patient presents with    Weight Management   1. Have you been to the ER, urgent care clinic since your last visit? Hospitalized since your last visit? No    2. Have you seen or consulted any other health care providers outside of the [facility] since your last visit? Include any pap smears or colon screening. No went for ct scan head     Nursing Note for Medical Monitoring in the [facility]      Elidia Parmar is a 40 y.o. male who is enrolled in [facility]    Elidia Parmar was prescribed the VLCD / LCD. Did you have any problems adhering to the program last week? no  If yes, please explain:     Since your last visit, have you experienced any complications? no    Have you received any other medical care this week? yes  If yes, where and for what? Extreme headache while lifting heavy went to pcp ct scan ordered of head now has appt with neurology    Have you had any change in your medications since your last visit? yes  If yes what? Added fiorinal  Are you taking an appetite suppressant? no   If yes:  Do you need a refill? BP Readings from Last 3 Encounters:   09/06/19 110/80   09/04/19 126/84   08/28/19 126/80        Eating Habits Over Last Week:  Did you take in 64 oz of non-caloric fluids? yes     Did you consume your prescribed meal replacement regimen each day?  yes       Physical Activity Over the Past Week:    Aerobic exercise: 0 min  Resistance exercise: 0 workouts / week

## 2019-09-06 NOTE — PROGRESS NOTES
[facility] Progress Note:   Follow up Provider Visit    CC: [facility] Provider Visit       Shahid Ibarra is a 40 y.o. male who is here for his follow up provider visit for the VLCD Program.     Weight Metrics 9/6/2019 9/4/2019 8/28/2019 8/21/2019 8/14/2019 8/7/2019 8/2/2019   Weight 254 lb 9.6 oz 255 lb 253 lb 12.8 oz 250 lb 12.8 oz 250 lb 3.2 oz 250 lb 3.2 oz -   Waist Measure Inches - - 44.5 44 44.5 44.25 45.5   BMI 34.53 kg/m2 34.58 kg/m2 34.42 kg/m2 34.01 kg/m2 33.93 kg/m2 33.93 kg/m2 -         Current Outpatient Medications   Medication Sig Dispense Refill    butalbital-aspirin-caffeine (FIORINAL) -40 mg tablet Take 1 Tab by mouth two (2) times daily as needed for Headache. Max Daily Amount: 2 Tabs. 20 Tab 0    traMADol (ULTRAM) 50 mg tablet Take 50 mg by mouth two (2) times a day.  multivitamin (ONE A DAY) tablet Take 1 Tab by mouth daily.  metaxalone (SKELAXIN) 800 mg tablet Take 800 mg by mouth two (2) times a day.  meloxicam (MOBIC) 15 mg tablet Take 15 mg by mouth daily.  adalimumab (HUMIRA PEN SC) by SubCUTAneous route every seven (7) days. Participation   Did you attend clinic and class last week? yes    Review of Systems  Since your last visit, have you experienced any complications?  yes  If yes, please list:     Positive in BOLD  CONST: Fever, weight loss, fatigue or chills  GI: Nausea, vomiting, abdominal pain, change in bowel habits, hematochezia, melena, and GERD   INTEG: Dermatitis, abnormal moles  HEENT: Recent changes in vision, vertigo, epistaxis, dysphagia and hoarseness  CV: Chest pain, palpitations, HTN, edema and varicosities  RESP: Cough, shortness of breath, wheezing, hemoptysis, snoring and reactive airway disease  : Hematuria, dysuria, frequency, urgency, nocturia and stress urinary incontinence   MS: Weakness, joint pain and arthritis, neck pain   ENDO: Diabetes, thyroid disease, polyuria, polydipsia, polyphagia, poor wound healing, heat intolerance, cold intolerance  LYMPH/HEME: Anemia, bruising and history of blood transfusions  NEURO: Dizziness, headache, fainting, seizures and stroke  PSYCH: Anxiety and depression      Are you taking an appetite suppressant? no  If so, is there any Chest Pain, Palpitations or Dizziness? BP Readings from Last 10 Encounters:   09/06/19 110/80   09/04/19 126/84   08/28/19 126/80   08/21/19 134/80   08/14/19 110/81   08/07/19 124/68   08/02/19 130/70   07/24/19 116/72   07/17/19 106/74   07/16/19 118/79         Have you received any other medical care this week? yes  If yes, where and for what? Neurology for headaches       Have you discontinued or changed any medicine or dose of your medicine since your last visit? no  If yes, where and for what? Diet  How many ounces of calorie-free liquids did you consume each day?  > 64 oz    How many meal replacements did you take each day? 5 MR     Did you have any problems adhering to the program?  no If yes, please explain: n/a      Exercise  Not currently working out due to sudden onset of headache and neck pain during workout last Tuesday       Review of Systems  Complete ROS negative except where noted above    Objective  Visit Vitals  /80   Temp 97.4 °F (36.3 °C)   Resp 18   Ht 6' (1.829 m)   Wt 115.5 kg (254 lb 9.6 oz)   SpO2 97%   BMI 34.53 kg/m²     No LMP for male patient. 3 most recent PHQ Screens 7/16/2019   Little interest or pleasure in doing things Not at all   Feeling down, depressed, irritable, or hopeless Not at all   Total Score PHQ 2 0         Waist Circumference: I personally reviewed patient's Weight Management Doc Flowsheet  Neck Circumference: I personally reviewed patient's Weight Management Doc Flowsheet  Percent Body Fat: I personally reviewed patient's Weight Management Doc Flowsheet    Physical Exam     General: 40 y.o.) male in no acute distress.   HEENT: Normocephalic, atraumatic, Pupils equal and reactive, nasopharynx clear, oropharynx clear and moist without lesions  NECK: Supple, no lymphadenopathy, thyromegaly, carotid bruits or jugular venous distension. trachea midline  RESP: Clear to auscultation bilaterally, no wheezes, rhonchi, or rales, normal respiratory excursion  CV: Regular rate and rhythm, no murmurs, rubs or gallops. 3+/4 pulses in bilateral dorsalis pedis and posterior tibialis. No distal edema or varicosities. ABD: Soft, nontender, nondistended, normoactive bowel sounds, no hernias, no hepatosplenomegaly  Extremities: Warm, well perfused, no tenderness or swelling, normal gait/station  Neuro: Sensation and strength grossly intact and symmetrical  Psych: Alert and oriented to person, place, and time. Assessment / Plan    Encounter Diagnoses   Name Primary?  Severe obesity with body mass index (BMI) of 35.0 to 39.9 with serious comorbidity ([facility] Yes    BMI 34.0-34.9,adult     Dietary surveillance and counseling        1. Weight management stable   Progress was reviewed with patient   Has gained 6 pounds since last visit    2. Labs    Latest results reviewed with patient   Lab slip given to pt for f/up HDL labs    3. Diet regimen   # of meal replacements prescribed: 4-5 MR    If modified LCD-nutritional guidelines:    Monthly Goal   5 pounds     Medical monitoring schedule:   Weekly BP/Weight checks   Monthly provider appointments  I have reviewed/discussed the above normal BMI with the patient. I have recommended the following interventions: dietary management education, guidance, and counseling, monitor weight and prescribed dietary intake . Micheline Bowen There are no Patient Instructions on file for this visit.       Follow-up and Dispositions    · Return in about 1 month (around 10/6/2019).           >50% of 30 minute visit spent face to face time with Mike Faye consisted of counseling & coordinating and/or discussing treatment plans in reference to his The primary encounter diagnosis was Severe obesity with body mass index (BMI) of 35.0 to 39.9 with serious comorbidity ([address] Diagnoses of BMI 34.0-34.9,adult and Dietary surveillance and counseling were also pertinent to this visit.             Paola Shaw, FNP-BC

## 2019-09-08 NOTE — TELEPHONE ENCOUNTER
Please have him take one or the other but he should not combine these rx.     Dr. Donald Luciano  Internists of [facility], [facility]  [place], [address]  Phone: [phone]  Fax: [phone]

## 2019-09-09 ENCOUNTER — TELEPHONE (OUTPATIENT)
Dept: INTERNAL MEDICINE CLINIC | Age: 44
End: 2019-09-09

## 2019-09-09 NOTE — PROGRESS NOTES
Please let him know that his liver and kidney function labs are unremarkable his uric acid level is normal.  His blood counts are unremarkable. His total cholesterol is 185. His triglycerides are 93. His HDL is 50. His LDL is 116. No medications are warranted for these findings.     Dr. Vikash Diaz  Internists of [address], [facility]  Phone: [phone]  Fax: [phone]

## 2019-09-09 NOTE — TELEPHONE ENCOUNTER
Pt aware of message below and verbalized understanding. No further questions or concerns from pt at this time. Patient wanted to let you know that the Fiorinal is helping with the headaches. He stated they are not gone completely but that they are definitely tolerable.

## 2019-09-09 NOTE — TELEPHONE ENCOUNTER
Pt aware of message below and verbalized understanding. He stated that he took both medications all weekend because he didn't know but that he would stop today. No further questions or concerns from pt at this time.

## 2019-09-09 NOTE — TELEPHONE ENCOUNTER
----- Message from Rosalba Epstein MD sent at 9/8/2019  8:13 PM EDT -----  Please let him know that his liver and kidney function labs are unremarkable his uric acid level is normal.  His blood counts are unremarkable. His total cholesterol is 185. His triglycerides are 93. His HDL is 50. His LDL is 116. No medications are warranted for these findings.     Dr. Vikash Diaz  Internists of [address], [facility] [address]  Phone: [phone]  Fax: [phone]

## 2019-09-11 ENCOUNTER — CLINICAL SUPPORT (OUTPATIENT)
Dept: SURGERY | Age: 44
End: 2019-09-11

## 2019-09-11 VITALS
SYSTOLIC BLOOD PRESSURE: 126 MMHG | HEIGHT: 72 IN | WEIGHT: 251.1 LBS | DIASTOLIC BLOOD PRESSURE: 90 MMHG | HEART RATE: 76 BPM | BODY MASS INDEX: 34.01 KG/M2

## 2019-09-11 DIAGNOSIS — E66.9 OBESITY, UNSPECIFIED CLASSIFICATION, UNSPECIFIED OBESITY TYPE, UNSPECIFIED WHETHER SERIOUS COMORBIDITY PRESENT: Primary | ICD-10-CM

## 2019-09-13 DIAGNOSIS — G44.211 INTRACTABLE EPISODIC TENSION-TYPE HEADACHE: ICD-10-CM

## 2019-09-13 RX ORDER — BUTALBITAL, ASPIRIN AND CAFFEINE 50; 325; 40 MG/1; MG/1; MG/1
1 TABLET ORAL
Qty: 20 TAB | Refills: 0 | OUTPATIENT
Start: 2019-09-13

## 2019-09-13 NOTE — TELEPHONE ENCOUNTER
No refills will be given. He should not have gone through this prescription so fast. Please have him take OTC rx.     Dr. Alesha Garcia  Internists of [facility], [address] [place] [address], [address] [place] [address] Str.  Phone: [phone]  Fax: [phone]

## 2019-09-13 NOTE — TELEPHONE ENCOUNTER
Chief Complaint   Patient presents with    Medication Refill     per Dr Julianne Carlton the patient will not be given refills on Fiorinal, and to take over the counter medication for the headache      9-13-19 Patient reached and 2 identifiers were used: Full Name, and Date of Birth verified. The patient was reached, and informed that Dr Julianne Carltno will not be refilling the Fiorinal, due to this medication was filled on 9-05-19 with instructions to take 1 tab by mouth 2 times daily as needed for Headache. Max Daily Amount 2 tabs, Qty. 20 tab, refill 0.  and that he should have not taken all the pills in the short time that he had the prescription  The patient stated the headaches were so bad that he has had to take 2 tablets every day, and \"that Dr Rasheeda west have never not given him something, and that he was going to have to find someone else for a PCP. \"  The patient stated he did try to get the Neurology Team to prescribe him something, however they refused to refill the Fiorinal, and directed the patient to contact Dr Julianne Carlton for refill needs. The patient stated he understands everything, but is not happy at all. The patient understands to take something over the counter for the headache if needed.

## 2019-09-13 NOTE — TELEPHONE ENCOUNTER
Fiorinal refill for a week    Says Dr. Aracelis Herrera rescheduled his appt and he won't be seen for another week

## 2019-09-13 NOTE — TELEPHONE ENCOUNTER
Patient came into the office to check on this. I advised him that no refills will be given and tu use OTC medication. He states he takes the medication every day because he has headaches. He wants to know if something else can be called in.

## 2019-09-18 ENCOUNTER — CLINICAL SUPPORT (OUTPATIENT)
Dept: SURGERY | Age: 44
End: 2019-09-18

## 2019-09-18 VITALS
SYSTOLIC BLOOD PRESSURE: 115 MMHG | DIASTOLIC BLOOD PRESSURE: 77 MMHG | HEART RATE: 66 BPM | BODY MASS INDEX: 34.24 KG/M2 | WEIGHT: 252.8 LBS | HEIGHT: 72 IN

## 2019-09-18 DIAGNOSIS — E66.9 OBESITY, UNSPECIFIED CLASSIFICATION, UNSPECIFIED OBESITY TYPE, UNSPECIFIED WHETHER SERIOUS COMORBIDITY PRESENT: Primary | ICD-10-CM

## 2019-09-18 NOTE — PROGRESS NOTES
Chief Complaint   Patient presents with    Weight Management     Patient attended a weekly class as part of the Medically Supervised Weight Loss Program.  This class was facilitated by a Registered Dietitian. Topics taught at class focused on diet, particularly carbohydrate counting and portion control. Classes also focused on behavior changes and the importance of establishing a daily exercise routine. Progress Note: Weekly Medical Monitoring in the [facility]    Is there anything that you or the patient needs to let the supervising provider know about? no    Over the past week, have you experienced any side-effects? Yes \"headaches\"    Fabien Vallejo is a 40 y.o. male who is enrolled in [facility]    Fabien Vallejo was prescribed the VLCD / LCD. Visit Vitals  /77   Pulse 66   Ht 6' (1.829 m)   Wt 252 lb 12.8 oz (114.7 kg)   BMI 34.29 kg/m²     Weight Metrics 9/18/2019 9/11/2019 9/6/2019 9/4/2019 8/28/2019 8/21/2019 8/14/2019   Weight 252 lb 12.8 oz 251 lb 1.6 oz 254 lb 9.6 oz 255 lb 253 lb 12.8 oz 250 lb 12.8 oz 250 lb 3.2 oz   Waist Measure Inches 45 45.5 - - 44.5 44 44.5   BMI 34.29 kg/m2 34.06 kg/m2 34.53 kg/m2 34.58 kg/m2 34.42 kg/m2 34.01 kg/m2 33.93 kg/m2         Have you received any other medical care this week? no  If yes, where and for what? Have you had any change in your medications since your last visit? no  If yes what? Did you have any problems adhering to the program last week? no  If yes, please explain:       Eating Habits Over Last Week:  Did you take in 64 oz of non-caloric fluids? yes     Did you consume your prescribed meal replacement regimen each day?  yes       Physical Activity Over the Past Week:    Aerobic exercise: 0 min  Resistance exercise: no workouts / week

## 2019-09-24 ENCOUNTER — OFFICE VISIT (OUTPATIENT)
Dept: NEUROLOGY | Age: 44
End: 2019-09-24

## 2019-09-24 VITALS
TEMPERATURE: 97.8 F | WEIGHT: 251 LBS | SYSTOLIC BLOOD PRESSURE: 116 MMHG | DIASTOLIC BLOOD PRESSURE: 84 MMHG | HEART RATE: 67 BPM | BODY MASS INDEX: 34 KG/M2 | RESPIRATION RATE: 16 BRPM | OXYGEN SATURATION: 96 % | HEIGHT: 72 IN

## 2019-09-24 DIAGNOSIS — M62.838 CERVICAL PARASPINAL MUSCLE SPASM: ICD-10-CM

## 2019-09-24 DIAGNOSIS — G44.89 OTHER HEADACHE SYNDROME: ICD-10-CM

## 2019-09-24 DIAGNOSIS — M54.2 CERVICALGIA OF OCCIPITO-ATLANTO-AXIAL REGION: Primary | ICD-10-CM

## 2019-09-24 RX ORDER — METHYLPREDNISOLONE 4 MG/1
TABLET ORAL
Qty: 1 DOSE PACK | Refills: 0 | Status: SHIPPED | OUTPATIENT
Start: 2019-09-24 | End: 2019-10-14 | Stop reason: ALTCHOICE

## 2019-09-24 NOTE — PROGRESS NOTES
HPI: 20-year-old male referred by Ronn Huang MD for evaluation of headaches. About 5 weeks ago he was trying to do maximal leg pressing, about 420 pounds or so, and as he was in the middle of a repetition he developed a sudden severe left suboccipital and retroauricular pain. He had to abort his workout. He rested over the weekend for a few days, and the following Monday his headache had abated, so he tried to do some body weight exercises and again developed a pain in the left suboccipital area. Over the next 2 weeks this pain became more severe and eventually extended to cover the whole occiput bilaterally, but still he hurts the most behind the left ear. The second time he had the headache returned he did feel somewhat nauseated. He denies any light or noise sensitivity. He denies any vomiting. Denies any weakness of one side or the other. He has had some ringing in the ears and has had problems with focus since this happened. He had a CT angiogram of the head and neck which I have personally reviewed and was completely within normal limits, more specifically showing no evidence of vertebrobasilar disease and no evidence of carotid or vertebral artery dissection. He had no intracerebral aneurysms. Over the last 2 weeks he has been taking Tylenol pretty much on a daily basis and has been taking less Skelaxin, medication that he typically takes at 800 mg twice a day for history of arthritis. He saw a YANE Yanez for orthopedic evaluation, reportedly there were some cervical spine x-ray anomalies that led to an MRI of the cervical spine ordered which is currently pending.     Social History     Socioeconomic History    Marital status: SINGLE     Spouse name: Not on file    Number of children: Not on file    Years of education: Not on file    Highest education level: Not on file   Occupational History    Not on file   Social Needs    Financial resource strain: Not on file   [address] insecurity:     Worry: Not on file     Inability: Not on file    Transportation needs:     Medical: Not on file     Non-medical: Not on file   Tobacco Use    Smoking status: Never Smoker    Smokeless tobacco: Never Used   Substance and Sexual Activity    Alcohol use: Yes     Alcohol/week: 2.0 standard drinks     Types: 2 Shots of liquor per week     Frequency: 2-4 times a month     Comment: social    Drug use: No    Sexual activity: Yes     Partners: Female     Birth control/protection: Condom   Lifestyle    Physical activity:     Days per week: Not on file     Minutes per session: Not on file    Stress: Not on file   Relationships    Social connections:     Talks on phone: Not on file     Gets together: Not on file     Attends Sabianist service: Not on file     Active member of club or organization: Not on file     Attends meetings of clubs or organizations: Not on file     Relationship status: Not on file    Intimate partner violence:     Fear of current or ex partner: Not on file     Emotionally abused: Not on file     Physically abused: Not on file     Forced sexual activity: Not on file   Other Topics Concern    Not on file   Social History Narrative    Not on file       Family History   Problem Relation Age of Onset    Stroke Father     Diabetes Maternal Grandmother     Cancer Maternal Grandfather 79        prostate cancer       Current Outpatient Medications   Medication Sig Dispense Refill    traMADol (ULTRAM) 50 mg tablet Take 50 mg by mouth two (2) times a day.  multivitamin (ONE A DAY) tablet Take 1 Tab by mouth daily.  metaxalone (SKELAXIN) 800 mg tablet Take 800 mg by mouth two (2) times a day.  meloxicam (MOBIC) 15 mg tablet Take 15 mg by mouth daily.  adalimumab (HUMIRA PEN SC) by SubCUTAneous route every seven (7) days.  butalbital-aspirin-caffeine (FIORINAL) -40 mg tablet Take 1 Tab by mouth two (2) times daily as needed for Headache.  Max Daily Amount: 2 Tabs.  20 Tab 0       Past Medical History:   Diagnosis Date    Asthma     Diabetes mellitus     no meds    Dupuytren's contracture     Essential hypertension     no meds    Ill-defined condition     neuropathy    Psoriatic arthritis, destructive type ([facility] [address]     sarcoidosis    Sarcoidosis        Past Surgical History:   Procedure Laterality Date    HX CARPAL TUNNEL RELEASE      HX ORTHOPAEDIC      fx skull/ broken jaw     HX ORTHOPAEDIC Right     carpal tunnel    HX OTHER SURGICAL      lipoma removed     AZ EXC SKIN BENIG 0.6-1CM TRUNK,ARM,LEG N/A 10/11/2018    Dr. Venu Diamond 1.1-2CM TRUNK,ARM,LEG N/A 10/11/2018    Dr. Venu Diamond 2.1-3CM TRUNK,ARM,LEG N/A 10/11/2018    Dr. Savannah Saenz       No Known Allergies    Patient Active Problem List   Diagnosis Code    Severe obesity with body mass index (BMI) of 35.0 to 39.9 with serious comorbidity (HCC) E66.01    Hypercholesterolemia E78.00    Psoriatic arthritis ([facility] [address] L40.50    Sarcoidosis D86.9    Type 2 diabetes mellitus with diabetic neuropathy (HCC) E11.40    SHEPPARD (nonalcoholic steatohepatitis) K75.81    Cervical disc disease M50.90    Vitamin D deficiency E55.9         Review of Systems:   Constitutional: no fever or chills  Skin denies rash or itching  HEENT:  Denies tinnitus, hearing loss, or visual changes  Respiratory: denies shortness of breath  Cardiovascular: denies chest pain, dyspnea on exertion  Gastrointestinal: does not report nausea or vomiting  Genitourinary: does not report dysuria or incontinence  Musculoskeletal: Reports joint pain  Endocrine: denies weight change  Hematology: denies easy bruising or bleeding   Neurological: as above in HPI      PHYSICAL EXAMINATION:         Vital signs:    Visit Vitals  /84 (BP 1 Location: Left arm, BP Patient Position: Sitting)   Pulse 67   Temp 97.8 °F (36.6 °C) (Oral)   Resp 16   Ht 6' (1.829 m)   Wt 113.9 kg (251 lb)   SpO2 96%   BMI 34.04 kg/m² GENERAL:                  Well developed, well nourished, in no apparent distress. HEART:                       RR, no murmurs  EXTREMITIES:           No edema is identified. Pulses are +2. HEAD:                         Normocephalic, atraumatic. Mild left occipital trigger not seen on the right side. There is full cervical rotation, there is limited right lateral flexion compared to the left with pain elicited in the left posterior lateral cervical muscles (splenius, levator scapula, and sternocleidomastoid muscle area)     NEUROLOGIC EXAMINATION       MENTAL STATUS:     Awake, alert, and oriented x 4. Attention and STM are grossly normal. There is no aphasia. Fund of knowledge is adequate. Mood and affect are appropriate  CRANIAL NERVES:   Visual fields are full to confrontation. Pupils are reactive to light and accommodation. Fundi are normal.   Extraocular movements are intact and there is no nystagmus. Facial sensation is normal  Face is symmetrical.   Hearing is present. SCM/TPZ 5/5  Tongue protrudes midline, palate elevates symmetrically. MOTOR:          5/5 strength throughout, normal tone. CEREBELLAR:           No tremors or dysmetria     SENSORY:     Normal distal pinprick, proprioception, and vibration. Romberg is negative. DTR's:                         + 3 throughout, no long tract signs                 GAIT:                           Normal gait    I have personally reviewed imaging studies. Impression: Now that a vertebral dissection has been appropriately ruled out, the best explanation for this acute headache with exertion is cervicogenic, more specifically consistent of a muscle spasm of left lateral cervical muscles with concomitant left occipital neuralgia. Over time there may be a component of rebound which may be developing. Plan: 1-agree with orthopedics plan to refer him to physical therapy. I gave him some cervical exercises for him to get started with a home exercise program.  2-I advised him to increase metaxalone to 800 mg every 6 hours at least for the next 3 to 5 days. 3-Medrol dose pack. 4-I counseled pt about analgesic rebound, and explained that this must be addressed before anticipating Improvement of headaches. I advised pt to reduce intake of prn pain meds of any kind for headaches to no more than 2 days of the week. 5- follow-up in 6 weeks. PLEASE NOTE:   Portions of this document may have been produced using voice recognition software. Unrecognized errors in transcription may be present. This note will not be viewable in [address]

## 2019-09-24 NOTE — LETTER
9/24/19 Patient: Jasmin Zavala YOB: 1975 Date of Visit: 9/24/2019 Pat Macias [facility] [address] [id number] [place] [address] VIA In Basket Dear Pat Macias MD, Thank you for referring Mr. Jasmin Zavala to [facility] for evaluation. My notes for this consultation are attached. If you have questions, please do not hesitate to call me. I look forward to following your patient along with you.  
 
 
Sincerely, 
 
Mitesh Morfin MD

## 2019-09-24 NOTE — PROGRESS NOTES
Annette Cervantes presents today for   Chief Complaint   Patient presents with   [facility] Establish Care    Headache       Is someone accompanying this pt? No    Is the patient using any DME equipment during OV? No    Depression Screening:  3 most recent PHQ Screens 7/16/2019   Little interest or pleasure in doing things Not at all   Feeling down, depressed, irritable, or hopeless Not at all   Total Score PHQ 2 0       Learning Assessment:  Learning Assessment 7/16/2019   PRIMARY LEARNER Patient   HIGHEST LEVEL OF EDUCATION - PRIMARY LEARNER  4 YEARS OF COLLEGE   BARRIERS PRIMARY LEARNER NONE   CO-LEARNER CAREGIVER No   PRIMARY LANGUAGE ENGLISH   LEARNER PREFERENCE PRIMARY DEMONSTRATION   ANSWERED BY patient   RELATIONSHIP SELF       Abuse Screening:  Abuse Screening Questionnaire 7/16/2019   Do you ever feel afraid of your partner? N   Are you in a relationship with someone who physically or mentally threatens you? N   Is it safe for you to go home? Y         Coordination of Care:  1. Have you been to the ER, urgent care clinic since your last visit? Hospitalized since your last visit? No    2. Have you seen or consulted any other health care providers outside of the [address] [place] [address] since your last visit? Include any pap smears or colon screening.  No

## 2019-09-24 NOTE — PATIENT INSTRUCTIONS
Neck Spasm: Exercises  Introduction  Here are some examples of exercises for you to try. The exercises may be suggested for a condition or for rehabilitation. Start each exercise slowly. Ease off the exercises if you start to have pain. You will be told when to start these exercises and which ones will work best for you. How to do the exercises  Levator scapula stretch    1. Sit in a firm chair, or stand up straight. 2. Gently tilt your head toward your left shoulder. 3. Turn your head to look down into your armpit, bending your head slightly forward. Let the weight of your head stretch your neck muscles. 4. Hold for 15 to 30 seconds. 5. Return to your starting position. 6. Follow the same instructions above, but tilt your head toward your right shoulder. 7. Repeat 2 to 4 times toward each shoulder. Upper trapezius stretch    1. Sit in a firm chair, or stand up straight. 2. This stretch works best if you keep your shoulder down as you lean away from it. To help you remember to do this, start by relaxing your shoulders and lightly holding on to your thighs or your chair. 3. Tilt your head toward your shoulder and hold for 15 to 30 seconds. Let the weight of your head stretch your muscles. 4. If you would like a little added stretch, place your arm behind your back. Use the arm opposite of the direction you are tilting your head. For example, if you are tilting your head to the left, place your right arm behind your back. 5. Repeat 2 to 4 times toward each shoulder. Neck rotation    1. Sit in a firm chair, or stand up straight. 2. Keeping your chin level, turn your head to the right, and hold for 15 to 30 seconds. 3. Turn your head to the left, and hold for 15 to 30 seconds. 4. Repeat 2 to 4 times to each side. Chin tuck    1. Lie on the floor with a rolled-up towel under your neck. Your head should be touching the floor. 2. Slowly bring your chin toward the front of your neck.   3. Hold for a count of 6, and then relax for up to 10 seconds. 4. Repeat 8 to 12 times. Forward neck flexion    1. Sit in a firm chair, or stand up straight. 2. Bend your head forward. 3. Hold for 15 to 30 seconds, then return to your starting position. 4. Repeat 2 to 4 times. Follow-up care is a key part of your treatment and safety. Be sure to make and go to all appointments, and call your doctor if you are having problems. It's also a good idea to know your test results and keep a list of the medicines you take. Where can you learn more? Go to http://dahlia-duke.info/. Enter P962 in the search box to learn more about \"Neck Spasm: Exercises. \"  Current as of: June 26, 2019  Content Version: 12.2  © 8201-0657 [employer]. Care instructions adapted under license by [employer] (which disclaims liability or warranty for this information). If you have questions about a medical condition or this instruction, always ask your healthcare professional. [address] any warranty or liability for your use of this information.

## 2019-09-25 ENCOUNTER — CLINICAL SUPPORT (OUTPATIENT)
Dept: SURGERY | Age: 44
End: 2019-09-25

## 2019-09-25 VITALS
WEIGHT: 248.2 LBS | DIASTOLIC BLOOD PRESSURE: 88 MMHG | BODY MASS INDEX: 33.62 KG/M2 | HEART RATE: 74 BPM | SYSTOLIC BLOOD PRESSURE: 118 MMHG | HEIGHT: 72 IN

## 2019-09-25 DIAGNOSIS — E66.9 OBESITY, UNSPECIFIED CLASSIFICATION, UNSPECIFIED OBESITY TYPE, UNSPECIFIED WHETHER SERIOUS COMORBIDITY PRESENT: Primary | ICD-10-CM

## 2019-09-26 ENCOUNTER — TELEPHONE (OUTPATIENT)
Dept: INTERNAL MEDICINE CLINIC | Age: 44
End: 2019-09-26

## 2019-09-26 NOTE — TELEPHONE ENCOUNTER
Patient contacted, patient identified with two identifiers (Name & ). Patient notified of DR. Morgan's instructions, and verbalizes understanding.

## 2019-09-26 NOTE — TELEPHONE ENCOUNTER
He can get any paperwork like this from Neurology if they believe it is warranted per his exam and hx.     Dr. Nguyễn Record  [facility], [address] [facility], [address]  Phone: [phone]  Fax: [phone]

## 2019-09-26 NOTE — TELEPHONE ENCOUNTER
Patient calling requesting a letter stating That he shouldn't be doing heavy lifting and or working out due to his headaches. Patient stated that he is trying to termination the contract with the gym and they will only allow if he gets a letter from the doctor. Patient stated that he can be contacted if any additional information is needed.

## 2019-10-02 ENCOUNTER — CLINICAL SUPPORT (OUTPATIENT)
Dept: SURGERY | Age: 44
End: 2019-10-02

## 2019-10-02 VITALS
BODY MASS INDEX: 33.67 KG/M2 | SYSTOLIC BLOOD PRESSURE: 124 MMHG | HEIGHT: 72 IN | WEIGHT: 248.6 LBS | HEART RATE: 84 BPM | DIASTOLIC BLOOD PRESSURE: 68 MMHG

## 2019-10-02 DIAGNOSIS — E66.9 OBESITY, UNSPECIFIED CLASSIFICATION, UNSPECIFIED OBESITY TYPE, UNSPECIFIED WHETHER SERIOUS COMORBIDITY PRESENT: Primary | ICD-10-CM

## 2019-10-02 NOTE — PROGRESS NOTES
Chief Complaint   Patient presents with    Weight Management     Patient attended a weekly class as part of the Medically Supervised Weight Loss Program.  This class was facilitated by a Registered Dietitian. Topics taught at class focused on diet, particularly carbohydrate counting and portion control. Classes also focused on behavior changes and the importance of establishing a daily exercise routine. Progress Note: Weekly Medical Monitoring in the [facility] Loss [facility]    Is there anything that you or the patient needs to let the supervising provider know about? no    Over the past week, have you experienced any side-effects? no    Smith Thomas is a 40 y.o. male who is enrolled in [facility]    Smith Thomas was prescribed the VLCD / LCD. Visit Vitals  /68   Pulse 84   Ht 6' (1.829 m)   Wt 248 lb 9.6 oz (112.8 kg)   BMI 33.72 kg/m²     Weight Metrics 10/2/2019 9/25/2019 9/24/2019 9/18/2019 9/11/2019 9/6/2019 9/4/2019   Weight 248 lb 9.6 oz 248 lb 3.2 oz 251 lb 252 lb 12.8 oz 251 lb 1.6 oz 254 lb 9.6 oz 255 lb   Waist Measure Inches - 45.75 - 45 45.5 - -   BMI 33.72 kg/m2 33.66 kg/m2 34.04 kg/m2 34.29 kg/m2 34.06 kg/m2 34.53 kg/m2 34.58 kg/m2         Have you received any other medical care this week? no  If yes, where and for what? Have you had any change in your medications since your last visit? no  If yes what? Did you have any problems adhering to the program last week? no  If yes, please explain:       Eating Habits Over Last Week:  Did you take in 64 oz of non-caloric fluids? yes     Did you consume your prescribed meal replacement regimen each day?  yes       Physical Activity Over the Past Week:    Aerobic exercise: 0 min  Resistance exercise: no workouts / week

## 2019-10-09 ENCOUNTER — HOSPITAL ENCOUNTER (OUTPATIENT)
Dept: LAB | Age: 44
Discharge: HOME | End: 2019-10-09

## 2019-10-09 ENCOUNTER — CLINICAL SUPPORT (OUTPATIENT)
Dept: SURGERY | Age: 44
End: 2019-10-09

## 2019-10-09 VITALS
WEIGHT: 246.9 LBS | HEIGHT: 72 IN | HEART RATE: 66 BPM | DIASTOLIC BLOOD PRESSURE: 76 MMHG | BODY MASS INDEX: 33.44 KG/M2 | SYSTOLIC BLOOD PRESSURE: 134 MMHG

## 2019-10-09 DIAGNOSIS — E66.9 OBESITY, UNSPECIFIED CLASSIFICATION, UNSPECIFIED OBESITY TYPE, UNSPECIFIED WHETHER SERIOUS COMORBIDITY PRESENT: Primary | ICD-10-CM

## 2019-10-09 LAB — XX-LABCORP SPECIMEN COL,LCBCF: NORMAL

## 2019-10-09 PROCEDURE — 99001 SPECIMEN HANDLING PT-LAB: CPT

## 2019-10-09 NOTE — PROGRESS NOTES
Patient attended a weekly class as part of the Medically Supervised Weight Loss Program.  This class was facilitated by a Registered Dietitian. Topics taught at class focused on diet, particularly carbohydrate counting and portion control. Classes also focused on behavior changes and the importance of establishing a daily exercise routine. Progress Note: Weekly Medical Monitoring in the [facility] Loss Program    Is there anything that you or the patient needs to let the supervising provider know about? no    Over the past week, have you experienced any side-effects? no    Fe Christian is a 40 y.o. male who is enrolled in [facility]    Fe Christian was prescribed the VLCD / LCD. Visit Vitals  /76   Pulse 66   Ht 6' (1.829 m)   Wt 112 kg (246 lb 14.4 oz)   BMI 33.49 kg/m²     Weight Metrics 10/9/2019 10/2/2019 9/25/2019 9/24/2019 9/18/2019 9/11/2019 9/6/2019   Weight 246 lb 14.4 oz 248 lb 9.6 oz 248 lb 3.2 oz 251 lb 252 lb 12.8 oz 251 lb 1.6 oz 254 lb 9.6 oz   Waist Measure Inches 44 44.5 45.75 - 45 45.5 -   BMI 33.49 kg/m2 33.72 kg/m2 33.66 kg/m2 34.04 kg/m2 34.29 kg/m2 34.06 kg/m2 34.53 kg/m2         Have you received any other medical care this week? Yes If yes, where and for what? Physical theraphy    Have you had any change in your medications since your last visit? no  If yes what? Did you have any problems adhering to the program last week? no  If yes, please explain:       Eating Habits Over Last Week:  Did you take in 64 oz of non-caloric fluids? yes     Did you consume your prescribed meal replacement regimen each day?  yes       Physical Activity Over the Past Week:    Aerobic exercise: 0 min  Resistance exercise: 0 workouts / week

## 2019-10-10 LAB
ALBUMIN SERPL-MCNC: 4.9 G/DL (ref 3.5–5.5)
ALBUMIN/GLOB SERPL: 1.9 {RATIO} (ref 1.2–2.2)
ALP SERPL-CCNC: 71 IU/L (ref 39–117)
ALT SERPL-CCNC: 24 IU/L (ref 0–44)
AST SERPL-CCNC: 16 IU/L (ref 0–40)
BILIRUB SERPL-MCNC: 0.5 MG/DL (ref 0–1.2)
BUN SERPL-MCNC: 26 MG/DL (ref 6–24)
BUN/CREAT SERPL: 24 (ref 9–20)
CALCIUM SERPL-MCNC: 9.8 MG/DL (ref 8.7–10.2)
CHLORIDE SERPL-SCNC: 101 MMOL/L (ref 96–106)
CO2 SERPL-SCNC: 26 MMOL/L (ref 20–29)
CREAT SERPL-MCNC: 1.08 MG/DL (ref 0.76–1.27)
GLOBULIN SER CALC-MCNC: 2.6 G/DL (ref 1.5–4.5)
GLUCOSE SERPL-MCNC: 93 MG/DL (ref 65–99)
POTASSIUM SERPL-SCNC: 4.6 MMOL/L (ref 3.5–5.2)
PROT SERPL-MCNC: 7.5 G/DL (ref 6–8.5)
SODIUM SERPL-SCNC: 142 MMOL/L (ref 134–144)
URATE SERPL-MCNC: 4.2 MG/DL (ref 3.7–8.6)

## 2019-10-14 ENCOUNTER — OFFICE VISIT (OUTPATIENT)
Dept: SURGERY | Age: 44
End: 2019-10-14

## 2019-10-14 VITALS
HEIGHT: 72 IN | HEART RATE: 76 BPM | WEIGHT: 244.8 LBS | DIASTOLIC BLOOD PRESSURE: 81 MMHG | BODY MASS INDEX: 33.16 KG/M2 | OXYGEN SATURATION: 95 % | TEMPERATURE: 98.4 F | SYSTOLIC BLOOD PRESSURE: 138 MMHG | RESPIRATION RATE: 18 BRPM

## 2019-10-14 DIAGNOSIS — E66.09 CLASS 1 OBESITY DUE TO EXCESS CALORIES WITHOUT SERIOUS COMORBIDITY WITH BODY MASS INDEX (BMI) OF 33.0 TO 33.9 IN ADULT: Primary | ICD-10-CM

## 2019-10-14 DIAGNOSIS — E66.09 CLASS 1 OBESITY DUE TO EXCESS CALORIES WITHOUT SERIOUS COMORBIDITY WITH BODY MASS INDEX (BMI) OF 33.0 TO 33.9 IN ADULT: ICD-10-CM

## 2019-10-14 DIAGNOSIS — Z71.3 DIETARY COUNSELING AND SURVEILLANCE: ICD-10-CM

## 2019-10-14 NOTE — PROGRESS NOTES
[facility] Progress Note:   Follow up Provider Visit    CC: [facility] Provider Visit       Olvin Mccarthy is a 40 y.o. male who is here for his follow up provider visit for the VLCD Program.     Weight Metrics 10/14/2019 10/14/2019 10/9/2019 10/2/2019 9/25/2019 9/24/2019 9/18/2019   Weight - 244 lb 12.8 oz 246 lb 14.4 oz 248 lb 9.6 oz 248 lb 3.2 oz 251 lb 252 lb 12.8 oz   Waist Measure Inches 44.75 - 44 44.5 45.75 - 45   BMI - 33.2 kg/m2 33.49 kg/m2 33.72 kg/m2 33.66 kg/m2 34.04 kg/m2 34.29 kg/m2         Current Outpatient Medications   Medication Sig Dispense Refill    butalbital-aspirin-caffeine (FIORINAL) -40 mg tablet Take 1 Tab by mouth two (2) times daily as needed for Headache. Max Daily Amount: 2 Tabs. 20 Tab 0    traMADol (ULTRAM) 50 mg tablet Take 50 mg by mouth two (2) times a day.  multivitamin (ONE A DAY) tablet Take 1 Tab by mouth daily.  metaxalone (SKELAXIN) 800 mg tablet Take 800 mg by mouth two (2) times a day.  meloxicam (MOBIC) 15 mg tablet Take 15 mg by mouth daily.  adalimumab (HUMIRA PEN SC) by SubCUTAneous route every seven (7) days. Participation   Did you attend clinic and class last week? yes    Review of Systems  Since your last visit, have you experienced any complications?  yes  If yes, please list:   Positive in BOLD  CONST: Fever, weight loss, fatigue or chills  GI: Nausea, vomiting, abdominal pain, change in bowel habits, hematochezia, melena, and GERD   INTEG: Dermatitis, abnormal moles  HEENT: Recent changes in vision, vertigo, epistaxis, dysphagia and hoarseness  CV: Chest pain, palpitations, HTN, edema and varicosities  RESP: Cough, shortness of breath, wheezing, hemoptysis, snoring and reactive airway disease  : Hematuria, dysuria, frequency, urgency, nocturia and stress urinary incontinence   MS: Weakness, joint pain and arthritis  ENDO: Diabetes, thyroid disease, polyuria, polydipsia, polyphagia, poor wound healing, heat intolerance, cold intolerance  LYMPH/HEME: Anemia, bruising and history of blood transfusions  NEURO: Dizziness, headache, fainting, seizures and stroke, neck pain   PSYCH: Anxiety and depression      Are you taking an appetite suppressant? no  If so, is there any Chest Pain, Palpitations or Dizziness? BP Readings from Last 10 Encounters:   10/14/19 138/81   10/09/19 134/76   10/02/19 124/68   09/25/19 118/88   09/24/19 116/84   09/18/19 115/77   09/11/19 126/90   09/06/19 110/80   09/04/19 126/84   08/28/19 126/80         Have you received any other medical care this week? yes  If yes, where and for what? PT for neck pain       Have you discontinued or changed any medicine or dose of your medicine since your last visit? no  If yes, where and for what? Diet  How many ounces of calorie-free liquids did you consume each day?  100-125 oz    How many meal replacements did you take each day? 4-5 MR     Did you have any problems adhering to the program?  no If yes, please explain: n/a      Exercise  No exercising currently due to neck pain and MD orders       Review of Systems  Complete ROS negative except where noted above    Objective  Visit Vitals  /81   Pulse 76   Temp 98.4 °F (36.9 °C) (Oral)   Resp 18   Ht 6' (1.829 m)   Wt 111 kg (244 lb 12.8 oz)   SpO2 95%   BMI 33.20 kg/m²     No LMP for male patient. 3 most recent PHQ Screens 7/16/2019   Little interest or pleasure in doing things Not at all   Feeling down, depressed, irritable, or hopeless Not at all   Total Score PHQ 2 0         Waist Circumference: I personally reviewed patient's Weight Management Doc Flowsheet  Neck Circumference: I personally reviewed patient's Weight Management Doc Flowsheet  Percent Body Fat: I personally reviewed patient's Weight Management Doc Flowsheet    Physical Exam     General: 40 y.o.) male in no acute distress.   HEENT: Normocephalic, atraumatic, Pupils equal and reactive, nasopharynx clear, oropharynx clear and moist without lesions  NECK: Supple, no lymphadenopathy, thyromegaly, carotid bruits or jugular venous distension. trachea midline  RESP: Clear to auscultation bilaterally, no wheezes, rhonchi, or rales, normal respiratory excursion  CV: Regular rate and rhythm, no murmurs, rubs or gallops. 3+/4 pulses in bilateral dorsalis pedis and posterior tibialis. No distal edema or varicosities. ABD: Soft, nontender, nondistended, normoactive bowel sounds, no hernias, no hepatosplenomegaly  Extremities: Warm, well perfused, no tenderness or swelling, normal gait/station  Neuro: Sensation and strength grossly intact and symmetrical  Psych: Alert and oriented to person, place, and time. Assessment / Plan    Encounter Diagnoses   Name Primary?  Class 1 obesity due to excess calories without serious comorbidity with body mass index (BMI) of 33.0 to 33.9 in adult Yes    BMI 33.0-33.9,adult     Dietary counseling and surveillance        1. Weight management well controlled   Progress was reviewed with patient   Weight lost since starting program: 10 pounds lost since last visit     2. Labs    Latest results reviewed with patient   Lab slip given to pt for f/up HDL labs    3. Diet regimen   # of meal replacements prescribed: 4 MR    If modified LCD-nutritional guidelines:    Monthly Goal   5-7 pounds      Medical monitoring schedule:   Weekly BP/Weight checks   Monthly provider appointments  I have reviewed/discussed the above normal BMI with the patient. I have recommended the following interventions: dietary management education, guidance, and counseling, monitor weight and prescribed dietary intake .  .                  >50% of 25 minute visit spent face to face time with [place] consisted of counseling & coordinating and/or discussing treatment plans in reference to his The primary encounter diagnosis was Class 1 obesity due to excess calories without serious comorbidity with body mass index (BMI) of 33.0 to 33.9 in adult. Diagnoses of BMI 33.0-33.9,adult and Dietary counseling and surveillance were also pertinent to this visit.             ROBER Villarreal-BC

## 2019-10-14 NOTE — PROGRESS NOTES
Chief Complaint   Patient presents with    Weight Management     monthly follow up     Nursing Note for Medical Monitoring in the [facility]      Nikhil Lr is a 40 y.o. male who is enrolled in [facility]    Nikhil Lr was prescribed the VLCD / LCD. Did you have any problems adhering to the program last week? no  If yes, please explain:     Since your last visit, have you experienced any complications? no    Have you received any other medical care this week? yes  If yes, where and for what? \"physical therapy for neck\"    Have you had any change in your medications since your last visit? no  If yes what? Are you taking an appetite suppressant? no   If yes:  Do you need a refill? BP Readings from Last 3 Encounters:   10/14/19 138/81   10/09/19 134/76   10/02/19 124/68        Eating Habits Over Last Week:  Did you take in 64 oz of non-caloric fluids? yes     Did you consume your prescribed meal replacement regimen each day? yes       Physical Activity Over the Past Week:    Aerobic exercise: 0 min  Resistance exercise: no workouts / week    Body mass index is 33.2 kg/m².

## 2019-10-16 ENCOUNTER — CLINICAL SUPPORT (OUTPATIENT)
Dept: SURGERY | Age: 44
End: 2019-10-16

## 2019-10-23 ENCOUNTER — CLINICAL SUPPORT (OUTPATIENT)
Dept: SURGERY | Age: 44
End: 2019-10-23

## 2019-10-23 VITALS
DIASTOLIC BLOOD PRESSURE: 86 MMHG | WEIGHT: 247.2 LBS | BODY MASS INDEX: 33.48 KG/M2 | HEART RATE: 62 BPM | SYSTOLIC BLOOD PRESSURE: 130 MMHG | HEIGHT: 72 IN

## 2019-10-23 DIAGNOSIS — E66.9 OBESITY, UNSPECIFIED CLASSIFICATION, UNSPECIFIED OBESITY TYPE, UNSPECIFIED WHETHER SERIOUS COMORBIDITY PRESENT: Primary | ICD-10-CM

## 2019-10-23 NOTE — PROGRESS NOTES
Patient attended a weekly class as part of the [facility].  This class was facilitated by a Registered Dietitian. Topics taught at class focused on diet, particularly carbohydrate counting and portion control. Classes also focused on behavior changes and the importance of establishing a daily exercise routine. Progress Note: Weekly Medical Monitoring in the [facility]    Is there anything that you or the patient needs to let the supervising provider know about? no    Over the past week, have you experienced any side-effects? no    Heri Contreras is a 40 y.o. male who is enrolled in [facility]    Heri Contreras was prescribed the VLCD / LCD. Visit Vitals  /86   Pulse 62   Ht 6' (1.829 m)   Wt 112.1 kg (247 lb 3.2 oz)   BMI 33.53 kg/m²     Weight Metrics 10/23/2019 10/14/2019 10/14/2019 10/9/2019 10/2/2019 9/25/2019 9/24/2019   Weight 247 lb 3.2 oz - 244 lb 12.8 oz 246 lb 14.4 oz 248 lb 9.6 oz 248 lb 3.2 oz 251 lb   Waist Measure Inches 44.5 44.75 - 44 44.5 45.75 -   BMI 33.53 kg/m2 - 33.2 kg/m2 33.49 kg/m2 33.72 kg/m2 33.66 kg/m2 34.04 kg/m2         Have you received any other medical care this week? no  If yes, where and for what? Have you had any change in your medications since your last visit? no  If yes what? Did you have any problems adhering to the program last week? yes  If yes, please explain: cookout and ate      Eating Habits Over Last Week:  Did you take in 64 oz of non-caloric fluids? yes     Did you consume your prescribed meal replacement regimen each day?  yes       Physical Activity Over the Past Week:    Aerobic exercise: 0 min  Resistance exercise: 0 workouts / week

## 2019-10-30 ENCOUNTER — CLINICAL SUPPORT (OUTPATIENT)
Dept: SURGERY | Age: 44
End: 2019-10-30

## 2019-10-30 VITALS
HEIGHT: 72 IN | WEIGHT: 243.5 LBS | SYSTOLIC BLOOD PRESSURE: 120 MMHG | DIASTOLIC BLOOD PRESSURE: 82 MMHG | HEART RATE: 78 BPM | BODY MASS INDEX: 32.98 KG/M2

## 2019-10-30 DIAGNOSIS — E66.9 OBESITY, UNSPECIFIED CLASSIFICATION, UNSPECIFIED OBESITY TYPE, UNSPECIFIED WHETHER SERIOUS COMORBIDITY PRESENT: Primary | ICD-10-CM

## 2019-10-30 NOTE — PROGRESS NOTES
Patient attended a weekly class as part of the Medically Supervised Weight Loss Program.  This class was facilitated by a Registered Dietitian. Topics taught at class focused on diet, particularly carbohydrate counting and portion control. Classes also focused on behavior changes and the importance of establishing a daily exercise routine. Progress Note: Weekly Medical Monitoring in the [facility]    Is there anything that you or the patient needs to let the supervising provider know about? no    Over the past week, have you experienced any side-effects? no    Elisabeth Class is a 40 y.o. male who is enrolled in [facility]    Elisabeth Class was prescribed the VLCD / LCD. Visit Vitals  /82   Pulse 78   Ht 6' (1.829 m)   Wt 110.5 kg (243 lb 8 oz)   BMI 33.02 kg/m²     Weight Metrics 10/30/2019 10/23/2019 10/14/2019 10/14/2019 10/9/2019 10/2/2019 9/25/2019   Weight 243 lb 8 oz 247 lb 3.2 oz - 244 lb 12.8 oz 246 lb 14.4 oz 248 lb 9.6 oz 248 lb 3.2 oz   Waist Measure Inches 45.25 44.5 44.75 - 44 44.5 45.75   BMI 33.02 kg/m2 33.53 kg/m2 - 33.2 kg/m2 33.49 kg/m2 33.72 kg/m2 33.66 kg/m2         Have you received any other medical care this week? no  If yes, where and for what? Have you had any change in your medications since your last visit? no  If yes what? Did you have any problems adhering to the program last week? no  If yes, please explain:       Eating Habits Over Last Week:  Did you take in 64 oz of non-caloric fluids? yes     Did you consume your prescribed meal replacement regimen each day?  yes       Physical Activity Over the Past Week:    Aerobic exercise: 0 min  Resistance exercise: 0 workouts / week

## 2019-11-06 ENCOUNTER — CLINICAL SUPPORT (OUTPATIENT)
Dept: SURGERY | Age: 44
End: 2019-11-06

## 2019-11-06 VITALS
SYSTOLIC BLOOD PRESSURE: 136 MMHG | DIASTOLIC BLOOD PRESSURE: 86 MMHG | WEIGHT: 244 LBS | BODY MASS INDEX: 33.05 KG/M2 | HEART RATE: 86 BPM | HEIGHT: 72 IN

## 2019-11-06 DIAGNOSIS — E66.9 OBESITY, UNSPECIFIED CLASSIFICATION, UNSPECIFIED OBESITY TYPE, UNSPECIFIED WHETHER SERIOUS COMORBIDITY PRESENT: Primary | ICD-10-CM

## 2019-11-06 NOTE — PROGRESS NOTES
Patient attended a weekly class as part of the [facility] Supervised Weight Loss Program.  This class was facilitated by a Registered Dietitian. Topics taught at class focused on diet, particularly carbohydrate counting and portion control. Classes also focused on behavior changes and the importance of establishing a daily exercise routine. Progress Note: Weekly Medical Monitoring in the [facility]    Is there anything that you or the patient needs to let the supervising provider know about? no    Over the past week, have you experienced any side-effects? no    Manuel Lechuga is a 40 y.o. male who is enrolled in [facility]    Manuel Lechuga was prescribed the VLCD / LCD. Visit Vitals  /86   Pulse 86   Ht 6' (1.829 m)   Wt 110.7 kg (244 lb)   BMI 33.09 kg/m²     Weight Metrics 11/6/2019 10/30/2019 10/23/2019 10/14/2019 10/14/2019 10/9/2019 10/2/2019   Weight 244 lb 243 lb 8 oz 247 lb 3.2 oz - 244 lb 12.8 oz 246 lb 14.4 oz 248 lb 9.6 oz   Waist Measure Inches 44.25 45.25 44.5 44.75 - 44 44.5   BMI 33.09 kg/m2 33.02 kg/m2 33.53 kg/m2 - 33.2 kg/m2 33.49 kg/m2 33.72 kg/m2         Have you received any other medical care this week? yes  If yes, where and for what? Physical theraphy for neck    Have you had any change in your medications since your last visit? no  If yes what? Did you have any problems adhering to the program last week? no  If yes, please explain:       Eating Habits Over Last Week:  Did you take in 64 oz of non-caloric fluids? yes     Did you consume your prescribed meal replacement regimen each day?  yes       Physical Activity Over the Past Week:    Aerobic exercise: 90 min  Resistance exercise: 0 workouts / week

## 2019-11-13 ENCOUNTER — HOSPITAL ENCOUNTER (OUTPATIENT)
Dept: LAB | Age: 44
Discharge: HOME | End: 2019-11-13

## 2019-11-13 LAB — XX-LABCORP SPECIMEN COL,LCBCF: NORMAL

## 2019-11-13 PROCEDURE — 99001 SPECIMEN HANDLING PT-LAB: CPT

## 2019-11-14 LAB
ALBUMIN SERPL-MCNC: 4.5 G/DL (ref 3.5–5.5)
ALBUMIN/GLOB SERPL: 1.9 {RATIO} (ref 1.2–2.2)
ALP SERPL-CCNC: 68 IU/L (ref 39–117)
ALT SERPL-CCNC: 20 IU/L (ref 0–44)
AST SERPL-CCNC: 17 IU/L (ref 0–40)
BILIRUB SERPL-MCNC: 0.4 MG/DL (ref 0–1.2)
BUN SERPL-MCNC: 23 MG/DL (ref 6–24)
BUN/CREAT SERPL: 21 (ref 9–20)
CALCIUM SERPL-MCNC: 9.7 MG/DL (ref 8.7–10.2)
CHLORIDE SERPL-SCNC: 103 MMOL/L (ref 96–106)
CO2 SERPL-SCNC: 27 MMOL/L (ref 20–29)
CREAT SERPL-MCNC: 1.12 MG/DL (ref 0.76–1.27)
GLOBULIN SER CALC-MCNC: 2.4 G/DL (ref 1.5–4.5)
GLUCOSE SERPL-MCNC: 140 MG/DL (ref 65–99)
POTASSIUM SERPL-SCNC: 4.3 MMOL/L (ref 3.5–5.2)
PROT SERPL-MCNC: 6.9 G/DL (ref 6–8.5)
SODIUM SERPL-SCNC: 143 MMOL/L (ref 134–144)
URATE SERPL-MCNC: 4.6 MG/DL (ref 3.7–8.6)

## 2019-11-21 ENCOUNTER — OFFICE VISIT (OUTPATIENT)
Dept: SURGERY | Age: 44
End: 2019-11-21

## 2019-11-21 VITALS
WEIGHT: 248.8 LBS | DIASTOLIC BLOOD PRESSURE: 118 MMHG | HEART RATE: 61 BPM | RESPIRATION RATE: 16 BRPM | SYSTOLIC BLOOD PRESSURE: 139 MMHG | BODY MASS INDEX: 33.7 KG/M2 | HEIGHT: 72 IN | OXYGEN SATURATION: 96 %

## 2019-11-21 DIAGNOSIS — E66.9 CLASS 1 OBESITY WITHOUT SERIOUS COMORBIDITY WITH BODY MASS INDEX (BMI) OF 33.0 TO 33.9 IN ADULT, UNSPECIFIED OBESITY TYPE: ICD-10-CM

## 2019-11-21 DIAGNOSIS — Z71.3 DIETARY COUNSELING AND SURVEILLANCE: ICD-10-CM

## 2019-11-21 DIAGNOSIS — E66.9 CLASS 1 OBESITY WITHOUT SERIOUS COMORBIDITY WITH BODY MASS INDEX (BMI) OF 33.0 TO 33.9 IN ADULT, UNSPECIFIED OBESITY TYPE: Primary | ICD-10-CM

## 2019-11-21 DIAGNOSIS — G89.29 OTHER CHRONIC PAIN: ICD-10-CM

## 2019-11-21 NOTE — PROGRESS NOTES
Nursing Note for Medical Monitoring in the [facility]      Fe Christian is a 40 y.o. male who is enrolled in [facility]    Fe Christian was prescribed the VLCD / LCD. Did you have any problems adhering to the program last week? no  If yes, please explain:     Since your last visit, have you experienced any complications? no    Have you received any other medical care this week? no  If yes, where and for what? Have you had any change in your medications since your last visit? no  If yes what? Are you taking an appetite suppressant? no   If yes:  Do you need a refill? BP Readings from Last 3 Encounters:   11/06/19 136/86   10/30/19 120/82   10/23/19 130/86        Eating Habits Over Last Week:  Did you take in 64 oz of non-caloric fluids? yes     Did you consume your prescribed meal replacement regimen each day?  yes       Physical Activity Over the Past Week:    Aerobic exercise: 90 min  Resistance exercise: 0 workouts / week

## 2019-11-21 NOTE — PROGRESS NOTES
[facility] Progress Note:   Follow up Provider Visit    CC: [facility] Provider Visit       Robin Smith is a 40 y.o. male who is here for his follow up provider visit for the VLCD Program.     Weight Metrics 11/21/2019 11/21/2019 11/6/2019 10/30/2019 10/23/2019 10/14/2019 10/14/2019   Weight - 248 lb 12.8 oz 244 lb 243 lb 8 oz 247 lb 3.2 oz - 244 lb 12.8 oz   Waist Measure Inches 46 - 44.25 45.25 44.5 44.75 -   BMI - 33.74 kg/m2 33.09 kg/m2 33.02 kg/m2 33.53 kg/m2 - 33.2 kg/m2         Current Outpatient Medications   Medication Sig Dispense Refill    butalbital-aspirin-caffeine (FIORINAL) -40 mg tablet Take 1 Tab by mouth two (2) times daily as needed for Headache. Max Daily Amount: 2 Tabs. 20 Tab 0    traMADol (ULTRAM) 50 mg tablet Take 50 mg by mouth two (2) times a day.  multivitamin (ONE A DAY) tablet Take 1 Tab by mouth daily.  metaxalone (SKELAXIN) 800 mg tablet Take 800 mg by mouth two (2) times a day.  meloxicam (MOBIC) 15 mg tablet Take 15 mg by mouth daily.  adalimumab (HUMIRA PEN SC) by SubCUTAneous route every seven (7) days. Participation   Did you attend clinic and class last week?  yes    Review of Systems  Since your last visit, have you experienced any complications? no  If yes, please list: n/a    Positive in BOLD  CONST: Fever, weight loss, fatigue or chills  GI: Nausea, vomiting, abdominal pain, change in bowel habits, hematochezia, melena, and GERD   INTEG: Dermatitis, abnormal moles  HEENT: Recent changes in vision, vertigo, epistaxis, dysphagia and hoarseness  CV: Chest pain, palpitations, HTN, edema and varicosities  RESP: Cough, shortness of breath, wheezing, hemoptysis, snoring and reactive airway disease  : Hematuria, dysuria, frequency, urgency, nocturia and stress urinary incontinence   MS: Weakness, joint pain and arthritis, neck pain   ENDO: Diabetes, thyroid disease, polyuria, polydipsia, polyphagia, poor wound healing, heat intolerance, cold intolerance  LYMPH/HEME: Anemia, bruising and history of blood transfusions  NEURO: Dizziness, headache, fainting, seizures and stroke  PSYCH: Anxiety and depression       Are you taking an appetite suppressant? no  If so, is there any Chest Pain, Palpitations or Dizziness? BP Readings from Last 10 Encounters:   11/21/19 (!) 139/118   11/06/19 136/86   10/30/19 120/82   10/23/19 130/86   10/14/19 138/81   10/09/19 134/76   10/02/19 124/68   09/25/19 118/88   09/24/19 116/84   09/18/19 115/77         Have you received any other medical care this week? no  If yes, where and for what? Have you discontinued or changed any medicine or dose of your medicine since your last visit? no  If yes, where and for what? Diet  How many ounces of calorie-free liquids did you consume each day?  > 120 oz    How many meal replacements did you take each day? 4-5 MR     Did you have any problems adhering to the program?  no If yes, please explain: n/a      Exercise  Currently in PT for neck pain, otherwise not exercising due to MD orders     Review of Systems  Complete ROS negative except where noted above    Objective  Visit Vitals  BP (!) 139/118   Pulse 61   Resp 16   Ht 6' (1.829 m)   Wt 112.9 kg (248 lb 12.8 oz)   SpO2 96%   BMI 33.74 kg/m²     No LMP for male patient. 3 most recent PHQ Screens 7/16/2019   Little interest or pleasure in doing things Not at all   Feeling down, depressed, irritable, or hopeless Not at all   Total Score PHQ 2 0         Waist Circumference: I personally reviewed patient's Weight Management Doc Flowsheet  Neck Circumference: I personally reviewed patient's Weight Management Doc Flowsheet  Percent Body Fat: I personally reviewed patient's Weight Management Doc Flowsheet    Physical Exam     General: 40 y.o.) male in no acute distress.   HEENT: Normocephalic, atraumatic, Pupils equal and reactive, nasopharynx clear, oropharynx clear and moist without lesions  NECK: Supple, no lymphadenopathy, thyromegaly, carotid bruits or jugular venous distension. trachea midline  RESP: Clear to auscultation bilaterally, no wheezes, rhonchi, or rales, normal respiratory excursion  CV: Regular rate and rhythm, no murmurs, rubs or gallops. 3+/4 pulses in bilateral dorsalis pedis and posterior tibialis. No distal edema or varicosities. ABD: Soft, nontender, nondistended, normoactive bowel sounds, no hernias, no hepatosplenomegaly  Extremities: Warm, well perfused, no tenderness or swelling, normal gait/station  Neuro: Sensation and strength grossly intact and symmetrical  Psych: Alert and oriented to person, place, and time. Assessment / Plan    Encounter Diagnoses   Name Primary?  Class 1 obesity without serious comorbidity with body mass index (BMI) of 33.0 to 33.9 in adult, unspecified obesity type Yes    BMI 33.0-33.9,adult     Other chronic pain     Dietary counseling and surveillance        1. Weight management needs further observation, needs improvement   Progress was reviewed with patient       2. Labs    Latest results reviewed with patient   Lab slip given to pt for f/up HDL labs    3. Diet regimen   # of meal replacements prescribed: 4 MR    If modified LCD-nutritional guidelines:    Monthly Goal   5 pounds weight loss      Medical monitoring schedule:   Weekly BP/Weight checks   Monthly provider appointments  I have reviewed/discussed the above normal BMI with the patient. I have recommended the following interventions: dietary management education, guidance, and counseling, monitor weight and prescribed dietary intake . Romeo aCrter                 Follow-up and Dispositions    · Return in about 1 month (around 12/21/2019).           >50% of 25 minute visit spent face to face time with Chepemadan Martincee consisted of counseling & coordinating and/or discussing treatment plans in reference to his The primary encounter diagnosis was Class 1 obesity without serious comorbidity with body mass index (BMI) of 33.0 to 33.9 in adult, unspecified obesity type. Diagnoses of BMI 33.0-33.9,adult, Other chronic pain, and Dietary counseling and surveillance were also pertinent to this visit.             ROBER Allan-BC

## 2019-12-11 ENCOUNTER — CLINICAL SUPPORT (OUTPATIENT)
Dept: SURGERY | Age: 44
End: 2019-12-11

## 2019-12-11 VITALS
BODY MASS INDEX: 34.23 KG/M2 | HEIGHT: 72 IN | DIASTOLIC BLOOD PRESSURE: 88 MMHG | HEART RATE: 72 BPM | WEIGHT: 252.7 LBS | SYSTOLIC BLOOD PRESSURE: 128 MMHG

## 2019-12-11 DIAGNOSIS — E66.9 OBESITY, UNSPECIFIED CLASSIFICATION, UNSPECIFIED OBESITY TYPE, UNSPECIFIED WHETHER SERIOUS COMORBIDITY PRESENT: Primary | ICD-10-CM

## 2019-12-11 NOTE — PROGRESS NOTES
Patient attended a weekly class as part of the Medically Supervised Weight Loss Program.  This class was facilitated by a Registered Dietitian. Topics taught at class focused on diet, particularly carbohydrate counting and portion control. Classes also focused on behavior changes and the importance of establishing a daily exercise routine. Progress Note: Weekly Medical Monitoring in the [facility]    Is there anything that you or the patient needs to let the supervising provider know about? no    Over the past week, have you experienced any side-effects? no    Ruiz Sandy is a 40 y.o. male who is enrolled in [facility]    Ruiz Sandy was prescribed the VLCD / LCD. Visit Vitals  /88   Pulse 72   Ht 6' (1.829 m)   Wt 114.6 kg (252 lb 11.2 oz)   BMI 34.27 kg/m²     Weight Metrics 12/11/2019 11/21/2019 11/21/2019 11/6/2019 10/30/2019 10/23/2019 10/14/2019   Weight 252 lb 11.2 oz - 248 lb 12.8 oz 244 lb 243 lb 8 oz 247 lb 3.2 oz -   Waist Measure Inches 46.5 46 - 44.25 45.25 44.5 44.75   BMI 34.27 kg/m2 - 33.74 kg/m2 33.09 kg/m2 33.02 kg/m2 33.53 kg/m2 -         Have you received any other medical care this week? yes  If yes, where and for what? Was in accident    Have you had any change in your medications since your last visit? no  If yes what? Did you have any problems adhering to the program last week? no  If yes, please explain:       Eating Habits Over Last Week:  Did you take in 64 oz of non-caloric fluids?  yes    Did you consume your prescribed meal replacement regimen each day? no       Physical Activity Over the Past Week:    Aerobic exercise: 0 min  Resistance exercise: 0 workouts / week

## 2019-12-18 ENCOUNTER — HOSPITAL ENCOUNTER (OUTPATIENT)
Dept: LAB | Age: 44
Discharge: HOME | End: 2019-12-18

## 2019-12-18 ENCOUNTER — CLINICAL SUPPORT (OUTPATIENT)
Dept: SURGERY | Age: 44
End: 2019-12-18

## 2019-12-18 VITALS
DIASTOLIC BLOOD PRESSURE: 78 MMHG | WEIGHT: 251.8 LBS | BODY MASS INDEX: 34.1 KG/M2 | HEART RATE: 46 BPM | HEIGHT: 72 IN | SYSTOLIC BLOOD PRESSURE: 116 MMHG

## 2019-12-18 DIAGNOSIS — E66.9 OBESITY, UNSPECIFIED CLASSIFICATION, UNSPECIFIED OBESITY TYPE, UNSPECIFIED WHETHER SERIOUS COMORBIDITY PRESENT: Primary | ICD-10-CM

## 2019-12-18 LAB — XX-LABCORP SPECIMEN COL,LCBCF: NORMAL

## 2019-12-18 PROCEDURE — 99001 SPECIMEN HANDLING PT-LAB: CPT

## 2019-12-19 LAB
ALBUMIN SERPL-MCNC: 5.1 G/DL (ref 3.5–5.5)
ALBUMIN/GLOB SERPL: 2.1 {RATIO} (ref 1.2–2.2)
ALP SERPL-CCNC: 67 IU/L (ref 39–117)
ALT SERPL-CCNC: 22 IU/L (ref 0–44)
AST SERPL-CCNC: 19 IU/L (ref 0–40)
BILIRUB SERPL-MCNC: 0.5 MG/DL (ref 0–1.2)
BUN SERPL-MCNC: 27 MG/DL (ref 6–24)
BUN/CREAT SERPL: 25 (ref 9–20)
CALCIUM SERPL-MCNC: 10.1 MG/DL (ref 8.7–10.2)
CHLORIDE SERPL-SCNC: 99 MMOL/L (ref 96–106)
CO2 SERPL-SCNC: 26 MMOL/L (ref 20–29)
CREAT SERPL-MCNC: 1.09 MG/DL (ref 0.76–1.27)
GLOBULIN SER CALC-MCNC: 2.4 G/DL (ref 1.5–4.5)
GLUCOSE SERPL-MCNC: 126 MG/DL (ref 65–99)
POTASSIUM SERPL-SCNC: 4.8 MMOL/L (ref 3.5–5.2)
PROT SERPL-MCNC: 7.5 G/DL (ref 6–8.5)
SODIUM SERPL-SCNC: 141 MMOL/L (ref 134–144)
SPECIMEN STATUS REPORT, ROLRST: NORMAL
URATE SERPL-MCNC: 3.8 MG/DL (ref 3.7–8.6)

## 2019-12-24 ENCOUNTER — OFFICE VISIT (OUTPATIENT)
Dept: FAMILY MEDICINE CLINIC | Age: 44
End: 2019-12-24

## 2019-12-24 VITALS
DIASTOLIC BLOOD PRESSURE: 74 MMHG | HEART RATE: 62 BPM | HEIGHT: 72 IN | RESPIRATION RATE: 16 BRPM | OXYGEN SATURATION: 96 % | WEIGHT: 256 LBS | BODY MASS INDEX: 34.67 KG/M2 | TEMPERATURE: 98 F | SYSTOLIC BLOOD PRESSURE: 110 MMHG

## 2019-12-24 DIAGNOSIS — E66.9 OBESITY, UNSPECIFIED CLASSIFICATION, UNSPECIFIED OBESITY TYPE, UNSPECIFIED WHETHER SERIOUS COMORBIDITY PRESENT: ICD-10-CM

## 2019-12-24 DIAGNOSIS — L40.50 PSORIATIC ARTHRITIS (HCC): ICD-10-CM

## 2019-12-24 DIAGNOSIS — Z86.2 HISTORY OF SARCOIDOSIS: ICD-10-CM

## 2019-12-24 DIAGNOSIS — E11.8 TYPE 2 DIABETES MELLITUS WITH COMPLICATION, WITHOUT LONG-TERM CURRENT USE OF INSULIN (HCC): ICD-10-CM

## 2019-12-24 DIAGNOSIS — I10 ESSENTIAL HYPERTENSION: Primary | ICD-10-CM

## 2019-12-24 RX ORDER — TRIAMCINOLONE ACETONIDE 1 MG/G
CREAM TOPICAL 2 TIMES DAILY
Qty: 15 G | Refills: 0 | Status: SHIPPED | OUTPATIENT
Start: 2019-12-24 | End: 2021-02-19 | Stop reason: ALTCHOICE

## 2019-12-24 RX ORDER — ZINC GLUCONATE 10 MG
1 LOZENGE ORAL DAILY
COMMUNITY
End: 2021-02-19 | Stop reason: ALTCHOICE

## 2019-12-24 RX ORDER — TRIAMCINOLONE ACETONIDE 5 MG/G
CREAM TOPICAL AS NEEDED
COMMUNITY
End: 2019-12-24

## 2019-12-24 RX ORDER — GABAPENTIN 300 MG/1
300 CAPSULE ORAL
Qty: 30 CAP | Refills: 0 | Status: SHIPPED | OUTPATIENT
Start: 2019-12-24 | End: 2020-01-02

## 2019-12-24 RX ORDER — TRAMADOL HYDROCHLORIDE 50 MG/1
50 TABLET ORAL
Qty: 30 TAB | Refills: 0 | Status: SHIPPED | OUTPATIENT
Start: 2019-12-24 | End: 2020-01-02

## 2019-12-24 NOTE — PROGRESS NOTES
1. Have you been to the ER, urgent care clinic since your last visit? Hospitalized since your last visit? No    2. Have you seen or consulted any other health care providers outside of the [address] [place] [address] since your last visit? Include any pap smears or colon screening.  Yes, Dr. Waleska Gonzalez and [facility]

## 2019-12-26 ENCOUNTER — OFFICE VISIT (OUTPATIENT)
Dept: SURGERY | Age: 44
End: 2019-12-26

## 2019-12-26 VITALS
WEIGHT: 254 LBS | BODY MASS INDEX: 34.4 KG/M2 | HEIGHT: 72 IN | SYSTOLIC BLOOD PRESSURE: 119 MMHG | DIASTOLIC BLOOD PRESSURE: 88 MMHG | OXYGEN SATURATION: 98 % | RESPIRATION RATE: 16 BRPM | HEART RATE: 75 BPM | TEMPERATURE: 97.6 F

## 2019-12-26 DIAGNOSIS — Z71.3 DIETARY COUNSELING AND SURVEILLANCE: ICD-10-CM

## 2019-12-26 DIAGNOSIS — E66.9 CLASS 1 OBESITY WITHOUT SERIOUS COMORBIDITY WITH BODY MASS INDEX (BMI) OF 34.0 TO 34.9 IN ADULT, UNSPECIFIED OBESITY TYPE: Primary | ICD-10-CM

## 2019-12-26 DIAGNOSIS — G89.29 OTHER CHRONIC PAIN: ICD-10-CM

## 2019-12-26 DIAGNOSIS — E66.9 CLASS 1 OBESITY WITHOUT SERIOUS COMORBIDITY WITH BODY MASS INDEX (BMI) OF 34.0 TO 34.9 IN ADULT, UNSPECIFIED OBESITY TYPE: ICD-10-CM

## 2019-12-26 NOTE — PROGRESS NOTES
[facility] Progress Note:   Follow up Provider Visit    CC: [facility] Provider Visit        Nikhil Lr is a 40 y.o. male who is here for his follow up provider visit for the VLCD Program.     Weight Metrics 12/26/2019 12/26/2019 12/24/2019 12/18/2019 12/11/2019 11/21/2019 11/21/2019   Weight - 254 lb 256 lb 251 lb 12.8 oz 252 lb 11.2 oz - 248 lb 12.8 oz   Waist Measure Inches 47 - - 46 46.5 46 -   BMI - 34.45 kg/m2 34.72 kg/m2 34.15 kg/m2 34.27 kg/m2 - 33.74 kg/m2         Current Outpatient Medications   Medication Sig Dispense Refill    magnesium 250 mg tab Take 1 Tab by mouth daily.  traMADol (ULTRAM) 50 mg tablet Take 1 Tab by mouth nightly for 30 days. Max Daily Amount: 50 mg. 30 Tab 0    gabapentin (NEURONTIN) 300 mg capsule Take 1 Cap by mouth nightly. Max Daily Amount: 300 mg. 30 Cap 0    triamcinolone acetonide (KENALOG) 0.1 % topical cream Apply  to affected area two (2) times a day. use thin layer 15 g 0    multivitamin (ONE A DAY) tablet Take 1 Tab by mouth daily.  metaxalone (SKELAXIN) 800 mg tablet Take 800 mg by mouth three (3) times daily.  meloxicam (MOBIC) 15 mg tablet Take 15 mg by mouth daily.  adalimumab (HUMIRA PEN SC) 40 mg by SubCUTAneous route every seven (7) days. Participation   Did you attend clinic and class last week?  yes    Review of Systems  Since your last visit, have you experienced any complications? no  If yes, please list: n/a    Positive in BOLD  CONST: Fever, weight loss, fatigue or chills  GI: Nausea, vomiting, abdominal pain, change in bowel habits, hematochezia, melena, and GERD   INTEG: Dermatitis, abnormal moles  HEENT: Recent changes in vision, vertigo, epistaxis, dysphagia and hoarseness  CV: Chest pain, palpitations, HTN, edema and varicosities  RESP: Cough, shortness of breath, wheezing, hemoptysis, snoring and reactive airway disease  : Hematuria, dysuria, frequency, urgency, nocturia and stress urinary incontinence   MS: Weakness, joint pain and arthritis  ENDO: Diabetes, thyroid disease, polyuria, polydipsia, polyphagia, poor wound healing, heat intolerance, cold intolerance  LYMPH/HEME: Anemia, bruising and history of blood transfusions  NEURO: Dizziness, headache, fainting, seizures and stroke  PSYCH: Anxiety and depression      Are you taking an appetite suppressant? no  If so, is there any Chest Pain, Palpitations or Dizziness? BP Readings from Last 10 Encounters:   12/26/19 119/88   12/24/19 110/74   12/18/19 116/78   12/11/19 128/88   11/21/19 (!) 139/118   11/06/19 136/86   10/30/19 120/82   10/23/19 130/86   10/14/19 138/81   10/09/19 134/76         Have you received any other medical care this week? no  If yes, where and for what? Have you discontinued or changed any medicine or dose of your medicine since your last visit? no  If yes, where and for what? Diet  How many ounces of calorie-free liquids did you consume each day? 64 oz    How many meal replacements did you take each day? 4-5 MR     Did you have any problems adhering to the program?  yes If yes, please explain: Glenny holiday      Exercise  Aerobic exercise: 20-30 minutes a few times a week       Review of Systems  Complete ROS negative except where noted above    Objective  Visit Vitals  /88   Pulse 75   Temp 97.6 °F (36.4 °C) (Oral)   Resp 16   Ht 6' (1.829 m)   Wt 115.2 kg (254 lb)   SpO2 98%   BMI 34.45 kg/m²     No LMP for male patient.     3 most recent PHQ Screens 12/24/2019   Little interest or pleasure in doing things Not at all   Feeling down, depressed, irritable, or hopeless Not at all   Total Score PHQ 2 0         Waist Circumference: I personally reviewed patient's Weight Management Doc Flowsheet  Neck Circumference: I personally reviewed patient's Weight Management Doc Flowsheet  Percent Body Fat: I personally reviewed patient's Weight Management Doc Flowsheet    Physical Exam     General: 44 y.o.) male in no acute distress. HEENT: Normocephalic, atraumatic, Pupils equal and reactive, nasopharynx clear, oropharynx clear and moist without lesions  NECK: Supple, no lymphadenopathy, thyromegaly, carotid bruits or jugular venous distension. trachea midline  RESP: Clear to auscultation bilaterally, no wheezes, rhonchi, or rales, normal respiratory excursion  CV: Regular rate and rhythm, no murmurs, rubs or gallops. 3+/4 pulses in bilateral dorsalis pedis and posterior tibialis. No distal edema or varicosities. ABD: Soft, nontender, nondistended, normoactive bowel sounds, no hernias, no hepatosplenomegaly  Extremities: Warm, well perfused, no tenderness or swelling, normal gait/station  Neuro: Sensation and strength grossly intact and symmetrical  Psych: Alert and oriented to person, place, and time. Assessment / Plan    Encounter Diagnoses   Name Primary?  Class 1 obesity without serious comorbidity with body mass index (BMI) of 34.0 to 34.9 in adult, unspecified obesity type Yes    BMI 34.0-34.9,adult     Dietary counseling and surveillance        1. Weight management needs further observation, needs improvement   Progress was reviewed with patient   Weight lost since starting program: up 3 pounds since last visit     2. Labs    Latest results reviewed with patient   Lab slip given to pt for f/up HDL labs    3. Diet regimen   # of meal replacements prescribed: 4-5 MR    If modified LCD-nutritional guidelines:    Monthly Goal   5-8 pounds     Medical monitoring schedule:   Weekly BP/Weight checks   Monthly provider appointments  I have reviewed/discussed the above normal BMI with the patient. I have recommended the following interventions: dietary management education, guidance, and counseling, encourage exercise, monitor weight and prescribed dietary intake .               >50% of 25 minute visit spent face to face time with Cuong Ruiz consisted of counseling & coordinating and/or discussing treatment plans in reference to his The primary encounter diagnosis was Class 1 obesity without serious comorbidity with body mass index (BMI) of 34.0 to 34.9 in adult, unspecified obesity type. Diagnoses of BMI 34.0-34.9,adult and Dietary counseling and surveillance were also pertinent to this visit.             Cassie Goodwin, BRYCEP-BC

## 2019-12-26 NOTE — PROGRESS NOTES
SUBJECTIVE  Chief Complaint   Patient presents with    New Patient    Other     med management     Patient presents to establish care. Has a history of HTN and DM2 but has managed these with lifestyle mod fication. Has been in the weight loss program and doing well. No cardiac complaints. He has psoriatic arthritis and sees rheumatology. They were managing his tramadol but he was then asked to have his PCP manage that. His PCP has since left the practice he was going to. He has weaned himself from 2 tabs po tid to 2 tabs at bedtime. He says his main pain is in his hips at night and it prevents him from sleeping. He is on Huhmira with some relief. Past Medical History:   Diagnosis Date    Asthma     Diabetes mellitus     no meds    Dupuytren's contracture     Essential hypertension     no meds    HTN (hypertension)     Ill-defined condition     neuropathy    Psoriatic arthritis, destructive type ([address]     Sarcoidosis        Current Outpatient Medications:     magnesium 250 mg tab, Take 1 Tab by mouth daily. , Disp: , Rfl:     traMADol (ULTRAM) 50 mg tablet, Take 1 Tab by mouth nightly for 30 days. Max Daily Amount: 50 mg., Disp: 30 Tab, Rfl: 0    gabapentin (NEURONTIN) 300 mg capsule, Take 1 Cap by mouth nightly. Max Daily Amount: 300 mg., Disp: 30 Cap, Rfl: 0    triamcinolone acetonide (KENALOG) 0.1 % topical cream, Apply  to affected area two (2) times a day. use thin layer, Disp: 15 g, Rfl: 0    multivitamin (ONE A DAY) tablet, Take 1 Tab by mouth daily. , Disp: , Rfl:     metaxalone (SKELAXIN) 800 mg tablet, Take 800 mg by mouth three (3) times daily. , Disp: , Rfl:     meloxicam (MOBIC) 15 mg tablet, Take 15 mg by mouth daily. , Disp: , Rfl:     adalimumab (HUMIRA PEN SC), 40 mg by SubCUTAneous route every seven (7) days. , Disp: , Rfl:     No Known Allergies    Past Surgical History:   Procedure Laterality Date    CLOSED RX NOSE/JAW FRAC+WIRES Right 1994    broken jaw had to wire it    HX CARPAL TUNNEL RELEASE      HX ORTHOPAEDIC      fx skull/ broken jaw     HX ORTHOPAEDIC Right     carpal tunnel    HX OTHER SURGICAL      lipoma removed     VA EXC SKIN BENIG 0.6-1CM TRUNK,ARM,LEG N/A 10/11/2018    Dr. Castillo Winn 1.1-2CM TRUNK,ARM,LEG N/A 10/11/2018    Dr. Castillo Winn 2.1-3CM Gary Manning N/A 10/11/2018    Dr. Reid Jean History     Socioeconomic History    Marital status: SINGLE     Spouse name: Not on file    Number of children: Not on file    Years of education: Not on file    Highest education level: Not on file   Occupational History    Occupation:    Social Needs    Financial resource strain: Not on file    Food insecurity:     Worry: Not on file     Inability: Not on file    Transportation needs:     Medical: Not on file     Non-medical: Not on file   Tobacco Use    Smoking status: Never Smoker    Smokeless tobacco: Never Used   Substance and Sexual Activity    Alcohol use:  Yes     Alcohol/week: 2.0 standard drinks     Types: 2 Shots of liquor per week     Frequency: 2-4 times a month     Comment: social    Drug use: Not Currently    Sexual activity: Yes     Partners: Female     Birth control/protection: Condom   Lifestyle    Physical activity:     Days per week: Not on file     Minutes per session: Not on file    Stress: Not on file   Relationships    Social connections:     Talks on phone: Not on file     Gets together: Not on file     Attends Hindu service: Not on file     Active member of club or organization: Not on file     Attends meetings of clubs or organizations: Not on file     Relationship status: Not on file    Intimate partner violence:     Fear of current or ex partner: Not on file     Emotionally abused: Not on file     Physically abused: Not on file     Forced sexual activity: Not on file   Other Topics Concern    Not on file   Social History Narrative    Not on file Past Surgical History:   Procedure Laterality Date    CLOSED RX NOSE/JAW FRAC+WIRES Right 1994    broken jaw had to wire it    HX CARPAL TUNNEL RELEASE      HX ORTHOPAEDIC      fx skull/ broken jaw     HX ORTHOPAEDIC Right     carpal tunnel    HX OTHER SURGICAL      lipoma removed     VT EXC SKIN BENIG 0.6-1CM TRUNK,ARM,LEG N/A 10/11/2018    Dr. Yeyo Navarro 1.1-2CM TRUNK,ARM,LEG N/A 10/11/2018    Dr. Yeyo Navarro 2.1-3CM TRUNK,ARM,LEG N/A 10/11/2018    Dr. Bob Frances     ROS:  Complete 10 system ROS is obtained from the patient intake forms which are reviewed with the patient. The intake H&P is scanned in for the chart. OBJECTIVE    Blood pressure 110/74, pulse 62, temperature 98 °F (36.7 °C), temperature source Oral, resp. rate 16, height 6' (1.829 m), weight 256 lb (116.1 kg), SpO2 96 %. General:  Alert, cooperative, well appearing, in no apparent distress. CV:  The heart sounds are regular in rate and rhythm. There is a normal S1 and S2. There or no murmurs. Lungs: Inspiratory and expiratory efforts are full and unlabored. Lung sounds are clear and equal to auscultation throughout all lung fields without wheezing, rales, or rhonchi. Skin:  No rashes, no jaundice. Psych: normal affect. Mood good. Oriented x 3. Judgement and insight intact. ASSESSMENT / PLAN    ICD-10-CM ICD-9-CM    1. Essential hypertension I10 401.9    2. Type 2 diabetes mellitus with complication, without long-term current use of insulin (HCC) E11.8 250.90 traMADol (ULTRAM) 50 mg tablet   3. Obesity, unspecified classification, unspecified obesity type, unspecified whether serious comorbidity present E66.9 278.00    4. Psoriatic arthritis (HCC) L40.50 696.0 traMADol (ULTRAM) 50 mg tablet      gabapentin (NEURONTIN) 300 mg capsule   5. History of sarcoidosis Z86.2 V12.29      HTN / DM - diet controlled. Cont diet and exercise.  Monitor labs q6-12 months depending on his weight and lifestyle changes. Obesity - diet and exercise. Doing great currently. Psoriatic arthritis - cont per rheumatology. We can manage his tramadol. He has weaned significantly and has done well. I will try to transition him to gabapentin and see where we get.  without red flags. We may have to fill out a controlled substance agreement. History of sarcoidosis - CXR was normal in the past.  History is unclear as I do not have access to the pathology report and he does not as well. We will monitor. 30 minutes of face to face time spent with the patient with at least 50% on counseling on above medical issues. All chart history elements were reviewed by me at the time of the visit even though marked at time of note closure. Patient understands our medical plan. Patient has provided input and agrees with goals. Alternatives have been explained and offered. All questions answered. The patient is to call if condition worsens or fails to improve. Follow-up and Dispositions    · Return in about 1 month (around 1/24/2020) for medication evaluation.

## 2019-12-26 NOTE — PROGRESS NOTES
Nursing Note for Medical Monitoring in the [facility]      Hari Reyes is a 40 y.o. male who is enrolled in [facility]    Hari Reyes was prescribed the VLCD / LCD. Did you have any problems adhering to the program last week? no  If yes, please explain:     Since your last visit, have you experienced any complications? no    Have you received any other medical care this week? yes  If yes, where and for what? Physical therapy    Have you had any change in your medications since your last visit? yes  If yes what? Added gabapentin    Are you taking an appetite suppressant? no   If yes:  Do you need a refill? BP Readings from Last 3 Encounters:   12/26/19 119/88   12/24/19 110/74   12/18/19 116/78        Eating Habits Over Last Week:  Did you take in 64 oz of non-caloric fluids? yes     Did you consume your prescribed meal replacement regimen each day?  yes       Physical Activity Over the Past Week:    Aerobic exercise: 180 min  Resistance exercise: 1 workouts / week

## 2019-12-31 ENCOUNTER — TELEPHONE (OUTPATIENT)
Dept: FAMILY MEDICINE CLINIC | Age: 44
End: 2019-12-31

## 2019-12-31 NOTE — TELEPHONE ENCOUNTER
Pt called and stated the Gabapentin 300mg  Is causing him irregular heart beat and insomnia. Pt stated he's going to stop taking it. Please call pt at your earliest convenience.

## 2020-01-02 ENCOUNTER — APPOINTMENT (OUTPATIENT)
Dept: CT IMAGING | Age: 45
End: 2020-01-02
Attending: EMERGENCY MEDICINE
Payer: COMMERCIAL

## 2020-01-02 ENCOUNTER — HOSPITAL ENCOUNTER (EMERGENCY)
Age: 45
Discharge: HOME | End: 2020-01-02
Attending: EMERGENCY MEDICINE
Payer: COMMERCIAL

## 2020-01-02 VITALS
SYSTOLIC BLOOD PRESSURE: 140 MMHG | BODY MASS INDEX: 34.54 KG/M2 | OXYGEN SATURATION: 95 % | DIASTOLIC BLOOD PRESSURE: 85 MMHG | HEART RATE: 67 BPM | TEMPERATURE: 98 F | WEIGHT: 255 LBS | HEIGHT: 72 IN | RESPIRATION RATE: 12 BRPM

## 2020-01-02 DIAGNOSIS — L40.50 PSORIATIC ARTHRITIS (HCC): ICD-10-CM

## 2020-01-02 DIAGNOSIS — G43.001 MIGRAINE WITHOUT AURA AND WITH STATUS MIGRAINOSUS, NOT INTRACTABLE: Primary | ICD-10-CM

## 2020-01-02 DIAGNOSIS — E11.8 TYPE 2 DIABETES MELLITUS WITH COMPLICATION, WITHOUT LONG-TERM CURRENT USE OF INSULIN (HCC): ICD-10-CM

## 2020-01-02 LAB
ATRIAL RATE: 59 BPM
CALCULATED P AXIS, ECG09: 55 DEGREES
CALCULATED R AXIS, ECG10: 7 DEGREES
CALCULATED T AXIS, ECG11: 31 DEGREES
DIAGNOSIS, 93000: NORMAL
P-R INTERVAL, ECG05: 150 MS
Q-T INTERVAL, ECG07: 408 MS
QRS DURATION, ECG06: 86 MS
QTC CALCULATION (BEZET), ECG08: 403 MS
VENTRICULAR RATE, ECG03: 59 BPM

## 2020-01-02 PROCEDURE — 93005 ELECTROCARDIOGRAM TRACING: CPT

## 2020-01-02 PROCEDURE — 74011250637 HC RX REV CODE- 250/637: Performed by: EMERGENCY MEDICINE

## 2020-01-02 PROCEDURE — 99284 EMERGENCY DEPT VISIT MOD MDM: CPT

## 2020-01-02 PROCEDURE — 70450 CT HEAD/BRAIN W/O DYE: CPT

## 2020-01-02 RX ORDER — BUTALBITAL, ACETAMINOPHEN AND CAFFEINE 300; 40; 50 MG/1; MG/1; MG/1
CAPSULE ORAL
Status: DISCONTINUED
Start: 2020-01-02 | End: 2020-01-02 | Stop reason: HOSPADM

## 2020-01-02 RX ORDER — TRAMADOL HYDROCHLORIDE 50 MG/1
50 TABLET ORAL
Qty: 30 TAB | Refills: 0 | Status: SHIPPED | OUTPATIENT
Start: 2020-01-02 | End: 2020-01-24

## 2020-01-02 RX ORDER — BUTALBITAL, ACETAMINOPHEN AND CAFFEINE 300; 40; 50 MG/1; MG/1; MG/1
1 CAPSULE ORAL
Status: COMPLETED | OUTPATIENT
Start: 2020-01-02 | End: 2020-01-02

## 2020-01-02 RX ORDER — TRAMADOL HYDROCHLORIDE 50 MG/1
50 TABLET ORAL
Qty: 10 TAB | Refills: 0 | Status: SHIPPED | OUTPATIENT
Start: 2020-01-02 | End: 2020-01-09

## 2020-01-02 RX ADMIN — BUTALBITAL, ACETAMINOPHEN AND CAFFEINE 1 CAPSULE: 300; 40; 50 CAPSULE ORAL at 01:14

## 2020-01-02 NOTE — DISCHARGE INSTRUCTIONS
Patient Education        Migraine Headache: Care Instructions  Your Care Instructions  Migraines are painful, throbbing headaches that often start on one side of the head. They may cause nausea and vomiting and make you sensitive to light, sound, or smell. Without treatment, migraines can last from 4 hours to a few days. Medicines can help prevent migraines or stop them after they have started. Your doctor can help you find which ones work best for you. Follow-up care is a key part of your treatment and safety. Be sure to make and go to all appointments, and call your doctor if you are having problems. It's also a good idea to know your test results and keep a list of the medicines you take. How can you care for yourself at home? · Do not drive if you have taken a prescription pain medicine. · Rest in a quiet, dark room until your headache is gone. Close your eyes, and try to relax or go to sleep. Don't watch TV or read. · Put a cold, moist cloth or cold pack on the painful area for 10 to 20 minutes at a time. Put a thin cloth between the cold pack and your skin. · Use a warm, moist towel or a heating pad set on low to relax tight shoulder and neck muscles. · Have someone gently massage your neck and shoulders. · Take your medicines exactly as prescribed. Call your doctor if you think you are having a problem with your medicine. You will get more details on the specific medicines your doctor prescribes. · Be careful not to take pain medicine more often than the instructions allow. You could get worse or more frequent headaches when the medicine wears off. To prevent migraines  · Keep a headache diary so you can figure out what triggers your headaches. Avoiding triggers may help you prevent headaches. Record when each headache began, how long it lasted, and what the pain was like.  (Was it throbbing, aching, stabbing, or dull?) Write down any other symptoms you had with the headache, such as nausea, flashing lights or dark spots, or sensitivity to bright light or loud noise. Note if the headache occurred near your period. List anything that might have triggered the headache. Triggers may include certain foods (chocolate, cheese, wine) or odors, smoke, bright light, stress, or lack of sleep. · If your doctor has prescribed medicine for your migraines, take it as directed. You may have medicine that you take only when you get a migraine and medicine that you take all the time to help prevent migraines. ? If your doctor has prescribed medicine for when you get a headache, take it at the first sign of a migraine, unless your doctor has given you other instructions. ? If your doctor has prescribed medicine to prevent migraines, take it exactly as prescribed. Call your doctor if you think you are having a problem with your medicine. · Find healthy ways to deal with stress. Migraines are most common during or right after stressful times. Take time to relax before and after you do something that has caused a migraine in the past.  · Try to keep your muscles relaxed by keeping good posture. Check your jaw, face, neck, and shoulder muscles for tension. Try to relax them. When you sit at a desk, change positions often. And make sure to stretch for 30 seconds each hour. · Get plenty of sleep and exercise. · Eat meals on a regular schedule. Avoid foods and drinks that often trigger migraines. These include chocolate, alcohol (especially red wine and port), aspartame, monosodium glutamate (MSG), and some additives found in foods (such as hot dogs, rivero, cold cuts, aged cheeses, and pickled foods). · Limit caffeine. Don't drink too much coffee, tea, or soda. But don't quit caffeine suddenly. That can also give you migraines. · Do not smoke or allow others to smoke around you. If you need help quitting, talk to your doctor about stop-smoking programs and medicines.  These can increase your chances of quitting for good.  · If you are taking birth control pills or hormone therapy, talk to your doctor about whether they are triggering your migraines. When should you call for help? Call 911 anytime you think you may need emergency care. For example, call if:    · You have signs of a stroke. These may include:  ? Sudden numbness, paralysis, or weakness in your face, arm, or leg, especially on only one side of your body. ? Sudden vision changes. ? Sudden trouble speaking. ? Sudden confusion or trouble understanding simple statements. ? Sudden problems with walking or balance. ? A sudden, severe headache that is different from past headaches.    Call your doctor now or seek immediate medical care if:    · You have new or worse nausea and vomiting.     · You have a new or higher fever.     · Your headache gets much worse.    Watch closely for changes in your health, and be sure to contact your doctor if:    · You are not getting better after 2 days (48 hours). Where can you learn more? Go to http://dahlia-duke.info/. Enter U289 in the search box to learn more about \"Migraine Headache: Care Instructions. \"  Current as of: March 28, 2019  Content Version: 12.2  © 4928-1817 [employer]. Care instructions adapted under license by [employer] (which disclaims liability or warranty for this information). If you have questions about a medical condition or this instruction, always ask your healthcare professional. [place] [address] any warranty or liability for your use of this information. Migraine Headache: Care Instructions  Your Care Instructions  Migraines are painful, throbbing headaches that often start on one side of the head. They may cause nausea and vomiting and make you sensitive to light, sound, or smell. Without treatment, migraines can last from 4 hours to a few days. Medicines can help prevent migraines or stop them after they have started. Your doctor can help you find which ones work best for you. Follow-up care is a key part of your treatment and safety. Be sure to make and go to all appointments, and call your doctor if you are having problems. It's also a good idea to know your test results and keep a list of the medicines you take. How can you care for yourself at home? · Do not drive if you have taken a prescription pain medicine. · Rest in a quiet, dark room until your headache is gone. Close your eyes, and try to relax or go to sleep. Don't watch TV or read. · Put a cold, moist cloth or cold pack on the painful area for 10 to 20 minutes at a time. Put a thin cloth between the cold pack and your skin. · Use a warm, moist towel or a heating pad set on low to relax tight shoulder and neck muscles. · Have someone gently massage your neck and shoulders. · Take your medicines exactly as prescribed. Call your doctor if you think you are having a problem with your medicine. You will get more details on the specific medicines your doctor prescribes. · Be careful not to take pain medicine more often than the instructions allow. You could get worse or more frequent headaches when the medicine wears off. To prevent migraines  · Keep a headache diary so you can figure out what triggers your headaches. Avoiding triggers may help you prevent headaches. Record when each headache began, how long it lasted, and what the pain was like. (Was it throbbing, aching, stabbing, or dull?) Write down any other symptoms you had with the headache, such as nausea, flashing lights or dark spots, or sensitivity to bright light or loud noise. Note if the headache occurred near your period. List anything that might have triggered the headache. Triggers may include certain foods (chocolate, cheese, wine) or odors, smoke, bright light, stress, or lack of sleep. · If your doctor has prescribed medicine for your migraines, take it as directed.  You may have medicine that you take only when you get a migraine and medicine that you take all the time to help prevent migraines. ? If your doctor has prescribed medicine for when you get a headache, take it at the first sign of a migraine, unless your doctor has given you other instructions. ? If your doctor has prescribed medicine to prevent migraines, take it exactly as prescribed. Call your doctor if you think you are having a problem with your medicine. · Find healthy ways to deal with stress. Migraines are most common during or right after stressful times. Take time to relax before and after you do something that has caused a migraine in the past.  · Try to keep your muscles relaxed by keeping good posture. Check your jaw, face, neck, and shoulder muscles for tension. Try to relax them. When you sit at a desk, change positions often. And make sure to stretch for 30 seconds each hour. · Get plenty of sleep and exercise. · Eat meals on a regular schedule. Avoid foods and drinks that often trigger migraines. These include chocolate, alcohol (especially red wine and port), aspartame, monosodium glutamate (MSG), and some additives found in foods (such as hot dogs, rivero, cold cuts, aged cheeses, and pickled foods). · Limit caffeine. Don't drink too much coffee, tea, or soda. But don't quit caffeine suddenly. That can also give you migraines. · Do not smoke or allow others to smoke around you. If you need help quitting, talk to your doctor about stop-smoking programs and medicines. These can increase your chances of quitting for good. · If you are taking birth control pills or hormone therapy, talk to your doctor about whether they are triggering your migraines. When should you call for help? Call 911 anytime you think you may need emergency care. For example, call if:    · You have signs of a stroke.  These may include:  ? Sudden numbness, paralysis, or weakness in your face, arm, or leg, especially on only one side of your body.  ? Sudden vision changes. ? Sudden trouble speaking. ? Sudden confusion or trouble understanding simple statements. ? Sudden problems with walking or balance. ? A sudden, severe headache that is different from past headaches.    Call your doctor now or seek immediate medical care if:    · You have new or worse nausea and vomiting.     · You have a new or higher fever.     · Your headache gets much worse.    Watch closely for changes in your health, and be sure to contact your doctor if:    · You are not getting better after 2 days (48 hours). Where can you learn more? Go to http://dahlia-duke.info/. Enter M515 in the search box to learn more about \"Migraine Headache: Care Instructions. \"  Current as of: March 28, 2019  Content Version: 12.2  © 0916-6340 [employer]. Care instructions adapted under license by [employer] (which disclaims liability or warranty for this information). If you have questions about a medical condition or this instruction, always ask your healthcare professional. [place] [address] any warranty or liability for your use of this information. Patient Education        Migraine Headache: Care Instructions  Your Care Instructions  Migraines are painful, throbbing headaches that often start on one side of the head. They may cause nausea and vomiting and make you sensitive to light, sound, or smell. Without treatment, migraines can last from 4 hours to a few days. Medicines can help prevent migraines or stop them after they have started. Your doctor can help you find which ones work best for you. Follow-up care is a key part of your treatment and safety. Be sure to make and go to all appointments, and call your doctor if you are having problems. It's also a good idea to know your test results and keep a list of the medicines you take. How can you care for yourself at home?   · Do not drive if you have taken a prescription pain medicine. · Rest in a quiet, dark room until your headache is gone. Close your eyes, and try to relax or go to sleep. Don't watch TV or read. · Put a cold, moist cloth or cold pack on the painful area for 10 to 20 minutes at a time. Put a thin cloth between the cold pack and your skin. · Use a warm, moist towel or a heating pad set on low to relax tight shoulder and neck muscles. · Have someone gently massage your neck and shoulders. · Take your medicines exactly as prescribed. Call your doctor if you think you are having a problem with your medicine. You will get more details on the specific medicines your doctor prescribes. · Be careful not to take pain medicine more often than the instructions allow. You could get worse or more frequent headaches when the medicine wears off. To prevent migraines  · Keep a headache diary so you can figure out what triggers your headaches. Avoiding triggers may help you prevent headaches. Record when each headache began, how long it lasted, and what the pain was like. (Was it throbbing, aching, stabbing, or dull?) Write down any other symptoms you had with the headache, such as nausea, flashing lights or dark spots, or sensitivity to bright light or loud noise. Note if the headache occurred near your period. List anything that might have triggered the headache. Triggers may include certain foods (chocolate, cheese, wine) or odors, smoke, bright light, stress, or lack of sleep. · If your doctor has prescribed medicine for your migraines, take it as directed. You may have medicine that you take only when you get a migraine and medicine that you take all the time to help prevent migraines. ? If your doctor has prescribed medicine for when you get a headache, take it at the first sign of a migraine, unless your doctor has given you other instructions. ? If your doctor has prescribed medicine to prevent migraines, take it exactly as prescribed.  Call your doctor if you think you are having a problem with your medicine. · Find healthy ways to deal with stress. Migraines are most common during or right after stressful times. Take time to relax before and after you do something that has caused a migraine in the past.  · Try to keep your muscles relaxed by keeping good posture. Check your jaw, face, neck, and shoulder muscles for tension. Try to relax them. When you sit at a desk, change positions often. And make sure to stretch for 30 seconds each hour. · Get plenty of sleep and exercise. · Eat meals on a regular schedule. Avoid foods and drinks that often trigger migraines. These include chocolate, alcohol (especially red wine and port), aspartame, monosodium glutamate (MSG), and some additives found in foods (such as hot dogs, rivero, cold cuts, aged cheeses, and pickled foods). · Limit caffeine. Don't drink too much coffee, tea, or soda. But don't quit caffeine suddenly. That can also give you migraines. · Do not smoke or allow others to smoke around you. If you need help quitting, talk to your doctor about stop-smoking programs and medicines. These can increase your chances of quitting for good. · If you are taking birth control pills or hormone therapy, talk to your doctor about whether they are triggering your migraines. When should you call for help? Call 911 anytime you think you may need emergency care. For example, call if:    · You have signs of a stroke. These may include:  ? Sudden numbness, paralysis, or weakness in your face, arm, or leg, especially on only one side of your body. ? Sudden vision changes. ? Sudden trouble speaking. ? Sudden confusion or trouble understanding simple statements. ? Sudden problems with walking or balance. ? A sudden, severe headache that is different from past headaches.    Call your doctor now or seek immediate medical care if:    · You have new or worse nausea and vomiting.     · You have a new or higher fever.   · Your headache gets much worse.    Watch closely for changes in your health, and be sure to contact your doctor if:    · You are not getting better after 2 days (48 hours). Where can you learn more? Go to http://dahlia-duke.info/. Enter P084 in the search box to learn more about \"Migraine Headache: Care Instructions. \"  Current as of: March 28, 2019  Content Version: 12.2  © 6165-8857 [employer]. Care instructions adapted under license by [employer] (which disclaims liability or warranty for this information). If you have questions about a medical condition or this instruction, always ask your healthcare professional. [address] any warranty or liability for your use of this information.

## 2020-01-02 NOTE — ED TRIAGE NOTES
Pt c/o return of headache to top of head after brushing teeth causing him to gag. Pt states had same episode 2 months ago after lifting heavy weights causing him to have sudden headache with nausea/ vomiting. Pt was seen by his dr and neurologist, had MRI and sent to PT. Pt was getting better but tonight returned after gagging while brushing his tongue.

## 2020-01-02 NOTE — ED PROVIDER NOTES
HPI Pt c/o return of headache to top of head after brushing teeth causing him to gag. Pt states had same episode 2 months ago after lifting heavy weights causing him to have sudden headache with nausea/ vomiting. Pt was seen by his dr and neurologist, had MRI and sent to PT. Pt was getting better but tonight his headache returned after gagging while brushing his tongue.     Past Medical History:   Diagnosis Date    Asthma     Diabetes mellitus     no meds    Dupuytren's contracture     Essential hypertension     no meds    HTN (hypertension)     Ill-defined condition     neuropathy    Psoriatic arthritis, destructive type (HCC)     Sarcoidosis     says he had an arm mass removed and the path showed sarcoid, negative CXR       Past Surgical History:   Procedure Laterality Date    CLOSED RX NOSE/JAW FRAC+WIRES Right 1994    broken jaw had to wire it    HX CARPAL TUNNEL RELEASE      HX ORTHOPAEDIC      fx skull/ broken jaw     HX ORTHOPAEDIC Right     carpal tunnel    HX OTHER SURGICAL      lipoma removed     VT EXC SKIN BENIG 0.6-1CM TRUNK,ARM,LEG N/A 10/11/2018    Dr. Green Comes 1.1-2CM TRUNK,ARM,LEG N/A 10/11/2018    Dr. Green Comes 2.1-3CM TRUNK,ARM,LEG N/A 10/11/2018    Dr. Richmond Estevez         Family History:   Problem Relation Age of Onset    Asthma Mother     Stroke Father     Alcohol abuse Father     Substance Abuse Father     Diabetes Maternal Grandmother     Hypertension Maternal Grandmother     High Cholesterol Maternal Grandmother     Stroke Maternal Grandmother     Cancer Maternal Grandfather 79        prostate cancer and skin    Colon Cancer Maternal Grandfather     Depression Maternal Uncle        Social History     Socioeconomic History    Marital status: SINGLE     Spouse name: Not on file    Number of children: Not on file    Years of education: Not on file    Highest education level: Not on file   Occupational History    Occupation:    Social Needs    Financial resource strain: Not on file    Food insecurity:     Worry: Not on file     Inability: Not on file    Transportation needs:     Medical: Not on file     Non-medical: Not on file   Tobacco Use    Smoking status: Never Smoker    Smokeless tobacco: Never Used   Substance and Sexual Activity    Alcohol use: Yes     Alcohol/week: 2.0 standard drinks     Types: 2 Shots of liquor per week     Frequency: 2-4 times a month     Comment: social    Drug use: Not Currently    Sexual activity: Yes     Partners: Female     Birth control/protection: Condom   Lifestyle    Physical activity:     Days per week: Not on file     Minutes per session: Not on file    Stress: Not on file   Relationships    Social connections:     Talks on phone: Not on file     Gets together: Not on file     Attends Yazdanism service: Not on file     Active member of club or organization: Not on file     Attends meetings of clubs or organizations: Not on file     Relationship status: Not on file    Intimate partner violence:     Fear of current or ex partner: Not on file     Emotionally abused: Not on file     Physically abused: Not on file     Forced sexual activity: Not on file   Other Topics Concern    Not on file   Social History Narrative    Not on file         ALLERGIES: Patient has no known allergies. Review of Systems   Constitutional: Negative. HENT: Negative. Eyes: Negative. Respiratory: Negative. Cardiovascular: Negative. Gastrointestinal: Negative. Endocrine: Negative. Genitourinary: Negative. Musculoskeletal: Negative. Skin: Negative. Allergic/Immunologic: Negative. Neurological: Negative. Hematological: Negative. Psychiatric/Behavioral: Negative. All other systems reviewed and are negative.       Vitals:    01/02/20 0044   BP: 138/89   Pulse: 64   Resp: 20   Temp: 98 °F (36.7 °C)   SpO2: 96%   Weight: 115.7 kg (255 lb)   Height: 6' (1.829 m) Physical Exam  Vitals signs and nursing note reviewed. Constitutional:       General: He is not in acute distress. Appearance: He is well-developed. HENT:      Head: Normocephalic. Eyes:      Conjunctiva/sclera: Conjunctivae normal.      Pupils: Pupils are equal, round, and reactive to light. Neck:      Musculoskeletal: Normal range of motion and neck supple. Cardiovascular:      Rate and Rhythm: Normal rate and regular rhythm. Heart sounds: Normal heart sounds. No murmur. Pulmonary:      Effort: Pulmonary effort is normal. No respiratory distress. Breath sounds: Normal breath sounds. No wheezing or rales. Chest:      Chest wall: No tenderness. Abdominal:      General: Bowel sounds are normal. There is no distension. Palpations: Abdomen is soft. Tenderness: There is no tenderness. There is no rebound. Musculoskeletal: Normal range of motion. General: No tenderness. Skin:     General: Skin is warm and dry. Findings: No rash. Neurological:      Mental Status: He is alert and oriented to person, place, and time. Cranial Nerves: No cranial nerve deficit. Motor: No abnormal muscle tone. Coordination: Coordination normal.   Psychiatric:         Behavior: Behavior normal.         Thought Content: Thought content normal.         Judgment: Judgment normal.          MDM       Procedures    Differential diagnosis: Migraine headache, tension headache, intracranial hemorrhage, anxiety,    Hospital course: Patient may asymptomatic emergency room. CT scan head was negative for intracranial hemorrhage or tumor. Patient headache resolved after treatment with Tylenol initial part of the ER or follow-up Fioricet in the ER. Diagnosis: Migraine headache    Disposition: Follow-up with his neurologist within 24 hours.   Return ER needed    Dictation disclaimer:  Please note that this dictation was completed with [employer], the computer voice recognition software. Quite often unanticipated grammatical, syntax, homophones, and other interpretive errors are inadvertently transcribed by the computer software. Please disregard these errors. Please excuse any errors that have escaped final proofreading.

## 2020-01-02 NOTE — ED NOTES
Both written and verbal discharge instructions given to pt with verbalized understanding of home care and follow up. Prescription given. Pt has a ride home with his mother.

## 2020-01-06 ENCOUNTER — OFFICE VISIT (OUTPATIENT)
Dept: NEUROLOGY | Age: 45
End: 2020-01-06

## 2020-01-06 VITALS
BODY MASS INDEX: 35.33 KG/M2 | TEMPERATURE: 98.4 F | HEART RATE: 67 BPM | RESPIRATION RATE: 20 BRPM | OXYGEN SATURATION: 97 % | WEIGHT: 260.8 LBS | HEIGHT: 72 IN | SYSTOLIC BLOOD PRESSURE: 116 MMHG | DIASTOLIC BLOOD PRESSURE: 82 MMHG

## 2020-01-06 DIAGNOSIS — G47.10 HYPERSOMNIA: ICD-10-CM

## 2020-01-06 DIAGNOSIS — G44.89 OTHER HEADACHE SYNDROME: Primary | ICD-10-CM

## 2020-01-06 DIAGNOSIS — G47.8 UPPER AIRWAY RESISTANCE SYNDROME: ICD-10-CM

## 2020-01-06 NOTE — PROGRESS NOTES
1/6/2020 12:24 PM    SSN: [id number]    Subjective:   39 y/o male I had seen 9/24 for a severe headache which happened after he was leg pressing about 420lbs. [place] referred to that date's note, essentially had a severe left suboccipital and retroauricular pain which raised concern for a vertebral dissection. Had a normal CTA of head and neck, including no stenosis or aneurysms. MRI C spine denied, so he had about 20 PT sessions and by mid Nov headache was gone. Has severe chronic hip pain, associated with psoriatic arthritis and steroid use, and was taking 6 tramadols per day. 2-3 wks ago trying to wean he tried GPN, stopped d/t palpitations, but over the last 2 wks headaches returned again. Around [place] he was in the middle of sex when he developed a 5-6 bitemporal headache. He had an orgasm, headache improved but lingered and has been present since. He had intercourse aggravation again New year's bernie, but finished the act, and the next day was brushing his teeth when he spit and sort of gagged with his toothpaste, developing a severe 10/10 headache, bitemporal without nausea, light/noise sensitivity or other migraine features. He is not a smoker. He does snore and has sleep fragmentation d/t pain. PSG 2012 showed AHI of 1. Denies jaw, lingual, palatal, or facial pain. Social History     Socioeconomic History    Marital status: SINGLE     Spouse name: Not on file    Number of children: Not on file    Years of education: Not on file    Highest education level: Not on file   Occupational History    Occupation:    Social Needs    Financial resource strain: Not on file    Food insecurity:     Worry: Not on file     Inability: Not on file    Transportation needs:     Medical: Not on file     Non-medical: Not on file   Tobacco Use    Smoking status: Never Smoker    Smokeless tobacco: Never Used   Substance and Sexual Activity    Alcohol use:  Yes Alcohol/week: 2.0 standard drinks     Types: 2 Shots of liquor per week     Frequency: 2-4 times a month     Comment: social    Drug use: Not Currently    Sexual activity: Yes     Partners: Female     Birth control/protection: Condom   Lifestyle    Physical activity:     Days per week: Not on file     Minutes per session: Not on file    Stress: Not on file   Relationships    Social connections:     Talks on phone: Not on file     Gets together: Not on file     Attends Anabaptist service: Not on file     Active member of club or organization: Not on file     Attends meetings of clubs or organizations: Not on file     Relationship status: Not on file    Intimate partner violence:     Fear of current or ex partner: Not on file     Emotionally abused: Not on file     Physically abused: Not on file     Forced sexual activity: Not on file   Other Topics Concern    Not on file   Social History Narrative    Not on file       Family History   Problem Relation Age of Onset    Asthma Mother     Stroke Father     Alcohol abuse Father     Substance Abuse Father     Diabetes Maternal Grandmother     Hypertension Maternal Grandmother     High Cholesterol Maternal Grandmother     Stroke Maternal Grandmother     Cancer Maternal Grandfather 79        prostate cancer and skin    Colon Cancer Maternal Grandfather     Depression Maternal Uncle        Current Outpatient Medications   Medication Sig Dispense Refill    traMADol (ULTRAM) 50 mg tablet Take 1 Tab by mouth nightly for 30 days. Max Daily Amount: 50 mg. 30 Tab 0    magnesium 250 mg tab Take 1 Tab by mouth daily.  triamcinolone acetonide (KENALOG) 0.1 % topical cream Apply  to affected area two (2) times a day. use thin layer 15 g 0    multivitamin (ONE A DAY) tablet Take 1 Tab by mouth daily.  metaxalone (SKELAXIN) 800 mg tablet Take 800 mg by mouth three (3) times daily.  meloxicam (MOBIC) 15 mg tablet Take 15 mg by mouth daily.       adalimumab (HUMIRA PEN SC) 40 mg by SubCUTAneous route every seven (7) days.  traMADol (ULTRAM) 50 mg tablet Take 1 Tab by mouth every six (6) hours as needed for Pain for up to 7 days. Max Daily Amount: 200 mg. 10 Tab 0       Past Medical History:   Diagnosis Date    Asthma     Diabetes mellitus     no meds    Dupuytren's contracture     Essential hypertension     no meds    HTN (hypertension)     Ill-defined condition     neuropathy    Psoriatic arthritis, destructive type (HCC)     Sarcoidosis     says he had an arm mass removed and the path showed sarcoid, negative CXR       Past Surgical History:   Procedure Laterality Date    CLOSED RX NOSE/JAW FRAC+WIRES Right 1994    broken jaw had to wire it    HX CARPAL TUNNEL RELEASE      HX ORTHOPAEDIC      fx skull/ broken jaw     HX ORTHOPAEDIC Right     carpal tunnel    HX OTHER SURGICAL      lipoma removed     MS EXC SKIN BENIG 0.6-1CM TRUNK,ARM,LEG N/A 10/11/2018    Dr. Quintana Plane 1.1-2CM TRUNK,ARM,LEG N/A 10/11/2018    Dr. Quintana Plane 2.1-3CM TRUNK,ARM,LEG N/A 10/11/2018    Dr. Johnston Sero       Allergies   Allergen Reactions    Gabapentin Palpitations       Vital signs:    Visit Vitals  /82 (BP 1 Location: Left arm, BP Patient Position: Sitting)   Pulse 67   Temp 98.4 °F (36.9 °C) (Oral)   Resp 20   Ht 6' (1.829 m)   Wt 118.3 kg (260 lb 12.8 oz)   SpO2 97%   BMI 35.37 kg/m²       Review of Systems:   GENERAL: Denies fever or fatigue  CARDIAC: No CP or SOB  PULMONARY: No cough of SOB  MUSCULOSKELETAL: No new joint pain  NEURO: SEE HPI      EXAM: Alert, in NAD. Heart is regular. Oriented x3, EOM's are full, PERRL, no facial asymmetries.  Strength and tone are normal. DTR's +2, gait symmetric , no occip trigger point, full cervical ROM          Assessment/Plan: He continues to have peculiar headaches, these appear different than the ones in early December in location, although all associated with activity ranging from sex to heavy leg pressing. Have to consider a skull base problem such as a Chiari malformation explaining why activities that may increase ICP are resulting in recurring headaches. His pain management from his psoriatic arthritis is a comorbidity to keep in mind. I will therefore get an MRI of the brain looking for Chiari, other skull base processes. Advised him to keep a log with his headaches. Counseled him about Chiari, what it is, treatment if we do find this, alternatives if we do not. Reassured him the normal CTA head and neck is reasonably ruling out a vascular lesion. Given ongoing headaches, symptoms of SHELDON, snoring, EDS, will get a HST. I will see him after his tests. 25/40 mins counseling as above. PLEASE NOTE:   Portions of this document may have been produced using voice recognition software. Unrecognized errors in transcription may be present. This note will not be viewable in [address]

## 2020-01-06 NOTE — PROGRESS NOTES
Vicente Woods is a 40 y.o. male in today for [facility] ED follow-up for HAs. Learning assessment previously completed 12/24/2019; primary language is [place]. 1. Have you been to the ER, urgent care clinic since your last visit? Hospitalized since your last visit? Yes Reason for visit: 1/2/2020 [facility] ED arm/ leg tingling and HA    2. Have you seen or consulted any other health care providers outside of the [address] [place] [address] since your last visit? Include any pap smears or colon screening.  No

## 2020-01-08 ENCOUNTER — CLINICAL SUPPORT (OUTPATIENT)
Dept: SURGERY | Age: 45
End: 2020-01-08

## 2020-01-08 VITALS
BODY MASS INDEX: 34.17 KG/M2 | HEIGHT: 72 IN | HEART RATE: 76 BPM | WEIGHT: 252.3 LBS | SYSTOLIC BLOOD PRESSURE: 138 MMHG | DIASTOLIC BLOOD PRESSURE: 88 MMHG

## 2020-01-08 DIAGNOSIS — E66.9 OBESITY, UNSPECIFIED CLASSIFICATION, UNSPECIFIED OBESITY TYPE, UNSPECIFIED WHETHER SERIOUS COMORBIDITY PRESENT: Primary | ICD-10-CM

## 2020-01-08 DIAGNOSIS — G47.8 UPPER AIRWAY RESISTANCE SYNDROME: Primary | ICD-10-CM

## 2020-01-08 NOTE — PROGRESS NOTES
Patient attended a weekly class as part of the Medically Supervised Weight Loss Program.  This class was facilitated by a Registered Dietitian. Topics taught at class focused on diet, particularly carbohydrate counting and portion control. Classes also focused on behavior changes and the importance of establishing a daily exercise routine. Progress Note: Weekly Medical Monitoring in the [facility] Weight Loss Program    Is there anything that you or the patient needs to let the supervising provider know about? no    Over the past week, have you experienced any side-effects? no    Logan Day is a 40 y.o. male who is enrolled in [facility] Loss Program    Logan Day was prescribed the VLCD / LCD. Visit Vitals  /88   Pulse 76   Ht 6' (1.829 m)   Wt 114.4 kg (252 lb 4.8 oz)   BMI 34.22 kg/m²     Weight Metrics 1/8/2020 1/6/2020 1/2/2020 12/26/2019 12/26/2019 12/24/2019 12/18/2019   Weight 252 lb 4.8 oz 260 lb 12.8 oz 255 lb - 254 lb 256 lb 251 lb 12.8 oz   Waist Measure Inches 46 - - 47 - - 46   BMI 34.22 kg/m2 35.37 kg/m2 34.58 kg/m2 - 34.45 kg/m2 34.72 kg/m2 34.15 kg/m2         Have you received any other medical care this week? yes  If yes, where and for what? Went to er for headaches    Have you had any change in your medications since your last visit? no  If yes what? Did you have any problems adhering to the program last week? no  If yes, please explain:       Eating Habits Over Last Week:  Did you take in 64 oz of non-caloric fluids? yes     Did you consume your prescribed meal replacement regimen each day?  yes       Physical Activity Over the Past Week:    Aerobic exercise: 30 min  Resistance exercise: 2 workouts / week

## 2020-01-15 ENCOUNTER — CLINICAL SUPPORT (OUTPATIENT)
Dept: SURGERY | Age: 45
End: 2020-01-15

## 2020-01-15 VITALS
WEIGHT: 251 LBS | HEART RATE: 101 BPM | HEIGHT: 72 IN | DIASTOLIC BLOOD PRESSURE: 93 MMHG | BODY MASS INDEX: 34 KG/M2 | SYSTOLIC BLOOD PRESSURE: 130 MMHG

## 2020-01-15 DIAGNOSIS — E66.9 OBESITY, UNSPECIFIED CLASSIFICATION, UNSPECIFIED OBESITY TYPE, UNSPECIFIED WHETHER SERIOUS COMORBIDITY PRESENT: Primary | ICD-10-CM

## 2020-01-15 NOTE — PROGRESS NOTES
Patient attended a weekly class as part of the Medically Supervised Weight Loss Program.  This class was facilitated by a Registered Dietitian. Topics taught at class focused on diet, particularly carbohydrate counting and portion control. Classes also focused on behavior changes and the importance of establishing a daily exercise routine. Progress Note: Weekly Medical Monitoring in the [facility] Weight Loss Program    Is there anything that you or the patient needs to let the supervising provider know about? no    Over the past week, have you experienced any side-effects? no    Mariela Pierce is a 40 y.o. male who is enrolled in [facility] Weight Loss Program    Mariela Pierce was prescribed the VLCD / LCD. Visit Vitals  BP (!) 130/93   Pulse (!) 101   Ht 6' (1.829 m)   Wt 113.9 kg (251 lb)   BMI 34.04 kg/m²     Weight Metrics 1/15/2020 1/8/2020 1/6/2020 1/2/2020 12/26/2019 12/26/2019 12/24/2019   Weight 251 lb 252 lb 4.8 oz 260 lb 12.8 oz 255 lb - 254 lb 256 lb   Waist Measure Inches 47 46 - - 47 - -   BMI 34.04 kg/m2 34.22 kg/m2 35.37 kg/m2 34.58 kg/m2 - 34.45 kg/m2 34.72 kg/m2         Have you received any other medical care this week? no  If yes, where and for what? Have you had any change in your medications since your last visit? no  If yes what? Did you have any problems adhering to the program last week? no  If yes, please explain:       Eating Habits Over Last Week:  Did you take in 64 oz of non-caloric fluids? yes     Did you consume your prescribed meal replacement regimen each day?  yes       Physical Activity Over the Past Week:    Aerobic exercise: 60 min  Resistance exercise: 3 workouts / week

## 2020-01-22 ENCOUNTER — CLINICAL SUPPORT (OUTPATIENT)
Dept: SURGERY | Age: 45
End: 2020-01-22

## 2020-01-22 VITALS
WEIGHT: 251.2 LBS | SYSTOLIC BLOOD PRESSURE: 113 MMHG | BODY MASS INDEX: 34.02 KG/M2 | HEART RATE: 76 BPM | HEIGHT: 72 IN | DIASTOLIC BLOOD PRESSURE: 85 MMHG

## 2020-01-22 DIAGNOSIS — E66.9 OBESITY, UNSPECIFIED CLASSIFICATION, UNSPECIFIED OBESITY TYPE, UNSPECIFIED WHETHER SERIOUS COMORBIDITY PRESENT: Primary | ICD-10-CM

## 2020-01-22 DIAGNOSIS — L40.50 PSORIATIC ARTHRITIS (HCC): ICD-10-CM

## 2020-01-22 RX ORDER — GABAPENTIN 300 MG/1
CAPSULE ORAL
Qty: 30 CAP | Refills: 0 | OUTPATIENT
Start: 2020-01-22

## 2020-01-22 NOTE — PROGRESS NOTES
Patient attended a weekly class as part of the Medically Supervised Weight Loss Program.  This class was facilitated by a Registered Dietitian. Topics taught at class focused on diet, particularly carbohydrate counting and portion control. Classes also focused on behavior changes and the importance of establishing a daily exercise routine. Progress Note: Weekly Medical Monitoring in the [facility] Weight Loss Program    Is there anything that you or the patient needs to let the supervising provider know about? no    Over the past week, have you experienced any side-effects? no    Cheo Rothman is a 40 y.o. male who is enrolled in [facility]    Cheo Rothman was prescribed the VLCD / LCD. Visit Vitals  /85   Pulse 76   Ht 6' (1.829 m)   Wt 113.9 kg (251 lb 3.2 oz)   BMI 34.07 kg/m²     Weight Metrics 1/22/2020 1/15/2020 1/8/2020 1/6/2020 1/2/2020 12/26/2019 12/26/2019   Weight 251 lb 3.2 oz 251 lb 252 lb 4.8 oz 260 lb 12.8 oz 255 lb - 254 lb   Waist Measure Inches 47 47 46 - - 47 -   BMI 34.07 kg/m2 34.04 kg/m2 34.22 kg/m2 35.37 kg/m2 34.58 kg/m2 - 34.45 kg/m2         Have you received any other medical care this week? yes  If yes, where and for what? Physical therapy    Have you had any change in your medications since your last visit? no  If yes what? Did you have any problems adhering to the program last week? no  If yes, please explain:       Eating Habits Over Last Week:  Did you take in 64 oz of non-caloric fluids? yes     Did you consume your prescribed meal replacement regimen each day?  yes       Physical Activity Over the Past Week:    Aerobic exercise: 30 min  Resistance exercise: 2 workouts / week

## 2020-01-23 ENCOUNTER — TELEPHONE (OUTPATIENT)
Dept: NEUROLOGY | Age: 45
End: 2020-01-23

## 2020-01-23 ENCOUNTER — HOSPITAL ENCOUNTER (OUTPATIENT)
Age: 45
Discharge: HOME | End: 2020-01-23
Attending: PSYCHIATRY & NEUROLOGY
Payer: COMMERCIAL

## 2020-01-23 DIAGNOSIS — G44.89 OTHER HEADACHE SYNDROME: ICD-10-CM

## 2020-01-23 PROCEDURE — 70551 MRI BRAIN STEM W/O DYE: CPT

## 2020-01-23 NOTE — TELEPHONE ENCOUNTER
Pt states that he spoke with Carly at the [address] and his Home Sleep Study needs to be ordered through [facility] as stated on 1/6 order. Please fax order.

## 2020-01-24 ENCOUNTER — DOCUMENTATION ONLY (OUTPATIENT)
Dept: NEUROLOGY | Age: 45
End: 2020-01-24

## 2020-01-24 ENCOUNTER — OFFICE VISIT (OUTPATIENT)
Dept: FAMILY MEDICINE CLINIC | Age: 45
End: 2020-01-24

## 2020-01-24 VITALS
SYSTOLIC BLOOD PRESSURE: 110 MMHG | HEIGHT: 72 IN | WEIGHT: 253 LBS | RESPIRATION RATE: 16 BRPM | BODY MASS INDEX: 34.27 KG/M2 | TEMPERATURE: 98.3 F | HEART RATE: 76 BPM | OXYGEN SATURATION: 95 % | DIASTOLIC BLOOD PRESSURE: 82 MMHG

## 2020-01-24 DIAGNOSIS — Z79.891 CHRONIC PRESCRIPTION OPIATE USE: ICD-10-CM

## 2020-01-24 DIAGNOSIS — L40.50 PSORIATIC ARTHRITIS (HCC): Primary | ICD-10-CM

## 2020-01-24 RX ORDER — TRAMADOL HYDROCHLORIDE 50 MG/1
50 TABLET ORAL EVERY 12 HOURS
Qty: 60 TAB | Refills: 0 | Status: SHIPPED | OUTPATIENT
Start: 2020-01-24 | End: 2020-02-21

## 2020-01-24 NOTE — PROGRESS NOTES
SUBJECTIVE  Chief Complaint   Patient presents with    Headache     not taking anything for headaches      Patient presents for follow-up of ER visit and to discuss chronic pain. Went to the ER for a severe HA. He has had an effort HA in the past and the same happened this time with sudden onset with exertion. He is under the care of neurology. He has had imaging and awaiting results. He has psoriatic arthritis and sees rheumatology. At his last visit he had reported much reduced use of tramadol and was wondering if there were other options to get off of it. We started him on gabapentin and he reported multiple side effects each day he took it that resolved when he stopped. He started again and had the side effects he says. Past Medical History:   Diagnosis Date    Asthma     Diabetes mellitus     no meds    Dupuytren's contracture     Essential hypertension     no meds    HTN (hypertension)     Ill-defined condition     neuropathy    Psoriatic arthritis, destructive type (HCC)     Sarcoidosis     says he had an arm mass removed and the path showed sarcoid, negative CXR       Current Outpatient Medications:     OTHER, 1 Tab daily. Tumeric, Disp: , Rfl:     traMADol (ULTRAM) 50 mg tablet, Take 1 Tab by mouth every twelve (12) hours for 30 days. Max Daily Amount: 100 mg., Disp: 60 Tab, Rfl: 0    magnesium 250 mg tab, Take 1 Tab by mouth daily. , Disp: , Rfl:     triamcinolone acetonide (KENALOG) 0.1 % topical cream, Apply  to affected area two (2) times a day. use thin layer, Disp: 15 g, Rfl: 0    multivitamin (ONE A DAY) tablet, Take 1 Tab by mouth daily. , Disp: , Rfl:     metaxalone (SKELAXIN) 800 mg tablet, Take 800 mg by mouth three (3) times daily. , Disp: , Rfl:     meloxicam (MOBIC) 15 mg tablet, Take 15 mg by mouth daily. , Disp: , Rfl:     adalimumab (HUMIRA PEN SC), 40 mg by SubCUTAneous route every seven (7) days. , Disp: , Rfl:     Allergies   Allergen Reactions    Gabapentin Palpitations       Past Surgical History:   Procedure Laterality Date    CLOSED RX NOSE/JAW FRAC+WIRES Right 1994    broken jaw had to wire it    HX CARPAL TUNNEL RELEASE      HX ORTHOPAEDIC      fx skull/ broken jaw     HX ORTHOPAEDIC Right     carpal tunnel    HX OTHER SURGICAL      lipoma removed     UT EXC SKIN BENIG 0.6-1CM TRUNK,ARM,LEG N/A 10/11/2018    Dr. Maria Guadalupe Carter 1.1-2CM TRUNK,ARM,LEG N/A 10/11/2018    Dr. Maria Guadalupe Carter 2.1-3CM Jennifer Ron N/A 10/11/2018    Dr. Harshil Serrano History     Socioeconomic History    Marital status: SINGLE     Spouse name: Not on file    Number of children: Not on file    Years of education: Not on file    Highest education level: Not on file   Occupational History    Occupation:    Social Needs    Financial resource strain: Not on file    Food insecurity:     Worry: Not on file     Inability: Not on file    Transportation needs:     Medical: Not on file     Non-medical: Not on file   Tobacco Use    Smoking status: Never Smoker    Smokeless tobacco: Never Used   Substance and Sexual Activity    Alcohol use:  Yes     Alcohol/week: 2.0 standard drinks     Types: 2 Shots of liquor per week     Frequency: 2-4 times a month     Comment: social    Drug use: Not Currently    Sexual activity: Yes     Partners: Female     Birth control/protection: Condom   Lifestyle    Physical activity:     Days per week: Not on file     Minutes per session: Not on file    Stress: Not on file   Relationships    Social connections:     Talks on phone: Not on file     Gets together: Not on file     Attends Uatsdin service: Not on file     Active member of club or organization: Not on file     Attends meetings of clubs or organizations: Not on file     Relationship status: Not on file    Intimate partner violence:     Fear of current or ex partner: Not on file     Emotionally abused: Not on file     Physically abused: Not on file     Forced sexual activity: Not on file   Other Topics Concern    Not on file   Social History Narrative    Not on file     Past Surgical History:   Procedure Laterality Date    CLOSED RX NOSE/JAW FRAC+WIRES Right 1994    broken jaw had to wire it    HX CARPAL TUNNEL RELEASE      HX ORTHOPAEDIC      fx skull/ broken jaw     HX ORTHOPAEDIC Right     carpal tunnel    HX OTHER SURGICAL      lipoma removed     MS EXC SKIN BENIG 0.6-1CM TRUNK,ARM,LEG N/A 10/11/2018    Dr. Colbert Gess 1.1-2CM TRUNK,ARM,LEG N/A 10/11/2018    Dr. Colbert Gess 2.1-3CM TRUNK,ARM,LEG N/A 10/11/2018    Dr. Siu Core    Blood pressure 110/82, pulse 76, temperature 98.3 °F (36.8 °C), temperature source Oral, resp. rate 16, height 6' (1.829 m), weight 253 lb (114.8 kg), SpO2 95 %. General:  Alert, cooperative, well appearing, in no apparent distress. Psych: normal affect. Mood good. Oriented x 3. Judgement and insight intact. ER records reviewed. ASSESSMENT / PLAN    ICD-10-CM ICD-9-CM    1. Psoriatic arthritis (HCC) L40.50 696.0 traMADol (ULTRAM) 50 mg tablet   2. Chronic prescription opiate use Z79.891 V58.69      Psoriatic arthritis - cont per rheumatology. We can manage his tramadol since he was unable to tolerate gabapentin. Controlled substance agreement on file and no red flags on . HAs - cont per neurology. 15 minutes of face to face time spent with the patient with at least 50% on counseling on above medical issues. All chart history elements were reviewed by me at the time of the visit even though marked at time of note closure. Patient understands our medical plan. Patient has provided input and agrees with goals. Alternatives have been explained and offered. All questions answered. The patient is to call if condition worsens or fails to improve.      Follow-up and Dispositions    · Return in about 2 months (around 3/24/2020) for routine care.

## 2020-01-24 NOTE — PROGRESS NOTES
1. Have you been to the ER, urgent care clinic since your last visit? Hospitalized since your last visit? Yes Where: [facility]    2. Have you seen or consulted any other health care providers outside of the [facility] since your last visit? Include any pap smears or colon screening.  Yes Where: Dr. Arthur Sales and Dr. Komal Allen (ortho)

## 2020-01-26 NOTE — PATIENT INSTRUCTIONS
Tramadol (By mouth) Tramadol (TRAM-a-dol) Treats moderate to severe pain. This medicine is a narcotic pain reliever. Brand Name(s): ConZip, FusePaq Synapryn, Ultram, Ultram ER, traMADol HCl There may be other brand names for this medicine. When This Medicine Should Not Be Used: This medicine is not right for everyone. Do not use if you had an allergic reaction to tramadol or other narcotic medicine, or if you have stomach or bowel blockage (including paralytic ileus). How to Use This Medicine:  
Long Acting Capsule, Liquid, Tablet, Dissolving Tablet, Long Acting Tablet · Take your medicine as directed. Your dose may need to be changed several times to find what works best for you. · Make sure your hands are dry before you handle the disintegrating tablet. Peel back the foil from the blister pack, then remove the tablet. Do not push the tablet through the foil. Place the tablet in your mouth. After it has melted, swallow or take a drink of water. · Swallow the extended-release tablet whole. Do not crush, break, or chew it. · Drink plenty of liquids to help avoid constipation. · This medicine should come with a Medication Guide. Ask your pharmacist for a copy if you do not have one. · Missed dose: Take a dose as soon as you remember. If it is almost time for your next dose, wait until then and take a regular dose. Do not take extra medicine to make up for a missed dose. · Store the medicine in a closed container at room temperature, away from heat, moisture, and direct light. Drugs and Foods to Avoid: Ask your doctor or pharmacist before using any other medicine, including over-the-counter medicines, vitamins, and herbal products. · Do not use this medicine if you are using or have used an MAO inhibitor within the past 14 days. · Some medicines can affect how tramadol works. Tell your doctor if you are using any of the following: ¨ Carbamazepine, cyclobenzaprine, digoxin, erythromycin, ketoconazole, lithium, mirtazapine, phenytoin, promethazine, rifampin, ritonavir, quinidine, or trazodone ¨ Blood thinner (including warfarin) ¨ Diuretic (water pill) ¨ Medicine to treat depression (including bupropion, fluoxetine, paroxetine, quinidine) ¨ Phenothiazine medicine ¨ Triptan medicine for migraine headaches · Tell your doctor if you use anything else that makes you sleepy. Some examples are allergy medicine, narcotic pain medicine, and alcohol. Tell your doctor if you are using a muscle relaxer. · Do not drink alcohol while you are using this medicine. Warnings While Using This Medicine: · Tell your doctor if you are pregnant or breastfeeding, or if you have kidney disease, liver disease (including cirrhosis), gallstones, lung or breathing problems, pancreas problems, or a history of head injury, seizures, drug addiction, or depression or similar emotional problems. Tell your doctor if you have phenylketonuria. · This medicine may cause the following problems: 
¨ High risk of overdose, which can lead to death ¨ Respiratory depression (serious breathing problem that can be life-threatening) ¨ Serotonin syndrome (when used with certain medicines) ¨ Unusual change in mood or behavior · This medicine may make you dizzy, drowsy, or lightheaded. Do not drive or doing anything else that could be dangerous until you know how this medicine affects you. · This medicine can be habit-forming. Do not use more than your prescribed dose. Call your doctor if you think your medicine is not working. · Do not stop using this medicine suddenly. Your doctor will need to slowly decrease your dose before you stop it completely. · Tell any doctor or dentist who treats you that you are using this medicine. · This medicine may cause constipation, especially with long-term use.  Ask your doctor if you should use a laxative to prevent and treat constipation. · This medicine could cause infertility. Talk with your doctor before using this medicine if you plan to have children. · Keep all medicine out of the reach of children. Never share your medicine with anyone. Possible Side Effects While Using This Medicine:  
Call your doctor right away if you notice any of these side effects: · Allergic reaction: Itching or hives, swelling in your face or hands, swelling or tingling in your mouth or throat, chest tightness, trouble breathing · Anxiety, restlessness, fast heartbeat, fever, sweating, muscle spasms, nausea, vomiting, diarrhea, seeing or hearing things that are not there · Blistering, peeling, red skin rash · Blue lips, fingernails, or skin · Extreme dizziness, drowsiness, or weakness, shallow breathing, slow heartbeat, seizures, and cold, clammy skin · Lightheadedness, dizziness, fainting · Seizures · Unusual mood or behavior, thoughts of killing yourself or others · Trouble breathing If you notice these less serious side effects, talk with your doctor: · Constipation, loss of appetite, stomach upset · Dry mouth 
· Headache If you notice other side effects that you think are caused by this medicine, tell your doctor. Call your doctor for medical advice about side effects. You may report side effects to FDA at 6-992-FDA-0446 © 2017 [address] Information is for End User's use only and may not be sold, redistributed or otherwise used for commercial purposes. The above information is an  only. It is not intended as medical advice for individual conditions or treatments. Talk to your doctor, nurse or pharmacist before following any medical regimen to see if it is safe and effective for you. Pain Medicine: Care Instructions Your Care Instructions Pain can keep you from doing the things you want to do. Medicine may help you feel better. There are many kinds of pain medicine.  One type you can buy over the counter is acetaminophen (Tylenol). Other medicines help both pain and swelling. These are called nonsteroidal anti-inflammatory drugs (NSAIDs). They include aspirin, ibuprofen (Advil, Motrin), and naproxen (Aleve). All of these drugs can cause side effects. Take them just as the package label tells you to. The most common side effects are stomach upset, heartburn, and nausea. NSAIDs may irritate the stomach lining. If the medicine upsets your stomach, you can try taking it with food. But if that doesn't help, talk with your doctor to make sure it's not a more serious problem. If you take NSAIDs often, you could get stomach ulcers or kidney problems. This can also happen if you take them for a long time. NSAIDs rarely cause a bad allergic reaction. Many pain medicines need to be prescribed by a doctor. Some of these drugs are called opioids. Examples are hydrocodone, morphine, fentanyl, and codeine. Taking opioids can lead to opioid use disorder. Moderate to severe opioid use disorder is sometimes called addiction. These medicines often can be used safely if you are under a doctor's care. Follow-up care is a key part of your treatment and safety. Be sure to make and go to all appointments, and call your doctor if you are having problems. It's also a good idea to know your test results and keep a list of the medicines you take. How can you care for yourself at home? · Be safe with medicines. If you take an over-the-counter pain medicine, such as acetaminophen (Tylenol), ibuprofen (Advil, Motrin), or naproxen (Aleve), read and follow all instructions on the label. · Do not give aspirin to anyone younger than 20. It has been linked to Reye syndrome, a serious illness. · Be careful when taking over-the-counter cold or flu medicines and Tylenol at the same time. Many of these medicines contain acetaminophen, which is Tylenol.  Read the labels to make sure that you are not taking more than the recommended dose. Too much Tylenol can be harmful. · Do not take two or more pain medicines at the same time unless the doctor told you to. · Do not drink alcohol and take pain medicine at the same time. · If your pain pills make you constipated: ? Talk to your doctor about a laxative. ? Drink plenty of fluids, enough so that your urine is light yellow or clear like water. Drink water, fruit juice, or other drinks that do not contain caffeine or alcohol. If you have kidney, heart, or liver disease and have to limit fluids, talk with your doctor before you increase the amount of fluids you drink. ? Take fiber, such as Citrucel or Metamucil, daily if needed. Read and follow all instructions on the label. If you take pain medicine for more than a few days, talk to your doctor before you take fiber. When should you call for help? Call your doctor now or seek immediate medical care if: 
  · Your pain medicine is not easing your pain.  
  · You have stomach pain, an upset stomach, or heartburn that lasts or comes back.  
  · You can't sleep because of the pain.  
 Watch closely for changes in your health, and be sure to contact your doctor if: 
  · You do not get better as expected. Where can you learn more? Go to http://dahlia-duke.info/. Enter 68 90 46 in the search box to learn more about \"Pain Medicine: Care Instructions. \" Current as of: March 28, 2019 Content Version: 12.2 © 0545-3308 [employer]. Care instructions adapted under license by [employer] (which disclaims liability or warranty for this information). If you have questions about a medical condition or this instruction, always ask your healthcare professional. [place] [address] any warranty or liability for your use of this information.

## 2020-01-29 ENCOUNTER — HOSPITAL ENCOUNTER (OUTPATIENT)
Dept: LAB | Age: 45
Discharge: HOME | End: 2020-01-29

## 2020-01-29 ENCOUNTER — DOCUMENTATION ONLY (OUTPATIENT)
Dept: NEUROLOGY | Age: 45
End: 2020-01-29

## 2020-01-29 LAB — SENTARA SPECIMEN COL,SENBCF: NORMAL

## 2020-01-29 PROCEDURE — 99001 SPECIMEN HANDLING PT-LAB: CPT

## 2020-01-31 LAB
ALBUMIN SERPL-MCNC: 4.8 G/DL (ref 4–5)
ALBUMIN/GLOB SERPL: 2 {RATIO} (ref 1.2–2.2)
ALP SERPL-CCNC: 59 IU/L (ref 39–117)
ALT SERPL-CCNC: 21 IU/L (ref 0–44)
AST SERPL-CCNC: 24 IU/L (ref 0–40)
BILIRUB SERPL-MCNC: 0.5 MG/DL (ref 0–1.2)
BUN SERPL-MCNC: 19 MG/DL (ref 6–24)
BUN/CREAT SERPL: 18 (ref 9–20)
CALCIUM SERPL-MCNC: 10.2 MG/DL (ref 8.7–10.2)
CHLORIDE SERPL-SCNC: 98 MMOL/L (ref 96–106)
CO2 SERPL-SCNC: 26 MMOL/L (ref 20–29)
CREAT SERPL-MCNC: 1.06 MG/DL (ref 0.76–1.27)
GLOBULIN SER CALC-MCNC: 2.4 G/DL (ref 1.5–4.5)
GLUCOSE SERPL-MCNC: 80 MG/DL (ref 65–99)
POTASSIUM SERPL-SCNC: 4.3 MMOL/L (ref 3.5–5.2)
PROT SERPL-MCNC: 7.2 G/DL (ref 6–8.5)
SODIUM SERPL-SCNC: 141 MMOL/L (ref 134–144)
URATE SERPL-MCNC: 4.2 MG/DL (ref 3.7–8.6)

## 2020-02-04 ENCOUNTER — OFFICE VISIT (OUTPATIENT)
Dept: SURGERY | Age: 45
End: 2020-02-04

## 2020-02-04 VITALS
OXYGEN SATURATION: 95 % | HEART RATE: 70 BPM | SYSTOLIC BLOOD PRESSURE: 140 MMHG | HEIGHT: 72 IN | TEMPERATURE: 98.2 F | WEIGHT: 250.5 LBS | BODY MASS INDEX: 33.93 KG/M2 | DIASTOLIC BLOOD PRESSURE: 82 MMHG | RESPIRATION RATE: 16 BRPM

## 2020-02-04 DIAGNOSIS — R51.9 FREQUENT HEADACHES: ICD-10-CM

## 2020-02-04 DIAGNOSIS — E66.9 CLASS 1 OBESITY WITHOUT SERIOUS COMORBIDITY WITH BODY MASS INDEX (BMI) OF 33.0 TO 33.9 IN ADULT, UNSPECIFIED OBESITY TYPE: Primary | ICD-10-CM

## 2020-02-04 DIAGNOSIS — E66.9 CLASS 1 OBESITY WITHOUT SERIOUS COMORBIDITY WITH BODY MASS INDEX (BMI) OF 33.0 TO 33.9 IN ADULT, UNSPECIFIED OBESITY TYPE: ICD-10-CM

## 2020-02-04 NOTE — PROGRESS NOTES
[facility] Progress Note:   Follow up Provider Visit    CC: [facility] Provider Visit       Sona Duque is a 40 y.o. male who is here for his follow up provider visit for the VLCD Program.      Weight Metrics 2/4/2020 1/24/2020 1/22/2020 1/15/2020 1/8/2020 1/6/2020 1/2/2020   Weight 250 lb 8 oz 253 lb 251 lb 3.2 oz 251 lb 252 lb 4.8 oz 260 lb 12.8 oz 255 lb   Waist Measure Inches - - 47 47 46 - -   BMI 33.97 kg/m2 34.31 kg/m2 34.07 kg/m2 34.04 kg/m2 34.22 kg/m2 35.37 kg/m2 34.58 kg/m2         Current Outpatient Medications   Medication Sig Dispense Refill    OTHER 1 Tab daily. Tumeric      traMADol (ULTRAM) 50 mg tablet Take 1 Tab by mouth every twelve (12) hours for 30 days. Max Daily Amount: 100 mg. 60 Tab 0    magnesium 250 mg tab Take 1 Tab by mouth daily.  triamcinolone acetonide (KENALOG) 0.1 % topical cream Apply  to affected area two (2) times a day. use thin layer 15 g 0    multivitamin (ONE A DAY) tablet Take 1 Tab by mouth daily.  metaxalone (SKELAXIN) 800 mg tablet Take 800 mg by mouth three (3) times daily.  meloxicam (MOBIC) 15 mg tablet Take 15 mg by mouth daily.  adalimumab (HUMIRA PEN SC) 40 mg by SubCUTAneous route every seven (7) days. Participation   Did you attend clinic and class last week?  yes    Review of Systems  Since your last visit, have you experienced any complications? no  If yes, please list: n/a     Positive in BOLD  CONST: Fever, weight loss, fatigue or chills  GI: Nausea, vomiting, abdominal pain, change in bowel habits, hematochezia, melena, and GERD   INTEG: Dermatitis, abnormal moles  HEENT: Recent changes in vision, vertigo, epistaxis, dysphagia and hoarseness  CV: Chest pain, palpitations, HTN, edema and varicosities  RESP: Cough, shortness of breath, wheezing, hemoptysis, snoring and reactive airway disease  : Hematuria, dysuria, frequency, urgency, nocturia and stress urinary incontinence   MS: Weakness, joint pain and arthritis  ENDO: Diabetes, thyroid disease, polyuria, polydipsia, polyphagia, poor wound healing, heat intolerance, cold intolerance  LYMPH/HEME: Anemia, bruising and history of blood transfusions  NEURO: Dizziness, headache, fainting, seizures and stroke  PSYCH: Anxiety and depression      Are you taking an appetite suppressant? no  If so, is there any Chest Pain, Palpitations or Dizziness? BP Readings from Last 10 Encounters:   02/04/20 140/82   01/24/20 110/82   01/22/20 113/85   01/15/20 (!) 130/93   01/08/20 138/88   01/06/20 116/82   01/02/20 140/85   12/26/19 119/88   12/24/19 110/74   12/18/19 116/78         Have you received any other medical care this week? no  If yes, where and for what? Have you discontinued or changed any medicine or dose of your medicine since your last visit? no  If yes, where and for what? Diet  How many ounces of calorie-free liquids did you consume each day? 64+ oz    How many meal replacements did you take each day? 4 MR     Did you have any problems adhering to the program?  no If yes, please explain: n/a      Exercise  Aerobic exercise: 20-30 min/3 times a week   Resistance exercise: 3 workouts / week        Review of Systems  Complete ROS negative except where noted above    Objective  Visit Vitals  /82   Pulse 70   Temp 98.2 °F (36.8 °C) (Oral)   Resp 16   Ht 6' (1.829 m)   Wt 113.6 kg (250 lb 8 oz)   SpO2 95%   BMI 33.97 kg/m²     No LMP for male patient.     3 most recent PHQ Screens 1/24/2020   Little interest or pleasure in doing things Not at all   Feeling down, depressed, irritable, or hopeless Not at all   Total Score PHQ 2 0         Waist Circumference: I personally reviewed patient's Weight Management Doc Flowsheet  Neck Circumference: I personally reviewed patient's Weight Management Doc Flowsheet  Percent Body Fat: I personally reviewed patient's Weight Management Doc Flowsheet    Physical Exam     General: 40 y.o.) male in no acute distress. HEENT: Normocephalic, atraumatic, Pupils equal and reactive, nasopharynx clear, oropharynx clear and moist without lesions  NECK: Supple, no lymphadenopathy, thyromegaly, carotid bruits or jugular venous distension. trachea midline  RESP: Clear to auscultation bilaterally, no wheezes, rhonchi, or rales, normal respiratory excursion  CV: Regular rate and rhythm, no murmurs, rubs or gallops. 3+/4 pulses in bilateral dorsalis pedis and posterior tibialis. No distal edema or varicosities. ABD: Soft, nontender, nondistended, normoactive bowel sounds, no hernias, no hepatosplenomegaly  Extremities: Warm, well perfused, no tenderness or swelling, normal gait/station  Neuro: Sensation and strength grossly intact and symmetrical  Psych: Alert and oriented to person, place, and time. Assessment / Plan    Encounter Diagnoses   Name Primary?  Class 1 obesity without serious comorbidity with body mass index (BMI) of 33.0 to 33.9 in adult, unspecified obesity type Yes    BMI 33.0-33.9,adult     Frequent headaches        1. Weight management well controlled   Progress was reviewed with patient   Down 4 pounds since last visit     2. Labs    Latest results reviewed with patient   Lab slip given to pt for f/up HDL labs    3. Diet regimen   # of meal replacements prescribed: 4-5 depending on workouts. If running add 5th meal replacement    If modified LCD-nutritional guidelines:    Monthly Goal   5-7 pounds     Medical monitoring schedule:   Weekly BP/Weight checks   Monthly provider appointments  I have reviewed/discussed the above normal BMI with the patient. I have recommended the following interventions: dietary management education, guidance, and counseling, encourage exercise, monitor weight and prescribed dietary intake .  .                >50% of 25 minute visit spent face to face time with Cecelia De Luna consisted of counseling & coordinating and/or discussing treatment plans in reference to his The primary encounter diagnosis was Class 1 obesity without serious comorbidity with body mass index (BMI) of 33.0 to 33.9 in adult, unspecified obesity type. Diagnoses of BMI 33.0-33.9,adult and Frequent headaches were also pertinent to this visit.             ROBER Menendez-BC

## 2020-02-04 NOTE — PROGRESS NOTES
Chief Complaint   Patient presents with    Weight Management     monthly follow up     Nursing Note for Medical Monitoring in the [facility]      Jennyfer Ye is a 40 y.o. male who is enrolled in [facility]    Jennyfer Ye was prescribed the VLCD / LCD. Did you have any problems adhering to the program last week? yes  If yes, please explain: \"superbowl ate chicken\"    Since your last visit, have you experienced any complications? no    Have you received any other medical care this week? no  If yes, where and for what? Have you had any change in your medications since your last visit? no  If yes what? Are you taking an appetite suppressant? no   If yes:  Do you need a refill? BP Readings from Last 3 Encounters:   02/04/20 140/82   01/24/20 110/82   01/22/20 113/85        Eating Habits Over Last Week:  Did you take in 64 oz of non-caloric fluids? yes     Did you consume your prescribed meal replacement regimen each day?  yes       Physical Activity Over the Past Week:    Aerobic exercise: 60 min  Resistance exercise: 30 workouts / week

## 2020-02-12 ENCOUNTER — CLINICAL SUPPORT (OUTPATIENT)
Dept: SURGERY | Age: 45
End: 2020-02-12

## 2020-02-12 VITALS
BODY MASS INDEX: 33.55 KG/M2 | SYSTOLIC BLOOD PRESSURE: 128 MMHG | DIASTOLIC BLOOD PRESSURE: 80 MMHG | HEART RATE: 66 BPM | WEIGHT: 247.7 LBS | HEIGHT: 72 IN

## 2020-02-12 DIAGNOSIS — E66.9 OBESITY, UNSPECIFIED CLASSIFICATION, UNSPECIFIED OBESITY TYPE, UNSPECIFIED WHETHER SERIOUS COMORBIDITY PRESENT: Primary | ICD-10-CM

## 2020-02-12 NOTE — PROGRESS NOTES
Patient attended a weekly class as part of the [facility]ically Supervised Weight Loss Program.  This class was facilitated by a Registered Dietitian. Topics taught at class focused on diet, particularly carbohydrate counting and portion control. Classes also focused on behavior changes and the importance of establishing a daily exercise routine. Progress Note: Weekly Medical Monitoring in the [facility]    Is there anything that you or the patient needs to let the supervising provider know about? no    Over the past week, have you experienced any side-effects? no    Mansi Rogers is a 40 y.o. male who is enrolled in [facility]    Mansi Rogers was prescribed the VLCD / LCD. Visit Vitals  /80   Pulse 66   Ht 6' (1.829 m)   Wt 112.4 kg (247 lb 11.2 oz)   BMI 33.59 kg/m²     Weight Metrics 2/12/2020 2/4/2020 1/24/2020 1/22/2020 1/15/2020 1/8/2020 1/6/2020   Weight 247 lb 11.2 oz 250 lb 8 oz 253 lb 251 lb 3.2 oz 251 lb 252 lb 4.8 oz 260 lb 12.8 oz   Waist Measure Inches 45.5 - - 47 47 46 -   BMI 33.59 kg/m2 33.97 kg/m2 34.31 kg/m2 34.07 kg/m2 34.04 kg/m2 34.22 kg/m2 35.37 kg/m2         Have you received any other medical care this week? no  If yes, where and for what? Have you had any change in your medications since your last visit? no  If yes what? Did you have any problems adhering to the program last week? no  If yes, please explain:       Eating Habits Over Last Week:  Did you take in 64 oz of non-caloric fluids? yes     Did you consume your prescribed meal replacement regimen each day?  yes       Physical Activity Over the Past Week:    Aerobic exercise: 90 min  Resistance exercise: 0 workouts / week

## 2020-02-18 ENCOUNTER — OFFICE VISIT (OUTPATIENT)
Dept: FAMILY MEDICINE CLINIC | Age: 45
End: 2020-02-18

## 2020-02-18 VITALS
TEMPERATURE: 98.6 F | HEIGHT: 72 IN | DIASTOLIC BLOOD PRESSURE: 78 MMHG | RESPIRATION RATE: 16 BRPM | WEIGHT: 250 LBS | OXYGEN SATURATION: 98 % | HEART RATE: 67 BPM | SYSTOLIC BLOOD PRESSURE: 126 MMHG | BODY MASS INDEX: 33.86 KG/M2

## 2020-02-18 DIAGNOSIS — J06.9 UPPER RESPIRATORY TRACT INFECTION, UNSPECIFIED TYPE: ICD-10-CM

## 2020-02-18 DIAGNOSIS — J20.9 ACUTE BRONCHITIS, UNSPECIFIED ORGANISM: Primary | ICD-10-CM

## 2020-02-18 DIAGNOSIS — L40.50 PSORIATIC ARTHRITIS (HCC): ICD-10-CM

## 2020-02-18 DIAGNOSIS — R05.8 COUGH WITH CONGESTION OF PARANASAL SINUS: ICD-10-CM

## 2020-02-18 DIAGNOSIS — R09.81 COUGH WITH CONGESTION OF PARANASAL SINUS: ICD-10-CM

## 2020-02-18 DIAGNOSIS — R68.82 DECREASED SEX DRIVE: ICD-10-CM

## 2020-02-18 LAB
FLUAV+FLUBV AG NOSE QL IA.RAPID: NEGATIVE POS/NEG
FLUAV+FLUBV AG NOSE QL IA.RAPID: NEGATIVE POS/NEG
S PYO AG THROAT QL: NEGATIVE
VALID INTERNAL CONTROL?: YES
VALID INTERNAL CONTROL?: YES

## 2020-02-18 RX ORDER — AZITHROMYCIN 250 MG/1
TABLET, FILM COATED ORAL
Qty: 6 TAB | Refills: 0 | Status: SHIPPED | OUTPATIENT
Start: 2020-02-18 | End: 2020-03-10

## 2020-02-18 RX ORDER — FLUTICASONE PROPIONATE 50 MCG
2 SPRAY, SUSPENSION (ML) NASAL DAILY
Qty: 1 BOTTLE | Refills: 0 | Status: SHIPPED | OUTPATIENT
Start: 2020-02-18 | End: 2020-05-22 | Stop reason: ALTCHOICE

## 2020-02-18 NOTE — PATIENT INSTRUCTIONS
Bronchitis: Care Instructions  Your Care Instructions    Bronchitis is inflammation of the bronchial tubes, which carry air to the lungs. The tubes swell and produce mucus, or phlegm. The mucus and inflamed bronchial tubes make you cough. You may have trouble breathing. Most cases of bronchitis are caused by viruses like those that cause colds. Antibiotics usually do not help and they may be harmful. Bronchitis usually develops rapidly and lasts about 2 to 3 weeks in otherwise healthy people. Follow-up care is a key part of your treatment and safety. Be sure to make and go to all appointments, and call your doctor if you are having problems. It's also a good idea to know your test results and keep a list of the medicines you take. How can you care for yourself at home? · Take all medicines exactly as prescribed. Call your doctor if you think you are having a problem with your medicine. · Get some extra rest.  · Take an over-the-counter pain medicine, such as acetaminophen (Tylenol), ibuprofen (Advil, Motrin), or naproxen (Aleve) to reduce fever and relieve body aches. Read and follow all instructions on the label. · Do not take two or more pain medicines at the same time unless the doctor told you to. Many pain medicines have acetaminophen, which is Tylenol. Too much acetaminophen (Tylenol) can be harmful. · Take an over-the-counter cough medicine that contains dextromethorphan to help quiet a dry, hacking cough so that you can sleep. Avoid cough medicines that have more than one active ingredient. Read and follow all instructions on the label. · Breathe moist air from a humidifier, hot shower, or sink filled with hot water. The heat and moisture will thin mucus so you can cough it out. · Do not smoke. Smoking can make bronchitis worse. If you need help quitting, talk to your doctor about stop-smoking programs and medicines. These can increase your chances of quitting for good.   When should you call for help? Call 911 anytime you think you may need emergency care. For example, call if:    · You have severe trouble breathing.    Call your doctor now or seek immediate medical care if:    · You have new or worse trouble breathing.     · You cough up dark brown or bloody mucus (sputum).     · You have a new or higher fever.     · You have a new rash.    Watch closely for changes in your health, and be sure to contact your doctor if:    · You cough more deeply or more often, especially if you notice more mucus or a change in the color of your mucus.     · You are not getting better as expected. Where can you learn more? Go to http://dahlia-duke.info/. Enter H333 in the search box to learn more about \"Bronchitis: Care Instructions. \"  Current as of: June 9, 2019  Content Version: 12.2  © 1301-0261 [employer]. Care instructions adapted under license by [employer] (which disclaims liability or warranty for this information). If you have questions about a medical condition or this instruction, always ask your healthcare professional. [address] any warranty or liability for your use of this information.

## 2020-02-18 NOTE — PROGRESS NOTES
Nicole Vásquez, 39 y.o.,  male    SUBJECTIVE  Cough x 3 days (Dr. Carrington Howard pt)    C/o productive cough with yellowish sputum, chest congestion, nasal congestion with post nasal drip, sore throat. Denies fever or wheezing. Non smoker. He is on humira for psoriatic arthritis. He is requesting testosterone levels to be checked. Says he is following with medical weight loss center, and has successfully lost weight, however having difficulty with gaining muscle mass even with his regular work outs. He also mentions decreased sex drive and overall vitality. No ED symptoms     ROS:  See HPI, all others negative        Patient Active Problem List   Diagnosis Code    Severe obesity with body mass index (BMI) of 35.0 to 39.9 with serious comorbidity (HCC) E66.01    Hypercholesterolemia E78.00    Psoriatic arthritis ([facility] [address] L40.50    Sarcoidosis D86.9    Type 2 diabetes mellitus with diabetic neuropathy (HCC) E11.40    SHEPPARD (nonalcoholic steatohepatitis) K75.81    Cervical disc disease M50.90    Vitamin D deficiency E55.9       Current Outpatient Medications   Medication Sig Dispense Refill    azithromycin (ZITHROMAX) 250 mg tablet Take two tablets today then one tablet daily 6 Tab 0    fluticasone propionate (FLONASE) 50 mcg/actuation nasal spray 2 Sprays by Both Nostrils route daily. 1 Bottle 0    OTHER 1 Tab daily. Tumeric      traMADol (ULTRAM) 50 mg tablet Take 1 Tab by mouth every twelve (12) hours for 30 days. Max Daily Amount: 100 mg. 60 Tab 0    magnesium 250 mg tab Take 1 Tab by mouth daily.  triamcinolone acetonide (KENALOG) 0.1 % topical cream Apply  to affected area two (2) times a day. use thin layer 15 g 0    multivitamin (ONE A DAY) tablet Take 1 Tab by mouth daily.  metaxalone (SKELAXIN) 800 mg tablet Take 800 mg by mouth three (3) times daily.  meloxicam (MOBIC) 15 mg tablet Take 15 mg by mouth daily.       adalimumab (HUMIRA PEN SC) 40 mg by SubCUTAneous route every seven (7) days. Allergies   Allergen Reactions    Gabapentin Palpitations       Past Medical History:   Diagnosis Date    Asthma     Diabetes mellitus     no meds    Dupuytren's contracture     Essential hypertension     no meds    HTN (hypertension)     Ill-defined condition     neuropathy    Psoriatic arthritis, destructive type (HCC)     Sarcoidosis     says he had an arm mass removed and the path showed sarcoid, negative CXR       Social History     Socioeconomic History    Marital status: SINGLE     Spouse name: Not on file    Number of children: Not on file    Years of education: Not on file    Highest education level: Not on file   Occupational History    Occupation:    Social Needs    Financial resource strain: Not on file    Food insecurity:     Worry: Not on file     Inability: Not on file    Transportation needs:     Medical: Not on file     Non-medical: Not on file   Tobacco Use    Smoking status: Never Smoker    Smokeless tobacco: Never Used   Substance and Sexual Activity    Alcohol use:  Yes     Alcohol/week: 2.0 standard drinks     Types: 2 Shots of liquor per week     Frequency: 2-4 times a month     Comment: social    Drug use: Not Currently    Sexual activity: Yes     Partners: Female     Birth control/protection: Condom   Lifestyle    Physical activity:     Days per week: Not on file     Minutes per session: Not on file    Stress: Not on file   Relationships    Social connections:     Talks on phone: Not on file     Gets together: Not on file     Attends Hoahaoism service: Not on file     Active member of club or organization: Not on file     Attends meetings of clubs or organizations: Not on file     Relationship status: Not on file    Intimate partner violence:     Fear of current or ex partner: Not on file     Emotionally abused: Not on file     Physically abused: Not on file     Forced sexual activity: Not on file   Other Topics Concern    Not on file   Social History Narrative    Not on file       Family History   Problem Relation Age of Onset    Asthma Mother     Stroke Father     Alcohol abuse Father     Substance Abuse Father     Diabetes Maternal Grandmother     Hypertension Maternal Grandmother     High Cholesterol Maternal Grandmother     Stroke Maternal Grandmother     Cancer Maternal Grandfather 79        prostate cancer and skin    Colon Cancer Maternal Grandfather     Depression Maternal Uncle          OBJECTIVE    Physical Exam:     Visit Vitals  /78 (BP 1 Location: Left arm, BP Patient Position: Sitting)   Pulse 67   Temp 98.6 °F (37 °C) (Oral)   Resp 16   Ht 6' (1.829 m)   Wt 250 lb (113.4 kg)   SpO2 98%   BMI 33.91 kg/m²       General: alert, mildly ill-appearing, in no apparent distress or pain  Head: atraumatic. Non-tender maxillary and frontal sinuses  Eyes: Lids with no discharge, no matting, conjunctivae clear and non injected, full EOMs, PERLLA  Ears: pinna non-tender, external auditory canal patent, TM intact  Mouth/throat:tonsils non enlarged, pharynx non erythematous and no lesion, nasal mucosa boggy  Neck: supple, no adenopathy palpated  CVS: normal rate, regular rhythm, distinct S1 and S2  Lungs:clear to ausculation bilaterally, no crackles, wheezing or rhonchi noted  Psych:  mood and affect normal    Results for orders placed or performed in visit on 02/18/20   AMB POC RAPID INFLUENZA TEST   Result Value Ref Range    VALID INTERNAL CONTROL POC Yes     Influenza A Ag POC Negative Negative Pos/Neg    Influenza B Ag POC Negative Negative Pos/Neg   AMB POC RAPID STREP A   Result Value Ref Range    VALID INTERNAL CONTROL POC Yes     Group A Strep Ag Negative Negative         ASSESSMENT/PLAN  Diagnoses and all orders for this visit:    1. Acute bronchitis, unspecified organism  Start: zpack, flonase  Hold humira dose while sick    2. Decreased sex drive  -     TESTOSTERONE AND SHBG; Future    3.  Cough with congestion of paranasal sinus  -     AMB POC RAPID INFLUENZA TEST  -     AMB POC RAPID STREP A    4. Psoriatic arthritis ([facility] [address]    5. Upper respiratory tract infection, unspecified type    Other orders  -     azithromycin (ZITHROMAX) 250 mg tablet; Take two tablets today then one tablet daily  -     fluticasone propionate (FLONASE) 50 mcg/actuation nasal spray; 2 Sprays by Both Nostrils route daily. Follow-up and Dispositions    · Return in about 2 weeks (around 3/3/2020), or if symptoms worsen or fail to improve, for w/ Dr. Georgina Nelson. Patient understands plan of care. Patient has provided input and agrees with goals.

## 2020-02-18 NOTE — PROGRESS NOTES
1. Have you been to the ER, urgent care clinic since your last visit? Hospitalized since your last visit? No    2. Have you seen or consulted any other health care providers outside of the [address] [place] [address] since your last visit? Include any pap smears or colon screening.  No

## 2020-02-20 DIAGNOSIS — L40.50 PSORIATIC ARTHRITIS (HCC): ICD-10-CM

## 2020-02-21 RX ORDER — TRAMADOL HYDROCHLORIDE 50 MG/1
TABLET ORAL
Qty: 60 TAB | Refills: 0 | Status: SHIPPED | OUTPATIENT
Start: 2020-02-21 | End: 2020-03-22

## 2020-02-26 ENCOUNTER — CLINICAL SUPPORT (OUTPATIENT)
Dept: SURGERY | Age: 45
End: 2020-02-26

## 2020-02-26 VITALS
WEIGHT: 247 LBS | SYSTOLIC BLOOD PRESSURE: 120 MMHG | HEIGHT: 72 IN | HEART RATE: 84 BPM | BODY MASS INDEX: 33.46 KG/M2 | DIASTOLIC BLOOD PRESSURE: 92 MMHG

## 2020-02-26 DIAGNOSIS — E66.9 OBESITY, UNSPECIFIED CLASSIFICATION, UNSPECIFIED OBESITY TYPE, UNSPECIFIED WHETHER SERIOUS COMORBIDITY PRESENT: Primary | ICD-10-CM

## 2020-03-04 ENCOUNTER — CLINICAL SUPPORT (OUTPATIENT)
Dept: SURGERY | Age: 45
End: 2020-03-04

## 2020-03-04 DIAGNOSIS — E66.9 OBESITY, UNSPECIFIED CLASSIFICATION, UNSPECIFIED OBESITY TYPE, UNSPECIFIED WHETHER SERIOUS COMORBIDITY PRESENT: Primary | ICD-10-CM

## 2020-03-05 VITALS
BODY MASS INDEX: 33.73 KG/M2 | SYSTOLIC BLOOD PRESSURE: 124 MMHG | HEART RATE: 82 BPM | WEIGHT: 248.7 LBS | DIASTOLIC BLOOD PRESSURE: 84 MMHG

## 2020-03-05 NOTE — PROGRESS NOTES
Patient attended a weekly class as part of the Medically Supervised Weight Loss Program.  This class was facilitated by a Registered Dietitian. Topics taught at class focused on diet, particularly carbohydrate counting and portion control. Classes also focused on behavior changes and the importance of establishing a daily exercise routine. Progress Note: Weekly Medical Monitoring in the [facility] Loss Program    Is there anything that you or the patient needs to let the supervising provider know about? no    Over the past week, have you experienced any side-effects? no    Xochitl Basurto is a 39 y.o. male who is enrolled in [facility]    Xochitl Basurto was prescribed the VLCD / LCD. Visit Vitals  /84   Pulse 82   Wt 112.8 kg (248 lb 11.2 oz)   BMI 33.73 kg/m²     Weight Metrics 3/4/2020 2/26/2020 2/18/2020 2/12/2020 2/4/2020 1/24/2020 1/22/2020   Weight 248 lb 11.2 oz 247 lb 250 lb 247 lb 11.2 oz 250 lb 8 oz 253 lb 251 lb 3.2 oz   Waist Measure Inches - 45.5 - 45.5 - - 47   BMI 33.73 kg/m2 33.5 kg/m2 33.91 kg/m2 33.59 kg/m2 33.97 kg/m2 34.31 kg/m2 34.07 kg/m2         Have you received any other medical care this week? yes  If yes, where and for what? Chiropractor. Have you had any change in your medications since your last visit? no  If yes what? Did you have any problems adhering to the program last week? no  If yes, please explain:       Eating Habits Over Last Week:  Did you take in 64 oz of non-caloric fluids? yes     Did you consume your prescribed meal replacement regimen each day?  yes       Physical Activity Over the Past Week:    Aerobic exercise: 45 min  Resistance exercise: 3 workouts / week

## 2020-03-09 ENCOUNTER — HOSPITAL ENCOUNTER (OUTPATIENT)
Dept: LAB | Age: 45
Discharge: HOME | End: 2020-03-09

## 2020-03-09 LAB
XX-LABCORP SPECIMEN COL,LCBCF: NORMAL
XX-LABCORP SPECIMEN COL,LCBCF: NORMAL

## 2020-03-09 PROCEDURE — 99001 SPECIMEN HANDLING PT-LAB: CPT

## 2020-03-10 ENCOUNTER — OFFICE VISIT (OUTPATIENT)
Dept: SURGERY | Age: 45
End: 2020-03-10

## 2020-03-10 VITALS
BODY MASS INDEX: 33.46 KG/M2 | OXYGEN SATURATION: 98 % | HEART RATE: 72 BPM | TEMPERATURE: 97.2 F | DIASTOLIC BLOOD PRESSURE: 80 MMHG | WEIGHT: 247 LBS | HEIGHT: 72 IN | SYSTOLIC BLOOD PRESSURE: 120 MMHG | RESPIRATION RATE: 18 BRPM

## 2020-03-10 DIAGNOSIS — R51.9 FREQUENT HEADACHES: ICD-10-CM

## 2020-03-10 DIAGNOSIS — E66.9 CLASS 1 OBESITY WITHOUT SERIOUS COMORBIDITY WITH BODY MASS INDEX (BMI) OF 33.0 TO 33.9 IN ADULT, UNSPECIFIED OBESITY TYPE: Primary | ICD-10-CM

## 2020-03-10 NOTE — PROGRESS NOTES
[facility] Progress Note:   Follow up Provider Visit    CC: [facility] Provider Visit       Jennyfer Ye is a 39 y.o. male who is here for his follow up provider visit for the VLCD Program.    Weight Metrics 3/10/2020 3/10/2020 3/5/2020 3/4/2020 2/26/2020 2/18/2020 2/12/2020   Weight - 247 lb - 248 lb 11.2 oz 247 lb 250 lb 247 lb 11.2 oz   Waist Measure Inches 44.25 - 44.5 - 45.5 - 45.5   BMI - 33.5 kg/m2 - 33.73 kg/m2 33.5 kg/m2 33.91 kg/m2 33.59 kg/m2         Current Outpatient Medications   Medication Sig Dispense Refill    traMADol (ULTRAM) 50 mg tablet TAKE ONE TABLET BY MOUTH EVERY 12 HOURS FOR 30 DAYS (MAX DAILY AMOUNT 100MG) 60 Tab 0    fluticasone propionate (FLONASE) 50 mcg/actuation nasal spray 2 Sprays by Both Nostrils route daily. 1 Bottle 0    OTHER 1 Tab daily. Tumeric      magnesium 250 mg tab Take 1 Tab by mouth daily.  triamcinolone acetonide (KENALOG) 0.1 % topical cream Apply  to affected area two (2) times a day. use thin layer 15 g 0    multivitamin (ONE A DAY) tablet Take 1 Tab by mouth daily.  metaxalone (SKELAXIN) 800 mg tablet Take 800 mg by mouth three (3) times daily.  meloxicam (MOBIC) 15 mg tablet Take 15 mg by mouth daily.  adalimumab (HUMIRA PEN SC) 40 mg by SubCUTAneous route every seven (7) days. Participation   Did you attend clinic and class last week? yes    Review of Systems  Since your last visit, have you experienced any complications?  yes  If yes, please list:     Positive in BOLD  CONST: Fever, weight loss, fatigue or chills  GI: Nausea, vomiting, abdominal pain, change in bowel habits, hematochezia, melena, and GERD   INTEG: Dermatitis, abnormal moles  HEENT: Recent changes in vision, vertigo, epistaxis, dysphagia and hoarseness  CV: Chest pain, palpitations, HTN, edema and varicosities  RESP: Cough, shortness of breath, wheezing, hemoptysis, snoring and reactive airway disease  : Hematuria, dysuria, frequency, urgency, nocturia and stress urinary incontinence   MS: Weakness, joint pain and arthritis  ENDO: Diabetes, thyroid disease, polyuria, polydipsia, polyphagia, poor wound healing, heat intolerance, cold intolerance  LYMPH/HEME: Anemia, bruising and history of blood transfusions  NEURO: Dizziness, headache, fainting, seizures and stroke  PSYCH: Anxiety and depression      Are you taking an appetite suppressant? no  If so, is there any Chest Pain, Palpitations or Dizziness? BP Readings from Last 10 Encounters:   03/10/20 120/80   03/05/20 124/84   02/26/20 (!) 120/92   02/18/20 126/78   02/12/20 128/80   02/04/20 140/82   01/24/20 110/82   01/22/20 113/85   01/15/20 (!) 130/93   01/08/20 138/88         Have you received any other medical care this week? no  If yes, where and for what? Have you discontinued or changed any medicine or dose of your medicine since your last visit? no  If yes, where and for what? Diet  How many ounces of calorie-free liquids did you consume each day? >120 oz    How many meal replacements did you take each day? 4-5 MR     Did you have any problems adhering to the program?  yes If yes, please explain: Recent death in the family      Exercise  Aerobic exercise: 20-30 min; 2-3 times a week        Review of Systems  Complete ROS negative except where noted above    Objective  Visit Vitals  /80   Pulse 72   Temp 97.2 °F (36.2 °C)   Resp 18   Ht 6' (1.829 m)   Wt 112 kg (247 lb)   SpO2 98%   BMI 33.50 kg/m²     No LMP for male patient.     3 most recent PHQ Screens 1/24/2020   Little interest or pleasure in doing things Not at all   Feeling down, depressed, irritable, or hopeless Not at all   Total Score PHQ 2 0         Waist Circumference: I personally reviewed patient's Weight Management Doc Flowsheet  Neck Circumference: I personally reviewed patient's Weight Management Doc Flowsheet  Percent Body Fat: I personally reviewed patient's Weight Management Doc Flowsheet    Physical Exam     General: 39 y.o.) male in no acute distress. HEENT: Normocephalic, atraumatic, Pupils equal and reactive, nasopharynx clear, oropharynx clear and moist without lesions  NECK: Supple, no lymphadenopathy, thyromegaly, carotid bruits or jugular venous distension. trachea midline  RESP: Clear to auscultation bilaterally, no wheezes, rhonchi, or rales, normal respiratory excursion  CV: Regular rate and rhythm, no murmurs, rubs or gallops. 3+/4 pulses in bilateral dorsalis pedis and posterior tibialis. No distal edema or varicosities. ABD: Soft, nontender, nondistended, normoactive bowel sounds, no hernias, no hepatosplenomegaly  Extremities: Warm, well perfused, no tenderness or swelling, normal gait/station  Neuro: Sensation and strength grossly intact and symmetrical  Psych: Alert and oriented to person, place, and time. Assessment / Plan    Encounter Diagnoses   Name Primary?  Class 1 obesity without serious comorbidity with body mass index (BMI) of 33.0 to 33.9 in adult, unspecified obesity type Yes    BMI 33.0-33.9,adult     Frequent headaches        1. Weight management well controlled   Progress was reviewed with patient   -3 pounds     2. Labs    Latest results reviewed with patient   Lab slip given to pt for f/up HDL labs    3. Diet regimen   # of meal replacements prescribed: 4-5 MR    If modified LCD-nutritional guidelines:    Monthly Goal   3-5 pounds      Medical monitoring schedule:   Weekly BP/Weight checks   Monthly provider appointments  I have reviewed/discussed the above normal BMI with the patient. I have recommended the following interventions: dietary management education, guidance, and counseling, encourage exercise, monitor weight and prescribed dietary intake .  .            >50% of 25 minute visit spent face to face time with Stas Coels consisted of counseling & coordinating and/or discussing treatment plans in reference to his The primary encounter diagnosis was Class 1 obesity without serious comorbidity with body mass index (BMI) of 33.0 to 33.9 in adult, unspecified obesity type. Diagnoses of BMI 33.0-33.9,adult and Frequent headaches were also pertinent to this visit.             ROBER Jin-BC

## 2020-03-10 NOTE — PROGRESS NOTES
Chief Complaint   Patient presents with    Weight Management   1. Have you been to the ER, urgent care clinic since your last visit? Hospitalized since your last visit? No    2. Have you seen or consulted any other health care providers outside of the [address] [place] [address] since your last visit? Include any pap smears or colon screening. Yes ortho and physical theraphy    Nursing Note for Medical Monitoring in the [facility] Loss Program      Senait Diaz is a 39 y.o. male who is enrolled in [facility]    Senait Diaz was prescribed the VLCD / LCD. Did you have any problems adhering to the program last week? yes  If yes, please explain: grandfather in hospital and recent passing    Since your last visit, have you experienced any complications? no    Have you received any other medical care this week? yes  If yes, where and for what? 2 weeks ago had cortisone injections shoulder which caused elevations in blood sugar reading  Have you had any change in your medications since your last visit? no  If yes what? Are you taking an appetite suppressant? no   If yes:  Do you need a refill? BP Readings from Last 3 Encounters:   03/10/20 120/80   03/05/20 124/84   02/26/20 (!) 120/92        Eating Habits Over Last Week:  Did you take in 64 oz of non-caloric fluids?  yes     Did you consume your prescribed meal replacement regimen each day? no       Physical Activity Over the Past Week:    Aerobic exercise: 90 min  Resistance exercise: 2 workouts / week

## 2020-03-11 DIAGNOSIS — E66.9 CLASS 1 OBESITY WITHOUT SERIOUS COMORBIDITY WITH BODY MASS INDEX (BMI) OF 33.0 TO 33.9 IN ADULT, UNSPECIFIED OBESITY TYPE: ICD-10-CM

## 2020-03-13 LAB
25(OH)D3+25(OH)D2 SERPL-MCNC: 27.2 NG/ML (ref 30–100)
A-TOCOPHEROL VIT E SERPL-MCNC: 8.3 MG/L (ref 7–25.1)
ALBUMIN SERPL-MCNC: 4.5 G/DL (ref 4–5)
ALBUMIN/GLOB SERPL: 2.1 {RATIO} (ref 1.2–2.2)
ALP SERPL-CCNC: 75 IU/L (ref 39–117)
ALT SERPL-CCNC: 22 IU/L (ref 0–44)
AST SERPL-CCNC: 17 IU/L (ref 0–40)
BILIRUB SERPL-MCNC: 0.3 MG/DL (ref 0–1.2)
BUN SERPL-MCNC: 20 MG/DL (ref 6–24)
BUN/CREAT SERPL: 22 (ref 9–20)
CALCIUM SERPL-MCNC: 9 MG/DL (ref 8.7–10.2)
CHLORIDE SERPL-SCNC: 102 MMOL/L (ref 96–106)
CO2 SERPL-SCNC: 20 MMOL/L (ref 20–29)
COPPER SERPL-MCNC: 75 UG/DL (ref 72–166)
CREAT SERPL-MCNC: 0.9 MG/DL (ref 0.76–1.27)
FOLATE SERPL-MCNC: 15 NG/ML
GAMMA TOCOPHEROL SERPL-MCNC: 0.8 MG/L (ref 0.5–5.5)
GLOBULIN SER CALC-MCNC: 2.1 G/DL (ref 1.5–4.5)
GLUCOSE SERPL-MCNC: 152 MG/DL (ref 65–99)
MAGNESIUM SERPL-MCNC: 2 MG/DL (ref 1.6–2.3)
POTASSIUM SERPL-SCNC: 3.9 MMOL/L (ref 3.5–5.2)
PROT SERPL-MCNC: 6.6 G/DL (ref 6–8.5)
SHBG SERPL-SCNC: 23 NMOL/L (ref 16.5–55.9)
SODIUM SERPL-SCNC: 140 MMOL/L (ref 134–144)
TESTOST SERPL-MCNC: 293 NG/DL (ref 264–916)
TESTOSTERONE.FREE+WB MFR SERPL: 37.3 % (ref 9–46)
TESTOSTERONE.FREE+WB SERPL-MCNC: 109.3 NG/DL (ref 40–250)
URATE SERPL-MCNC: 4.2 MG/DL (ref 3.7–8.6)
VIT B1 BLD-SCNC: 180.7 NMOL/L (ref 66.5–200)
VIT B12 SERPL-MCNC: 1202 PG/ML (ref 232–1245)
ZINC SERPL-MCNC: 97 UG/DL (ref 56–134)

## 2020-03-17 DIAGNOSIS — L40.50 PSORIATIC ARTHRITIS (HCC): ICD-10-CM

## 2020-03-22 RX ORDER — TRAMADOL HYDROCHLORIDE 50 MG/1
TABLET ORAL
Qty: 60 TAB | Refills: 0 | Status: SHIPPED | OUTPATIENT
Start: 2020-03-22 | End: 2020-04-20

## 2020-03-25 ENCOUNTER — VIRTUAL VISIT (OUTPATIENT)
Dept: FAMILY MEDICINE CLINIC | Age: 45
End: 2020-03-25

## 2020-03-25 DIAGNOSIS — L40.50 PSORIATIC ARTHRITIS (HCC): Primary | ICD-10-CM

## 2020-03-25 DIAGNOSIS — I10 ESSENTIAL HYPERTENSION: ICD-10-CM

## 2020-03-25 DIAGNOSIS — J40 BRONCHITIS: ICD-10-CM

## 2020-03-25 RX ORDER — AZITHROMYCIN 250 MG/1
TABLET, FILM COATED ORAL
Qty: 6 TAB | Refills: 0 | Status: SHIPPED | OUTPATIENT
Start: 2020-03-25 | End: 2020-03-30

## 2020-03-25 NOTE — PROGRESS NOTES
Consent:  He and/or his healthcare decision maker is aware that this patient-initiated Telehealth encounter, conducted by synchronous (real-time) audio-video technology, is a billable service, with coverage as determined by his insurance carrier. He is aware that he may receive a bill and has provided verbal consent to proceed: Yes    I was in the office while conducting this encounter. Pursuant to the emergency declaration under the [facility] [address] waiver authority and the [employer] and [employer] Act, this Virtual  Visit was conducted, with patient's consent, to reduce the patient's risk of exposure to COVID-19 and provide continuity of care for an established patient. Services were provided through a video synchronous discussion virtually to substitute for in-person clinic visit. SUBJECTIVE  Chief Complaint   Patient presents with    Pain (Chronic)     autoimmune disorder, psoriatic arthritis     Patient presents for follow-up of chronic pain. He has psoriatic arthritis and sees rheumatology. He reports relief of pain on tramadol. We started him on gabapentin and he reported multiple side effects each day he took it that resolved when he stopped. The patient presents complaining of a mild cough. The cough is described as unproductive. The patient does have nasal congestion and drainage. No fevers or chills. He was treated for this last month and has intermittent symptoms that he attributes possibly to allergies. He did not stay on the flonase after his symptoms resolved last month.     Past Medical History:   Diagnosis Date    Asthma     Diabetes mellitus     no meds    Dupuytren's contracture     Essential hypertension     no meds    HTN (hypertension)     Ill-defined condition     neuropathy    Psoriatic arthritis, destructive type (HCC)     Sarcoidosis     says he had an arm mass removed and the path showed sarcoid, negative CXR       Current Outpatient Medications:     azithromycin (ZITHROMAX) 250 mg tablet, Take 2 tablets today, then take 1 tablet daily, Disp: 6 Tab, Rfl: 0    traMADoL (ULTRAM) 50 mg tablet, TAKE ONE TABLET BY MOUTH EVERY 12 HOURS *MAX 100MG PER DAY*, Disp: 60 Tab, Rfl: 0    fluticasone propionate (FLONASE) 50 mcg/actuation nasal spray, 2 Sprays by Both Nostrils route daily. , Disp: 1 Bottle, Rfl: 0    OTHER, 1 Tab daily. Tumeric, Disp: , Rfl:     magnesium 250 mg tab, Take 1 Tab by mouth daily. , Disp: , Rfl:     triamcinolone acetonide (KENALOG) 0.1 % topical cream, Apply  to affected area two (2) times a day. use thin layer, Disp: 15 g, Rfl: 0    multivitamin (ONE A DAY) tablet, Take 1 Tab by mouth daily. , Disp: , Rfl:     metaxalone (SKELAXIN) 800 mg tablet, Take 800 mg by mouth three (3) times daily. , Disp: , Rfl:     meloxicam (MOBIC) 15 mg tablet, Take 15 mg by mouth daily. , Disp: , Rfl:     adalimumab (HUMIRA PEN SC), 40 mg by SubCUTAneous route every seven (7) days. , Disp: , Rfl:     Allergies   Allergen Reactions    Gabapentin Palpitations       Past Surgical History:   Procedure Laterality Date    CLOSED RX NOSE/JAW FRAC+WIRES Right 1994    broken jaw had to wire it    HX CARPAL TUNNEL RELEASE      HX ORTHOPAEDIC      fx skull/ broken jaw     HX ORTHOPAEDIC Right     carpal tunnel    HX OTHER SURGICAL      lipoma removed     UT EXC SKIN BENIG 0.6-1CM TRUNK,ARM,LEG N/A 10/11/2018    Dr. Ofelia Simmonds 1.1-2CM TRUNK,ARM,LEG N/A 10/11/2018    Dr. Ofelia Simmonds 2.1-3CM TRUNK,ARM,LEG N/A 10/11/2018    Dr. Yamilex Hidalgo History     Socioeconomic History    Marital status: SINGLE     Spouse name: Not on file    Number of children: Not on file    Years of education: Not on file    Highest education level: Not on file   Occupational History    Occupation:    Social Needs    Financial resource strain: Not on file    Food insecurity     Worry: Not on file     Inability: Not on file    Transportation needs     Medical: Not on file     Non-medical: Not on file   Tobacco Use    Smoking status: Never Smoker    Smokeless tobacco: Never Used   Substance and Sexual Activity    Alcohol use: Yes     Alcohol/week: 2.0 standard drinks     Types: 2 Shots of liquor per week     Frequency: 2-4 times a month     Comment: social    Drug use: Not Currently    Sexual activity: Yes     Partners: Female     Birth control/protection: Condom   Lifestyle    Physical activity     Days per week: Not on file     Minutes per session: Not on file    Stress: Not on file   Relationships    Social connections     Talks on phone: Not on file     Gets together: Not on file     Attends Yazidism service: Not on file     Active member of club or organization: Not on file     Attends meetings of clubs or organizations: Not on file     Relationship status: Not on file    Intimate partner violence     Fear of current or ex partner: Not on file     Emotionally abused: Not on file     Physically abused: Not on file     Forced sexual activity: Not on file   Other Topics Concern    Not on file   Social History Narrative    Not on file     Past Surgical History:   Procedure Laterality Date    CLOSED RX NOSE/JAW FRAC+WIRES Right 1994    broken jaw had to wire it    HX CARPAL TUNNEL RELEASE      HX ORTHOPAEDIC      fx skull/ broken jaw     HX ORTHOPAEDIC Right     carpal tunnel    HX OTHER SURGICAL      lipoma removed     VA EXC SKIN BENIG 0.6-1CM TRUNK,ARM,LEG N/A 10/11/2018    Dr. Vitor Ignacio 1.1-2CM TRUNK,ARM,LEG N/A 10/11/2018    Dr. Vitor Ignaico 2.1-3CM TRUNK,ARM,LEG N/A 10/11/2018    Dr. Dipika Scott:  Alert, cooperative, well appearing, in no apparent distress. Chest:  Normal breathing effort. Comfortable. Psych: normal affect. Mood good. Oriented x 3. Judgement and insight intact. ASSESSMENT / PLAN    ICD-10-CM ICD-9-CM    1. Psoriatic arthritis ([facility] [address] L40.50 696.0    2. Essential hypertension I10 401.9    3. Bronchitis J40 490      Psoriatic arthritis - cont per rheumatology. We can manage his tramadol since he was unable to tolerate gabapentin and his rheumatologist will not manage chronic pain. Controlled substance agreement on file and no red flags on . HTN - cont lifestyle modification. Bronchitis - get back on allergy meds. This could be allergic in nature. Since we are in the midst of a COVID pandemic, I have had him consider staying home but he is low risk for this. Z-moreno on hand in case he has signs of infection. All chart history elements were reviewed by me at the time of the visit even though marked at time of note closure. Patient understands our medical plan. Patient has provided input and agrees with goals. Alternatives have been explained and offered. All questions answered. The patient is to call if condition worsens or fails to improve. Follow-up and Dispositions    · Return in about 2 months (around 5/25/2020).

## 2020-03-27 ENCOUNTER — VIRTUAL VISIT (OUTPATIENT)
Dept: NEUROLOGY | Age: 45
End: 2020-03-27

## 2020-03-27 DIAGNOSIS — G44.89 OTHER HEADACHE SYNDROME: Primary | ICD-10-CM

## 2020-03-27 DIAGNOSIS — M62.838 CERVICAL PARASPINAL MUSCLE SPASM: ICD-10-CM

## 2020-03-27 DIAGNOSIS — E66.01 MORBID OBESITY (HCC): ICD-10-CM

## 2020-03-27 DIAGNOSIS — G47.33 OSA (OBSTRUCTIVE SLEEP APNEA): ICD-10-CM

## 2020-03-27 NOTE — PROGRESS NOTES
Chief Complaint   Patient presents with    Sleep Problem     follow up MRI, Sleep study       Kristian Coombs presents today for   Chief Complaint   Patient presents with    Sleep Problem     follow up MRI, Sleep study       Is someone accompanying this pt? Virtual visit,[phone]. Is the patient using any DME equipment during OV? no    Depression Screening:  3 most recent PHQ Screens 1/24/2020   Little interest or pleasure in doing things Not at all   Feeling down, depressed, irritable, or hopeless Not at all   Total Score PHQ 2 0       Learning Assessment:  Learning Assessment 12/24/2019   PRIMARY LEARNER Patient   HIGHEST LEVEL OF EDUCATION - PRIMARY LEARNER  4 YEARS OF COLLEGE   BARRIERS PRIMARY LEARNER NONE   CO-LEARNER CAREGIVER No   CO-LEARNER NAME n/a   PRIMARY LANGUAGE ENGLISH   LEARNER PREFERENCE PRIMARY DEMONSTRATION   ANSWERED BY patient   RELATIONSHIP SELF       Abuse Screening:  Abuse Screening Questionnaire 1/24/2020   Do you ever feel afraid of your partner? N   Are you in a relationship with someone who physically or mentally threatens you? N   Is it safe for you to go home? Y       Fall Risk  No flowsheet data found. Coordination of Care:  1. Have you been to the ER, urgent care clinic since your last visit? Hospitalized since your last visit? no    2. Have you seen or consulted any other health care providers outside of the [address] [place] [address] since your last visit? Include any pap smears or colon screening.  no

## 2020-03-27 NOTE — PROGRESS NOTES
3/27/2020 11:19 AM    SSN: [id number]      HPI:   66-year-old male who I last saw January 6 for history of peculiar headache syndromes associated with activity that range from sexual activity to heavy leg pressing. Last time here considering the possibility of a Chiari malformation I ordered an MRI of the brain, and given symptoms of snoring, excessive daytime sleepiness, a home sleep study was ordered. I have reviewed his home sleep study done on February 23 which is reported as showing an overall AHI of 8.1 on the first night and 18 on the second night with low oxygen saturation of 87%. I have personally reviewed his MRI of the brain, which does not show any intracranial anomalies other than a right cerebellar small developmental venous anomaly. There is no evidence of a Chiari malformation. Since her last visit, he kept a log. He tells me that during the first month he had headaches about 3-4 times out of the week again with exertion such as leg pressing or after sexual intercourse, 5-7 out of 10 in intensity. He was having some shoulder issues and did some physical therapyand reports that over the last 3 weeks concomitant with the physical therapy he has had no headaches at all. His therapist pointed out that he had a lot of tightness in the neck particularly on the right side of the neck. He has made some adjustments and is doing home exercises to address this.     Social History     Socioeconomic History    Marital status: SINGLE     Spouse name: Not on file    Number of children: Not on file    Years of education: Not on file    Highest education level: Not on file   Occupational History    Occupation:    Social Needs    Financial resource strain: Not on file    Food insecurity     Worry: Not on file     Inability: Not on file    Transportation needs     Medical: Not on file     Non-medical: Not on file   Tobacco Use    Smoking status: Never Smoker    Smokeless tobacco: Never Used   Substance and Sexual Activity    Alcohol use: Yes     Alcohol/week: 2.0 standard drinks     Types: 2 Shots of liquor per week     Frequency: 2-4 times a month     Comment: social    Drug use: Not Currently    Sexual activity: Yes     Partners: Female     Birth control/protection: Condom   Lifestyle    Physical activity     Days per week: Not on file     Minutes per session: Not on file    Stress: Not on file   Relationships    Social connections     Talks on phone: Not on file     Gets together: Not on file     Attends Latter-day service: Not on file     Active member of club or organization: Not on file     Attends meetings of clubs or organizations: Not on file     Relationship status: Not on file    Intimate partner violence     Fear of current or ex partner: Not on file     Emotionally abused: Not on file     Physically abused: Not on file     Forced sexual activity: Not on file   Other Topics Concern    Not on file   Social History Narrative    Not on file       Family History   Problem Relation Age of Onset    Asthma Mother     Stroke Father     Alcohol abuse Father     Substance Abuse Father     Diabetes Maternal Grandmother     Hypertension Maternal Grandmother     High Cholesterol Maternal Grandmother     Stroke Maternal Grandmother     Cancer Maternal Grandfather 79        prostate cancer and skin    Colon Cancer Maternal Grandfather     Depression Maternal Uncle        Current Outpatient Medications   Medication Sig Dispense Refill    azithromycin (ZITHROMAX) 250 mg tablet Take 2 tablets today, then take 1 tablet daily 6 Tab 0    traMADoL (ULTRAM) 50 mg tablet TAKE ONE TABLET BY MOUTH EVERY 12 HOURS *MAX 100MG PER DAY* 60 Tab 0    fluticasone propionate (FLONASE) 50 mcg/actuation nasal spray 2 Sprays by Both Nostrils route daily. 1 Bottle 0    OTHER 1 Tab daily. Tumeric      magnesium 250 mg tab Take 1 Tab by mouth daily.       triamcinolone acetonide (KENALOG) 0.1 % topical cream Apply  to affected area two (2) times a day. use thin layer 15 g 0    multivitamin (ONE A DAY) tablet Take 1 Tab by mouth daily.  metaxalone (SKELAXIN) 800 mg tablet Take 800 mg by mouth three (3) times daily.  meloxicam (MOBIC) 15 mg tablet Take 15 mg by mouth daily.  adalimumab (HUMIRA PEN SC) 40 mg by SubCUTAneous route every seven (7) days. Past Medical History:   Diagnosis Date    Asthma     Diabetes mellitus     no meds    Dupuytren's contracture     Essential hypertension     no meds    HTN (hypertension)     Ill-defined condition     neuropathy    Psoriatic arthritis, destructive type (HCC)     Sarcoidosis     says he had an arm mass removed and the path showed sarcoid, negative CXR       Past Surgical History:   Procedure Laterality Date    CLOSED RX NOSE/JAW FRAC+WIRES Right 1994    broken jaw had to wire it    HX CARPAL TUNNEL RELEASE      HX ORTHOPAEDIC      fx skull/ broken jaw     HX ORTHOPAEDIC Right     carpal tunnel    HX OTHER SURGICAL      lipoma removed     MI EXC SKIN BENALONDRA 0.6-1CM TRUNK,ARM,LEG N/A 10/11/2018    Dr. Mariano Carbajal 1.1-2CM TRUNK,ARM,LEG N/A 10/11/2018    Dr. Mariano Carbajal 2.1-3CM TRUNK,ARM,LEG N/A 10/11/2018    Dr. Plummer Kiersten       Allergies   Allergen Reactions    Gabapentin Palpitations       Review of Systems:   GENERAL: No reported fever or fatigue  CARDIAC: No CP or SOB  PULMONARY: No cough of SOB reported  MUSCULOSKELETAL: No new joint pain  NEURO: SEE HPI      Vital signs:   HT-6'  WT-247 lbs  HR-76  RR-18      EXAM: Alert, in NAD. Oriented to person, time, place, normal attention and concentration, no aphasia, EOM's are full, no facial asymmetries, hearing is present. No lateralizing weakness.   gait deferred          Assessment/Plan: Posterior exertional headaches, given the reports from his long and improvement with physical therapy as well as physical therapist observations  along with absence of intracranial anomalies including no presence of a Chiari malformation I suspect that his exertional headaches likely had to do with cervical and upper torso musculoskeletal tightness. I advised him to continue making adjustments to his exercise routines. I suggested getting a heart rate of blood pressure if even when he develops headaches with exertion or sexual intercourse again. I discussed headache prevention medications, but given his recent improvement, we agree that it is best to observe and see how he does with nonpharmacologic therapy. I also had a discussion about his obstructive sleep apnea. It is mild to moderate. I discussed the consequences of untreated obstructive sleep apnea. I discussed treatment options in detail including CPAP, desensitization techniques, mask choices to deal with his concerns regarding claustrophobia, weight loss, as well as other less effective intervention such as surgery and oral appliance therapy. He is very concerned about claustrophobia. He states that he will commit to weight loss and would like to wait on considering CPAP, but will keep it under consideration if in a couple of months he has not seen a significant amount of weight. I advised him to increase his amount of cardio, which she says that he will do. At any rate, he will see how he does and he agrees to call back if he has further needs. Pt counseled about helping to prevent the spread of coronavirus disease by practicing social isolation, to stay home except again essential medical care, to cover coughs and sneezes, clean hands often, and a monitor for fever and noticed respiratory symptoms. PLEASE NOTE:   Portions of this document may have been produced using voice recognition software. Unrecognized errors in transcription may be present. This note will not be viewable in [address]           Kane Bowles is a 39 y.o. male who was seen by synchronous (real-time) audio-video technology on 3/27/2020. Consent:  He and/or his healthcare decision maker is aware that this patient-initiated Telehealth encounter is a billable service, with coverage as determined by his insurance carrier. He is aware that he may receive a bill and has provided verbal consent to proceed: Yes    I was in the office while conducting this encounter. Pursuant to the emergency declaration under the [address] [place] [address], [facility] waiver authority and the [email or web address] and [employer] Act, this Virtual  Visit was conducted, with patient's consent, to reduce the patient's risk of exposure to COVID-19 and provide continuity of care for an established patient. Services were provided through a video synchronous discussion virtually to substitute for in-person clinic visit.     Parvin Mckeon MD

## 2020-03-27 NOTE — PATIENT INSTRUCTIONS
Thank you for choosing [facility] and [facility] for your  
 
care. You may receive a survey about your visit. We appreciate you taking time  
 
to complete this survey as we use your feedback to improve our services. We  
 
realize we are not perfect, but we strive to provide excellent care.

## 2020-04-08 ENCOUNTER — VIRTUAL VISIT (OUTPATIENT)
Dept: SURGERY | Age: 45
End: 2020-04-08

## 2020-04-08 VITALS — WEIGHT: 261 LBS | BODY MASS INDEX: 35.35 KG/M2 | HEIGHT: 72 IN

## 2020-04-08 DIAGNOSIS — Z78.9 WEIGHT LOSS ADVISED: ICD-10-CM

## 2020-04-08 DIAGNOSIS — E66.01 SEVERE OBESITY WITH BODY MASS INDEX (BMI) OF 35.0 TO 39.9 WITH SERIOUS COMORBIDITY (HCC): Primary | ICD-10-CM

## 2020-04-08 DIAGNOSIS — Z71.3 WEIGHT LOSS COUNSELING, ENCOUNTER FOR: ICD-10-CM

## 2020-04-08 NOTE — PROGRESS NOTES
Consent:  Celeste Pierson  and/or his healthcare decision maker is aware that this patient-initiated Telehealth encounter is a billable service, with coverage as determined by his insurance carrier. He is aware that he may receive a bill and has provided verbal consent to proceed: Yes    Provider location while conducting visit: in the office  Patient location: Home  Other person participating in the telehealth services: Jaya Chen    This virtual visit was conducted via interactive/real-time audio/video using [employer] me   Visit start: 1100 Visit End: 1130       Weight Loss Program Progress Note:       CC: Weight Management    Celeste Pierson is a 39 y.o. male who is here for his  f/up medical provider visit for the LCD Program who was seen by synchronous (real-time) audio-video technology on 4/8/2020.      he is attending class via [employer]. +14 lbs    Weight History  Weight Metrics 4/8/2020 3/10/2020 3/10/2020 3/5/2020 3/4/2020 2/26/2020 2/18/2020   Weight 261 lb - 247 lb - 248 lb 11.2 oz 247 lb 250 lb   Waist Measure Inches - 44.25 - 44.5 - 45.5 -   BMI 35.4 kg/m2 - 33.5 kg/m2 - 33.73 kg/m2 33.5 kg/m2 33.91 kg/m2       Highest wt:  286lbs   Goal wt: 180  Start wt: 279lbs   EKG due: 183 pounds  Ideal body weight: 77.6 kg (171 lb 1.2 oz)  Adjusted ideal body weight: 93.9 kg (207 lb 0.7 oz)  Body mass index is 35.4 kg/m².       History    Past Medical History:   Diagnosis Date    Asthma     Diabetes mellitus     no meds    Dupuytren's contracture     Essential hypertension     no meds    HTN (hypertension)     Ill-defined condition     neuropathy    Psoriatic arthritis, destructive type (HCC)     Sarcoidosis     says he had an arm mass removed and the path showed sarcoid, negative CXR       Past Surgical History:   Procedure Laterality Date    CLOSED RX NOSE/JAW FRAC+WIRES Right 1994    broken jaw had to wire it    HX CARPAL TUNNEL RELEASE      HX ORTHOPAEDIC      fx skull/ broken jaw     HX ORTHOPAEDIC Right     carpal tunnel    HX OTHER SURGICAL      lipoma removed     OR EXC SKIN BENIG 0.6-1CM TRUNK,ARM,LEG N/A 10/11/2018    Dr. Oliver Damico 1.1-2CM TRUNK,ARM,LEG N/A 10/11/2018    Dr. Oliver Damico 2.1-3CM TRUNK,ARM,LEG N/A 10/11/2018    Dr. Sakshi Mahan       Current Outpatient Medications   Medication Sig Dispense Refill    traMADoL (ULTRAM) 50 mg tablet TAKE ONE TABLET BY MOUTH EVERY 12 HOURS *MAX 100MG PER DAY* 60 Tab 0    OTHER 1 Tab daily. Tumeric      magnesium 250 mg tab Take 1 Tab by mouth daily.  triamcinolone acetonide (KENALOG) 0.1 % topical cream Apply  to affected area two (2) times a day. use thin layer 15 g 0    multivitamin (ONE A DAY) tablet Take 1 Tab by mouth daily.  metaxalone (SKELAXIN) 800 mg tablet Take 800 mg by mouth three (3) times daily.  meloxicam (MOBIC) 15 mg tablet Take 15 mg by mouth daily.  adalimumab (HUMIRA PEN SC) 40 mg by SubCUTAneous route every seven (7) days.  fluticasone propionate (FLONASE) 50 mcg/actuation nasal spray 2 Sprays by Both Nostrils route daily. 1 Bottle 0       Allergies   Allergen Reactions    Gabapentin Palpitations       Social History     Tobacco Use    Smoking status: Never Smoker    Smokeless tobacco: Never Used   Substance Use Topics    Alcohol use:  Yes     Alcohol/week: 2.0 standard drinks     Types: 2 Shots of liquor per week     Frequency: 2-4 times a month     Comment: social    Drug use: Not Currently       Family History   Problem Relation Age of Onset    Asthma Mother     Stroke Father     Alcohol abuse Father     Substance Abuse Father     Diabetes Maternal Grandmother     Hypertension Maternal Grandmother     High Cholesterol Maternal Grandmother     Stroke Maternal Grandmother     Cancer Maternal Grandfather 79        prostate cancer and skin    Colon Cancer Maternal Grandfather     Depression Maternal Uncle        Family Status   Relation Name Status  Mother  Alive    Father   at age 61        unsure    Sister  Alive    MGM  Alive    Iesha 52  (Not Specified)         Medications:  Outpatient Medications Marked as Taking for the 20 encounter (Virtual Visit) with Dakota Carbajal NP   Medication Sig Dispense Refill    traMADoL (ULTRAM) 50 mg tablet TAKE ONE TABLET BY MOUTH EVERY 12 HOURS *MAX 100MG PER DAY* 60 Tab 0    OTHER 1 Tab daily. Tumeric      magnesium 250 mg tab Take 1 Tab by mouth daily.  triamcinolone acetonide (KENALOG) 0.1 % topical cream Apply  to affected area two (2) times a day. use thin layer 15 g 0    multivitamin (ONE A DAY) tablet Take 1 Tab by mouth daily.  metaxalone (SKELAXIN) 800 mg tablet Take 800 mg by mouth three (3) times daily.  meloxicam (MOBIC) 15 mg tablet Take 15 mg by mouth daily.  adalimumab (HUMIRA PEN SC) 40 mg by SubCUTAneous route every seven (7) days. Review of Systems  Review of Systems   Constitutional:        Weight gain    All other systems reviewed and are negative. Objective  Visit Vitals  Ht 6' (1.829 m)   Wt 118.4 kg (261 lb)   BMI 35.40 kg/m²    (As obtained by patient/caregiver at home)  No LMP for male patient. Physical Exam  Physical Exam  Vitals signs and nursing note reviewed. Constitutional:       Appearance: Normal appearance. He is obese. HENT:      Head: Normocephalic and atraumatic. Neck:      Musculoskeletal: Normal range of motion. Pulmonary:      Effort: Pulmonary effort is normal.   Neurological:      Mental Status: He is alert and oriented to person, place, and time. Psychiatric:         Mood and Affect: Mood normal.         Behavior: Behavior normal.         Assessment / Plan    Encounter Diagnoses   Name Primary?  Severe obesity with body mass index (BMI) of 35.0 to 39.9 with serious comorbidity (HCC) Yes    BMI 35.0-35.9,adult     Weight loss counseling, encounter for     Weight loss advised        1. Weight management      Today, the patient is  Height: 6' (182.9 cm) tall, Weight: 118.4 kg (261 lb) lbs for a Body mass index is 35.4 kg/m². is suffering from obesity  which is further complicated by diabetes and hypercholesterolemia. Degree of control: trouble focusing on LCD, changes to home and work schedule due to [address], trying to adjust, advise LCD and discussed behavior changes    Progress was reviewed with patient. Goal(s) for next appointment:   -10    I have reviewed/discussed the above normal BMI with the patient. I have recommended the following interventions: dietary management education, guidance, and counseling, encourage exercise, monitor weight and prescribed dietary intake . [employer]  Services were provided through a video synchronous discussion virtually to substitute for in-person clinic visit. Pursuant to the emergency declaration under the [facility], [facility] waiver authority and the [employer] and [employer] Act, this Virtual  Visit was conducted, with patient's consent, to reduce the patient's risk of exposure to COVID-19 and provide continuity of care for an established patient. Skylar Palmer NP     Dragon medical dictation software was used for portions of this report. Unintended transcription errors may occur.

## 2020-04-08 NOTE — PROGRESS NOTES
Chief Complaint   Patient presents with    Weight Management     monthly visit     Nursing Note for Medical Monitoring in the [facility] Weight Loss Program      Courtney Dorantes is a 39 y.o. male who is enrolled in [facility]    Courtney Dorantes was prescribed the VLCD / LCD. Did you have any problems adhering to the program last week? no  If yes, please explain:     Since your last visit, have you experienced any complications? no    Have you received any other medical care this week? no  If yes, where and for what? Have you had any change in your medications since your last visit? no  If yes what? Are you taking an appetite suppressant? no   If yes:  Do you need a refill? BP Readings from Last 3 Encounters:   03/10/20 120/80   03/05/20 124/84   02/26/20 (!) 120/92        Eating Habits Over Last Week:  Did you take in 64 oz of non-caloric fluids? yes     Did you consume your prescribed meal replacement regimen each day?  yes       Physical Activity Over the Past Week:    Aerobic exercise: 0 min  Resistance exercise: no workouts / week

## 2020-04-20 DIAGNOSIS — L40.50 PSORIATIC ARTHRITIS (HCC): ICD-10-CM

## 2020-04-20 RX ORDER — TRAMADOL HYDROCHLORIDE 50 MG/1
TABLET ORAL
Qty: 60 TAB | Refills: 0 | Status: SHIPPED | OUTPATIENT
Start: 2020-04-20 | End: 2020-05-26 | Stop reason: SDUPTHER

## 2020-05-22 ENCOUNTER — VIRTUAL VISIT (OUTPATIENT)
Dept: SURGERY | Age: 45
End: 2020-05-22

## 2020-05-22 VITALS — BODY MASS INDEX: 35.08 KG/M2 | HEIGHT: 72 IN | WEIGHT: 259 LBS

## 2020-05-22 DIAGNOSIS — Z71.3 WEIGHT LOSS COUNSELING, ENCOUNTER FOR: ICD-10-CM

## 2020-05-22 DIAGNOSIS — E66.01 SEVERE OBESITY WITH BODY MASS INDEX (BMI) OF 35.0 TO 39.9 WITH SERIOUS COMORBIDITY (HCC): Primary | ICD-10-CM

## 2020-05-22 DIAGNOSIS — Z78.9 WEIGHT LOSS ADVISED: ICD-10-CM

## 2020-05-22 DIAGNOSIS — E11.40 TYPE 2 DIABETES MELLITUS WITH DIABETIC NEUROPATHY, UNSPECIFIED WHETHER LONG TERM INSULIN USE (HCC): ICD-10-CM

## 2020-05-22 NOTE — PROGRESS NOTES
Chief Complaint   Patient presents with    Weight Management     monthly follow up     Nursing Note for Medical Monitoring in the [facility] Weight Loss Program      Courtney Dorantes is a 39 y.o. male who is enrolled in [facility] Loss Program    Courtney Dorantes was prescribed the VLCD / LCD. Did you have any problems adhering to the program last week? no  If yes, please explain:     Since your last visit, have you experienced any complications? no    Have you received any other medical care this week? Yes   If yes, where and for what? \"annual eye appointment\"    Have you had any change in your medications since your last visit? no  If yes what? Are you taking an appetite suppressant? no   If yes:  Do you need a refill? BP Readings from Last 3 Encounters:   03/10/20 120/80   03/05/20 124/84   02/26/20 (!) 120/92        Eating Habits Over Last Week:  Did you take in 64 oz of non-caloric fluids? yes     Did you consume your prescribed meal replacement regimen each day?  yes       Physical Activity Over the Past Week:    Aerobic exercise: 120 min  Resistance exercise: 60 minutes workouts / week

## 2020-05-22 NOTE — PROGRESS NOTES
Consent:  Samantha Kan  and/or his healthcare decision maker is aware that this patient-initiated Telehealth encounter is a billable service, with coverage as determined by his insurance carrier. He is aware that he may receive a bill and has provided verbal consent to proceed: Yes    Provider location while conducting visit: in the office  Patient location: Home  Other person participating in the telehealth services: Aleena Loera    This virtual visit was conducted via interactive/real-time audio/video using Doxy. me   Visit start: [address]   Visit YME:4225       Weight Loss Program Progress Note:       CC: Weight Management  Samantha Kan is a 39 y.o. male who is here for his  f/up medical provider visit for the LCD Program who was seen by synchronous (real-time) audio-video technology on 5/22/2020.      -3lbs     Weight History  Weight Metrics 5/22/2020 4/8/2020 3/10/2020 3/10/2020 3/5/2020 3/4/2020 2/26/2020   Weight 259 lb 261 lb - 247 lb - 248 lb 11.2 oz 247 lb   Waist Measure Inches - - 44.25 - 44.5 - 45.5   BMI 35.13 kg/m2 35.4 kg/m2 - 33.5 kg/m2 - 33.73 kg/m2 33.5 kg/m2     Highest wt:  286lbs   Goal wt: 180  Start wt: 279lbs   EKG due: 183 pounds    SIdeal body weight: 77.6 kg (171 lb 1.2 oz)  Adjusted ideal body weight: 93.6 kg (206 lb 3.9 oz)  Body mass index is 35.13 kg/m².       History    Past Medical History:   Diagnosis Date    Asthma     Diabetes mellitus     no meds    Dupuytren's contracture     Essential hypertension     no meds    HTN (hypertension)     Ill-defined condition     neuropathy    Psoriatic arthritis, destructive type (HCC)     Sarcoidosis     says he had an arm mass removed and the path showed sarcoid, negative CXR       Past Surgical History:   Procedure Laterality Date    CLOSED RX NOSE/JAW FRAC+WIRES Right 1994    broken jaw had to wire it    HX CARPAL TUNNEL RELEASE      HX ORTHOPAEDIC      fx skull/ broken jaw     HX ORTHOPAEDIC Right     carpal tunnel    HX OTHER SURGICAL      lipoma removed     CA EXC SKIN BENIG 0.6-1CM TRUNK,ARM,LEG N/A 10/11/2018    Dr. Albin Matamoros 1.1-2CM TRUNK,ARM,LEG N/A 10/11/2018    Dr. Albin Matamoros 2.1-3CM TRUNK,ARM,LEG N/A 10/11/2018    Dr. Aggie Donovan       Current Outpatient Medications   Medication Sig Dispense Refill    OTHER 1 Tab daily. Tumeric      magnesium 250 mg tab Take 1 Tab by mouth daily.  triamcinolone acetonide (KENALOG) 0.1 % topical cream Apply  to affected area two (2) times a day. use thin layer 15 g 0    multivitamin (ONE A DAY) tablet Take 1 Tab by mouth daily.  metaxalone (SKELAXIN) 800 mg tablet Take 800 mg by mouth three (3) times daily.  meloxicam (MOBIC) 15 mg tablet Take 15 mg by mouth daily.  adalimumab (HUMIRA PEN SC) 40 mg by SubCUTAneous route every seven (7) days. Allergies   Allergen Reactions    Gabapentin Palpitations       Social History     Tobacco Use    Smoking status: Never Smoker    Smokeless tobacco: Never Used   Substance Use Topics    Alcohol use:  Yes     Alcohol/week: 2.0 standard drinks     Types: 2 Shots of liquor per week     Frequency: 2-4 times a month     Comment: social    Drug use: Not Currently       Family History   Problem Relation Age of Onset    Asthma Mother     Stroke Father     Alcohol abuse Father     Substance Abuse Father     Diabetes Maternal Grandmother     Hypertension Maternal Grandmother     High Cholesterol Maternal Grandmother     Stroke Maternal Grandmother     Cancer Maternal Grandfather 79        prostate cancer and skin    Colon Cancer Maternal Grandfather     Depression Maternal Uncle        Family Status   Relation Name Status    Mother  Alive    Father   at age 61        unsure    Sister  Alive    MGM  Alive    [facility] 52  (Not Specified)         Medications:  Outpatient Medications Marked as Taking for the 20 encounter (Virtual Visit) with Jasmin Edmonds Nelida Manzo NP   Medication Sig Dispense Refill    OTHER 1 Tab daily. Tumeric      magnesium 250 mg tab Take 1 Tab by mouth daily.  triamcinolone acetonide (KENALOG) 0.1 % topical cream Apply  to affected area two (2) times a day. use thin layer 15 g 0    multivitamin (ONE A DAY) tablet Take 1 Tab by mouth daily.  metaxalone (SKELAXIN) 800 mg tablet Take 800 mg by mouth three (3) times daily.  meloxicam (MOBIC) 15 mg tablet Take 15 mg by mouth daily.  adalimumab (HUMIRA PEN SC) 40 mg by SubCUTAneous route every seven (7) days. Review of Systems  Review of Systems   All other systems reviewed and are negative. Objective  Visit Vitals  Ht 6' (1.829 m)   Wt 117.5 kg (259 lb) Comment: pt took weight at home   BMI 35.13 kg/m²    (As obtained by patient/caregiver at home)  No LMP for male patient. Physical Exam  Physical Exam  Vitals signs and nursing note reviewed. Constitutional:       Appearance: Normal appearance. He is obese. HENT:      Head: Normocephalic and atraumatic. Neck:      Musculoskeletal: Normal range of motion. Pulmonary:      Effort: Pulmonary effort is normal.   Neurological:      Mental Status: He is alert and oriented to person, place, and time. Psychiatric:         Mood and Affect: Mood normal.         Behavior: Behavior normal.         Assessment / Plan    Encounter Diagnoses   Name Primary?  Severe obesity with body mass index (BMI) of 35.0 to 39.9 with serious comorbidity (HCC) Yes    BMI 35.0-35.9,adult     Weight loss counseling, encounter for     Weight loss advised     Type 2 diabetes mellitus with diabetic neuropathy, unspecified whether long term insulin use ([facility] [address]          1. Weight management   Today, the patient is  Height: 6' (182.9 cm) tall, Weight: 117.5 kg (259 lb)(pt took weight at home) lbs for a Body mass index is 35.13 kg/m². is suffering from obesity  which is further complicated by diabetes.      Degree of control: reports DM better controled and eyesight improving, Continue LCD    Progress was reviewed with patient. Goal(s) for next appointment:   -5lbs       I have reviewed/discussed the above normal BMI with the patient. I have recommended the following interventions: dietary management education, guidance, and counseling, encourage exercise, monitor weight and prescribed dietary intake . Eliud Willams Services were provided through a video synchronous discussion virtually to substitute for in-person clinic visit. Pursuant to the emergency declaration under the [facility], [facility] waiver authority and the [employer] and [employer] Act, this Virtual  Visit was conducted, with patient's consent, to reduce the patient's risk of exposure to COVID-19 and provide continuity of care for an established patient. Lino Clayton NP     Dragon medical dictation software was used for portions of this report. Unintended transcription errors may occur.

## 2020-05-23 DIAGNOSIS — L40.50 PSORIATIC ARTHRITIS (HCC): ICD-10-CM

## 2020-05-25 RX ORDER — TRAMADOL HYDROCHLORIDE 50 MG/1
TABLET ORAL
Qty: 60 TAB | Refills: 0 | OUTPATIENT
Start: 2020-05-25

## 2020-05-26 ENCOUNTER — VIRTUAL VISIT (OUTPATIENT)
Dept: FAMILY MEDICINE CLINIC | Age: 45
End: 2020-05-26

## 2020-05-26 DIAGNOSIS — L40.50 PSORIATIC ARTHRITIS (HCC): ICD-10-CM

## 2020-05-26 RX ORDER — TRAMADOL HYDROCHLORIDE 50 MG/1
50 TABLET ORAL
Qty: 60 TAB | Refills: 0 | Status: SHIPPED | OUTPATIENT
Start: 2020-05-26 | End: 2020-06-25

## 2020-05-26 NOTE — TELEPHONE ENCOUNTER
Spoke with patient. He has been scheduled for Tuesday, May 26, 2020 04:00 PM for virtual f/u for psoriatic arthritis.

## 2020-05-26 NOTE — PROGRESS NOTES
Shiraz Gan is a 39 y.o. male who was evaluated by an audio-video encounter for concerns as above. Patient identification was verified prior to start of the visit. A caregiver was present when appropriate. Due to this being a TeleHealth encounter (During POMZH-51 public health emergency), evaluation of the following organ systems was limited: Vitals/Constitutional/EENT/Resp/CV/GI//MS/Neuro/Skin/Heme-Lymph-Imm. Pursuant to the emergency declaration under the [facility] [place] [id number] waiver authority and the [employer] and [employer] Act, this Virtual Visit was conducted, with patient's (and/or legal guardian's) consent, to reduce the patient's risk of exposure to COVID-19 and provide necessary medical care. Services were provided through a synchronous discussion virtually to substitute for in-person clinic visit. I was at home. The patient was at home. SUBJECTIVE  Chief Complaint   Patient presents with    Pain (Chronic)     tramadol refills     Patient presents for follow-up of chronic pain. He has psoriatic arthritis and sees rheumatology however they will not manage his tramadol. He sees us for that. He reports relief of pain on tramadol. We started him on gabapentin and he reported multiple side effects each day he took it that resolved when he stopped. He is still on Humira. He is sheltering due to the pandemic. He is concerned that maybe work may start to ask him to go back and wondered our opinion.     ROS:  History obtained from the patient  · General: negative for - chills, fever  · Respiratory: no cough, shortness of breath, or wheezing    Past Medical History:   Diagnosis Date    Asthma     Diabetes mellitus     no meds    Dupuytren's contracture     Essential hypertension     no meds    HTN (hypertension)     Ill-defined condition     neuropathy    Psoriatic arthritis, destructive type ([facility] [address]     Sarcoidosis     says he had an arm mass removed and the path showed sarcoid, negative CXR       Current Outpatient Medications:     OTHER, 1 Tab daily. Tumeric, Disp: , Rfl:     magnesium 250 mg tab, Take 1 Tab by mouth daily. , Disp: , Rfl:     triamcinolone acetonide (KENALOG) 0.1 % topical cream, Apply  to affected area two (2) times a day. use thin layer, Disp: 15 g, Rfl: 0    multivitamin (ONE A DAY) tablet, Take 1 Tab by mouth daily. , Disp: , Rfl:     metaxalone (SKELAXIN) 800 mg tablet, Take 800 mg by mouth three (3) times daily. , Disp: , Rfl:     meloxicam (MOBIC) 15 mg tablet, Take 15 mg by mouth daily. , Disp: , Rfl:     adalimumab (HUMIRA PEN SC), 40 mg by SubCUTAneous route every seven (7) days. , Disp: , Rfl:     Allergies   Allergen Reactions    Gabapentin Palpitations       Past Surgical History:   Procedure Laterality Date    CLOSED RX NOSE/JAW FRAC+WIRES Right 1994    broken jaw had to wire it    HX CARPAL TUNNEL RELEASE      HX ORTHOPAEDIC      fx skull/ broken jaw     HX ORTHOPAEDIC Right     carpal tunnel    HX OTHER SURGICAL      lipoma removed     DE EXC SKIN BENIG 0.6-1CM TRUNK,ARM,LEG N/A 10/11/2018    Dr. Albin Matamoors 1.1-2CM TRUNK,ARM,LEG N/A 10/11/2018    Dr. Albin Matamoros 2.1-3CM Brady Gonsales N/A 10/11/2018    Dr. Sammie Berrios History     Socioeconomic History    Marital status: SINGLE     Spouse name: Not on file    Number of children: Not on file    Years of education: Not on file    Highest education level: Not on file   Occupational History    Occupation:    Social Needs    Financial resource strain: Not on file    Food insecurity     Worry: Not on file     Inability: Not on file   [employer] needs     Medical: Not on file     Non-medical: Not on file   Tobacco Use    Smoking status: Never Smoker    Smokeless tobacco: Never Used   Substance and Sexual Activity    Alcohol use:  Yes     Alcohol/week: 2.0 standard drinks     Types: 2 Shots of liquor per week     Frequency: 2-4 times a month     Comment: social    Drug use: Not Currently    Sexual activity: Yes     Partners: Female     Birth control/protection: Condom   Lifestyle    Physical activity     Days per week: Not on file     Minutes per session: Not on file    Stress: Not on file   Relationships    Social connections     Talks on phone: Not on file     Gets together: Not on file     Attends Ca[employer] service: Not on file     Active member of club or organization: Not on file     Attends meetings of clubs or organizations: Not on file     Relationship status: Not on file    Intimate partner violence     Fear of current or ex partner: Not on file     Emotionally abused: Not on file     Physically abused: Not on file     Forced sexual activity: Not on file   Other Topics Concern    Not on file   Social History Narrative    Not on file     Past Surgical History:   Procedure Laterality Date    CLOSED RX NOSE/JAW FRAC+WIRES Right 1994    broken jaw had to wire it    HX CARPAL TUNNEL RELEASE      HX ORTHOPAEDIC      fx skull/ broken jaw     HX ORTHOPAEDIC Right     carpal tunnel    HX OTHER SURGICAL      lipoma removed     TX EXC SKIN BENIG 0.6-1CM TRUNK,ARM,LEG N/A 10/11/2018    Dr. Oliver Damico 1.1-2CM TRUNK,ARM,LEG N/A 10/11/2018    Dr. Oliver Damico 2.1-3CM TRUNK,ARM,LEG N/A 10/11/2018    Dr. Veola Jeans:  Alert, cooperative, well appearing, in no apparent distress. Chest:  Normal breathing effort. Comfortable. Psych: normal affect. Mood good. Oriented x 3. Judgement and insight intact. ASSESSMENT / PLAN    ICD-10-CM ICD-9-CM    1. Psoriatic arthritis ([facility]) L40.50 696.0 traMADoL (ULTRAM) 50 mg tablet     Psoriatic arthritis - cont per rheumatology. We can manage his tramadol since he was unable to tolerate gabapentin and his rheumatologist will not manage chronic pain.   Controlled substance agreement on file and no red flags on . Refills sent in. If needed we can write him a letter to continue to shelter since he needs to be on Humira. Bronchitis - resolved. All chart history elements were reviewed by me at the time of the visit even though marked at time of note closure. Patient understands our medical plan. Patient has provided input and agrees with goals. Alternatives have been explained and offered. All questions answered. The patient is to call if condition worsens or fails to improve. RTC in 2 months for med management.

## 2020-08-14 ENCOUNTER — VIRTUAL VISIT (OUTPATIENT)
Dept: SURGERY | Age: 45
End: 2020-08-14

## 2020-08-14 VITALS — BODY MASS INDEX: 34.54 KG/M2 | HEIGHT: 72 IN | WEIGHT: 255 LBS

## 2020-08-14 DIAGNOSIS — Z71.3 WEIGHT LOSS COUNSELING, ENCOUNTER FOR: ICD-10-CM

## 2020-08-14 DIAGNOSIS — Z71.3 DIETARY COUNSELING AND SURVEILLANCE: ICD-10-CM

## 2020-08-14 DIAGNOSIS — E66.9 OBESITY (BMI 30-39.9): Primary | ICD-10-CM

## 2020-08-14 DIAGNOSIS — Z78.9 WEIGHT LOSS ADVISED: ICD-10-CM

## 2020-08-14 DIAGNOSIS — E11.40 TYPE 2 DIABETES MELLITUS WITH DIABETIC NEUROPATHY, UNSPECIFIED WHETHER LONG TERM INSULIN USE (HCC): ICD-10-CM

## 2020-08-14 DIAGNOSIS — I10 HYPERTENSION, UNSPECIFIED TYPE: ICD-10-CM

## 2020-08-14 RX ORDER — DICLOFENAC SODIUM 75 MG/1
75 TABLET, DELAYED RELEASE ORAL 2 TIMES DAILY
COMMUNITY
Start: 2020-08-05 | End: 2020-11-12

## 2020-08-14 NOTE — PROGRESS NOTES
Nikhil Lr is a 39 y.o. male  Chief Complaint   Patient presents with    Weight Management     monthly follow up     Nursing Note for Medical Monitoring in the [facility] Weight Loss Program      Nikhil Lr is a 39 y.o. male who is enrolled in [facility] Weight Loss Program    Nikhil Lr was prescribed the VLCD / LCD. Did you have any problems adhering to the program last week? yes  If yes, please explain: \"due to broken leg\"    Since your last visit, have you experienced any complications? no    Have you received any other medical care this week? yes  If yes, where and for what? \"due to broken leg\"    Have you had any change in your medications since your last visit? yes  If yes what? \"Diclofenec)    Are you taking an appetite suppressant? no   If yes:  Do you need a refill? BP Readings from Last 3 Encounters:   03/10/20 120/80   03/05/20 124/84   02/26/20 (!) 120/92        Eating Habits Over Last Week:  Did you take in 64 oz of non-caloric fluids? yes     Did you consume your prescribed meal replacement regimen each day?  yes       Physical Activity Over the Past Week:    Aerobic exercise: 0 min  Resistance exercise: no workouts / week

## 2020-08-14 NOTE — PROGRESS NOTES
Consent:  Toño Cisneros  and/or his healthcare decision maker is aware that this patient-initiated Telehealth encounter is a billable service, with coverage as determined by his insurance carrier. He is aware that he may receive a bill and has provided verbal consent to proceed: Yes    Provider location while conducting visit: in the office  Patient location: Home  Other person participating in the telehealth services: Winston Shelton    This virtual visit was conducted via interactive/real-time audio/video using Doxy. me   Visit start: 0940 Visit End:1000     Weight Loss Program Progress Note:       CC: Weight Management    Toño Cisneros is a 39 y.o. male who is here for his  f/up medical provider visit for the LCD Program who was seen by synchronous (real-time) audio-video technology on 8/14/2020. Recently fracture leg, limited PA, and notices weight gain during this time but still improving from 4/8/20 weigh in.      -4lbs     Weight History  Weight Metrics 8/14/2020 5/22/2020 4/8/2020 3/10/2020 3/10/2020 3/5/2020 3/4/2020   Weight 255 lb 259 lb 261 lb - 247 lb - 248 lb 11.2 oz   Waist Measure Inches - - - 44.25 - 44.5 -   BMI 34.58 kg/m2 35.13 kg/m2 35.4 kg/m2 - 33.5 kg/m2 - 33.73 kg/m2       Starting wt: 279  Goal wt: 180  EKG due: 220  Highest wt:  286lbs   Ideal body weight: 77.6 kg (171 lb 1.2 oz)  Adjusted ideal body weight: 92.8 kg (204 lb 10.3 oz)  Body mass index is 34.58 kg/m². History    Past Medical History:   Diagnosis Date    Asthma     Broken leg left    broke left leg and tore ligaments 1 week ago.     Diabetes mellitus     no meds    Dupuytren's contracture     Essential hypertension     no meds    HTN (hypertension)     Ill-defined condition     neuropathy    Psoriatic arthritis, destructive type (HCC)     Sarcoidosis     says he had an arm mass removed and the path showed sarcoid, negative CXR       Past Surgical History:   Procedure Laterality Date    CLOSED RX NOSE/JAW FRAC+WIRES Right 1994    broken jaw had to wire it    HX CARPAL TUNNEL RELEASE      HX ORTHOPAEDIC      fx skull/ broken jaw     HX ORTHOPAEDIC Right     carpal tunnel    HX OTHER SURGICAL      lipoma removed     [place] EXC SKIN BENIG 0.6-1CM TRUNK,ARM,LEG N/A 10/11/2018    Dr. Ekaterina Yen 1.1-2CM TRUNK,ARM,LEG N/A 10/11/2018    Dr. Ekaterina Yen 2.1-3CM TRUNK,ARM,LEG N/A 10/11/2018    Dr. Ritchie Monday       Current Outpatient Medications   Medication Sig Dispense Refill    diclofenac EC (VOLTAREN) 75 mg EC tablet Take 75 mg by mouth two (2) times a day.  OTHER 1 Tab daily. Tumeric      magnesium 250 mg tab Take 1 Tab by mouth daily.  triamcinolone acetonide (KENALOG) 0.1 % topical cream Apply  to affected area two (2) times a day. use thin layer 15 g 0    multivitamin (ONE A DAY) tablet Take 1 Tab by mouth daily.  metaxalone (SKELAXIN) 800 mg tablet Take 800 mg by mouth three (3) times daily.  meloxicam (MOBIC) 15 mg tablet Take 15 mg by mouth daily.  adalimumab (HUMIRA PEN SC) 40 mg by SubCUTAneous route every seven (7) days. Allergies   Allergen Reactions    Gabapentin Palpitations       Social History     Tobacco Use    Smoking status: Never Smoker    Smokeless tobacco: Never Used   Substance Use Topics    Alcohol use:  Yes     Alcohol/week: 2.0 standard drinks     Types: 2 Shots of liquor per week     Frequency: 2-4 times a month     Comment: social    Drug use: Not Currently       Family History   Problem Relation Age of Onset    Asthma Mother     Stroke Father     Alcohol abuse Father     Substance Abuse Father     Diabetes Maternal Grandmother     Hypertension Maternal Grandmother     High Cholesterol Maternal Grandmother     Stroke Maternal Grandmother     Cancer Maternal Grandfather 79        prostate cancer and skin    Colon Cancer Maternal Grandfather     Depression Maternal Uncle        Family Status   Relation Name Status    Mother  Alive    Father   at age 61        unsure    Sister  Alive    MGM  Alive    MGF  [address]  (Not Specified)         Medications:  Outpatient Medications Marked as Taking for the 20 encounter (Virtual Visit) with Nguyen Galindo NP   Medication Sig Dispense Refill    diclofenac EC (VOLTAREN) 75 mg EC tablet Take 75 mg by mouth two (2) times a day.  OTHER 1 Tab daily. Tumeric      magnesium 250 mg tab Take 1 Tab by mouth daily.  triamcinolone acetonide (KENALOG) 0.1 % topical cream Apply  to affected area two (2) times a day. use thin layer 15 g 0    multivitamin (ONE A DAY) tablet Take 1 Tab by mouth daily.  metaxalone (SKELAXIN) 800 mg tablet Take 800 mg by mouth three (3) times daily.  meloxicam (MOBIC) 15 mg tablet Take 15 mg by mouth daily.  adalimumab (HUMIRA PEN SC) 40 mg by SubCUTAneous route every seven (7) days. Review of Systems  Review of Systems   Constitutional: Positive for weight loss. Negative for chills, fever and malaise/fatigue. Respiratory: Negative. Cardiovascular: Negative. Gastrointestinal: Negative. Musculoskeletal: Positive for falls and joint pain. Neurological: Negative. All other systems reviewed and are negative. Objective  Visit Vitals  Ht 6' (1.829 m)   Wt 115.7 kg (255 lb)   BMI 34.58 kg/m²    (As obtained by patient/caregiver at home)  No LMP for male patient. Physical Exam  Physical Exam  Vitals signs and nursing note reviewed. Constitutional:       Appearance: Normal appearance. He is obese. HENT:      Head: Normocephalic and atraumatic. Neck:      Musculoskeletal: Normal range of motion. Pulmonary:      Effort: Pulmonary effort is normal.   Neurological:      Mental Status: He is alert and oriented to person, place, and time.    Psychiatric:         Mood and Affect: Mood normal.         Behavior: Behavior normal.         Assessment / Plan    Encounter Diagnoses   Name Primary?  Obesity (BMI 30-39. 9) Yes    Weight loss counseling, encounter for     Weight loss advised     Type 2 diabetes mellitus with diabetic neuropathy, unspecified whether long term insulin use ([facility] [address]     BMI 34.0-34.9,adult     Dietary counseling and surveillance     Hypertension, unspecified type      1. Weight management   Today, the patient is  Height: 6' (182.9 cm) tall, Weight: 115.7 kg (255 lb) lbs for a Body mass index is 34.58 kg/m². is suffering from obesity  which is further complicated by diabetes, hypertension and weight related arthopathies. Degree of control: continues with slow weight loss, recommend UE PA while LE are limited, 3+1reviewed,    Progress was reviewed with patient. Goal(s) for next appointment:   -6lbs       I have reviewed/discussed the above normal BMI with the patient. I have recommended the following interventions: dietary management education, guidance, and counseling, encourage exercise, monitor weight and prescribed dietary intake . Luan Solis Services were provided through a video synchronous discussion virtually to substitute for in-person clinic visit. Pursuant to the emergency declaration under the [facility] waiver authority and the [employer] and [employer] Act, this Virtual  Visit was conducted, with patient's consent, to reduce the patient's risk of exposure to COVID-19 and provide continuity of care for an established patient. Rayna Luis NP     Dragon medical dictation software was used for portions of this report. Unintended transcription errors may occur.

## 2020-09-22 ENCOUNTER — OFFICE VISIT (OUTPATIENT)
Dept: SURGERY | Age: 45
End: 2020-09-22
Payer: COMMERCIAL

## 2020-09-22 VITALS
WEIGHT: 251.6 LBS | BODY MASS INDEX: 34.08 KG/M2 | TEMPERATURE: 97.3 F | OXYGEN SATURATION: 96 % | DIASTOLIC BLOOD PRESSURE: 80 MMHG | SYSTOLIC BLOOD PRESSURE: 128 MMHG | HEART RATE: 74 BPM | RESPIRATION RATE: 16 BRPM | HEIGHT: 72 IN

## 2020-09-22 DIAGNOSIS — I10 HYPERTENSION, UNSPECIFIED TYPE: ICD-10-CM

## 2020-09-22 DIAGNOSIS — K75.81 NASH (NONALCOHOLIC STEATOHEPATITIS): ICD-10-CM

## 2020-09-22 DIAGNOSIS — Z78.9 WEIGHT LOSS ADVISED: ICD-10-CM

## 2020-09-22 DIAGNOSIS — E66.9 OBESITY (BMI 30-39.9): ICD-10-CM

## 2020-09-22 DIAGNOSIS — Z71.3 WEIGHT LOSS COUNSELING, ENCOUNTER FOR: ICD-10-CM

## 2020-09-22 DIAGNOSIS — E66.9 OBESITY (BMI 30-39.9): Primary | ICD-10-CM

## 2020-09-22 PROCEDURE — 99214 OFFICE O/P EST MOD 30 MIN: CPT | Performed by: NURSE PRACTITIONER

## 2020-09-22 NOTE — PROGRESS NOTES
[facility] Progress Note:   F/up Provider Visit    CC: Weight Management    Florence Ryan is a 39 y.o. male who is here for his  f/up medical provider visit for the LCD Program. he is attending online as required. Weight loss is slow, still trying upper body physical activity ROM and PA limited due to leg fracture. Considering moving back to [facility]. Weight History  Weight Metrics 9/22/2020 9/22/2020 8/14/2020 5/22/2020 4/8/2020 3/10/2020 3/10/2020   Weight - 251 lb 9.6 oz 255 lb 259 lb 261 lb - 247 lb   Waist Measure Inches 44.5 - - - - 44.25 -   BMI - 34.12 kg/m2 34.58 kg/m2 35.13 kg/m2 35.4 kg/m2 - 33.5 kg/m2       Starting wt: 279  Goal wt: 180  EKG due: 220  Highest weight: 286lbs   Ideal body weight: 77.6 kg (171 lb 1.2 oz)  Adjusted ideal body weight: 92.2 kg (203 lb 4.6 oz)  Body mass index is 34.12 kg/m². History    Past Medical History:   Diagnosis Date    Asthma     Broken leg left    broke left leg and tore ligaments 1 week ago.     Diabetes mellitus     no meds    Dupuytren's contracture     Essential hypertension     no meds    Fracture of left leg 08/2020    HTN (hypertension)     Ill-defined condition     neuropathy    Psoriatic arthritis, destructive type (HCC)     Sarcoidosis     says he had an arm mass removed and the path showed sarcoid, negative CXR       Past Surgical History:   Procedure Laterality Date    CLOSED RX NOSE/JAW FRAC+WIRES Right 1994    broken jaw had to wire it    HX CARPAL TUNNEL RELEASE      HX ORTHOPAEDIC      fx skull/ broken jaw     HX ORTHOPAEDIC Right     carpal tunnel    HX OTHER SURGICAL      lipoma removed     PA EXC SKIN BENIG 0.6-1CM TRUNK,ARM,LEG N/A 10/11/2018    Dr. Janelle Manzo 1.1-2CM TRUNK,ARM,LEG N/A 10/11/2018    Dr. Janelle Manzo 2.1-3CM TRUNK,ARM,LEG N/A 10/11/2018    Dr. Brandie Qureshi       Current Outpatient Medications   Medication Sig Dispense Refill    diclofenac EC (VOLTAREN) 75 mg EC tablet Take 75 mg by mouth two (2) times a day.  OTHER 1 Tab daily. Tumeric      magnesium 250 mg tab Take 1 Tab by mouth daily.  triamcinolone acetonide (KENALOG) 0.1 % topical cream Apply  to affected area two (2) times a day. use thin layer 15 g 0    multivitamin (ONE A DAY) tablet Take 1 Tab by mouth daily.  metaxalone (SKELAXIN) 800 mg tablet Take 800 mg by mouth three (3) times daily.  meloxicam (MOBIC) 15 mg tablet Take 15 mg by mouth daily.  adalimumab (HUMIRA PEN SC) 40 mg by SubCUTAneous route every seven (7) days. Allergies   Allergen Reactions    Gabapentin Palpitations       Social History     Tobacco Use    Smoking status: Never Smoker    Smokeless tobacco: Never Used   Substance Use Topics    Alcohol use: Yes     Alcohol/week: 2.0 standard drinks     Types: 2 Shots of liquor per week     Frequency: 2-4 times a month     Comment: social    Drug use: Not Currently       Family History   Problem Relation Age of Onset    Asthma Mother     Stroke Father     Alcohol abuse Father     Substance Abuse Father     Diabetes Maternal Grandmother     Hypertension Maternal Grandmother     High Cholesterol Maternal Grandmother     Stroke Maternal Grandmother     Cancer Maternal Grandfather 79        prostate cancer and skin    Colon Cancer Maternal Grandfather     Depression Maternal Uncle        Family Status   Relation Name Status    Mother  Alive    Father   at age 61        unsure    Sister  Alive    MGM  Alive    MGF  Alive    MUnc  (Not Specified)         Medications:  Outpatient Medications Marked as Taking for the 20 encounter (Office Visit) with Caden Vaughan NP   Medication Sig Dispense Refill    diclofenac EC (VOLTAREN) 75 mg EC tablet Take 75 mg by mouth two (2) times a day.  OTHER 1 Tab daily. Tumeric      magnesium 250 mg tab Take 1 Tab by mouth daily.       triamcinolone acetonide (KENALOG) 0.1 % topical cream Apply  to affected area two (2) times a day. use thin layer 15 g 0    multivitamin (ONE A DAY) tablet Take 1 Tab by mouth daily.  metaxalone (SKELAXIN) 800 mg tablet Take 800 mg by mouth three (3) times daily.  meloxicam (MOBIC) 15 mg tablet Take 15 mg by mouth daily.  adalimumab (HUMIRA PEN SC) 40 mg by SubCUTAneous route every seven (7) days. Review of Systems  Review of Systems   Constitutional: Positive for weight loss. Musculoskeletal: Positive for joint pain. All other systems reviewed and are negative. Objective  Visit Vitals  /80   Pulse 74   Temp 97.3 °F (36.3 °C) (Temporal)   Resp 16   Ht 6' (1.829 m)   Wt 114.1 kg (251 lb 9.6 oz)   SpO2 96%   BMI 34.12 kg/m²     No LMP for male patient. Physical Exam  Physical Exam  Vitals signs and nursing note reviewed. Constitutional:       Appearance: He is obese. HENT:      Head: Normocephalic and atraumatic. Eyes:      Pupils: Pupils are equal, round, and reactive to light. Neck:      Musculoskeletal: Normal range of motion. Cardiovascular:      Rate and Rhythm: Normal rate. Heart sounds: Normal heart sounds. Pulmonary:      Effort: Pulmonary effort is normal.      Breath sounds: Normal breath sounds. Abdominal:      General: Bowel sounds are normal. There is no distension. Tenderness: There is no abdominal tenderness. Musculoskeletal:         General: Signs of injury present. Neurological:      Mental Status: He is alert and oriented to person, place, and time. Mental status is at baseline. Psychiatric:         Mood and Affect: Mood normal.         Behavior: Behavior normal.         No results found for this or any previous visit (from the past 672 hour(s)). Assessment / Plan    Encounter Diagnoses   Name Primary?  Obesity (BMI 30-39. 9) Yes    Weight loss counseling, encounter for     Weight loss advised     Hypertension, unspecified type     SHEPPARD (nonalcoholic steatohepatitis)      1. Weight management   Today, the patient is  Height: 6' (182.9 cm) tall, Weight: 114.1 kg (251 lb 9.6 oz) lbs for a Body mass index is 34.12 kg/m². is suffering from obesity  which is further complicated by hypertension and SHEPPARD. Degree of control: doing well but weight loss slow on LCD, back to VLCD - labs ordered    Progress was reviewed with patient. Goal(s) for next appointment:   -15lbs     I have reviewed/discussed the above normal BMI with the patient. I have recommended the following interventions: dietary management education, guidance, and counseling, encourage exercise, monitor weight and prescribed dietary intake . Yanci Ordonez 2.  Labs    Latest results reviewed with patient   Routine monitoring labs ordered  Orders Placed This Encounter    METABOLIC PANEL, COMPREHENSIVE    MAGNESIUM    CBC WITH AUTOMATED DIFF    TSH 3RD GENERATION    URINALYSIS W/MICROSCOPIC         The primary encounter diagnosis was Obesity (BMI 30-39.9). Diagnoses of Weight loss counseling, encounter for, Weight loss advised, Hypertension, unspecified type, and SHEPPARD (nonalcoholic steatohepatitis) were also pertinent to this visit. Mr. Jerry Jara has a reminder for a \"due or due soon\" health maintenance. I have asked that he contact his primary care provider for follow-up on this health maintenance. Farrah Colorado, ROBER-BC     Dragon medical dictation software was used for portions of this report. Unintended transcription errors may occur.

## 2020-09-22 NOTE — PROGRESS NOTES
Manuel Flood is a 39 y.o. male  Chief Complaint   Patient presents with    Weight Management     monthly follow up     Nursing Note for Medical Monitoring in the [facility]      Manuel Flood is a 39 y.o. male who is enrolled in [facility]    Manuel Flood was prescribed the VLCD / LCD. Did you have any problems adhering to the program last week? no  If yes, please explain:     Since your last visit, have you experienced any complications? no    Have you received any other medical care this week? no  If yes, where and for what? Have you had any change in your medications since your last visit? no  If yes what? Are you taking an appetite suppressant? no   If yes:  Do you need a refill? BP Readings from Last 3 Encounters:   09/22/20 128/80   03/10/20 120/80   03/05/20 124/84        Eating Habits Over Last Week:  Did you take in 64 oz of non-caloric fluids? yes     Did you consume your prescribed meal replacement regimen each day?  yes       Physical Activity Over the Past Week:    Aerobic exercise: 60 min  Resistance exercise: 60 workouts / week

## 2020-09-25 NOTE — PROGRESS NOTES
Patient attended a weekly class as part of the [facility]ically Supervised Weight Loss Program.  This class was facilitated by a Registered Dietitian. Topics taught at class focused on diet, particularly carbohydrate counting and portion control. Classes also focused on behavior changes and the importance of establishing a daily exercise routine. Progress Note: Weekly Medical Monitoring in the [facility] Program    Is there anything that you or the patient needs to let the supervising provider know about? no    Over the past week, have you experienced any side-effects? no    Gabriele Zhang is a 40 y.o. male who is enrolled in [facility]    Gabriele Zhang was prescribed the VLCD / LCD. Visit Vitals  /88   Pulse 74   Ht 6' (1.829 m)   Wt 112.6 kg (248 lb 3.2 oz)   BMI 33.66 kg/m²     Weight Metrics 9/25/2019 9/24/2019 9/18/2019 9/11/2019 9/6/2019 9/4/2019 8/28/2019   Weight 248 lb 3.2 oz 251 lb 252 lb 12.8 oz 251 lb 1.6 oz 254 lb 9.6 oz 255 lb 253 lb 12.8 oz   Waist Measure Inches 45.75 - 45 45.5 - - 44.5   BMI 33.66 kg/m2 34.04 kg/m2 34.29 kg/m2 34.06 kg/m2 34.53 kg/m2 34.58 kg/m2 34.42 kg/m2         Have you received any other medical care this week? yes  If yes, where and for what? Ortho and neuro    Have you had any change in your medications since your last visit? no  If yes what? Did you have any problems adhering to the program last week? no  If yes, please explain:       Eating Habits Over Last Week:  Did you take in 64 oz of non-caloric fluids? yes     Did you consume your prescribed meal replacement regimen each day?  yes       Physical Activity Over the Past Week:    Aerobic exercise: 0 min  Resistance exercise: 0 workouts / week Scheduled patient for blood work on 11/3 at [facility] main [facility]

## 2020-10-02 ENCOUNTER — HOSPITAL ENCOUNTER (OUTPATIENT)
Dept: PHYSICAL THERAPY | Age: 45
Discharge: HOME | End: 2020-10-02
Payer: COMMERCIAL

## 2020-10-02 PROCEDURE — 97535 SELF CARE MNGMENT TRAINING: CPT

## 2020-10-02 PROCEDURE — 97161 PT EVAL LOW COMPLEX 20 MIN: CPT

## 2020-10-02 PROCEDURE — 97110 THERAPEUTIC EXERCISES: CPT

## 2020-10-02 NOTE — PROGRESS NOTES
[facility]  [address]  [place], [address]  [phone] fax    Plan of Care/ Statement of Necessity for Physical Therapy Services    Patient name: Tee Bailey Start of Care: 10/2/2020   Referral source: Sheron Alegria MD : 1975    Medical Diagnosis: Right ankle pain [M25.571]  Left ankle pain [M25.572]  Left knee pain [M25.562]  Payor: Ebony Begin / Plan: Estella Coopertos / Product Type: HMO /  Onset Date:6 weeks    Treatment Diagnosis: Left knee and B ankle pain   Prior Hospitalization: see medical history Provider#: [id number]   Medications: Verified on Patient summary List    Comorbidities: psoriatic arthritis, diabetes, HTN   Prior Level of Function: Pt able to participate in tennis, golf and running without knee and ankle pain     The Plan of Care and following information is based on the information from the initial evaluation. Assessment/ key information: The pt is a 40 y/o M presenting with c/o left knee and B ankle pain for the past 6 weeks. He reports his dogs ran into his legs causing him to fall with both ankles pinned underneath him. He does report having a fracture of the left ankle with significant left knee sprain and right ankle sprain. He presents with good overall mobility through each joint, mild knee flexion deficit compared to right. The pt reports mainly medial joint line left knee pain and some patellar region pain intermittently. He reports his ankle mainly are sore with prolonged standing but he does have some pain with unlevel surfaces and direction changes. Good overall stability during today's exam, (-) Tommy's, valgus stress test positive for pain only. Signs and symptoms consistent with mechanical knee and ankle pain. He would benefit from PT to improve strength, flexibility and pain to return to PLOF.     Evaluation Complexity History MEDIUM  Complexity : 1-2 comorbidities / personal factors will impact the outcome/ POC ; Examination MEDIUM Complexity : 3 Standardized tests and measures addressing body structure, function, activity limitation and / or participation in recreation  ;Presentation LOW Complexity : Stable, uncomplicated  ;Clinical Decision Making MEDIUM Complexity : FOTO score of 26-74  Overall Complexity Rating: LOW   Problem List: pain affecting function, decrease ROM, decrease strength, impaired gait/ balance, decrease ADL/ functional abilitiies, decrease activity tolerance and decrease flexibility/ joint mobility   Treatment Plan may include any combination of the following: Therapeutic exercise, Therapeutic activities, Neuromuscular re-education, Physical agent/modality, Gait/balance training, Manual therapy, Patient education, Self Care training and Functional mobility training  Patient / Family readiness to learn indicated by: asking questions, trying to perform skills and interest  Persons(s) to be included in education: patient (P)  Barriers to Learning/Limitations: None  Patient Goal (s): Get back to my activities  Patient Self Reported Health Status: good  Rehabilitation Potential: excellent    Short Term Goals: To be accomplished in 2 weeks:  1. Pt will demonstrate I and compliance with HEP to maximize therapeutic effect. 2. Pt will demonstrate 20 SLR without lag or patella pain to improve stability in standing. Long Term Goals: To be accomplished in 5 weeks:  1. Pt will demonstrate SLS for 30\" on Airex (B) without LOB to improve ankle proprioception. 2. Pt will demonstrate 15 unilateral heel raises (B) to improve stability with recreation. 3. Pt will demonstrate step down from 6\" box without pain or instability to improve ankle and quad strength in preparation for return to sport. 4. Pt will report ability to walk several blocks with little to no difficulty to improve ADL ease. Frequency / Duration: Patient to be seen 2 times per week for 5 weeks.     Patient/ Caregiver education and instruction: Diagnosis, prognosis, self care, activity modification and exercises   [x]  Plan of care has been reviewed with RUTHANN Dykes DPT, CMTPT 10/2/2020 11:12 AM    ________________________________________________________________________    I certify that the above Therapy Services are being furnished while the patient is under my care. I agree with the treatment plan and certify that this therapy is necessary.     Physician's Signature:____________Date:_________TIME:________    ** Signature, Date and Time must be completed for valid certification **    Please sign and return to [facility] [address]  [address] [facility] [address]  [place], [facility] [address]  [phone] fax

## 2020-10-02 NOTE — PROGRESS NOTES
PT DAILY TREATMENT NOTE 10-18    Patient Name: Alexandra Estrella  Date:10/2/2020  : 1975  [x]  Patient  Verified  Payor: Xochitl Bai / Plan: Claus Amin / Product Type: HMO /    In time:9:45  Out time:10:32  Total Treatment Time (min): 52  Visit #: 1 of 10    Medicare/BCBS Only   Total Timed Codes (min):  24 1:1 Treatment Time:  47       Treatment Area: Right ankle pain [M25.571]  Left ankle pain [M25.572]  Left knee pain [M25.562]    SUBJECTIVE  Pain Level (0-10 scale): 0  Any medication changes, allergies to medications, adverse drug reactions, diagnosis change, or new procedure performed?: [x] No    [] Yes (see summary sheet for update)  Subjective functional status/changes:   [] No changes reported  The pt reports intermittent pain mostly with direction changes or unlevel surfaces    OBJECTIVE    23 min [x]Eval                  []Re-Eval       10 min Therapeutic Exercise:  [] See flow sheet :   Rationale: increase ROM and increase strength to improve the patients ability to improve ease of ADLs    14 min Therapeutic Activity:  []  See flow sheet :   Rationale: self care and pt education  to improve the patients ability to progress HEP safely to maximize therapeutic effect             With   [] TE   [] TA   [] neuro   [] other: Patient Education: [x] Review HEP    [] Progressed/Changed HEP based on:   [] positioning   [] body mechanics   [] transfers   [] heat/ice application    [] other:      Other Objective/Functional Measures:      Pain Level (0-10 scale) post treatment: 0    ASSESSMENT/Changes in Function: see POC    Patient will continue to benefit from skilled PT services to modify and progress therapeutic interventions, address functional mobility deficits, address ROM deficits, address strength deficits, analyze and address soft tissue restrictions, analyze and cue movement patterns, analyze and modify body mechanics/ergonomics and address imbalance/dizziness to attain remaining goals. [x]  See Plan of Care  []  See progress note/recertification  []  See Discharge Summary         Progress towards goals / Updated goals:  Short Term Goals: To be accomplished in 2 weeks:  1. Pt will demonstrate I and compliance with HEP to maximize therapeutic effect. IE: HEP issued and instructed  2. Pt will demonstrate 20 SLR without lag or patella pain to improve stability in standing. IE: some patellar pain with SLR  Long Term Goals: To be accomplished in 5 weeks:  1. Pt will demonstrate SLS for 30\" on Airex (B) without LOB to improve ankle proprioception. IE: good stability on non-compliant surface  2. Pt will demonstrate 15 unilateral heel raises (B) to improve stability with recreation. IE: limited heel rise on left  3. Pt will demonstrate step down from 6\" box without pain or instability to improve ankle and quad strength in preparation for return to sport. IE: challenged with squatting  4. Pt will report ability to walk several blocks with little to no difficulty to improve ADL ease.    IE: Limited a lot    PLAN  []  Upgrade activities as tolerated     []  Continue plan of care  []  Update interventions per flow sheet       []  Discharge due to:_  []  Other:_      Morenita Kyle DPT, CMTPT 10/2/2020  12:14 PM    Future Appointments   Date Time Provider Kamrai Jennifer   10/6/2020  3:00 PM Zara Padron PTA [facility]   10/7/2020  2:00 PM [facility]BP COORD CLASS [facility]   10/8/2020  1:00 PM Zara Padron PTA [facility]   10/13/2020 12:00 PM Elie, [address] [facility]   10/15/2020 12:45 PM Marco Boning [facility]   10/20/2020 12:00 PM Zara Padron PTA [facility]   10/22/2020 12:00 PM Marco Boning [facility]   10/27/2020 10:30 AM Vale Saleem NP [facility]

## 2020-10-06 ENCOUNTER — HOSPITAL ENCOUNTER (OUTPATIENT)
Dept: PHYSICAL THERAPY | Age: 45
Discharge: HOME | End: 2020-10-06
Payer: COMMERCIAL

## 2020-10-06 PROCEDURE — 97112 NEUROMUSCULAR REEDUCATION: CPT

## 2020-10-06 PROCEDURE — 97110 THERAPEUTIC EXERCISES: CPT

## 2020-10-06 NOTE — PROGRESS NOTES
PT DAILY TREATMENT NOTE 10-18    Patient Name: Beverly Herrera  Date:10/6/2020  : 1975  [x]  Patient  Verified  Payor: Silvana Craven / Plan: Petros Koehler / Product Type: HMO /    In time:3:00  Out time:3:45  Total Treatment Time (min): 45  Visit #: 2 of 10    Medicare/BCBS Only   Total Timed Codes (min):  45 1:1 Treatment Time:  45       Treatment Area: Right ankle pain [M25.571]  Left ankle pain [M25.572]  Left knee pain [M25.562]    SUBJECTIVE  Pain Level (0-10 scale): 3  Any medication changes, allergies to medications, adverse drug reactions, diagnosis change, or new procedure performed?: [x] No    [] Yes (see summary sheet for update)  Subjective functional status/changes:   [] No changes reported  Pt reports he doesn't have a lot of knee pain however notices some discomfort in the medial left knee with prolonged standing. Pt reports his biggest issue is lack of strength in the left knee and B ankles. OBJECTIVE    30 min Therapeutic Exercise:  [x] See flow sheet :   Rationale: increase ROM and increase strength to improve the patients ability to perform functional task with ease. 15 min Neuromuscular Re-education:  [x]  See flow sheet :   Rationale: increase strength, improve coordination, improve balance and increase proprioception  to improve the patients ability to perform ADL's with ease. With   [] TE   [] TA   [] neuro   [] other: Patient Education: [x] Review HEP    [] Progressed/Changed HEP based on:   [] positioning   [] body mechanics   [] transfers   [] heat/ice application    [] other:      Other Objective/Functional Measures: initiated therex per flowsheet     Pain Level (0-10 scale) post treatment: 0    ASSESSMENT/Changes in Function:   First f/u session with pt displaying a good tolerance and putting forth a good effort with therex. Pt reports he does not have a lot of pain in his left knee and B ankles however reports a significant lack of strength.  Pt challenged with single legged HR on the left due to weakness but noting no pain. Continued treatment to improve left knee and B ankle strength to aid with ease of ambulation. Patient will continue to benefit from skilled PT services to modify and progress therapeutic interventions, address functional mobility deficits, address ROM deficits, address strength deficits, analyze and address soft tissue restrictions, analyze and cue movement patterns, analyze and modify body mechanics/ergonomics and assess and modify postural abnormalities to attain remaining goals. [x]  See Plan of Care  []  See progress note/recertification  []  See Discharge Summary         Progress towards goals / Updated goals:Short Term Goals: To be accomplished in 2 weeks:  1. Pt will demonstrate I and compliance with HEP to maximize therapeutic effect. IE: HEP issued and instructed    Current: met 10/6/20 Pt reports understanding and compliance   2. Pt will demonstrate 20 SLR without lag or patella pain to improve stability in standing. IE: some patellar pain with SLR  Long Term Goals: To be accomplished in 5 weeks:  1. Pt will demonstrate SLS for 30\" on Airex (B) without LOB to improve ankle proprioception. IE: good stability on non-compliant surface  2. Pt will demonstrate 15 unilateral heel raises (B) to improve stability with recreation. IE: limited heel rise on left  3. Pt will demonstrate step down from 6\" box without pain or instability to improve ankle and quad strength in preparation for return to sport. IE: challenged with squatting  4. Pt will report ability to walk several blocks with little to no difficulty to improve ADL ease.               IE: Limited a lot    PLAN  []  Upgrade activities as tolerated     [x]  Continue plan of care  []  Update interventions per flow sheet       []  Discharge due to:_  []  Other:_      Darryl Wang PTA 10/6/2020  2:58 PM    Future Appointments   Date Time Provider Kamari Rodriguez   10/6/2020  3:00 PM Jaya Hoffman PTA [facility]   10/7/2020  2:00 PM [facility] COORD CLASS [facility]   10/8/2020  1:00 PM Jaya Hoffman PTA [facility]   10/13/2020 12:00 PM Elie, [address] [facility]   10/15/2020 12:45 PM Robert Elam [facility]   10/20/2020 12:00 PM Jaya Hoffman PTA [facility]   10/22/2020 12:00 PM Robert Elam [facility]   10/27/2020 10:30 AM Esteban Fleischer, LUIS [facility]

## 2020-10-07 ENCOUNTER — OFFICE VISIT (OUTPATIENT)
Dept: SURGERY | Age: 45
End: 2020-10-07

## 2020-10-07 VITALS
DIASTOLIC BLOOD PRESSURE: 78 MMHG | HEIGHT: 72 IN | HEART RATE: 68 BPM | WEIGHT: 248 LBS | SYSTOLIC BLOOD PRESSURE: 112 MMHG | BODY MASS INDEX: 33.59 KG/M2

## 2020-10-07 DIAGNOSIS — E66.9 OBESITY, UNSPECIFIED CLASSIFICATION, UNSPECIFIED OBESITY TYPE, UNSPECIFIED WHETHER SERIOUS COMORBIDITY PRESENT: Primary | ICD-10-CM

## 2020-10-07 NOTE — PROGRESS NOTES
Chief Complaint   Patient presents with    Weight Management     Patient attended a weekly class as part of the Medically Supervised Weight Loss Program.  This class was facilitated by a Registered Dietitian. Topics taught at class focused on diet, particularly carbohydrate counting and portion control. Classes also focused on behavior changes and the importance of establishing a daily exercise routine. Progress Note: Weekly Medical Monitoring in the [facility] Weight Loss Program    Is there anything that you or the patient needs to let the supervising provider know about? no    Over the past week, have you experienced any side-effects? no    Michelle Cho is a 39 y.o. male who is enrolled in [facility] Loss Program    Michelle Cho was prescribed the VLCD / LCD. Visit Vitals  Ht 6' (1.829 m)   Wt 112.5 kg (248 lb)   BMI 33.63 kg/m²     Weight Metrics 10/7/2020 10/7/2020 9/22/2020 9/22/2020 8/14/2020 5/22/2020 4/8/2020   Weight - 248 lb - 251 lb 9.6 oz 255 lb 259 lb 261 lb   Waist Measure Inches 45.5 - 44.5 - - - -   BMI - 33.63 kg/m2 - 34.12 kg/m2 34.58 kg/m2 35.13 kg/m2 35.4 kg/m2         Have you received any other medical care this week? yes  If yes, where and for what? Physical theraphy    Have you had any change in your medications since your last visit? no  If yes what? Did you have any problems adhering to the program last week? no  If yes, please explain:       Eating Habits Over Last Week:  Did you take in 64 oz of non-caloric fluids? yes     Did you consume your prescribed meal replacement regimen each day?  yes       Physical Activity Over the Past Week:    Aerobic exercise: 60 min  Resistance exercise: 6 workouts / week

## 2020-10-08 ENCOUNTER — HOSPITAL ENCOUNTER (OUTPATIENT)
Dept: PHYSICAL THERAPY | Age: 45
Discharge: HOME | End: 2020-10-08
Payer: COMMERCIAL

## 2020-10-08 PROCEDURE — 97110 THERAPEUTIC EXERCISES: CPT

## 2020-10-08 PROCEDURE — 97112 NEUROMUSCULAR REEDUCATION: CPT

## 2020-10-08 NOTE — PROGRESS NOTES
PT DAILY TREATMENT NOTE 10-18    Patient Name: Cesilia Sotelo  Date:10/8/2020  : 1975  [x]  Patient  Verified  Payor: Gurmeet Contreras / Plan: Jamie Aguillon / Product Type: HMO /    In time:1:01  Out time:1:59  Total Treatment Time (min): 58  Visit #: 3 of 10    Medicare/BCBS Only   Total Timed Codes (min):  48 1:1 Treatment Time:  48       Treatment Area: Right ankle pain [M25.571]  Left ankle pain [M25.572]  Left knee pain [M25.562]    SUBJECTIVE  Pain Level (0-10 scale): 0  Any medication changes, allergies to medications, adverse drug reactions, diagnosis change, or new procedure performed?: [x] No    [] Yes (see summary sheet for update)  Subjective functional status/changes:   [] No changes reported  Pt reports he is feeling a lot better since his last visit and that he has been performing his HEP at home . OBJECTIVE    Modality rationale: decrease inflammation and decrease pain to improve the patients ability to perform functional task with ease.    Min Type Additional Details    [] Estim:  []Unatt       []IFC  []Premod                        []Other:  []w/ice   []w/heat  Position:  Location:    [] Estim: []Att    []TENS instruct  []NMES                    []Other:  []w/US   []w/ice   []w/heat  Position:  Location:    []  Traction: [] Cervical       []Lumbar                       [] Prone          []Supine                       []Intermittent   []Continuous Lbs:  [] before manual  [] after manual    []  Ultrasound: []Continuous   [] Pulsed                           []1MHz   []3MHz W/cm2:  Location:    []  Iontophoresis with dexamethasone         Location: [] Take home patch   [] In clinic    []  Ice     []  heat  []  Ice massage  []  Laser   []  Anodyne Position:  Location:    []  Laser with stim  []  Other:  Position:  Location:   10 [x]  Vasopneumatic Device Pressure:       [x] lo [] med [] hi   Temperature: [x] lo [] med [] hi   [x] Skin assessment post-treatment:  [x]intact []redness- no adverse reaction    []redness  adverse reaction:       28 min Therapeutic Exercise:  [x] See flow sheet :   Rationale: increase ROM and increase strength to improve the patients ability to perform daily task with ease. 20 min Neuromuscular Re-education:  [x]  See flow sheet :   Rationale: increase strength, improve coordination, improve balance and increase proprioception  to improve the patients ability to perform functional task with ease. With   [] TE   [] TA   [] neuro   [] other: Patient Education: [x] Review HEP    [] Progressed/Changed HEP based on:   [] positioning   [] body mechanics   [] transfers   [] heat/ice application    [] other:      Other Objective/Functional Measures:      Pain Level (0-10 scale) post treatment: 0    ASSESSMENT/Changes in Function:   Pt displays improved functional mobility and strength in the left knee and ankle noting increased ease and mobility with heel raises and wall squats. No pain reported post treatment. Patient will continue to benefit from skilled PT services to modify and progress therapeutic interventions, address functional mobility deficits, address ROM deficits, address strength deficits, analyze and address soft tissue restrictions, analyze and cue movement patterns, analyze and modify body mechanics/ergonomics and assess and modify postural abnormalities to attain remaining goals. [x]  See Plan of Care  []  See progress note/recertification  []  See Discharge Summary         Progress towards goals / Updated goals:  Progress towards goals / Updated goals:Short Term Goals: To be accomplished in 2 weeks:  1. Pt will demonstrate I and compliance with HEP to maximize therapeutic effect.              HW: HEP issued and instructed               Current: met 10/6/20 Pt reports understanding and compliance   2.  Pt will demonstrate 20 SLR without lag or patella pain to improve stability in standing.              IE: some patellar pain with SLR  Long Term Goals: To be accomplished in 5 weeks:  1. Pt will demonstrate SLS for 30\" on Aire[employer] (B) without LOB to improve ankle proprioception.              IE: good stability on non-compliant surface  2. Pt will demonstrate 15 unilateral heel raises (B) to improve stability with recreation.              IE: limited heel rise on left  3. Pt will demonstrate step down from 6\" box without pain or instability to improve ankle and quad strength in preparation for return to sport.              IE: challenged with squatting  4.  Pt will report ability to walk several blocks with little to no difficulty to improve ADL ease.              IE: Limited a lot   Current: Met 10/8/20 pt reports little to no difficulty with walking several blocks    PLAN  []  Upgrade activities as tolerated     [x]  Continue plan of care  []  Update interventions per flow sheet       []  Discharge due to:_  []  Other:_      Rashida Valdes PTA 10/8/2020  1:03 PM    Future Appointments   Date Time Provider Kamari Jennifer   10/13/2020 11:45 AM [facility] COORD CLASS [facility] AM[facility]   10/13/2020 12:00 PM Elie [address] [facility]   10/15/2020 12:45 PM Anjelica Cintron [facility]   10/20/2020 11:45 AM [facility] COORD CLASS [facility] BS AMB   10/20/2020 12:00 PM Stanford Freeman PTA [facility]   10/22/2020 12:00 PM Anjelica Cintron [facility]   10/27/2020 10:30 AM Kristina Vargas NP [facility] AMB

## 2020-10-13 ENCOUNTER — OFFICE VISIT (OUTPATIENT)
Dept: SURGERY | Age: 45
End: 2020-10-13

## 2020-10-13 ENCOUNTER — APPOINTMENT (OUTPATIENT)
Dept: PHYSICAL THERAPY | Age: 45
End: 2020-10-13
Payer: COMMERCIAL

## 2020-10-13 ENCOUNTER — HOSPITAL ENCOUNTER (EMERGENCY)
Age: 45
Discharge: HOME | End: 2020-10-13
Attending: EMERGENCY MEDICINE
Payer: COMMERCIAL

## 2020-10-13 ENCOUNTER — OFFICE VISIT (OUTPATIENT)
Dept: ORTHOPEDIC SURGERY | Age: 45
End: 2020-10-13
Payer: COMMERCIAL

## 2020-10-13 ENCOUNTER — PATIENT OUTREACH (OUTPATIENT)
Dept: CASE MANAGEMENT | Age: 45
End: 2020-10-13

## 2020-10-13 VITALS
DIASTOLIC BLOOD PRESSURE: 100 MMHG | OXYGEN SATURATION: 98 % | TEMPERATURE: 97.7 F | HEART RATE: 57 BPM | RESPIRATION RATE: 18 BRPM | WEIGHT: 253 LBS | BODY MASS INDEX: 34.27 KG/M2 | SYSTOLIC BLOOD PRESSURE: 158 MMHG | HEIGHT: 72 IN

## 2020-10-13 VITALS
SYSTOLIC BLOOD PRESSURE: 132 MMHG | BODY MASS INDEX: 34.21 KG/M2 | HEART RATE: 93 BPM | TEMPERATURE: 97.7 F | WEIGHT: 252.6 LBS | HEIGHT: 72 IN | OXYGEN SATURATION: 97 % | DIASTOLIC BLOOD PRESSURE: 82 MMHG | RESPIRATION RATE: 14 BRPM

## 2020-10-13 VITALS
WEIGHT: 248.2 LBS | BODY MASS INDEX: 33.62 KG/M2 | DIASTOLIC BLOOD PRESSURE: 88 MMHG | OXYGEN SATURATION: 95 % | TEMPERATURE: 97.8 F | SYSTOLIC BLOOD PRESSURE: 124 MMHG | HEART RATE: 67 BPM | HEIGHT: 72 IN

## 2020-10-13 DIAGNOSIS — E66.9 OBESITY, UNSPECIFIED CLASSIFICATION, UNSPECIFIED OBESITY TYPE, UNSPECIFIED WHETHER SERIOUS COMORBIDITY PRESENT: Primary | ICD-10-CM

## 2020-10-13 DIAGNOSIS — R29.898 HAND WEAKNESS: ICD-10-CM

## 2020-10-13 DIAGNOSIS — M54.16 LUMBAR RADICULOPATHY: ICD-10-CM

## 2020-10-13 DIAGNOSIS — M47.812 CERVICAL FACET JOINT SYNDROME: ICD-10-CM

## 2020-10-13 DIAGNOSIS — L40.50 PSORIATIC ARTHRITIS (HCC): ICD-10-CM

## 2020-10-13 DIAGNOSIS — M54.2 NECK PAIN: Primary | ICD-10-CM

## 2020-10-13 DIAGNOSIS — M54.50 LUMBAR PAIN: ICD-10-CM

## 2020-10-13 DIAGNOSIS — M50.30 DDD (DEGENERATIVE DISC DISEASE), CERVICAL: ICD-10-CM

## 2020-10-13 DIAGNOSIS — M54.12 CERVICAL RADICULOPATHY: Primary | ICD-10-CM

## 2020-10-13 DIAGNOSIS — M54.12 CERVICAL RADICULOPATHY: ICD-10-CM

## 2020-10-13 PROCEDURE — 72110 X-RAY EXAM L-2 SPINE 4/>VWS: CPT | Performed by: PHYSICAL MEDICINE & REHABILITATION

## 2020-10-13 PROCEDURE — 99203 OFFICE O/P NEW LOW 30 MIN: CPT | Performed by: PHYSICAL MEDICINE & REHABILITATION

## 2020-10-13 PROCEDURE — 99282 EMERGENCY DEPT VISIT SF MDM: CPT

## 2020-10-13 PROCEDURE — 72040 X-RAY EXAM NECK SPINE 2-3 VW: CPT | Performed by: PHYSICAL MEDICINE & REHABILITATION

## 2020-10-13 RX ORDER — OXYCODONE AND ACETAMINOPHEN 5; 325 MG/1; MG/1
1 TABLET ORAL
Qty: 14 TAB | Refills: 0 | Status: SHIPPED | OUTPATIENT
Start: 2020-10-13 | End: 2020-10-20

## 2020-10-13 RX ORDER — CYCLOBENZAPRINE HCL 5 MG
5-10 TABLET ORAL
Qty: 22 TAB | Refills: 0 | Status: SHIPPED | OUTPATIENT
Start: 2020-10-13 | End: 2020-10-20

## 2020-10-13 RX ORDER — METHYLPREDNISOLONE 4 MG/1
TABLET ORAL
Qty: 1 DOSE PACK | Refills: 0 | Status: SHIPPED | OUTPATIENT
Start: 2020-10-13 | End: 2020-10-27 | Stop reason: ALTCHOICE

## 2020-10-13 RX ORDER — TOPIRAMATE 25 MG/1
TABLET ORAL
Qty: 90 TAB | Refills: 1 | Status: SHIPPED | OUTPATIENT
Start: 2020-10-13 | End: 2021-01-27

## 2020-10-13 NOTE — PROGRESS NOTES
Rosa Keating presents today for   Chief Complaint   Patient presents with    Neck Pain    Back Pain    Arm Pain     left    Leg Pain     left    New Patient     referred by [facility] ED       Is someone accompanying this pt? no    Is the patient using any DME equipment during OV? no    Depression Screening:  3 most recent PHQ Screens 10/13/2020   Little interest or pleasure in doing things Not at all   Feeling down, depressed, irritable, or hopeless Not at all   Total Score PHQ 2 0       Learning Assessment:  Learning Assessment 12/24/2019   PRIMARY LEARNER Patient   HIGHEST LEVEL OF EDUCATION - PRIMARY LEARNER  4 YEARS OF COLLEGE   BARRIERS PRIMARY LEARNER NONE   CO-LEARNER CAREGIVER No   CO-LEARNER NAME n/a   PRIMARY LANGUAGE ENGLISH   LEARNER PREFERENCE PRIMARY DEMONSTRATION   ANSWERED BY patient   RELATIONSHIP SELF       Abuse Screening:  Abuse Screening Questionnaire 1/24/2020   Do you ever feel afraid of your partner? N   Are you in a relationship with someone who physically or mentally threatens you? N   Is it safe for you to go home? Y       OPIOID RISK TOOL  No flowsheet data found. Pt currently taking Antiplatelet therapy? no    Coordination of Care:  1. Have you been to the ER, urgent care clinic since your last visit? Yes, [facility] ED  Hospitalized since your last visit? No    2. Have you seen or consulted any other health care providers outside of the [address] [place] [address] since your last visit? Yes, Dr. Jacob Pierce any pap smears or colon screening.

## 2020-10-13 NOTE — ED TRIAGE NOTES
Pt reports twisting his head left and felt a pop in his neck and now complains of neck pain and left shoulder blade pain causing numbness down his arm and leg. Pt reports this has happened before and hes had to have physical therapy for it.

## 2020-10-13 NOTE — PATIENT INSTRUCTIONS
Neck Arthritis: Exercises Introduction Here are some examples of exercises for you to try. The exercises may be suggested for a condition or for rehabilitation. Start each exercise slowly. Ease off the exercises if you start to have pain. You will be told when to start these exercises and which ones will work best for you. How to do the exercises Neck stretches to the side 1. This stretch works best if you keep your shoulder down as you lean away from it. To help you remember to do this, start by relaxing your shoulders and lightly holding on to your thighs or your chair. 2. Tilt your head toward your shoulder and hold for 15 to 30 seconds. Let the weight of your head stretch your muscles. 3. Repeat 2 to 4 times toward each shoulder. Chin tuck 1. Lie on the floor with a rolled-up towel under your neck. Your head should be touching the floor. 2. Slowly bring your chin toward your chest. 
3. Hold for a count of 6, and then relax for up to 10 seconds. 4. Repeat 8 to 12 times. Active cervical rotation 1. Sit in a firm chair, or stand up straight. 2. Keeping your chin level, turn your head to the right, and hold for 15 to 30 seconds. 3. Turn your head to the left and hold for 15 to 30 seconds. 4. Repeat 2 to 4 times to each side. Shoulder blade squeeze 1. While standing, squeeze your shoulder blades together. 2. Do not raise your shoulders up as you are squeezing. 3. Hold for 6 seconds. 4. Repeat 8 to 12 times. Shoulder rolls 1. Sit comfortably with your feet shoulder-width apart. You can also do this exercise standing up. 2. Roll your shoulders up, then back, and then down in a smooth, circular motion. 3. Repeat 2 to 4 times. Follow-up care is a key part of your treatment and safety. Be sure to make and go to all appointments, and call your doctor if you are having problems. It's also a good idea to know your test results and keep a list of the medicines you take. Where can you learn more? Go to http://www.gray.com/ Enter S041 in the search box to learn more about \"Neck Arthritis: Exercises. \" Current as of: March 2, 2020               Content Version: 12.6 © 7792-1399 [employer]. Care instructions adapted under license by [employer] (which disclaims liability or warranty for this information). If you have questions about a medical condition or this instruction, always ask your healthcare professional. [address] any warranty or liability for your use of this information.

## 2020-10-13 NOTE — PROGRESS NOTES
Chief Complaint   Patient presents with    Weight Management     Patient attended a weekly class as part of the Medically Supervised Weight Loss Program.  This class was facilitated by a Registered Dietitian. Topics taught at class focused on diet, particularly carbohydrate counting and portion control. Classes also focused on behavior changes and the importance of establishing a daily exercise routine. Progress Note: Weekly Medical Monitoring in the [facility] Weight Loss Program    Is there anything that you or the patient needs to let the supervising provider know about? no    Over the past week, have you experienced any side-effects? no    Mihcael Hsieh is a 39 y.o. male who is enrolled in [facility] Program    Michael Hsieh was prescribed the VLCD / LCD. Visit Vitals  /88   Pulse 67   Temp 97.8 °F (36.6 °C)   Ht 6' (1.829 m)   Wt 112.6 kg (248 lb 3.2 oz)   SpO2 95%   BMI 33.66 kg/m²     Weight Metrics 10/13/2020 10/13/2020 10/13/2020 10/7/2020 10/7/2020 9/22/2020 9/22/2020   Weight - 253 lb 248 lb 3.2 oz - 248 lb - 251 lb 9.6 oz   Waist Measure Inches 43.5 - - 45.5 - 44.5 -   BMI - 34.31 kg/m2 33.66 kg/m2 - 33.63 kg/m2 - 34.12 kg/m2         Have you received any other medical care this week? yesIf yes, where and for what? For neck pain  Have you had any change in your medications since your last visit? no  If yes what? Did you have any problems adhering to the program last week? no  If yes, please explain:       Eating Habits Over Last Week:  Did you take in 64 oz of non-caloric fluids? yes     Did you consume your prescribed meal replacement regimen each day? yes       Physical Activity Over the Past Week:    Aerobic exercise: 180 min  Resistance exercise: 0 workouts / week  . \

## 2020-10-13 NOTE — ED PROVIDER NOTES
Pt c/o left sided neck pain, started 1 1/2 days ago when turned neck to left. H/o same. Has pain to left upper back pain. Tingling that goes down left arm. Also some tingling left leg. No weakness or numbness. No bowel or urinary changes. No injury. No headache. No fever or chills. No rash. No cp or sob. On mobic nl, no other meds taken for pain. Past Medical History:   Diagnosis Date    Asthma     Broken leg left    broke left leg and tore ligaments 1 week ago.     Diabetes mellitus     no meds    Dupuytren's contracture     Essential hypertension     no meds    Fracture of left leg 08/2020    HTN (hypertension)     Ill-defined condition     neuropathy    Psoriatic arthritis, destructive type (HCC)     Sarcoidosis     says he had an arm mass removed and the path showed sarcoid, negative CXR       Past Surgical History:   Procedure Laterality Date    CLOSED RX NOSE/JAW FRAC+WIRES Right 1994    broken jaw had to wire it    HX CARPAL TUNNEL RELEASE      HX ORTHOPAEDIC      fx skull/ broken jaw     HX ORTHOPAEDIC Right     carpal tunnel    HX OTHER SURGICAL      lipoma removed     AR EXC SKIN BENIG 0.6-1CM TRUNK,ARM,LEG N/A 10/11/2018    Dr. Darnell Monae 1.1-2CM TRUNK,ARM,LEG N/A 10/11/2018    Dr. Darnell Monae 2.1-3CM TRUNK,ARM,LEG N/A 10/11/2018    Dr. Emma Joel         Family History:   Problem Relation Age of Onset    Asthma Mother     Stroke Father     Alcohol abuse Father     Substance Abuse Father     Diabetes Maternal Grandmother     Hypertension Maternal Grandmother     High Cholesterol Maternal Grandmother     Stroke Maternal Grandmother     Cancer Maternal Grandfather 79        prostate cancer and skin    Colon Cancer Maternal Grandfather     Depression Maternal Uncle        Social History     Socioeconomic History    Marital status: SINGLE     Spouse name: Not on file    Number of children: Not on file    Years of education: Not on file    Highest education level: Not on file   Occupational History    Occupation:    Social Needs    Financial resource strain: Not on file    Food insecurity     Worry: Not on file     Inability: Not on file   [employer] needs     Medical: Not on file     Non-medical: Not on file   Tobacco Use    Smoking status: Never Smoker    Smokeless tobacco: Never Used   Substance and Sexual Activity    Alcohol use: Yes     Alcohol/week: 2.0 standard drinks     Types: 2 Shots of liquor per week     Frequency: 2-4 times a month     Comment: social    Drug use: Not Currently    Sexual activity: Yes     Partners: Female     Birth control/protection: Condom   Lifestyle    Physical activity     Days per week: Not on file     Minutes per session: Not on file    Stress: Not on file   Relationships    Social connections     Talks on phone: Not on file     Gets together: Not on file     Attends [employer]ism service: Not on file     Active member of club or organization: Not on file     Attends meetings of clubs or organizations: Not on file     Relationship status: Not on file    Intimate partner violence     Fear of current or ex partner: Not on file     Emotionally abused: Not on file     Physically abused: Not on file     Forced sexual activity: Not on file   Other Topics Concern    Not on file   Social History Narrative    Not on file         ALLERGIES: Gabapentin    Review of Systems   Constitutional: Negative for diaphoresis and fever. HENT: Negative for congestion. Respiratory: Negative for cough and shortness of breath. Cardiovascular: Negative for chest pain. Gastrointestinal: Negative for abdominal pain and nausea. Musculoskeletal: Positive for back pain and neck pain. Skin: Negative for rash. Neurological: Negative for dizziness. All other systems reviewed and are negative.       Vitals:    10/13/20 0549   BP: (!) 158/100   Pulse: (!) 57   Resp: 18   Temp: 97.7 °F (36.5 °C)   SpO2: 98%   Weight: 114.8 kg (253 lb)   Height: 6' (1.829 m)            Physical Exam  Vitals signs and nursing note reviewed. Constitutional:       Appearance: He is well-developed. HENT:      Head: Normocephalic and atraumatic. Eyes:      Conjunctiva/sclera: Conjunctivae normal.   Neck:      Musculoskeletal: Normal range of motion. Comments: + left paracervical ttp/spasm    Cardiovascular:      Rate and Rhythm: Normal rate and regular rhythm. Pulmonary:      Effort: Pulmonary effort is normal.      Breath sounds: No wheezing. Abdominal:      Palpations: Abdomen is soft. Tenderness: There is no abdominal tenderness. Musculoskeletal:         General: Tenderness (left upper parathoracic ttp/spasm. no other back/extremity ttp. from. nvi) present. Skin:     General: Skin is warm and dry. Capillary Refill: Capillary refill takes less than 2 seconds. Findings: No rash. Neurological:      Mental Status: He is alert and oriented to person, place, and time. MDM       Procedures    Vitals:  Patient Vitals for the past 12 hrs:   Temp Pulse Resp BP SpO2   10/13/20 0549 97.7 °F (36.5 °C) (!) 57 18 (!) 158/100 98 %         Medications ordered:   Medications - No data to display      Lab findings:  No results found for this or any previous visit (from the past 12 hour(s)). X-Ray, CT or other radiology findings or impressions:  No orders to display       Progress notes, Consult notes or additional Procedure notes:   No emc. Neuro intact throughout. Not c/w cord compression/cauda equina/fracture/epidural abscess/acute abdomen/dvt/aaa. . Stable for discharge and close follow-up. Patient agrees w discharge plan and verbalizes understanding of detailed return inst given. No indication for urgent mri or other imaging. Diagnosis:   1.  Cervical radiculopathy        Disposition: homd    Follow-up Information     Follow up With Specialties Details Why Contact Info Octavio Kumar MD Family Medicine Schedule an appointment as soon as possible for a visit in 2 days  [address] Oliver Valdovinos MD Orthopedic Surgery Schedule an appointment as soon as possible for a visit in 2 days  [address] [place] [facility]  [phone]             Patient's Medications   Start Taking    CYCLOBENZAPRINE (FLEXERIL) 5 MG TABLET    Take 1-2 Tabs by mouth three (3) times daily as needed for Muscle Spasm(s) for up to 7 days. OXYCODONE-ACETAMINOPHEN (PERCOCET) 5-325 MG PER TABLET    Take 1 Tab by mouth every six (6) hours as needed for Pain for up to 7 days. Max Daily Amount: 4 Tabs. Continue Taking    ADALIMUMAB (HUMIRA PEN SC)    40 mg by SubCUTAneous route every seven (7) days. DICLOFENAC EC (VOLTAREN) 75 MG EC TABLET    Take 75 mg by mouth two (2) times a day. MAGNESIUM 250 MG TAB    Take 1 Tab by mouth daily. MELOXICAM (MOBIC) 15 MG TABLET    Take 15 mg by mouth daily. METAXALONE (SKELAXIN) 800 MG TABLET    Take 800 mg by mouth three (3) times daily. MULTIVITAMIN (ONE A DAY) TABLET    Take 1 Tab by mouth daily. OTHER    1 Tab daily. Tumeric    TRIAMCINOLONE ACETONIDE (KENALOG) 0.1 % TOPICAL CREAM    Apply  to affected area two (2) times a day.  use thin layer   These Medications have changed    No medications on file   Stop Taking    No medications on file

## 2020-10-13 NOTE — DISCHARGE INSTRUCTIONS
Return for pain, fever not resolving with motrin or tylenol, shortness of breath, vomiting, decreased fluid intake, any urinary or bowel changes, any weakness or numbness, dizziness, or any change or concerns. Patient Education        Pinched Nerve in the Neck: Care Instructions  Your Care Instructions  A pinched nerve in the neck happens when a vertebra or disc in the upper part of your spine is damaged. This damage can happen because of an injury. Or it can just happen with age. The changes caused by the damage may put pressure on a nearby nerve root, pinching it. This causes symptoms such as sharp pain in your neck, shoulder, arm, hand, or back. You may also have tingling or numbness. Sometimes it makes your arm weaker. The symptoms are usually worse when you turn your head or strain your neck. For many people, the symptoms get better over time and finally go away. Early treatment usually includes medicines for pain and swelling. Sometimes physical therapy and special exercises may help. Follow-up care is a key part of your treatment and safety. Be sure to make and go to all appointments, and call your doctor if you are having problems. It's also a good idea to know your test results and keep a list of the medicines you take. How can you care for yourself at home? · Be safe with medicines. Read and follow all instructions on the label. ¨ If the doctor gave you a prescription medicine for pain, take it as prescribed. ¨ If you are not taking a prescription pain medicine, ask your doctor if you can take an over-the-counter medicine. · Try using a heating pad on a low or medium setting for 15 to 20 minutes every 2 or 3 hours. Try a warm shower in place of one session with the heating pad. You can also buy single-use heat wraps that last up to 8 hours. · You can also try an ice pack for 10 to 15 minutes every 2 to 3 hours. There isn't strong evidence that either heat or ice will help.  But you can try them to see if they help you. · Don't spend too long in one position. Take short breaks to move around and change positions. · Wear a seat belt and shoulder harness when you are in a car. · Sleep with a pillow under your head and neck that keeps your neck straight. · If you were given a neck brace (cervical collar) to limit neck motion, wear it as instructed for as many days as your doctor tells you to. Do not wear it longer than you were told to. Wearing a brace for too long can lead to neck stiffness and can weaken the neck muscles. · Follow your doctor's instructions for gentle neck-stretching exercises. · Do not smoke. Smoking can slow healing of your discs. If you need help quitting, talk to your doctor about stop-smoking programs and medicines. These can increase your chances of quitting for good. · Avoid strenuous work or exercise until your doctor says it is okay. When should you call for help? Call 911 anytime you think you may need emergency care. For example, call if:  ? · You are unable to move an arm or a leg at all. ?Call your doctor now or seek immediate medical care if:  ? · You have new or worse symptoms in your arms, legs, chest, belly, or buttocks. Symptoms may include:  ¨ Numbness or tingling. ¨ Weakness. ¨ Pain. ? · You lose bladder or bowel control. ? Watch closely for changes in your health, and be sure to contact your doctor if:  ? · You are not getting better as expected. Where can you learn more? Go to http://www.gray.com/. Enter Y657 in the search box to learn more about \"Pinched Nerve in the Neck: Care Instructions. \"  Current as of: March 21, 2017  Content Version: 11.5  © 2594-3342 [employer]. Care instructions adapted under license by [employer] (which disclaims liability or warranty for this information).  If you have questions about a medical condition or this instruction, always ask your healthcare professional. [email or web address] disclaims any warranty or liability for your use of this information.

## 2020-10-13 NOTE — PROGRESS NOTES
[facility]  Kelly Mayo., [address] [place] [address], [facility] [address]  Phone: [phone]  Fax: [phone]    NEW PATIENT  Pt's YOB: 1975    ASSESSMENT   Diagnoses and all orders for this visit:    1. Neck pain  -     AMB POC XRAY, SPINE, CERVICAL; 2 OR 3  -     methylPREDNISolone (MEDROL DOSEPACK) 4 mg tablet; Per dose pack instructions  -     MRI CERV SPINE WO CONT; Future  -     REFERRAL TO PHYSICAL THERAPY    2. Cervical radiculopathy  -     topiramate (TOPAMAX) 25 mg tablet; Take 1 in the evening for 1 week, then increase to 2 the second week and continue with 3 in the evening  -     MRI CERV SPINE WO CONT; Future  -     REFERRAL TO PHYSICAL THERAPY    3. Cervical facet joint syndrome  -     MRI CERV SPINE WO CONT; Future  -     REFERRAL TO PHYSICAL THERAPY    4. DDD (degenerative disc disease), cervical  -     MRI CERV SPINE WO CONT; Future  -     REFERRAL TO PHYSICAL THERAPY    5. Lumbar radiculopathy  -     AMB POC XRAY, SPINE, LUMBOSACRAL; 4+    6. Lumbar pain  -     methylPREDNISolone (MEDROL DOSEPACK) 4 mg tablet; Per dose pack instructions  -     topiramate (TOPAMAX) 25 mg tablet; Take 1 in the evening for 1 week, then increase to 2 the second week and continue with 3 in the evening  -     AMB POC XRAY, SPINE, LUMBOSACRAL; 4+    7. Hand weakness  -     MRI CERV SPINE WO CONT; Future  -     REFERRAL TO PHYSICAL THERAPY    8. [facility]         IMPRESSION AND PLAN:  Alex Bell is a 39 y.o. right hand dominant male with history of neck pain. Pt reports an intermittent popping sensation in the neck with left cervical flexion that is followed by severe pain radiating down the left arm. Pt notes that his episodes of pain generally last up to 1 hour, but notes that with his most recent episode of pain, he woke up the following day with numbness extending down the left arm and leg.      1) Pt was given information on cervical arthritis exercises. 2) He was prescribed a Medrol Dosepak   3) Pt was also prescribed Topamax 25 mg 3 tabs QHS, tapering up as directed. 4) A cervical MRI was ordered. He has progressive neck pain with left radicular symptoms and hand weakness despite physical therapy and NSAID's   5) Mr. An Ferris has a reminder for a \"due or due soon\" health maintenance. I have asked that he contact his primary care provider, Cassandra Nelson MD, for follow-up on this health maintenance. 6)  demonstrated consistency with prescribing. Follow-up and Dispositions    · Return in about 3 weeks (around 11/3/2020) for Medication follow up, PT follow up, Diagnostic Test follow up. HISTORY OF PRESENT ILLNESS:  Evelin Bird is a 39 y.o. right hand dominant male with history of neck pain. He presents to the office today as a new patient. Pt reports an intermittent popping sensation in the neck with left cervical flexion that is followed by severe pain radiating down the left arm. He admits to occasional issues with balance. Pt notes that his episodes of pain generally last about 1 hour, but notes that with his most recent episode of pain, he woke up the following day with numbness extending down the left arm through the fingers. Pt notes that he also experienced numbness radiating down the posterior aspect of the left leg to the dorsal aspect of the foot through the toes. He reports pain in the neck and difficulty with sleep. Pt went to the ER today and was prescribed Flexeril and Percocet. He has not recently taken oral steroids. He notes that he was diagnosed with psoriatic arthritis 6-7 years ago and is followed by rheumatologist, Dr. Adal Mast. Pt admits to weakness with  strength, which he attributes to the psoriatic arthritis. Pt notes that he was using a leg press about 1 year ago when he started to experience severe headaches.  He followed up with a neurologist, Dr. Sherlie Shone, had a brain MRI and head CT, but according to the patient this demonstrated normal findings. He admits to improvement in his headaches when taking Topamax for about 2 weeks. Pt denies any history of glaucoma or kidney stones. He reports that he is allergic to Neurontin. Pt notes that he underwent physical therapy with cervical traction, deep massage, dry needling, and chiropractic manipulation last year with temporary improvement. He reports stiffness in the left upper back and notes that he has been using a percussion massage gun. Pt notes that he is currently in physical therapy for a fracture in the left leg and reports that he recently discontinued a walking boot. Pt at this time desires to proceed with medication evaluation, physical therapy, and a cervical MRI. 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 Pain Scale: 10 - Worst pain ever/10     PCP: Julia Fernandez MD    Past Medical History:   Diagnosis Date    Asthma     Broken leg left    broke left leg and tore ligaments 1 week ago.     Diabetes mellitus     no meds    Dupuytren's contracture     Essential hypertension     no meds    Fracture of left leg 08/2020    HTN (hypertension)     Ill-defined condition     neuropathy    Psoriatic arthritis, destructive type (HCC)     Sarcoidosis     says he had an arm mass removed and the path showed sarcoid, negative CXR        Social History     Socioeconomic History    Marital status: SINGLE     Spouse name: Not on file    Number of children: Not on file    Years of education: Not on file    Highest education level: Not on file   Occupational History    Occupation:    Social Needs    Financial resource strain: Not on file    Food insecurity     Worry: Not on file     Inability: Not on file   [employer] needs     Medical: Not on file     Non-medical: Not on file   Tobacco Use    Smoking status: Never Smoker    Smokeless tobacco: Never Used   Substance and Sexual Activity    Alcohol use: Yes     Alcohol/week: 2.0 standard drinks     Types: 2 Shots of liquor per week     Frequency: 2-4 times a month     Comment: social    Drug use: Not Currently    Sexual activity: Yes     Partners: Female     Birth control/protection: Condom   Lifestyle    Physical activity     Days per week: Not on file     Minutes per session: Not on file    Stress: Not on file   Relationships    Social connections     Talks on phone: Not on file     Gets together: Not on file     Attends Gnosticism service: Not on file     Active member of club or organization: Not on file     Attends meetings of clubs or organizations: Not on file     Relationship status: Not on file    Intimate partner violence     Fear of current or ex partner: Not on file     Emotionally abused: Not on file     Physically abused: Not on file     Forced sexual activity: Not on file   Other Topics Concern    Not on file   Social History Narrative    Not on file       Current Outpatient Medications   Medication Sig Dispense Refill    methylPREDNISolone (MEDROL DOSEPACK) 4 mg tablet Per dose pack instructions 1 Dose Pack 0    topiramate (TOPAMAX) 25 mg tablet Take 1 in the evening for 1 week, then increase to 2 the second week and continue with 3 in the evening 90 Tab 1    OTHER 1 Tab daily. Tumeric      magnesium 250 mg tab Take 1 Tab by mouth daily.  triamcinolone acetonide (KENALOG) 0.1 % topical cream Apply  to affected area two (2) times a day. use thin layer 15 g 0    multivitamin (ONE A DAY) tablet Take 1 Tab by mouth daily.  metaxalone (SKELAXIN) 800 mg tablet Take 800 mg by mouth three (3) times daily.  meloxicam (MOBIC) 15 mg tablet Take 15 mg by mouth daily.  adalimumab (HUMIRA PEN SC) 40 mg by SubCUTAneous route every seven (7) days.  oxyCODONE-acetaminophen (Percocet) 5-325 mg per tablet Take 1 Tab by mouth every six (6) hours as needed for Pain for up to 7 days.  Max Daily Amount: 4 Tabs. 14 Tab 0    cyclobenzaprine (FLEXERIL) 5 mg tablet Take 1-2 Tabs by mouth three (3) times daily as needed for Muscle Spasm(s) for up to 7 days. 22 Tab 0    diclofenac EC (VOLTAREN) 75 mg EC tablet Take 75 mg by mouth two (2) times a day. Allergies   Allergen Reactions    Gabapentin Palpitations       REVIEW OF SYSTEMS    Constitutional: Negative for fever, chills, or weight change. Respiratory: Negative for cough or shortness of breath. Cardiovascular: Negative for chest pain or palpitations. Gastrointestinal: Negative for acid reflux, change in bowel habits, or constipation. Genitourinary: Negative for dysuria and flank pain. Musculoskeletal: Positive for cervical pain. Skin: Negative for rash. Neurological: Positive for headaches but not dizziness. Positive for numbness in the left arm and leg. Endo/Heme/Allergies: Negative for increased bruising. Psychiatric/Behavioral: Positive for difficulty with sleep. PHYSICAL EXAMINATION  Visit Vitals  /82   Pulse 93   Temp 97.7 °F (36.5 °C) (Oral)   Resp 14   Ht 6' (1.829 m)   Wt 252 lb 9.6 oz (114.6 kg)   SpO2 97%   BMI 34.26 kg/m²       Constitutional: Awake, alert, and in no acute distress. HEENT: Normocephalic. Atraumatic. Oropharynx is moist and clear. PERRL. EOMI. Sclerae are nonicteric  Cardiovascular: Regular rate and rhythm  Lungs: Clear to auscultation bilaterally  Abdomen: Soft and nontender. Bowel sounds are present  Neurological: 1+ symmetrical DTRs in the upper extremities. 1+ symmetrical DTRs in the lower extremities. Sensation to light touch is intact. Negative Aceves's sign bilaterally. Skin: warm, dry, and intact. Musculoskeletal: Decreased range of motion with side to side cervical flexion. Tight across the upper trapezius bilaterally. No pain with extension, axial loading, or forward flexion. No pain with internal or external rotation of his hips. Negative straight leg raise bilaterally.  No pain with heel or toe walking. No difficulty with the single leg stance bilaterally. Biceps  Triceps Deltoids Wrist Ext Wrist Flex Hand Intrin   Right +4/5 +4/5 +4/5 +4/5 +4/5 +4/5   Left +4/5 +4/5 +4/5 +4/5 +4/5 +4/5      Hip Flex  Quads Hamstrings Ankle DF EHL Ankle PF   Right +4/5 +4/5 +4/5 +4/5 +4/5 +4/5   Left +4/5 +4/5 +4/5 +4/5 +4/5 +4/5     IMAGING:    Cervical spine 2V x-rays from 10/13/2020 were personally reviewed with the patient and demonstrated:  Straightening of the cervical spine. Degenerative disc at C5-6 and C6-7. Multilevel degenerative facets. Lumbar spine 4V x-rays from 10/13/2020 were personally reviewed with the patient and demonstrated:  Degenerative disc at L4-5. Multilevel degenerative facets. Mild straightening of the lumbar spine. Very minimal scoliosis. Written by Ruth Harman, as dictated by Jamila Arriaga MD.  I, Dr. Jamila Arriaga confirm that all documentation is accurate.

## 2020-10-13 NOTE — LETTER
10/17/20 Patient: Tee Bailey YOB: 1975 Date of Visit: 10/13/2020 Jasson Herrera MD 
[facility] [place] [id number] [place] [address] VIA In Basket Dear Jasson Herrera MD, Thank you for referring Mr. Tee Bailey to [address] [place] [address] for evaluation. My notes for this consultation are attached. If you have questions, please do not hesitate to call me. I look forward to following your patient along with you. Sincerely, Stepan Soliz MD

## 2020-10-14 ENCOUNTER — TELEPHONE (OUTPATIENT)
Dept: ORTHOPEDIC SURGERY | Age: 45
End: 2020-10-14

## 2020-10-14 NOTE — TELEPHONE ENCOUNTER
P/A submitted via cover my meds website for patient's Topiramate 25MG tablets medication. Used key code: Breanna Layne  And notes from previous office visits to submit prior authorization. Form printed and sent to scanning.     Obtained following message from website:    Pola Pablo ([place]: Breanna Layne) - [id number]  Topiramate 25MG tablets  Status: [place] Request    Created: October 13th, 2020 [phone]    Sent: October 14th, 2020

## 2020-10-15 ENCOUNTER — TELEPHONE (OUTPATIENT)
Dept: ORTHOPEDIC SURGERY | Age: 45
End: 2020-10-15

## 2020-10-15 ENCOUNTER — HOSPITAL ENCOUNTER (OUTPATIENT)
Dept: PHYSICAL THERAPY | Age: 45
Discharge: HOME | End: 2020-10-15
Payer: COMMERCIAL

## 2020-10-15 PROCEDURE — 97110 THERAPEUTIC EXERCISES: CPT

## 2020-10-15 PROCEDURE — 97112 NEUROMUSCULAR REEDUCATION: CPT

## 2020-10-15 NOTE — TELEPHONE ENCOUNTER
Our request for a MRI of the Cervical Spine without contrast has been faxed to [facility] for scheduling, [phone], fax [phone]. [employer] pre-authorization [id number], effective 10/15/20-11/13/20. Patient can self-schedule by contacting [facility]. He is already scheduled to follow-up with Dr. Sage Arcos.

## 2020-10-15 NOTE — TELEPHONE ENCOUNTER
Per Cover My Meds Website, P/A has been approved for patient's topamax. Called patient's [employer] [facility], left message, identifying myself/facility/call back number. Informed of above. Called patient to inform of above, Reached voicemail identified as \"César Weiss\", left message, identified myself/facility/callback number, informed of above, requested return call to facility with any further questions/concerns. No further action required at this time.

## 2020-10-20 ENCOUNTER — HOSPITAL ENCOUNTER (OUTPATIENT)
Dept: PHYSICAL THERAPY | Age: 45
Discharge: HOME | End: 2020-10-20
Payer: COMMERCIAL

## 2020-10-20 ENCOUNTER — TELEPHONE (OUTPATIENT)
Dept: SURGERY | Age: 45
End: 2020-10-20

## 2020-10-20 DIAGNOSIS — R29.898 HAND WEAKNESS: ICD-10-CM

## 2020-10-20 DIAGNOSIS — M54.12 CERVICAL RADICULOPATHY: ICD-10-CM

## 2020-10-20 DIAGNOSIS — M50.30 DDD (DEGENERATIVE DISC DISEASE), CERVICAL: ICD-10-CM

## 2020-10-20 DIAGNOSIS — M54.2 NECK PAIN: ICD-10-CM

## 2020-10-20 DIAGNOSIS — M47.812 CERVICAL FACET JOINT SYNDROME: ICD-10-CM

## 2020-10-20 PROCEDURE — 97110 THERAPEUTIC EXERCISES: CPT

## 2020-10-20 PROCEDURE — 97112 NEUROMUSCULAR REEDUCATION: CPT

## 2020-10-20 NOTE — PROGRESS NOTES
PT DAILY TREATMENT NOTE 10-18    Patient Name: Rosa Mean  Date:10/20/2020  : 1975  [x]  Patient  Verified  Payor: Cheo Rowell / Plan: Enedina Ford / Product Type: HMO /    In time:12:00  Out time:12:33  Total Treatment Time (min): 33  Visit #: 5 of 10    Medicare/BCBS Only   Total Timed Codes (min):  33 1:1 Treatment Time:  33       Treatment Area: Right ankle pain [M25.571]  Left ankle pain [M25.572]  Left knee pain [M25.562]    SUBJECTIVE  Pain Level (0-10 scale): 0  Any medication changes, allergies to medications, adverse drug reactions, diagnosis change, or new procedure performed?: [x] No    [] Yes (see summary sheet for update)  Subjective functional status/changes:   [] No changes reported  Pt reports no pain coming in today stating he is just a little tired because  and asked to have the script back for his neck to he can try to get therapy for his neck at another clinical facility. OBJECTIVE      21 min Therapeutic Exercise:  [x] See flow sheet :   Rationale: increase ROM and increase strength to improve the patients ability to perform functional task with ease. 12 min Neuromuscular Re-education:  [x]  See flow sheet :   Rationale: increase strength, improve coordination, improve balance and increase proprioception  to improve the patients ability to perform ADL's with ease. With   [] TE   [] TA   [] neuro   [] other: Patient Education: [x] Review HEP    [] Progressed/Changed HEP based on:   [] positioning   [] body mechanics   [] transfers   [] heat/ice application    [] other:      Other Objective/Functional Measures:   Elliptical- 5' Lv 5  Speed Ladder: shuffle, in and out, SL hops- 1x down and back ea.      Pain Level (0-10 scale) post treatment:     ASSESSMENT/Changes in Function:   Pt continues to display improved strength and stability in the left knee and B ankles noted with no complaints of progressed therex however continues to displays some challenge with SL HR and hops on the speed ladder due to continued weakness. Pt reports no pain just fatigue with therex and left in no apparent distress. Patient will continue to benefit from skilled PT services to modify and progress therapeutic interventions, address functional mobility deficits, address ROM deficits, address strength deficits, analyze and address soft tissue restrictions, analyze and cue movement patterns, analyze and modify body mechanics/ergonomics and assess and modify postural abnormalities to attain remaining goals. [x]  See Plan of Care  []  See progress note/recertification  []  See Discharge Summary         Progress towards goals / Updated goals:  Short Term Goals: To be accomplished in 2 weeks:  1. Pt will demonstrate I and compliance with HEP to maximize therapeutic effect.              KB: HEP issued and instructed               KLLTERE: met 10/6/20 Pt reports understanding and compliance   2. Pt will demonstrate 20 SLR without lag or patella pain to improve stability in standing.              IE: some patellar pain with SLR              Current: Met- 25 SLR without pain or lag 10/15/2020  Long Term Goals: To be accomplished in 5 weeks:  1. Pt will demonstrate SLS for 30\" on [employer] (B) without LOB to improve ankle proprioception.              IE: good stability on non-compliant surface              Current: Met - 30\" B without LOB 10/15/2020  2. Pt will demonstrate 15 unilateral heel raises (B) to improve stability with recreation.              IE: limited heel rise on left              Current: Met 15 unilateral heel raises without pain 10/15/2020  3. Pt will demonstrate step down from 6\" box without pain or instability to improve ankle and quad strength in preparation for return to sport.              IE: challenged with squatting   Current: Progressing 10/20/20 Pt able to demonstrate step down from 4\" box with no reports of pain and improved stability.   4. Pt will report ability to walk several blocks with little to no difficulty to improve ADL ease.              IE: Limited a lot              Current: Met 10/8/20 pt reports little to no difficulty with walking several blocks    PLAN  []  Upgrade activities as tolerated     [x]  Continue plan of care  []  Update interventions per flow sheet       []  Discharge due to:_  []  Other:_      Tu Sher PTA 10/20/2020  11:53 AM    Future Appointments   Date Time Provider [facility]   10/20/2020 12:00 PM Kwame Merchant PTA [facility]   10/22/2020 12:00 PM Saeid Lares [facility]   10/27/2020 10:30 AM Carlos Lake NP [facility]   11/3/2020 10:00 AM Les Causey MD [facility]

## 2020-10-22 ENCOUNTER — HOSPITAL ENCOUNTER (OUTPATIENT)
Dept: LAB | Age: 45
Discharge: HOME | End: 2020-10-22

## 2020-10-22 ENCOUNTER — HOSPITAL ENCOUNTER (OUTPATIENT)
Dept: PHYSICAL THERAPY | Age: 45
Discharge: HOME | End: 2020-10-22
Payer: COMMERCIAL

## 2020-10-22 LAB
XX-LABCORP SPECIMEN COL,LCBCF: NORMAL
XX-LABCORP SPECIMEN COL,LCBCF: NORMAL

## 2020-10-22 PROCEDURE — 99001 SPECIMEN HANDLING PT-LAB: CPT

## 2020-10-22 PROCEDURE — 97140 MANUAL THERAPY 1/> REGIONS: CPT

## 2020-10-22 PROCEDURE — 97110 THERAPEUTIC EXERCISES: CPT

## 2020-10-22 NOTE — PROGRESS NOTES
PT DAILY TREATMENT NOTE 10-18    Patient Name: Leroy Costa  Date:10/22/2020  : 1975  [x]  Patient  Verified  Payor: Melville Cranker / Plan: Shasha Gee / Product Type: HMO /    In time:12:00  Out time:12:32  Total Treatment Time (min): 32  Visit #: 6 of 10    Medicare/BCBS Only   Total Timed Codes (min):  32 1:1 Treatment Time:  32       Treatment Area: Right ankle pain [M25.571]  Left ankle pain [M25.572]  Left knee pain [M25.562]    SUBJECTIVE  Pain Level (0-10 scale): 2-3  Any medication changes, allergies to medications, adverse drug reactions, diagnosis change, or new procedure performed?: [x] No    [] Yes (see summary sheet for update)  Subjective functional status/changes:   [] No changes reported  The pt reports the night after his last session he noticed a lot of aching in his left knee and lesser so in his ankle.  He reports no issues during session     OBJECTIVE    Modality rationale: PD to improve the patients ability to    Min Type Additional Details    [] Estim:  []Unatt       []IFC  []Premod                        []Other:  []w/ice   []w/heat  Position:  Location:    [] Estim: []Att    []TENS instruct  []NMES                    []Other:  []w/US   []w/ice   []w/heat  Position:  Location:    []  Traction: [] Cervical       []Lumbar                       [] Prone          []Supine                       []Intermittent   []Continuous Lbs:  [] before manual  [] after manual    []  Ultrasound: []Continuous   [] Pulsed                           []1MHz   []3MHz W/cm2:  Location:    []  Iontophoresis with dexamethasone         Location: [] Take home patch   [] In clinic    []  Ice     []  heat  []  Ice massage  []  Laser   []  Anodyne Position:  Location:    []  Laser with stim  []  Other:  Position:  Location:    []  Vasopneumatic Device Pressure:       [] lo [] med [] hi   Temperature: [] lo [] med [] hi   [] Skin assessment post-treatment:  []intact []redness- no adverse reaction []redness  adverse reaction:         14 min Therapeutic Exercise:  [] See flow sheet :   Rationale: increase ROM and increase strength to improve the patients ability to perform daily tasks and self care    8 min Neuromuscular Re-education:  []  See flow sheet :   Rationale: improve balance and increase proprioception  to improve the patients ability to perform standing activity with improved stability     10 min Manual Therapy:  Assessment of left knee, STM/MFR left gastroc   Rationale: decrease pain and increase tissue extensibility to improve ease of ADLs            With   [] TE   [] TA   [] neuro   [] other: Patient Education: [x] Review HEP    [] Progressed/Changed HEP based on:   [] positioning   [] body mechanics   [] transfers   [] heat/ice application    [] other:      Other Objective/Functional Measures: negative laxity testing noted on left pt reports feeling \"loose\" he is still TTP at medial joint line near MCL     Pain Level (0-10 scale) post treatment: 0    ASSESSMENT/Changes in Function: Held multiple interventions today and tried to modify unilateral WB'ing today to avoid post exercise pain. Pt reports that he did come off of muscle relaxers the same day he was in PT last and wonders if he is just noticing more than previously. Good response to DL work today and denies pain upon completion. Advised pt to work soft tissue through [address] [place] [address] and monitor knee response over next 24 hours. Patient will continue to benefit from skilled PT services to modify and progress therapeutic interventions, address functional mobility deficits, address ROM deficits, address strength deficits, analyze and address soft tissue restrictions, analyze and cue movement patterns and analyze and modify body mechanics/ergonomics to attain remaining goals.      []  See Plan of Care  []  See progress note/recertification  []  See Discharge Summary         Progress towards goals / Updated goals:  Short Term Goals: To be accomplished in 2 weeks:  1. Pt will demonstrate I and compliance with HEP to maximize therapeutic effect.              BP: HEP issued and instructed               [id number]: met 10/6/20 Pt reports understanding and compliance   2. Pt will demonstrate 20 SLR without lag or patella pain to improve stability in standing.              IE: some patellar pain with SLR              Current: Met- 25 SLR without pain or lag 10/15/2020  Long Term Goals: To be accomplished in 5 weeks:  1. Pt will demonstrate SLS for 30\" on Airex (B) without LOB to improve ankle proprioception.              IE: good stability on non-compliant surface              Current: Met - 30\" B without LOB 10/15/2020  2. Pt will demonstrate 15 unilateral heel raises (B) to improve stability with recreation.  Guera Miranda: limited heel rise on left              Current: Met 15 unilateral heel raises without pain 10/15/2020  3. Pt will demonstrate step down from 6\" box without pain or instability to improve ankle and quad strength in preparation for return to sport.              IE: challenged with squatting              Current: Progressing 10/20/20 Pt able to demonstrate step down from 4\" box with no reports of pain and improved stability.   4. Pt will report ability to walk several blocks with little to no difficulty to improve ADL ease.              IE: Limited a lot              Current: Met 10/8/20 pt reports little to no difficulty with walking several blocks    PLAN  []  Upgrade activities as tolerated     [x]  Continue plan of care  []  Update interventions per flow sheet       []  Discharge due to:_  []  Other:_      Renetta Seen DPT, CMTPT 10/22/2020  12:15 PM    Future Appointments   Date Time Provider Kamari Rodriguez   10/26/2020 12:00 PM [facility]   10/27/2020 10:30 AM LUIS MorrisSN BS AMB   10/29/2020 12:45 PM Markus Teresa MMCPT [facility]   11/3/2020 10:00 AM Pecola Seip, MD [facility] BS AMB 11/3/2020 12:45 PM RUTHANN AriasPTHV [facility]   11/5/2020 11:30 AM RUTHANN Arias [facility]

## 2020-10-23 ENCOUNTER — TELEPHONE (OUTPATIENT)
Dept: ORTHOPEDIC SURGERY | Age: 45
End: 2020-10-23

## 2020-10-23 NOTE — TELEPHONE ENCOUNTER
Patient came by [facility] today to let us know he has had his MRI done and wants a sooner appt with Dr. Ayana Bourgeois. He is in pain in his neck. Where can we put him? His f/u is 11/3/2020.

## 2020-10-26 ENCOUNTER — HOSPITAL ENCOUNTER (OUTPATIENT)
Dept: PHYSICAL THERAPY | Age: 45
Discharge: HOME | End: 2020-10-26
Payer: COMMERCIAL

## 2020-10-26 LAB
ALBUMIN SERPL-MCNC: 4.8 G/DL (ref 4–5)
ALBUMIN/GLOB SERPL: 1.8 {RATIO} (ref 1.2–2.2)
ALP SERPL-CCNC: 74 IU/L (ref 39–117)
ALT SERPL-CCNC: 22 IU/L (ref 0–44)
APPEARANCE UR: CLEAR
AST SERPL-CCNC: 20 IU/L (ref 0–40)
BACTERIA #/AREA URNS HPF: NORMAL /[HPF]
BASOPHILS # BLD AUTO: 0 X10E3/UL (ref 0–0.2)
BASOPHILS NFR BLD AUTO: 0 %
BILIRUB SERPL-MCNC: 0.6 MG/DL (ref 0–1.2)
BILIRUB UR QL STRIP: NEGATIVE
BUN SERPL-MCNC: 18 MG/DL (ref 6–24)
BUN/CREAT SERPL: 17 (ref 9–20)
CALCIUM SERPL-MCNC: 9.8 MG/DL (ref 8.7–10.2)
CASTS URNS QL MICRO: NORMAL /LPF
CHLORIDE SERPL-SCNC: 100 MMOL/L (ref 96–106)
CO2 SERPL-SCNC: 26 MMOL/L (ref 20–29)
COLOR UR: YELLOW
CREAT SERPL-MCNC: 1.06 MG/DL (ref 0.76–1.27)
EOSINOPHIL # BLD AUTO: 0.2 X10E3/UL (ref 0–0.4)
EOSINOPHIL NFR BLD AUTO: 3 %
EPI CELLS #/AREA URNS HPF: NORMAL /HPF (ref 0–10)
ERYTHROCYTE [DISTWIDTH] IN BLOOD BY AUTOMATED COUNT: 12.8 % (ref 11.6–15.4)
GAMMA INTERFERON BACKGROUND BLD IA-ACNC: 0.02 IU/ML
GLOBULIN SER CALC-MCNC: 2.7 G/DL (ref 1.5–4.5)
GLUCOSE SERPL-MCNC: 89 MG/DL (ref 65–99)
GLUCOSE UR QL: NEGATIVE
HCT VFR BLD AUTO: 46.7 % (ref 37.5–51)
HGB BLD-MCNC: 16.4 G/DL (ref 13–17.7)
HGB UR QL STRIP: NEGATIVE
IMM GRANULOCYTES # BLD AUTO: 0 X10E3/UL (ref 0–0.1)
IMM GRANULOCYTES NFR BLD AUTO: 0 %
KETONES UR QL STRIP: NEGATIVE
LEUKOCYTE ESTERASE UR QL STRIP: NEGATIVE
LYMPHOCYTES # BLD AUTO: 3.2 X10E3/UL (ref 0.7–3.1)
LYMPHOCYTES NFR BLD AUTO: 46 %
M TB IFN-G BLD-IMP: NEGATIVE
M TB IFN-G CD4+ BCKGRND COR BLD-ACNC: 0.02 IU/ML
MAGNESIUM SERPL-MCNC: 2.4 MG/DL (ref 1.6–2.3)
MCH RBC QN AUTO: 32.5 PG (ref 26.6–33)
MCHC RBC AUTO-ENTMCNC: 35.1 G/DL (ref 31.5–35.7)
MCV RBC AUTO: 93 FL (ref 79–97)
MICRO URNS: ABNORMAL
MICRO URNS: ABNORMAL
MITOGEN IGNF BLD-ACNC: >10 IU/ML
MONOCYTES # BLD AUTO: 0.5 X10E3/UL (ref 0.1–0.9)
MONOCYTES NFR BLD AUTO: 8 %
MUCOUS THREADS URNS QL MICRO: PRESENT
NEUTROPHILS # BLD AUTO: 3.1 X10E3/UL (ref 1.4–7)
NEUTROPHILS NFR BLD AUTO: 43 %
NITRITE UR QL STRIP: NEGATIVE
PH UR STRIP: 8 [PH] (ref 5–7.5)
PLATELET # BLD AUTO: 249 X10E3/UL (ref 150–450)
POTASSIUM SERPL-SCNC: 4.7 MMOL/L (ref 3.5–5.2)
PROT SERPL-MCNC: 7.5 G/DL (ref 6–8.5)
PROT UR QL STRIP: NEGATIVE
QUANTIFERON INCUBATION, QF1T: NORMAL
QUANTIFERON TB2 AG: 0.02 IU/ML
RBC # BLD AUTO: 5.05 X10E6/UL (ref 4.14–5.8)
RBC #/AREA URNS HPF: NORMAL /HPF (ref 0–2)
SERVICE CMNT-IMP: NORMAL
SODIUM SERPL-SCNC: 142 MMOL/L (ref 134–144)
SP GR UR: 1.01 (ref 1–1.03)
TSH SERPL DL<=0.005 MIU/L-ACNC: 1.33 UIU/ML (ref 0.45–4.5)
UROBILINOGEN UR STRIP-MCNC: 0.2 MG/DL (ref 0.2–1)
WBC # BLD AUTO: 7 X10E3/UL (ref 3.4–10.8)
WBC #/AREA URNS HPF: NORMAL /HPF (ref 0–5)

## 2020-10-26 PROCEDURE — 97112 NEUROMUSCULAR REEDUCATION: CPT

## 2020-10-26 PROCEDURE — 97110 THERAPEUTIC EXERCISES: CPT

## 2020-10-26 NOTE — PROGRESS NOTES
PT DAILY TREATMENT NOTE 10-18    Patient Name: Alex Bell  Date:10/26/2020  : 1975  [x]  Patient  Verified  Payor: Tracey Stewart / Plan: Radha Duarte / Product Type: HMO /    In time:12:00  Out time:12:36  Total Treatment Time (min): 36  Visit #: 7 of 10    Medicare/BCBS Only   Total Timed Codes (min):  36 1:1 Treatment Time:  36       Treatment Area: Right ankle pain [M25.571]  Left ankle pain [M25.572]  Left knee pain [M25.562]    SUBJECTIVE  Pain Level (0-10 scale): 1  Any medication changes, allergies to medications, adverse drug reactions, diagnosis change, or new procedure performed?: [x] No    [] Yes (see summary sheet for update)  Subjective functional status/changes:   [] No changes reported  Pt reports he is feeling a lot better since his last session and only feels a small twinge in his knee with walking. Pt states he was up and [place] and his knees and ankles did fine. OBJECTIVE    26 min Therapeutic Exercise:  [x] See flow sheet :   Rationale: increase ROM and increase strength to improve the patients ability to perform functional task with ease. 10 min Neuromuscular Re-education:  [x]  See flow sheet :   Rationale: increase strength, improve coordination, improve balance and increase proprioception  to improve the patients ability to perform ADL's with ease. With   [] TE   [] TA   [] neuro   [] other: Patient Education: [x] Review HEP    [] Progressed/Changed HEP based on:   [] positioning   [] body mechanics   [] transfers   [] heat/ice application    [] other:      Other Objective/Functional Measures:      Pain Level (0-10 scale) post treatment: 0    ASSESSMENT/Changes in Function:   Reintroduced some unilateral WB exercises this visit with pt displaying a good response to exercises reporting no increased pain in the left knee or B ankles.  Pt reports some times he can feel good while doing the exercises but later that night is when his pain and discomfort may flare up. Pt instructed to monitor how he feels tonight following his PT session and we will reassess at his next visit. Patient will continue to benefit from skilled PT services to modify and progress therapeutic interventions, address functional mobility deficits, address ROM deficits, address strength deficits, analyze and address soft tissue restrictions, analyze and cue movement patterns, analyze and modify body mechanics/ergonomics and assess and modify postural abnormalities to attain remaining goals. [x]  See Plan of Care  []  See progress note/recertification  []  See Discharge Summary         Progress towards goals / Updated goals:  Short Term Goals: To be accomplished in 2 weeks:  1. Pt will demonstrate I and compliance with HEP to maximize therapeutic effect.              BRENDA: HEP issued and instructed               [id number]: met 10/6/20 Pt reports understanding and compliance   2. Pt will demonstrate 20 SLR without lag or patella pain to improve stability in standing.              IE: some patellar pain with SLR              Current: Met- 25 SLR without pain or lag 10/15/2020  Long Term Goals: To be accomplished in 5 weeks:  1. Pt will demonstrate SLS for 30\" on Airex (B) without LOB to improve ankle proprioception.              IE: good stability on non-compliant surface              Current: Met - 30\" B without LOB 10/15/2020  2. Pt will demonstrate 15 unilateral heel raises (B) to improve stability with recreation.  Janet Staley: limited heel rise on left              Current: Met 15 unilateral heel raises without pain 10/15/2020  3. Pt will demonstrate step down from 6\" box without pain or instability to improve ankle and quad strength in preparation for return to sport.              IE: challenged with squatting              Current: Progressing 10/20/20 Pt able to demonstrate step down from 4\" box with no reports of pain and improved stability.   4. Pt will report ability to walk several blocks with little to no difficulty to improve ADL ease.              IE: Limited a lot              Current: Met 10/8/20 pt reports little to no difficulty with walking several blocks       PLAN  []  Upgrade activities as tolerated     [x]  Continue plan of care  []  Update interventions per flow sheet       []  Discharge due to:_  []  Other:_      Serena Poe PTA 10/26/2020  11:57 AM    Future Appointments   Date Time Provider Kamari Singhi   10/26/2020 12:00 PM [facility]   10/27/2020 10:30 AM LUIS RalphSN [facility]   10/27/2020  3:45 PM Sara Hicks MD [facility] BS [facility]   10/29/2020 12:45 PM Elie [address] [facility]   11/3/2020 12:45 PM [facility]   11/5/2020 11:30 AM [facility]

## 2020-10-27 ENCOUNTER — OFFICE VISIT (OUTPATIENT)
Dept: SURGERY | Age: 45
End: 2020-10-27

## 2020-10-27 ENCOUNTER — OFFICE VISIT (OUTPATIENT)
Dept: ORTHOPEDIC SURGERY | Age: 45
End: 2020-10-27
Payer: COMMERCIAL

## 2020-10-27 VITALS
SYSTOLIC BLOOD PRESSURE: 114 MMHG | HEIGHT: 72 IN | HEART RATE: 71 BPM | BODY MASS INDEX: 34.52 KG/M2 | WEIGHT: 254.9 LBS | DIASTOLIC BLOOD PRESSURE: 84 MMHG

## 2020-10-27 VITALS
SYSTOLIC BLOOD PRESSURE: 136 MMHG | WEIGHT: 258 LBS | DIASTOLIC BLOOD PRESSURE: 88 MMHG | HEIGHT: 72 IN | RESPIRATION RATE: 24 BRPM | TEMPERATURE: 96.7 F | BODY MASS INDEX: 34.95 KG/M2 | HEART RATE: 73 BPM | OXYGEN SATURATION: 95 %

## 2020-10-27 DIAGNOSIS — G89.29 CHRONIC LEFT SHOULDER PAIN: ICD-10-CM

## 2020-10-27 DIAGNOSIS — M25.512 CHRONIC LEFT SHOULDER PAIN: ICD-10-CM

## 2020-10-27 DIAGNOSIS — E66.9 OBESITY, UNSPECIFIED CLASSIFICATION, UNSPECIFIED OBESITY TYPE, UNSPECIFIED WHETHER SERIOUS COMORBIDITY PRESENT: Primary | ICD-10-CM

## 2020-10-27 DIAGNOSIS — M62.838 MUSCLE SPASM: ICD-10-CM

## 2020-10-27 DIAGNOSIS — M47.812 CERVICAL FACET JOINT SYNDROME: Primary | ICD-10-CM

## 2020-10-27 PROCEDURE — 99213 OFFICE O/P EST LOW 20 MIN: CPT | Performed by: PHYSICAL MEDICINE & REHABILITATION

## 2020-10-27 RX ORDER — CYCLOBENZAPRINE HCL 5 MG
TABLET ORAL
Qty: 60 TAB | Refills: 2 | Status: SHIPPED | OUTPATIENT
Start: 2020-10-27 | End: 2021-02-19 | Stop reason: ALTCHOICE

## 2020-10-27 RX ORDER — CYCLOBENZAPRINE HCL 10 MG
TABLET ORAL
Qty: 60 TAB | Refills: 2 | Status: CANCELLED | OUTPATIENT
Start: 2020-10-27

## 2020-10-27 NOTE — PROGRESS NOTES
Alexandra Estrella presents today for   Chief Complaint   Patient presents with    Neck Pain       Is someone accompanying this pt? no    Is the patient using any DME equipment during OV? no    Depression Screening:  3 most recent PHQ Screens 10/13/2020   Little interest or pleasure in doing things Not at all   Feeling down, depressed, irritable, or hopeless Not at all   Total Score PHQ 2 0       Learning Assessment:  Learning Assessment 12/24/2019   PRIMARY LEARNER Patient   HIGHEST LEVEL OF EDUCATION - PRIMARY LEARNER  4 YEARS OF COLLEGE   BARRIERS PRIMARY LEARNER NONE   CO-LEARNER CAREGIVER No   CO-LEARNER NAME n/a   PRIMARY LANGUAGE ENGLISH   LEARNER PREFERENCE PRIMARY DEMONSTRATION   ANSWERED BY patient   RELATIONSHIP SELF       Abuse Screening:  Abuse Screening Questionnaire 1/24/2020   Do you ever feel afraid of your partner? N   Are you in a relationship with someone who physically or mentally threatens you? N   Is it safe for you to go home? Y         Coordination of Care:  1. Have you been to the ER, urgent care clinic since your last visit? no  Hospitalized since your last visit? no    2. Have you seen or consulted any other health care providers outside of the [address] [place] [address] since your last visit? Yes, weight loss clinic Include any pap smears or colon screening.  no
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
## 2020-10-27 NOTE — LETTER
10/28/20 Patient: Rosa Keating YOB: 1975 Date of Visit: 10/27/2020 Marcelino Miles MD 
[address] [place] [address] [id number] [address] VIA In Basket Dear Marcelino Miles MD, Thank you for referring Mr. Rosa Keating to [facility] for evaluation. My notes for this consultation are attached. If you have questions, please do not hesitate to call me. I look forward to following your patient along with you. Sincerely, Hetal Randall MD

## 2020-10-27 NOTE — PATIENT INSTRUCTIONS
Shoulder Arthritis: Exercises Introduction Here are some examples of exercises for you to try. The exercises may be suggested for a condition or for rehabilitation. Start each exercise slowly. Ease off the exercises if you start to have pain. You will be told when to start these exercises and which ones will work best for you. How to do the exercises Shoulder flexion (lying down) To make a wand for this exercise, use a piece of PVC pipe or a broom handle with the broom removed. Make the wand about a foot wider than your shoulders. 1. Lie on your back, holding a wand with both hands. Your palms should face down as you hold the wand. 2. Keeping your elbows straight, slowly raise your arms over your head. Raise them until you feel a stretch in your shoulders, upper back, and chest. 
3. Hold for 15 to 30 seconds. 4. Repeat 2 to 4 times. Shoulder rotation (lying down) To make a wand for this exercise, use a piece of PVC pipe or a broom handle with the broom removed. Make the wand about a foot wider than your shoulders. 1. Lie on your back. Hold a wand with both hands with your elbows bent and palms up. 2. Keep your elbows close to your body, and move the wand across your body toward the sore arm. 3. Hold for 8 to 12 seconds. 4. Repeat 2 to 4 times. Shoulder internal rotation with towel 1. Hold a towel above and behind your head with the arm that is not sore. 2. With your sore arm, reach behind your back and grasp the towel. 3. With the arm above your head, pull the towel upward. Do this until you feel a stretch on the front and outside of your sore shoulder. 4. Hold 15 to 30 seconds. 5. Repeat 2 to 4 times. Shoulder blade squeeze 1. Stand with your arms at your sides, and squeeze your shoulder blades together. Do not raise your shoulders up as you squeeze. 2. Hold 6 seconds. 3. Repeat 8 to 12 times. Resisted rows For this exercise, you will need elastic exercise material, such as surgical tubing or Thera-Band. 1. Put the band around a solid object at about waist level. (A bedpost will work well.) Each hand should hold an end of the band. 2. With your elbows at your sides and bent to 90 degrees, pull the band back. Your shoulder blades should move toward each other. Return to the starting position. 3. Repeat 8 to 12 times. External rotator strengthening exercise 1. Start by tying a piece of elastic exercise material to a doorknob. You can use surgical tubing or Thera-Band. (You may also hold one end of the band in each hand.) 2. Stand or sit with your shoulder relaxed and your elbow bent 90 degrees. Your upper arm should rest comfortably against your side. Squeeze a rolled towel between your elbow and your body for comfort. This will help keep your arm at your side. 3. Hold one end of the elastic band with the hand of the painful arm. 4. Start with your forearm across your belly. Slowly rotate the forearm out away from your body. Keep your elbow and upper arm tucked against the towel roll or the side of your body until you begin to feel tightness in your shoulder. Slowly move your arm back to where you started. 5. Repeat 8 to 12 times. Internal rotator strengthening exercise 1. Start by tying a piece of elastic exercise material to a doorknob. You can use surgical tubing or Thera-Band. 2. Stand or sit with your shoulder relaxed and your elbow bent 90 degrees. Your upper arm should rest comfortably against your side. Squeeze a rolled towel between your elbow and your body for comfort. This will help keep your arm at your side. 3. Hold one end of the elastic band in the hand of the painful arm. 4. Slowly rotate your forearm toward your body until it touches your belly. Slowly move it back to where you started. 5. Keep your elbow and upper arm firmly tucked against the towel roll or at your side. 6. Repeat 8 to 12 times. Pendulum swing If you have pain in your back, do not do this exercise. 1. Hold on to a table or the back of a chair with your good arm. Then bend forward a little and let your sore arm hang straight down. This exercise does not use the arm muscles. Rather, use your legs and your hips to create movement that makes your arm swing freely. 2. Use the movement from your hips and legs to guide the slightly swinging arm back and forth like a pendulum (or elephant trunk). Then guide it in circles that start small (about the size of a dinner plate). Make the circles a bit larger each day, as your pain allows. 3. Do this exercise for 5 minutes, 5 to 7 times each day. 4. As you have less pain, try bending over a little farther to do this exercise. This will increase the amount of movement at your shoulder. Follow-up care is a key part of your treatment and safety. Be sure to make and go to all appointments, and call your doctor if you are having problems. It's also a good idea to know your test results and keep a list of the medicines you take. Where can you learn more? Go to http://www.gray.com/ Enter H562 in the search box to learn more about \"Shoulder Arthritis: Exercises. \" Current as of: March 2, 2020               Content Version: 12.6 © 5355-4953 [employer]. Care instructions adapted under license by [employer] (which disclaims liability or warranty for this information). If you have questions about a medical condition or this instruction, always ask your healthcare professional. [place] [address] any warranty or liability for your use of this information.

## 2020-10-27 NOTE — PROGRESS NOTES
Lakshmi Anguiano is a 39 y.o. male  No chief complaint on file. Patient attended a weekly class as part of the Medically Supervised Weight Loss Program.  This class was facilitated by a Registered Dietitian. Topics taught at class focused on diet, particularly carbohydrate counting and portion control. Classes also focused on behavior changes and the importance of establishing a daily exercise routine. Progress Note: Weekly Medical Monitoring in the [facility] Weight Loss Program    Is there anything that you or the patient needs to let the supervising provider know about? no    Over the past week, have you experienced any side-effects? no    Lakshmi Anguiano is a 39 y.o. male who is enrolled in [facility] Weight Loss Program    Lakshmi Anguiano was prescribed the VLCD / LCD. Visit Vitals  /84   Pulse 71   Ht 6' (1.829 m)   Wt 254 lb 14.4 oz (115.6 kg)   BMI 34.57 kg/m²     Weight Metrics 10/27/2020 10/27/2020 10/13/2020 10/13/2020 10/13/2020 10/13/2020 10/7/2020   Weight - 254 lb 14.4 oz - 252 lb 9.6 oz 253 lb 248 lb 3.2 oz -   Waist Measure Inches 45.5 - 43.5 - - - 45.5   BMI - 34.57 kg/m2 - 34.26 kg/m2 34.31 kg/m2 33.66 kg/m2 -         Have you received any other medical care this week? yes  If yes, where and for what? Phys Therapy for leg and neck    Have you had any change in your medications since your last visit? no  If yes what? Did you have any problems adhering to the program last week? yes  If yes, please explain: \"taking steroids and makes me hungry\"      Eating Habits Over Last Week:  Did you take in 64 oz of non-caloric fluids?  yes     Did you consume your prescribed meal replacement regimen each day? no       Physical Activity Over the Past Week:    Aerobic exercise: 150 min  Resistance exercise: 100 minutes workouts / week

## 2020-10-29 ENCOUNTER — HOSPITAL ENCOUNTER (OUTPATIENT)
Dept: PHYSICAL THERAPY | Age: 45
Discharge: HOME | End: 2020-10-29
Payer: COMMERCIAL

## 2020-10-29 PROCEDURE — 97112 NEUROMUSCULAR REEDUCATION: CPT

## 2020-10-29 PROCEDURE — 97110 THERAPEUTIC EXERCISES: CPT

## 2020-10-29 NOTE — PROGRESS NOTES
[address]  [address]  [place], [address]  [phone] fax    Physical Therapy Progress Note  Patient name: Darline Rodriguez Start of Care: 10/2/2020   Referral source: Frederick Castanon MD : 1975                Medical Diagnosis: Right ankle pain [M25.571]  Left ankle pain [M25.572]  Left knee pain [M25.562]  Payor: [employer] / Plan: Johnny Martinez / Product Type: HMO /  Onset Date:6 weeks                Treatment Diagnosis: Left knee and B ankle pain   Prior Hospitalization: see medical history Provider#: [id number]   Medications: Verified on Patient summary List    Comorbidities: psoriatic arthritis, diabetes, HTN   Prior Level of Function: Pt able to participate in tennis, golf and running without knee and ankle pain    Visits from Start of Care: 8    Missed Visits: 1      Established Goals:        Excellent         Good         Limited            None  [] Increased ROM   []  []  []  []  [x] Increased Strength  []  [x]  []  []  [x] Increased Mobility  []  [x]  []  []   [] Decreased Pain   []  []  []  []  [] Decreased Swelling  []  []  []  []    Key Functional Changes:   Short Term Goals: To be accomplished in 2 weeks:  1. Pt will demonstrate I and compliance with HEP to maximize therapeutic effect.              HB: HEP issued and instructed               QXNOIGH: met 10/6/20 Pt reports understanding and compliance   2. Pt will demonstrate 20 SLR without lag or patella pain to improve stability in standing.              IE: some patellar pain with SLR              Current: Met- 25 SLR without pain or lag 10/15/2020  Long Term Goals: To be accomplished in 5 weeks:  1. Pt will demonstrate SLS for 30\" on [employer]ire[employer] (B) without LOB to improve ankle proprioception.              IE: good stability on non-compliant surface              Current: Met - 30\" B without LOB 10/15/2020  2.  Pt will demonstrate 15 unilateral heel raises (B) to improve stability with recreation.  Blease Night: limited heel rise on left              Current: Met 15 unilateral heel raises without pain 10/15/2020  3. Pt will demonstrate step down from 6\" box without pain or instability to improve ankle and quad strength in preparation for return to sport.              IE: challenged with squatting              Current: Met 6\" step downs without pain or compensation B 10/29/2020  4. Pt will report ability to walk several blocks with little to no difficulty to improve ADL ease.              IE: Limited a lot              Current: Met 10/8/20 pt reports little to no difficulty with walking several blocks    Updated Goals: to be achieved in 2 weeks:  1. Pt will tolerate 5 minutes of treadmill jogging without pain to return to running regiment. PN: not running yet  2. Pt will demonstrate pain free golf swings to allow return to golfing. PN: right lateral ankle discomfort on finish    ASSESSMENT/RECOMMENDATIONS: The pt has progressed very well in regards to strength and mobility. He is performing high level activities in clinic without issues. He is reporting some night time aching and sensitivity at this time as main limitation. Pt does report intermittent medial left knee discomfort with stress of medial ligaments. Will continue PT 1-2 weeks until MD f/u.     [x]Continue therapy per initial plan/protocol at a frequency of  2 x per week for 1-2 weeks  []Continue therapy with the following recommended changes:_____________________      _____________________________________________________________________  []Discontinue therapy progressing towards or have reached established goals  []Discontinue therapy due to lack of appreciable progress towards goals  []Discontinue therapy due to lack of attendance or compliance  []Await Physician's recommendations/decisions regarding therapy  []Other:________________________________________________________________    Thank you for this referral. Kellee Scruggs DPT, CMTPT 10/29/2020 1:58 PM  NOTE TO PHYSICIAN:  PLEASE COMPLETE THE ORDERS BELOW AND   FAX TO [facility]: [phone]  If you are unable to process this request in 24 hours please contact our office: [phone]    ? I have read the above report and request that my patient continue as recommended. ? I have read the above report and request that my patient continue therapy with the following changes/special instructions:__________________________________________________________  ? I have read the above report and request that my patient be discharged from therapy.     Physicians signature: ______________________________Date: ______Time:______

## 2020-11-03 ENCOUNTER — HOSPITAL ENCOUNTER (OUTPATIENT)
Dept: PHYSICAL THERAPY | Age: 45
Discharge: HOME | End: 2020-11-03
Payer: COMMERCIAL

## 2020-11-03 PROCEDURE — 97110 THERAPEUTIC EXERCISES: CPT

## 2020-11-03 PROCEDURE — 97112 NEUROMUSCULAR REEDUCATION: CPT

## 2020-11-03 NOTE — PROGRESS NOTES
PT DAILY TREATMENT NOTE     Patient Name: Laurent Hoffman  Date:11/3/2020  : 1975  [x]  Patient  Verified  Payor: Antonina Cavanaugh / Plan: Kulwinder Porfirio / Product Type: HMO /    In time:12:45  Out time:1:31  Total Treatment Time (min): 46  Visit #: 1 of 2-4    Medicare/BCBS Only   Total Timed Codes (min):  46 1:1 Treatment Time:  46       Treatment Area: Right ankle pain [M25.571]  Left ankle pain [M25.572]  Left knee pain [M25.562]    SUBJECTIVE  Pain Level (0-10 scale): 2  Any medication changes, allergies to medications, adverse drug reactions, diagnosis change, or new procedure performed?: [x] No    [] Yes (see summary sheet for update)  Subjective functional status/changes:   [] No changes reported  Pt reports increased left knee pain since his last appointment noting it has been keeping him up at night. Pt notes he is hearing a lot of popping in his ankles and knees over the past few days as well. Pt reports his pain comes at night when he is not moving or if he is just standing for a longer period of time. OBJECTIVE      31 min Therapeutic Exercise:  [x] See flow sheet :   Rationale: increase ROM and increase strength to improve the patients ability to perform daily task with ease. 15 min Neuromuscular Re-education:  [x]  See flow sheet :   Rationale: increase strength, improve coordination, improve balance and increase proprioception  to improve the patients ability to preform ADL's with ease.           With   [] TE   [] TA   [] neuro   [] other: Patient Education: [x] Review HEP    [] Progressed/Changed HEP based on:   [] positioning   [] body mechanics   [] transfers   [] heat/ice application    [] other:      Other Objective/Functional Measures:   HS stretch- 30\" ea  Knee flexion stretch at stairs- B 10x 5\" ea  Ankle 4 way- Org 15x ea      Pain Level (0-10 scale) post treatment: 1    ASSESSMENT/Changes in Function:   Pt reports discomfort in the ankles with jogging on the treadmill however notes its no more pain that what he came into treatment with. Pt reports increased medial left knee and B ankle pain over the weekend however notes no increased pain with therex today. Ankle 4 way added to continue to strengthen the B ankles and decrease instability with ambulation. Pt reports decreased pain post treatment. Patient will continue to benefit from skilled PT services to modify and progress therapeutic interventions, address functional mobility deficits, address ROM deficits, address strength deficits, analyze and address soft tissue restrictions, analyze and cue movement patterns, analyze and modify body mechanics/ergonomics and assess and modify postural abnormalities to attain remaining goals. [x]  See Plan of Care  []  See progress note/recertification  []  See Discharge Summary         Progress towards goals / Updated goals:  1. Pt will tolerate 5 minutes of treadmill jogging without pain to return to running regiment. PN: not running yet  2. Pt will demonstrate pain free golf swings to allow return to golfing.   PN: right lateral ankle discomfort on finish    PLAN  []  Upgrade activities as tolerated     [x]  Continue plan of care  []  Update interventions per flow sheet       []  Discharge due to:_  []  Other:_      Rashida Valdes PTA 11/3/2020  12:48 PM    Future Appointments   Date Time Provider Kamari Rodriguez   11/5/2020 11:30 AM Stanford Freeman, RUTHANN [facility]   11/11/2020  3:30 PM LUIS Perry BS AMB   12/8/2020  3:30 PM Kimmy Sanchez MD [facility] BS AMB

## 2020-11-05 ENCOUNTER — HOSPITAL ENCOUNTER (OUTPATIENT)
Dept: PHYSICAL THERAPY | Age: 45
Discharge: HOME | End: 2020-11-05
Payer: COMMERCIAL

## 2020-11-05 PROCEDURE — 97110 THERAPEUTIC EXERCISES: CPT

## 2020-11-05 PROCEDURE — 97112 NEUROMUSCULAR REEDUCATION: CPT

## 2020-11-05 NOTE — PROGRESS NOTES
PT DAILY TREATMENT NOTE     Patient Name: Braulio Peterson  Date:2020  : 1975  [x]  Patient  Verified  Payor: Erik Wang / Plan: Estrellita Hair / Product Type: HMO /    In time:11:32  Out time:12:07  Total Treatment Time (min): 35  Visit #: 2 of 4    Medicare/BCBS Only   Total Timed Codes (min):  35 1:1 Treatment Time:  28       Treatment Area: Right ankle pain [M25.571]  Left ankle pain [M25.572]  Left knee pain [M25.562]    SUBJECTIVE  Pain Level (0-10 scale): 4  Any medication changes, allergies to medications, adverse drug reactions, diagnosis change, or new procedure performed?: [x] No    [] Yes (see summary sheet for update)  Subjective functional status/changes:   [] No changes reported  Pt reports his left knee is really in pain at the top and at the middle of the knee but the pain didn't start until this morning. OBJECTIVE    27 min Therapeutic Exercise:  [x] See flow sheet :   Rationale: increase ROM and increase strength to improve the patients ability to perform ADL's with ease    8 min Neuromuscular Re-education:  [x]  See flow sheet :   Rationale: increase strength, improve coordination, improve balance and increase proprioception  to improve the patients ability to perform ADL's with ease. With   [] TE   [] TA   [] neuro   [] other: Patient Education: [x] Review HEP    [] Progressed/Changed HEP based on:   [] positioning   [] body mechanics   [] transfers   [] heat/ice application    [] other:      Other Objective/Functional Measures: therex modified due to increased pain in the right knee. Pain Level (0-10 scale) post treatment: 0    ASSESSMENT/Changes in Function:   Pt presents with increased pain in the medial and superior left knee today and continues to report pain at night and mostly with no movement. Therex modified this visit limiting closed chain exercises to limit increased left knee pain.  Pt to see his MD on Wed about his left knee but reports being nearly void of pain in the B ankles. Some continued pain in the medial left knee with jogging that eased once the pt got into a good stride. Pt to attend MD appointment and then call the therapy clinic about finishing out his last two appointments. No pain reported post treatment. Patient will continue to benefit from skilled PT services to modify and progress therapeutic interventions, address functional mobility deficits, address ROM deficits, address strength deficits, analyze and address soft tissue restrictions, analyze and cue movement patterns, analyze and modify body mechanics/ergonomics and assess and modify postural abnormalities to attain remaining goals. [x]  See Plan of Care  []  See progress note/recertification  []  See Discharge Summary         Progress towards goals / Updated goals:  1. Pt will tolerate 5 minutes of treadmill jogging without pain to return to running regiment. PN: not running yet  Current: Progressing 11/5/20 Pt jogging on treadmill for 5 mins and notes intermittent pain in the left knee at about 3-4/10  2. Pt will demonstrate pain free golf swings to allow return to golfing.   PN: right lateral ankle discomfort on finish    PLAN  []  Upgrade activities as tolerated     [x]  Continue plan of care  []  Update interventions per flow sheet       []  Discharge due to:_  []  Other:_      Sandy Krishnamurthy, RUTHANN 11/5/2020  11:33 AM    Future Appointments   Date Time Provider Kamari Jennifer   11/11/2020  3:30 PM LUIS FerreiraSJEREL BS AMB   12/8/2020  3:30 PM Lexie Bolden MD [facility] BS AMB

## 2020-11-09 DIAGNOSIS — L40.50 PSORIATIC ARTHRITIS (HCC): ICD-10-CM

## 2020-11-10 ENCOUNTER — OFFICE VISIT (OUTPATIENT)
Dept: SURGERY | Age: 45
End: 2020-11-10

## 2020-11-10 VITALS
DIASTOLIC BLOOD PRESSURE: 92 MMHG | HEART RATE: 74 BPM | SYSTOLIC BLOOD PRESSURE: 134 MMHG | BODY MASS INDEX: 35 KG/M2 | RESPIRATION RATE: 18 BRPM | WEIGHT: 258.4 LBS | HEIGHT: 72 IN

## 2020-11-10 DIAGNOSIS — E66.9 OBESITY, UNSPECIFIED CLASSIFICATION, UNSPECIFIED OBESITY TYPE, UNSPECIFIED WHETHER SERIOUS COMORBIDITY PRESENT: Primary | ICD-10-CM

## 2020-11-10 RX ORDER — TRAMADOL HYDROCHLORIDE 50 MG/1
TABLET ORAL
Qty: 60 TAB | Refills: 0 | OUTPATIENT
Start: 2020-11-10

## 2020-11-10 NOTE — PROGRESS NOTES
Alex Bell is a 39 y.o. male  Chief Complaint   Patient presents with    Weight Management     weekly weigh in.     Mr. Cem Kelly has been given the following recommendations today due to his elevated BP reading: referred to Alternative/PCP. Patient attended a weekly class as part of the Medically Supervised Weight Loss Program.  This class was facilitated by a Registered Dietitian. Topics taught at class focused on diet, particularly carbohydrate counting and portion control. Classes also focused on behavior changes and the importance of establishing a daily exercise routine. Progress Note: Weekly Medical Monitoring in the [facility] Weight Loss Program    Is there anything that you or the patient needs to let the supervising provider know about? no    Over the past week, have you experienced any side-effects? no    Alex Bell is a 39 y.o. male who is enrolled in [facility] Weight Loss Program    Alex Bell was prescribed the VLCD / LCD. Visit Vitals  BP (!) 134/92   Pulse 74   Resp 18   Ht 6' (1.829 m)   Wt 258 lb 6.4 oz (117.2 kg)   BMI 35.05 kg/m²     Weight Metrics 11/10/2020 11/10/2020 10/27/2020 10/27/2020 10/27/2020 10/13/2020 10/13/2020   Weight - 258 lb 6.4 oz - 254 lb 14.4 oz 258 lb - 252 lb 9.6 oz   Waist Measure Inches 46 - 45.5 - - 43.5 -   BMI - 35.05 kg/m2 - 34.57 kg/m2 34.99 kg/m2 - 34.26 kg/m2         Have you received any other medical care this week? no  If yes, where and for what? Have you had any change in your medications since your last visit? no  If yes what? Did you have any problems adhering to the program last week? no  If yes, please explain:       Eating Habits Over Last Week:  Did you take in 64 oz of non-caloric fluids? yes     Did you consume your prescribed meal replacement regimen each day?  yes       Physical Activity Over the Past Week:    Aerobic exercise: 90 min  Resistance exercise: 60 workouts / week

## 2020-11-11 ENCOUNTER — TELEPHONE (OUTPATIENT)
Dept: FAMILY MEDICINE CLINIC | Age: 45
End: 2020-11-11

## 2020-11-12 ENCOUNTER — OFFICE VISIT (OUTPATIENT)
Dept: FAMILY MEDICINE CLINIC | Age: 45
End: 2020-11-12
Payer: COMMERCIAL

## 2020-11-12 VITALS
BODY MASS INDEX: 34.4 KG/M2 | HEIGHT: 72 IN | TEMPERATURE: 98.2 F | SYSTOLIC BLOOD PRESSURE: 128 MMHG | DIASTOLIC BLOOD PRESSURE: 86 MMHG | WEIGHT: 254 LBS | HEART RATE: 75 BPM | OXYGEN SATURATION: 95 % | RESPIRATION RATE: 16 BRPM

## 2020-11-12 DIAGNOSIS — I10 ESSENTIAL HYPERTENSION: ICD-10-CM

## 2020-11-12 DIAGNOSIS — Z23 ENCOUNTER FOR IMMUNIZATION: ICD-10-CM

## 2020-11-12 DIAGNOSIS — L40.50 PSORIATIC ARTHRITIS (HCC): ICD-10-CM

## 2020-11-12 DIAGNOSIS — E11.8 TYPE 2 DIABETES MELLITUS WITH COMPLICATION, WITHOUT LONG-TERM CURRENT USE OF INSULIN (HCC): Primary | ICD-10-CM

## 2020-11-12 DIAGNOSIS — E66.9 OBESITY, UNSPECIFIED CLASSIFICATION, UNSPECIFIED OBESITY TYPE, UNSPECIFIED WHETHER SERIOUS COMORBIDITY PRESENT: ICD-10-CM

## 2020-11-12 LAB
ALBUMIN UR QL STRIP: 10 MG/L
CREATININE, URINE POC: 50 MG/DL
HBA1C MFR BLD HPLC: 5.6 %
MICROALBUMIN/CREAT RATIO POC: <30 MG/G

## 2020-11-12 PROCEDURE — 83036 HEMOGLOBIN GLYCOSYLATED A1C: CPT | Performed by: FAMILY MEDICINE

## 2020-11-12 PROCEDURE — 90471 IMMUNIZATION ADMIN: CPT | Performed by: FAMILY MEDICINE

## 2020-11-12 PROCEDURE — 90686 IIV4 VACC NO PRSV 0.5 ML IM: CPT | Performed by: FAMILY MEDICINE

## 2020-11-12 PROCEDURE — 99214 OFFICE O/P EST MOD 30 MIN: CPT | Performed by: FAMILY MEDICINE

## 2020-11-12 PROCEDURE — 82044 UR ALBUMIN SEMIQUANTITATIVE: CPT | Performed by: FAMILY MEDICINE

## 2020-11-12 RX ORDER — AMITRIPTYLINE HYDROCHLORIDE 25 MG/1
25 TABLET, FILM COATED ORAL
Qty: 30 TAB | Refills: 0 | Status: SHIPPED | OUTPATIENT
Start: 2020-11-12 | End: 2020-12-08 | Stop reason: SDUPTHER

## 2020-11-12 NOTE — PROGRESS NOTES
1. Have you been to the ER, urgent care clinic since your last visit? Hospitalized since your last visit? Yes When: [facility] Where: see chart    2. Have you seen or consulted any other health care providers outside of the [address] [place] [address] since your last visit? Include any pap smears or colon screening. Yes Where: [facility]    Physician order obtained. Patient completed adult immunization consent form. Allergies, contraindications and recommendations reviewed with patient. Seasonal influenza vaccine administered IM right deltoid. Patient tolerated well. Patient remained in office for 15 minutes after injection and no adverse reactions were noted.

## 2020-11-12 NOTE — PATIENT INSTRUCTIONS
Influenza (Flu) Vaccine (Inactivated or Recombinant): What You Need to Know Why get vaccinated? Influenza vaccine can prevent influenza (flu). Flu is a contagious disease that spreads around the [place] every year, usually between October and May. Anyone can get the flu, but it is more dangerous for some people. Infants and young children, people 72years of age and older, pregnant women, and people with certain health conditions or a weakened immune system are at greatest risk of flu complications. Pneumonia, bronchitis, sinus infections and ear infections are examples of flu-related complications. If you have a medical condition, such as heart disease, cancer or diabetes, flu can make it worse. Flu can cause fever and chills, sore throat, muscle aches, fatigue, cough, headache, and runny or stuffy nose. Some people may have vomiting and diarrhea, though this is more common in children than adults. Each year, thousands of people in the [facility] die from flu, and many more are hospitalized. Flu vaccine prevents millions of illnesses and flu-related visits to the doctor each year. Influenza vaccine CDC recommends everyone 10months of age and older get vaccinated every flu season. Children 6 months through 6years of age may need 2 doses during a single flu season. Everyone else needs only 1 dose each flu season. It takes about 2 weeks for protection to develop after vaccination. There are many flu viruses, and they are always changing. Each year a new flu vaccine is made to protect against three or four viruses that are likely to cause disease in the upcoming flu season. Even when the vaccine doesn't exactly match these viruses, it may still provide some protection. Influenza vaccine does not cause flu. Influenza vaccine may be given at the same time as other vaccines. Talk with your health care provider Tell your vaccine provider if the person getting the vaccine: · Has had an allergic reaction after a previous dose of influenza vaccine, or has any severe, life-threatening allergies. · Has ever had Guillain-Barré Syndrome (also called GBS). In some cases, your health care provider may decide to postpone influenza vaccination to a future visit. People with minor illnesses, such as a cold, may be vaccinated. People who are moderately or severely ill should usually wait until they recover before getting influenza vaccine. Your health care provider can give you more information. Risks of a vaccine reaction · Soreness, redness, and swelling where shot is given, fever, muscle aches, and headache can happen after influenza vaccine. · There may be a very small increased risk of Guillain-Barré Syndrome (GBS) after inactivated influenza vaccine (the flu shot). Darius Lowe children who get the flu shot along with pneumococcal vaccine (PCV13), and/or DTaP vaccine at the same time might be slightly more likely to have a seizure caused by fever. Tell your health care provider if a child who is getting flu vaccine has ever had a seizure. People sometimes faint after medical procedures, including vaccination. Tell your provider if you feel dizzy or have vision changes or ringing in the ears. As with any medicine, there is a very remote chance of a vaccine causing a severe allergic reaction, other serious injury, or death. What if there is a serious problem? An allergic reaction could occur after the vaccinated person leaves the clinic. If you see signs of a severe allergic reaction (hives, swelling of the face and throat, difficulty breathing, a fast heartbeat, dizziness, or weakness), call 9-1-1 and get the person to the nearest hospital. 
For other signs that concern you, call your health care provider. Adverse reactions should be reported to the Vaccine Adverse Event Reporting System (VAERS).  Your health care provider will usually file this report, or you can do it yourself. Visit the VAERS website at www.vaers. hhs.gov or call 6-906.513.4092. VAERS is only for reporting reactions, and VAERS staff do not give medical advice. The National Vaccine Injury Compensation Program 
The National Vaccine Injury Compensation Program (VICP) is a federal program that was created to compensate people who may have been injured by certain vaccines. Visit the VICP website at www.[facility].gov/vaccinecompensation or call 9-583.891.1497 to learn about the program and about filing a claim. There is a time limit to file a claim for compensation. How can I learn more? · Ask your healthcare provider. · Call your local or state health department. · Contact the Centers for Disease Control and Prevention (CDC): 
? Call 9-346.101.5060 (1-800-CDC-INFO) or 
? Visit CDC's website at www.cdc.gov/flu Vaccine Information Statement (Interim) Inactivated Influenza Vaccine 8/15/2019 
[address] 543PT-69 [facility], [facility] and [employer] Centers for Disease Control and Prevention Many Vaccine Information Statements are available in French and other languages. See www.immunize.org/vis. Muchas hojas de información sobre vacunas están disponibles en español y en otros idiomas. Visite www.immunize.org/vis. Care instructions adapted under license by [employer] (which disclaims liability or warranty for this information). If you have questions about a medical condition or this instruction, always ask your healthcare professional. [place] [address] any warranty or liability for your use of this information.

## 2020-11-12 NOTE — PROGRESS NOTES
SUBJECTIVE  Chief Complaint   Patient presents with    Hypertension    Diabetes     Patient presents for follow-up. He has had some bad luck as of late with a job loss and a recent left leg fracture. He is in rehab. He also has seen the spine center for radicular neck pains. He was due to see us in July but held off to get tramadol since he was trying to prioritize purchasing meds that were more urgent and doing formal PT. He has psoriatic arthritis and sees rheumatology. He sees us to manage tramadol. He has weaned down on that over the years a lot. Says he used to take 6 daily but most recent dose was once at nighttime. He has been off and says sleeping is difficult due to joint pains, mentions hips in particular. We started him on gabapentin and he reported multiple side effects each day he took it that resolved when he stopped. He started again and had the side effects he says. He is on Humira and is on Mobic. He has recently started flexeril for his neck. He has diet controlled diabetes and htn and is overdue for an a1c check. ROS:  History obtained from the patient   · General: negative for - chills, fever  · HEENT: no sore throat, nasal congestion, loss of taste or smell  · Respiratory: no cough, shortness of breath, or wheezing  · Gastrointestinal: no abdominal pain, N/V/D, change in bowel habits  · Neuro:  HAs have resolved. No longer seeing neurology.      Past Medical History:   Diagnosis Date    Asthma     Broken leg left    broke left leg and tore ligaments    Diabetes mellitus     no meds    Dupuytren's contracture     Essential hypertension     no meds    Fracture of left leg 08/2020    HTN (hypertension)     Ill-defined condition     neuropathy    Psoriatic arthritis, destructive type (HCC)     Sarcoidosis     says he had an arm mass removed and the path showed sarcoid, negative CXR       Current Outpatient Medications:     amitriptyline (ELAVIL) 25 mg tablet, Take 1 Tab by mouth nightly., Disp: 30 Tab, Rfl: 0    cyclobenzaprine (FLEXERIL) 5 mg tablet, Take 1 tab by mouth BID as needed for muscle spasm, Disp: 60 Tab, Rfl: 2    topiramate (TOPAMAX) 25 mg tablet, Take 1 in the evening for 1 week, then increase to 2 the second week and continue with 3 in the evening, Disp: 90 Tab, Rfl: 1    OTHER, 1 Tab daily. Tumeric, Disp: , Rfl:     magnesium 250 mg tab, Take 1 Tab by mouth daily. , Disp: , Rfl:     triamcinolone acetonide (KENALOG) 0.1 % topical cream, Apply  to affected area two (2) times a day. use thin layer, Disp: 15 g, Rfl: 0    multivitamin (ONE A DAY) tablet, Take 1 Tab by mouth daily. , Disp: , Rfl:     metaxalone (SKELAXIN) 800 mg tablet, Take 800 mg by mouth three (3) times daily. , Disp: , Rfl:     meloxicam (MOBIC) 15 mg tablet, Take 15 mg by mouth daily. , Disp: , Rfl:     adalimumab (HUMIRA PEN SC), 40 mg by SubCUTAneous route every seven (7) days. , Disp: , Rfl:     Allergies   Allergen Reactions    Gabapentin Palpitations       Past Surgical History:   Procedure Laterality Date    CLOSED RX NOSE/JAW FRAC+WIRES Right 1994    broken jaw had to wire it    HX CARPAL TUNNEL RELEASE      HX ORTHOPAEDIC      fx skull/ broken jaw     HX ORTHOPAEDIC Right     carpal tunnel    HX OTHER SURGICAL      lipoma removed     DE EXC SKIN BENIG 0.6-1CM TRUNK,ARM,LEG N/A 10/11/2018    Dr. Reba Bustamante 1.1-2CM TRUNK,ARM,LEG N/A 10/11/2018    Dr. Reba Bustamante 2.1-3CM Richad Splinter N/A 10/11/2018    Dr. [place] Flies History     Socioeconomic History    Marital status: SINGLE     Spouse name: Not on file    Number of children: Not on file    Years of education: Not on file    Highest education level: Not on file   Occupational History    Occupation: unemployed   Social Needs    Financial resource strain: Not on file    Food insecurity     Worry: Not on file     Inability: Not on file    Transportation needs     Medical: Not on file     Non-medical: Not on file   Tobacco Use    Smoking status: Never Smoker    Smokeless tobacco: Never Used   Substance and Sexual Activity    Alcohol use: Yes     Alcohol/week: 2.0 standard drinks     Types: 2 Shots of liquor per week     Frequency: 2-4 times a month     Comment: social    Drug use: Not Currently    Sexual activity: Yes     Partners: Female     Birth control/protection: Condom   Lifestyle    Physical activity     Days per week: Not on file     Minutes per session: Not on file    Stress: Not on file   Relationships    Social connections     Talks on phone: Not on file     Gets together: Not on file     Attends Jew service: Not on file     Active member of club or organization: Not on file     Attends meetings of clubs or organizations: Not on file     Relationship status: Not on file    Intimate partner violence     Fear of current or ex partner: Not on file     Emotionally abused: Not on file     Physically abused: Not on file     Forced sexual activity: Not on file   Other Topics Concern    Not on file   Social History Narrative    Not on file     Past Surgical History:   Procedure Laterality Date    CLOSED RX NOSE/JAW FRAC+WIRES Right 1994    broken jaw had to wire it    HX CARPAL TUNNEL RELEASE      HX ORTHOPAEDIC      fx skull/ broken jaw     HX ORTHOPAEDIC Right     carpal tunnel    HX OTHER SURGICAL      lipoma removed     AL EXC SKIN BENALONDRA 0.6-1CM TRUNK,ARM,LEG N/A 10/11/2018    Dr. Andrew Quintana 1.1-2CM TRUNK,ARM,LEG N/A 10/11/2018    Dr. Andrew Quintana 2.1-3CM TRUNK,ARM,LEG N/A 10/11/2018    Dr. Eder Carter    Blood pressure 128/86, pulse 75, temperature 98.2 °F (36.8 °C), temperature source Oral, resp. rate 16, height 6' (1.829 m), weight 254 lb (115.2 kg), SpO2 95 %. General:  Alert, cooperative, well appearing, in no apparent distress. CV:  The heart sounds are regular in rate and rhythm.   There is a normal S1 and S2. There or no murmurs. Lungs: Inspiratory and expiratory efforts are full and unlabored. Lung sounds are clear and equal to auscultation throughout all lung fields without wheezing, rales, or rhonchi. Skin:  No rashes, no jaundice. Feet:  No deformity. No open lesions. No rashes. Monofilament testing intact. Vascularly intact. Psych: normal affect. Mood good. Oriented x 3. Judgement and insight intact. Results for orders placed or performed in visit on 11/12/20   AMB POC HEMOGLOBIN A1C   Result Value Ref Range    Hemoglobin A1c (POC) 5.6 %   AMB POC URINE, MICROALBUMIN, SEMIQUANT (3 RESULTS)   Result Value Ref Range    ALBUMIN, URINE POC 10 Negative mg/L    CREATININE, URINE POC 50 mg/dL    Microalbumin/creat ratio (POC) <30 <30 MG/G       ASSESSMENT / PLAN    ICD-10-CM ICD-9-CM    1. Type 2 diabetes mellitus with complication, without long-term current use of insulin (HCC)  E11.8 250.90  DIABETES FOOT EXAM      AMB POC HEMOGLOBIN A1C      AMB POC URINE, MICROALBUMIN, SEMIQUANT (3 RESULTS)   2. Essential hypertension  I10 401.9    3. Psoriatic arthritis ([facility]  L40.50 696.0    4. Obesity, unspecified classification, unspecified obesity type, unspecified whether serious comorbidity present  E66.9 278.00    5. Encounter for immunization  Z23 V03.89 TN IMMUNIZ ADMIN,1 SINGLE/COMB VAC/TOXOID      INFLUENZA VIRUS VAC QUAD,SPLIT,PRESV FREE SYRINGE IM     DM2 - cont diet and exercise. Follow A1c q6-12 months. Annual eye exam would be ideal.    HTN - controlled. Cont lifestyle modification as it has been effective. Psoriatic arthritis - cont per rheumatology. We can manage his tramadol since he was unable to tolerate gabapentin however he would have to stop flexeril due to an interaction. I have advised before starting tramadol, we can try Elavil 25mg nightly and titrate to response. Controlled substance agreement on file and no red flags on .        Obesity-  Cont diet and exercise. Flu vaccine administered. All chart history elements were reviewed by me at the time of the visit even though marked at time of note closure. Patient understands our medical plan. Patient has provided input and agrees with goals. Alternatives have been explained and offered. All questions answered. The patient is to call if condition worsens or fails to improve. Follow-up and Dispositions    · Return in about 2 months (around 1/12/2021) for medication management.

## 2020-11-18 ENCOUNTER — OFFICE VISIT (OUTPATIENT)
Dept: SURGERY | Age: 45
End: 2020-11-18
Payer: COMMERCIAL

## 2020-11-18 VITALS
HEIGHT: 72 IN | DIASTOLIC BLOOD PRESSURE: 86 MMHG | BODY MASS INDEX: 33.66 KG/M2 | OXYGEN SATURATION: 97 % | TEMPERATURE: 97 F | RESPIRATION RATE: 18 BRPM | HEART RATE: 88 BPM | WEIGHT: 248.5 LBS | SYSTOLIC BLOOD PRESSURE: 126 MMHG

## 2020-11-18 DIAGNOSIS — Z71.3 WEIGHT LOSS COUNSELING, ENCOUNTER FOR: Primary | ICD-10-CM

## 2020-11-18 DIAGNOSIS — E66.9 OBESITY (BMI 30-39.9): ICD-10-CM

## 2020-11-18 DIAGNOSIS — Z78.9 WEIGHT LOSS ADVISED: ICD-10-CM

## 2020-11-18 DIAGNOSIS — Z71.3 WEIGHT LOSS COUNSELING, ENCOUNTER FOR: ICD-10-CM

## 2020-11-18 PROCEDURE — 99213 OFFICE O/P EST LOW 20 MIN: CPT | Performed by: NURSE PRACTITIONER

## 2020-11-18 NOTE — PROGRESS NOTES
[facility] Progress Note:   F/up Provider Visit     CC: Weight Management      Donald Wynne is a 39 y.o. male who is here for his  f/up medical provider visit for the VLCD  Program. Doing well on 4 meal replacements. He is down 10 lbs,  He has no complaints. He is aware of elevated Magnesium level and admits to taking high dose Tums for reflux symptoms. He believe the excessive use of Tums may have caused elevated magnesium level.  Symptoms of reflux has improved      Weight History    Ideal body weight: 77.6 kg (171 lb 1.2 oz)  Adjusted ideal body weight: 91.6 kg (202 lb 0.7 oz) (Based on [employer] Tables)  Goal: 190 lbs     Wt Readings from Last 10 Encounters:   11/18/20 112.7 kg (248 lb 8 oz)   11/12/20 115.2 kg (254 lb)   11/10/20 117.2 kg (258 lb 6.4 oz)   10/27/20 117 kg (258 lb)   10/27/20 115.6 kg (254 lb 14.4 oz)   10/13/20 114.6 kg (252 lb 9.6 oz)   10/13/20 112.6 kg (248 lb 3.2 oz)   10/13/20 114.8 kg (253 lb)   10/07/20 112.5 kg (248 lb)   09/22/20 114.1 kg (251 lb 9.6 oz)       Weight Metrics 11/18/2020 11/18/2020 11/12/2020 11/10/2020 11/10/2020 10/27/2020 10/27/2020   Weight - 248 lb 8 oz 254 lb - 258 lb 6.4 oz - 254 lb 14.4 oz   Waist Measure Inches 45.75 - - 46 - 45.5 -   BMI - 33.7 kg/m2 34.45 kg/m2 - 35.05 kg/m2 - 34.57 kg/m2             History    Past Medical History:   Diagnosis Date    Asthma     Broken leg left    broke left leg and tore ligaments    Diabetes mellitus     no meds    Dupuytren's contracture     Essential hypertension     no meds    Fracture of left leg 08/2020    HTN (hypertension)     Ill-defined condition     neuropathy    Psoriatic arthritis, destructive type (HCC)     Sarcoidosis     says he had an arm mass removed and the path showed sarcoid, negative CXR       Past Surgical History:   Procedure Laterality Date    CLOSED RX NOSE/JAW FRAC+WIRES Right 1994    broken jaw had to wire it    HX CARPAL TUNNEL RELEASE  HX ORTHOPAEDIC      fx skull/ broken jaw     HX ORTHOPAEDIC Right     carpal tunnel    HX OTHER SURGICAL      lipoma removed     MO EXC SKIN BENIG 0.6-1CM TRUNK,ARM,LEG N/A 10/11/2018    Dr. Dennie Ken 1.1-2CM TRUNK,ARM,LEG N/A 10/11/2018    Dr. Dennie Ken 2.1-3CM TRUNK,ARM,LEG N/A 10/11/2018    Dr. Isabelle Damon       Current Outpatient Medications   Medication Sig Dispense Refill    amitriptyline (ELAVIL) 25 mg tablet Take 1 Tab by mouth nightly. 30 Tab 0    cyclobenzaprine (FLEXERIL) 5 mg tablet Take 1 tab by mouth BID as needed for muscle spasm 60 Tab 2    topiramate (TOPAMAX) 25 mg tablet Take 1 in the evening for 1 week, then increase to 2 the second week and continue with 3 in the evening 90 Tab 1    OTHER 1 Tab daily. Tumeric      magnesium 250 mg tab Take 1 Tab by mouth daily.  triamcinolone acetonide (KENALOG) 0.1 % topical cream Apply  to affected area two (2) times a day. use thin layer 15 g 0    multivitamin (ONE A DAY) tablet Take 1 Tab by mouth daily.  metaxalone (SKELAXIN) 800 mg tablet Take 800 mg by mouth three (3) times daily.  meloxicam (MOBIC) 15 mg tablet Take 15 mg by mouth daily.  adalimumab (HUMIRA PEN SC) 40 mg by SubCUTAneous route every seven (7) days. Allergies   Allergen Reactions    Gabapentin Palpitations       Social History     Tobacco Use    Smoking status: Never Smoker    Smokeless tobacco: Never Used   Substance Use Topics    Alcohol use:  Yes     Alcohol/week: 2.0 standard drinks     Types: 2 Shots of liquor per week     Frequency: 2-4 times a month     Comment: social    Drug use: Not Currently       Family History   Problem Relation Age of Onset    Asthma Mother     Stroke Father     Alcohol abuse Father     Substance Abuse Father     Diabetes Maternal Grandmother     Hypertension Maternal Grandmother     High Cholesterol Maternal Grandmother     Stroke Maternal Grandmother     Cancer Maternal Grandfather 79        prostate cancer and skin    Colon Cancer Maternal Grandfather     Depression Maternal Uncle        Family Status   Relation Name Status    Mother  Alive    Father   at age 61        unsure    Sister  Alive    [facility]  (Not Specified)           Review of Systems  Positives in [place]    Constitutional:  Unexpected weight gain/loss, night sweats,fatigue/maliase/lethargy, change in sleep, fever, rash  Eyes:  Visual changes, headaches, eye pain, floaters  ENT:  Rhinorrhea, epistaxis, sinus pain, change in hearing, gingival bleeding, sore throat, dysphagia, dysphonia  CV:  Chest pain, shortness of breath, difficulty breathing, orthopnea, palpitations, loss of consciousness, claudication  Resp: Cough, wheeze, haemoptysis, sob, exercise intolerence  GI:  Abdominal pain, dysphagia, reflux, bloating, cramping. Obstipation, haematemesis, brbpr, hematochezia,dark tarry stools. Nausea, vomitting, diarrhea, constipation. : Change in frequency of urination, dysuria, hematuria, change in force of Stream  Neuro: Paresthesias, numbness, weakness, changes in balance, changes in speech, loss of control of bowel or bladder,   Psych:Changes in depression, anxiety, sleep patterns, change in energy Levels  Endocrine: Temperature intolerance, dry skin, hair loss, fatigue,  Episodes of hypoglycemia, changes in libido  Heme: Anemia, pupura, petechia  Skin: Rashes, itchiness, excessive bruising    Remainder of ROS as per HPI. Since your last visit, have you experienced any complications? no  If yes, please list:     Are you taking an appetite suppressant? no  If so:  Do you need a refill? no  Are you experiencine any Chest Pain, Palpitations or Dizziness? no    BP Readings from Last 3 Encounters:   20 126/86   20 128/86   11/10/20 (!) 134/92           Have you received any other medical care this week? yes  If yes, where and for what?  Physical therapy for neck       Have you discontinued or changed any medicine or dose of your medicine(s) since your last visit with [facility] provider? no    If yes, where and for what? Diet  How many ounces of calorie-free liquids did you consume each day?  64oz    How many meal replacements did you take each day? 4    Did you have any problems adhering to the program?  no   If yes, please explain:      Exercise  Aerobic exercise: 30 min daily  Resistance exercise: 30 min workouts daily   Any discomfort?  no     If yes, where? Objective  Visit Vitals  /86   Pulse 88   Temp 97 °F (36.1 °C) (Temporal)   Resp 18   Ht 6' (1.829 m)   Wt 112.7 kg (248 lb 8 oz)   SpO2 97%   BMI 33.70 kg/m²     No LMP for male patient. Physical Exam      General: AAOX3, pleasant and cooperative to exam. Appropriately groomed. NAD. Non-toxic in appearance. Appears stated age. Chest: Good equal bilateral expansion  Lungs: Clear to auscultation bilaterally without e/o crackles, wheezes or rhales. Heart: RRR, S1 and S2 noted. No c/r/m/g/vpmi. Neuro: CN II-XII appear to be grossly intact without focal deficit. Ambulatory. Skin: Clean, warm and dry. Assessment / Plan    Encounter Diagnoses   Name Primary?  Weight loss counseling, encounter for Yes    Obesity (BMI 30-39. 9)     Weight loss advised        1. Weight management    Degree of control: doing well    Progress was reviewed with patient     Goal(s) for next appointment: see below    Number of meal replacements per day: 4 MR          2. Labs    Latest results reviewed with patient   Routine monitoring labs ordered  Orders Placed This Encounter    MAGNESIUM    METABOLIC PANEL, COMPREHENSIVE    URIC ACID       3.  Goals   Continue 4 MR daily   Stop magnesium supplements    Try crystal light and propel en lieu of Gatorade zero.    -10 lbs   Will repeat magnesium level to evaluate progress     Waist Circumference: I personally reviewed patient's Weight Management Doc Flowsheet  Neck Circumference: I personally reviewed patient's Weight Management Doc Flowsheet  Percent Body Fat: I personally reviewed patient's Weight Management Doc Flowsheet       I have reviewed/discussed the above normal BMI with the patient. I have recommended the following interventions: dietary management education, guidance, and counseling, encourage exercise, monitor weight and prescribed dietary intake . Follow-up and Dispositions    · Return in about 1 month (around 12/18/2020).           >50% of 15 min visit spent counseling     ROBER De La Cruz-BC

## 2020-11-18 NOTE — PROGRESS NOTES
Mustapha Santos is a 39 y.o. male  Chief Complaint   Patient presents with    Weight Management     monthly follow up     Nursing Note for Medical Monitoring in the [facility]      Mustapha Santos is a 39 y.o. male who is enrolled in [facility]    Mustapha Santos was prescribed the VLCD / LCD. Did you have any problems adhering to the program last week? no  If yes, please explain:     Since your last visit, have you experienced any complications? no    Have you received any other medical care this week? no  If yes, where and for what? Have you had any change in your medications since your last visit? no  If yes what? Are you taking an appetite suppressant? no   If yes:  Do you need a refill? BP Readings from Last 3 Encounters:   11/18/20 126/86   11/12/20 128/86   11/10/20 (!) 134/92        Eating Habits Over Last Week:  Did you take in 64 oz of non-caloric fluids? yes     Did you consume your prescribed meal replacement regimen each day?  yes       Physical Activity Over the Past Week:    Aerobic exercise: 240 min  Resistance exercise: 240 workouts / week

## 2020-11-24 ENCOUNTER — OFFICE VISIT (OUTPATIENT)
Dept: SURGERY | Age: 45
End: 2020-11-24

## 2020-11-24 VITALS
SYSTOLIC BLOOD PRESSURE: 118 MMHG | RESPIRATION RATE: 18 BRPM | HEART RATE: 78 BPM | HEIGHT: 72 IN | WEIGHT: 250 LBS | DIASTOLIC BLOOD PRESSURE: 86 MMHG | TEMPERATURE: 97 F | BODY MASS INDEX: 33.86 KG/M2 | OXYGEN SATURATION: 97 %

## 2020-11-24 DIAGNOSIS — E66.9 OBESITY, UNSPECIFIED CLASSIFICATION, UNSPECIFIED OBESITY TYPE, UNSPECIFIED WHETHER SERIOUS COMORBIDITY PRESENT: Primary | ICD-10-CM

## 2020-11-24 NOTE — PROGRESS NOTES
Nichole Li is a 39 y.o. male  Chief Complaint   Patient presents with    Weight Management     weekly weigh in     Patient attended a weekly class as part of the Medically Supervised Weight Loss Program.  This class was facilitated by a Registered Dietitian. Topics taught at class focused on diet, particularly carbohydrate counting and portion control. Classes also focused on behavior changes and the importance of establishing a daily exercise routine. Progress Note: Weekly Medical Monitoring in the [facility] Weight Loss Program    Is there anything that you or the patient needs to let the supervising provider know about? no    Over the past week, have you experienced any side-effects? no    Nichole Li is a 39 y.o. male who is enrolled in [facility]    Nichole Li was prescribed the VLCD / LCD. Visit Vitals  /86   Pulse 78   Temp 97 °F (36.1 °C) (Temporal)   Resp 18   Ht 6' (1.829 m)   Wt 250 lb (113.4 kg)   SpO2 97%   BMI 33.91 kg/m²     Weight Metrics 11/24/2020 11/24/2020 11/18/2020 11/18/2020 11/12/2020 11/10/2020 11/10/2020   Weight - 250 lb - 248 lb 8 oz 254 lb - 258 lb 6.4 oz   Waist Measure Inches 45.75 - 45.75 - - 46 -   BMI - 33.91 kg/m2 - 33.7 kg/m2 34.45 kg/m2 - 35.05 kg/m2         Have you received any other medical care this week? yes  If yes, where and for what? \"dry needling for muscles\"    Have you had any change in your medications since your last visit? no  If yes what? Did you have any problems adhering to the program last week? no  If yes, please explain:       Eating Habits Over Last Week:  Did you take in 64 oz of non-caloric fluids? yes     Did you consume your prescribed meal replacement regimen each day?  yes       Physical Activity Over the Past Week:    Aerobic exercise: 150 min  Resistance exercise: 150 mins workouts / week

## 2020-12-08 ENCOUNTER — OFFICE VISIT (OUTPATIENT)
Dept: ORTHOPEDIC SURGERY | Age: 45
End: 2020-12-08
Payer: COMMERCIAL

## 2020-12-08 VITALS
HEART RATE: 81 BPM | OXYGEN SATURATION: 95 % | HEIGHT: 72 IN | SYSTOLIC BLOOD PRESSURE: 113 MMHG | TEMPERATURE: 97.3 F | BODY MASS INDEX: 34.32 KG/M2 | DIASTOLIC BLOOD PRESSURE: 81 MMHG | WEIGHT: 253.41 LBS | RESPIRATION RATE: 14 BRPM

## 2020-12-08 DIAGNOSIS — M62.838 MUSCLE SPASM: ICD-10-CM

## 2020-12-08 DIAGNOSIS — M25.512 CHRONIC LEFT SHOULDER PAIN: ICD-10-CM

## 2020-12-08 DIAGNOSIS — M79.10 TRIGGER POINT: ICD-10-CM

## 2020-12-08 DIAGNOSIS — M47.812 CERVICAL FACET JOINT SYNDROME: Primary | ICD-10-CM

## 2020-12-08 DIAGNOSIS — G89.29 CHRONIC LEFT SHOULDER PAIN: ICD-10-CM

## 2020-12-08 PROCEDURE — 99213 OFFICE O/P EST LOW 20 MIN: CPT | Performed by: PHYSICAL MEDICINE & REHABILITATION

## 2020-12-08 RX ORDER — AMITRIPTYLINE HYDROCHLORIDE 25 MG/1
25 TABLET, FILM COATED ORAL
Qty: 30 TAB | Refills: 0 | Status: SHIPPED | OUTPATIENT
Start: 2020-12-08 | End: 2020-12-09

## 2020-12-08 NOTE — LETTER
12/11/20 Patient: Alexandra Estrella YOB: 1975 Date of Visit: 12/8/2020 Lulu Duff MD 
[address] [place] [id number] [address] VIA In Basket Dear Lulu Duff MD, Thank you for referring Mr. Alexandra Estrella to [address] [place] [address] for evaluation. My notes for this consultation are attached. If you have questions, please do not hesitate to call me. I look forward to following your patient along with you. Sincerely, Treasure Jauregui MD

## 2020-12-08 NOTE — PROGRESS NOTES
[facility]  [address] [place], [address]  Phone: [phone]  Fax: [phone]    Pt's YOB: 1975    ASSESSMENT   Diagnoses and all orders for this visit:    1. Cervical facet joint syndrome    2. Muscle spasm    3. Chronic left shoulder pain    4. Trigger point         IMPRESSION AND PLAN:  Christine Welsh is a 39 y.o. right hand dominant male with history of cervical pain. He is currently enrolled in physical therapy. Pt continues to take Topamax 25 mg 3 tabs QHS with minimal relief. 1) Pt was given information on cervical arthritis exercises. 2) He will continue with physical therapy and dry needling. 3) Pt will taper off the Topamax since it is not effective. 4) I recommended the patient use Voltaren gel on the left shoulder more regularly. 5) Discussed ordering a left shoulder MRI to rule out a labral tear pending response to physical therapy. 6) Mr. Adrian Castellanos has a reminder for a \"due or due soon\" health maintenance. I have asked that he contact his primary care provider, Shikha Rivera MD, for follow-up on this health maintenance. 7)  demonstrated consistency with prescribing. Follow-up and Dispositions    · Return in about 5 weeks (around 1/12/2021) for PT follow up. HISTORY OF PRESENT ILLNESS:  Christine Welsh is a 39 y.o. right hand dominant male with history of cervical pain and presents to the office today for follow up. Pt reports an intermittent popping sensation in the neck with left cervical flexion that is followed by severe pain radiating down the left arm. He has attended physical therapy with dry needling since his last office visit. Pt notes that he experiences pain radiating down the left arm into the hand when his physical therapist performs certain maneuvers.  He notes that he had a steroid injection in the left shoulder in 1/2020 when he experienced a popping sensation and pain in the shoulder. Pt denies any previous shoulder MRI's. He continues to take Topamax 25 mg 3 tabs QHS but notes minimal relief with the medication. Pt uses Voltaren gel and Flexeril 5 mg as needed with benefit. He previously tolerated a Medrol Dosepak. Pt notes that he is currently in a weight loss program. Pt at this time desires to proceed with medication evaluation. Pain Scale: 7/10    PCP: Hemant Lares MD     Past Medical History:   Diagnosis Date    Asthma     Broken leg left    broke left leg and tore ligaments    Diabetes mellitus     no meds    Dupuytren's contracture     Essential hypertension     no meds    Fracture of left leg 08/2020    HTN (hypertension)     Ill-defined condition     neuropathy    Psoriatic arthritis, destructive type (HCC)     Sarcoidosis     says he had an arm mass removed and the path showed sarcoid, negative CXR        Social History     Socioeconomic History    Marital status: SINGLE     Spouse name: Not on file    Number of children: Not on file    Years of education: Not on file    Highest education level: Not on file   Occupational History    Occupation: unemployed   Social Needs    Financial resource strain: Not on file    Food insecurity     Worry: Not on file     Inability: Not on file   [employer] needs     Medical: Not on file     Non-medical: Not on file   Tobacco Use    Smoking status: Never Smoker    Smokeless tobacco: Never Used   Substance and Sexual Activity    Alcohol use:  Yes     Alcohol/week: 2.0 standard drinks     Types: 2 Shots of liquor per week     Frequency: 2-4 times a month     Comment: social    Drug use: Not Currently    Sexual activity: Yes     Partners: Female     Birth control/protection: Condom   Lifestyle    Physical activity     Days per week: Not on file     Minutes per session: Not on file    Stress: Not on file   Relationships    Social connections     Talks on phone: Not on file     Gets together: Not on file     Attends Mosque service: Not on file     Active member of club or organization: Not on file     Attends meetings of clubs or organizations: Not on file     Relationship status: Not on file    Intimate partner violence     Fear of current or ex partner: Not on file     Emotionally abused: Not on file     Physically abused: Not on file     Forced sexual activity: Not on file   Other Topics Concern    Not on file   Social History Narrative    Not on file       Current Outpatient Medications   Medication Sig Dispense Refill    cyclobenzaprine (FLEXERIL) 5 mg tablet Take 1 tab by mouth BID as needed for muscle spasm 60 Tab 2    topiramate (TOPAMAX) 25 mg tablet Take 1 in the evening for 1 week, then increase to 2 the second week and continue with 3 in the evening 90 Tab 1    OTHER 1 Tab daily. Tumeric      magnesium 250 mg tab Take 1 Tab by mouth daily.  triamcinolone acetonide (KENALOG) 0.1 % topical cream Apply  to affected area two (2) times a day. use thin layer 15 g 0    multivitamin (ONE A DAY) tablet Take 1 Tab by mouth daily.  metaxalone (SKELAXIN) 800 mg tablet Take 800 mg by mouth three (3) times daily.  meloxicam (MOBIC) 15 mg tablet Take 15 mg by mouth daily.  adalimumab (HUMIRA PEN SC) 40 mg by SubCUTAneous route every seven (7) days.  amitriptyline (ELAVIL) 25 mg tablet TAKE ONE TABLET BY MOUTH ONCE NIGHTLY 30 Tab 0       Allergies   Allergen Reactions    Gabapentin Palpitations         REVIEW OF SYSTEMS    Constitutional: Negative for fever, chills, or weight change. Respiratory: Negative for cough or shortness of breath. Cardiovascular: Negative for chest pain or palpitations. Gastrointestinal: Negative for acid reflux, change in bowel habits, or constipation. Genitourinary: Negative for dysuria and flank pain. Musculoskeletal: Positive for cervical pain. Neurological: Negative for headaches or dizziness.  Positive for left arm numbness. Psychiatric/Behavioral: Negative for difficulty with sleep. As per HPI    PHYSICAL EXAMINATION  Visit Vitals  /81   Pulse 81   Temp 97.3 °F (36.3 °C) (Temporal)   Resp 14   Ht 6' (1.829 m)   Wt 253 lb 6.6 oz (114.9 kg)   SpO2 95%   BMI 34.37 kg/m²       Constitutional: Awake, alert, and in no acute distress. Neurological: 1+ symmetrical DTRs in the upper extremities. 1+ symmetrical DTRs in the lower extremities. Sensation to light touch is intact. Negative Aceves's sign bilaterally. Skin: warm, dry, and intact. Musculoskeletal: Tight across the left upper trapezius. Decreased range of motion with side to side cervical flexion. Good range of motion of the left shoulder. Positive impingement sign on the left. Negative empty can test bilaterally. Good strength with resisted abduction and adduction. No pain with extension, axial loading, or forward flexion. No pain with internal or external rotation of his hips. Negative straight leg raise bilaterally. Biceps  Triceps Deltoids Wrist Ext Wrist Flex Hand Intrin   Right +4/5 +4/5 +4/5 +4/5 +4/5 +4/5   Left +4/5 +4/5 +4/5 +4/5 +4/5 +4/5      Hip Flex  Quads Hamstrings Ankle DF EHL Ankle PF   Right +4/5 +4/5 +4/5 +4/5 +4/5 +4/5   Left +4/5 +4/5 +4/5 +4/5 +4/5 +4/5     IMAGING:    Cervical spine MRI from 10/23/2020 was personally reviewed with the patient and demonstrated:  IMPRESSION:  C6-7: Moderate right uncovertebral joint hypertrophy.      Lumbar spine 4V x-rays from 10/13/2020 were personally reviewed with the patient and demonstrated:  Degenerative disc at L4-5. Multilevel degenerative facets. Mild straightening of the lumbar spine. Very minimal scoliosis. Written by Preeti Montoya, as dictated by Wicho Silva MD.  I, Dr. Wicho Silva confirm that all documentation is accurate.

## 2020-12-08 NOTE — PATIENT INSTRUCTIONS
Neck Arthritis: Exercises  Introduction  Here are some examples of exercises for you to try. The exercises may be suggested for a condition or for rehabilitation. Start each exercise slowly. Ease off the exercises if you start to have pain. You will be told when to start these exercises and which ones will work best for you. How to do the exercises  Neck stretches to the side   1. This stretch works best if you keep your shoulder down as you lean away from it. To help you remember to do this, start by relaxing your shoulders and lightly holding on to your thighs or your chair. 2. Tilt your head toward your shoulder and hold for 15 to 30 seconds. Let the weight of your head stretch your muscles. 3. Repeat 2 to 4 times toward each shoulder. Chin tuck   1. Lie on the floor with a rolled-up towel under your neck. Your head should be touching the floor. 2. Slowly bring your chin toward your chest.  3. Hold for a count of 6, and then relax for up to 10 seconds. 4. Repeat 8 to 12 times. Active cervical rotation   1. Sit in a firm chair, or stand up straight. 2. Keeping your chin level, turn your head to the right, and hold for 15 to 30 seconds. 3. Turn your head to the left and hold for 15 to 30 seconds. 4. Repeat 2 to 4 times to each side. Shoulder blade squeeze   1. While standing, squeeze your shoulder blades together. 2. Do not raise your shoulders up as you are squeezing. 3. Hold for 6 seconds. 4. Repeat 8 to 12 times. Shoulder rolls   1. Sit comfortably with your feet shoulder-width apart. You can also do this exercise standing up. 2. Roll your shoulders up, then back, and then down in a smooth, circular motion. 3. Repeat 2 to 4 times. Follow-up care is a key part of your treatment and safety. Be sure to make and go to all appointments, and call your doctor if you are having problems. It's also a good idea to know your test results and keep a list of the medicines you take.   Where can you learn more? Go to http://www.gray.com/  Enter P420 in the search box to learn more about \"Neck Arthritis: Exercises. \"  Current as of: March 2, 2020               Content Version: 12.6  © 7423-5193 [employer]. Care instructions adapted under license by [employer] (which disclaims liability or warranty for this information). If you have questions about a medical condition or this instruction, always ask your healthcare professional. [address] any warranty or liability for your use of this information.

## 2020-12-08 NOTE — TELEPHONE ENCOUNTER
Left message via voicemail that medication has been approved and e-scribed to [employer] on file.

## 2020-12-09 RX ORDER — AMITRIPTYLINE HYDROCHLORIDE 25 MG/1
TABLET, FILM COATED ORAL
Qty: 30 TAB | Refills: 0 | Status: SHIPPED | OUTPATIENT
Start: 2020-12-09 | End: 2021-01-05

## 2020-12-11 ENCOUNTER — OFFICE VISIT (OUTPATIENT)
Dept: SURGERY | Age: 45
End: 2020-12-11

## 2020-12-11 VITALS
SYSTOLIC BLOOD PRESSURE: 136 MMHG | WEIGHT: 245.6 LBS | RESPIRATION RATE: 18 BRPM | DIASTOLIC BLOOD PRESSURE: 88 MMHG | TEMPERATURE: 97.7 F | HEIGHT: 72 IN | HEART RATE: 82 BPM | BODY MASS INDEX: 33.26 KG/M2 | OXYGEN SATURATION: 97 %

## 2020-12-11 DIAGNOSIS — E66.9 OBESITY, UNSPECIFIED CLASSIFICATION, UNSPECIFIED OBESITY TYPE, UNSPECIFIED WHETHER SERIOUS COMORBIDITY PRESENT: Primary | ICD-10-CM

## 2020-12-11 NOTE — PROGRESS NOTES
Donald Wynne is a 39 y.o. male  Chief Complaint   Patient presents with    Weight Management     weekly weigh in     Patient attended a weekly class as part of the Medically Supervised Weight Loss Program.  This class was facilitated by a Registered Dietitian. Topics taught at class focused on diet, particularly carbohydrate counting and portion control. Classes also focused on behavior changes and the importance of establishing a daily exercise routine. Progress Note: Weekly Medical Monitoring in the [facility] Weight Loss Program    Is there anything that you or the patient needs to let the supervising provider know about? no    Over the past week, have you experienced any side-effects? no    Donald Wynne is a 39 y.o. male who is enrolled in [facility] Loss [facility]    Donald Wynne was prescribed the VLCD / LCD. Visit Vitals  /88   Pulse 82   Temp 97.7 °F (36.5 °C) (Temporal)   Resp 18   Ht 6' (1.829 m)   Wt 245 lb 9.6 oz (111.4 kg)   SpO2 97%   BMI 33.31 kg/m²     Weight Metrics 12/11/2020 12/11/2020 12/8/2020 11/24/2020 11/24/2020 11/18/2020 11/18/2020   Weight - 245 lb 9.6 oz 253 lb 6.6 oz - 250 lb - 248 lb 8 oz   Waist Measure Inches 45 - - 45.75 - 45.75 -   BMI - 33.31 kg/m2 34.37 kg/m2 - 33.91 kg/m2 - 33.7 kg/m2         Have you received any other medical care this week? yes  If yes, where and for what? Physical Therapy    Have you had any change in your medications since your last visit? no  If yes what? Did you have any problems adhering to the program last week? no  If yes, please explain:       Eating Habits Over Last Week:  Did you take in 64 oz of non-caloric fluids? yes     Did you consume your prescribed meal replacement regimen each day?  yes       Physical Activity Over the Past Week:    Aerobic exercise: 150 min  Resistance exercise: 150 workouts / week

## 2020-12-16 ENCOUNTER — HOSPITAL ENCOUNTER (OUTPATIENT)
Dept: LAB | Age: 45
Discharge: HOME | End: 2020-12-16

## 2020-12-16 LAB — XX-LABCORP SPECIMEN COL,LCBCF: NORMAL

## 2020-12-16 PROCEDURE — 99001 SPECIMEN HANDLING PT-LAB: CPT

## 2020-12-17 ENCOUNTER — OFFICE VISIT (OUTPATIENT)
Dept: SURGERY | Age: 45
End: 2020-12-17

## 2020-12-17 DIAGNOSIS — E66.9 OBESITY, UNSPECIFIED CLASSIFICATION, UNSPECIFIED OBESITY TYPE, UNSPECIFIED WHETHER SERIOUS COMORBIDITY PRESENT: Primary | ICD-10-CM

## 2020-12-17 LAB
ALBUMIN SERPL-MCNC: 4.8 G/DL (ref 4–5)
ALBUMIN/GLOB SERPL: 1.8 {RATIO} (ref 1.2–2.2)
ALP SERPL-CCNC: 70 IU/L (ref 39–117)
ALT SERPL-CCNC: 23 IU/L (ref 0–44)
AST SERPL-CCNC: 29 IU/L (ref 0–40)
BILIRUB SERPL-MCNC: 0.6 MG/DL (ref 0–1.2)
BUN SERPL-MCNC: 20 MG/DL (ref 6–24)
BUN/CREAT SERPL: 19 (ref 9–20)
CALCIUM SERPL-MCNC: 9.9 MG/DL (ref 8.7–10.2)
CHLORIDE SERPL-SCNC: 103 MMOL/L (ref 96–106)
CO2 SERPL-SCNC: 24 MMOL/L (ref 20–29)
CREAT SERPL-MCNC: 1.04 MG/DL (ref 0.76–1.27)
GLOBULIN SER CALC-MCNC: 2.6 G/DL (ref 1.5–4.5)
GLUCOSE SERPL-MCNC: 80 MG/DL (ref 65–99)
MAGNESIUM SERPL-MCNC: 2.4 MG/DL (ref 1.6–2.3)
POTASSIUM SERPL-SCNC: 4.6 MMOL/L (ref 3.5–5.2)
PROT SERPL-MCNC: 7.4 G/DL (ref 6–8.5)
SODIUM SERPL-SCNC: 141 MMOL/L (ref 134–144)
URATE SERPL-MCNC: 4.3 MG/DL (ref 3.8–8.4)

## 2020-12-17 NOTE — PROGRESS NOTES
Jose Luong is a 39 y.o. male  Chief Complaint   Patient presents with    Weight Management     weekly weigh in     Patient attended a weekly class as part of the Medically Supervised Weight Loss Program.  This class was facilitated by a Registered Dietitian. Topics taught at class focused on diet, particularly carbohydrate counting and portion control. Classes also focused on behavior changes and the importance of establishing a daily exercise routine. Progress Note: Weekly Medical Monitoring in the [facility] Weight Loss Program    Is there anything that you or the patient needs to let the supervising provider know about? no    Over the past week, have you experienced any side-effects? no    Jose Luong is a 39 y.o. male who is enrolled in [facility]    Jose Luong was prescribed the VLCD / LCD. There were no vitals taken for this visit. Weight Metrics 12/17/2020 12/11/2020 12/11/2020 12/8/2020 11/24/2020 11/24/2020 11/18/2020   Weight - - 245 lb 9.6 oz 253 lb 6.6 oz - 250 lb -   Waist Measure Inches 44.5 45 - - 45.75 - 45.75   BMI - - 33.31 kg/m2 34.37 kg/m2 - 33.91 kg/m2 -         Have you received any other medical care this week? yes  If yes, where and for what? Physical Therapy    Have you had any change in your medications since your last visit? no  If yes what? Did you have any problems adhering to the program last week? no  If yes, please explain:       Eating Habits Over Last Week:  Did you take in 64 oz of non-caloric fluids? yes     Did you consume your prescribed meal replacement regimen each day?  yes       Physical Activity Over the Past Week:    Aerobic exercise: 150 min  Resistance exercise: 150 minutes workouts / week

## 2020-12-22 DIAGNOSIS — M54.12 CERVICAL RADICULOPATHY: ICD-10-CM

## 2020-12-22 DIAGNOSIS — M54.50 LUMBAR PAIN: ICD-10-CM

## 2020-12-23 ENCOUNTER — VIRTUAL VISIT (OUTPATIENT)
Dept: SURGERY | Age: 45
End: 2020-12-23
Payer: COMMERCIAL

## 2020-12-23 VITALS — HEIGHT: 72 IN | BODY MASS INDEX: 32.78 KG/M2 | WEIGHT: 242 LBS

## 2020-12-23 DIAGNOSIS — Z71.3 WEIGHT LOSS COUNSELING, ENCOUNTER FOR: ICD-10-CM

## 2020-12-23 DIAGNOSIS — Z71.3 WEIGHT LOSS COUNSELING, ENCOUNTER FOR: Primary | ICD-10-CM

## 2020-12-23 DIAGNOSIS — Z71.3 DIETARY COUNSELING AND SURVEILLANCE: ICD-10-CM

## 2020-12-23 DIAGNOSIS — Z78.9 WEIGHT LOSS ADVISED: ICD-10-CM

## 2020-12-23 PROCEDURE — 99213 OFFICE O/P EST LOW 20 MIN: CPT | Performed by: NURSE PRACTITIONER

## 2020-12-23 NOTE — PROGRESS NOTES
Desiree Lynn is a 39 y.o. male  Chief Complaint   Patient presents with    Weight Management     monthly follow up     Nursing Note for Medical Monitoring in the [facility] Weight Loss Program      Desiree Lynn is a 39 y.o. male who is enrolled in [facility]    Desiree Lynn was prescribed the VLCD / LCD. Did you have any problems adhering to the program last week? no  If yes, please explain:     Since your last visit, have you experienced any complications? no    Have you received any other medical care this week? yes  If yes, where and for what? PT    Have you had any change in your medications since your last visit? no  If yes what? Are you taking an appetite suppressant? no   If yes:  Do you need a refill? BP Readings from Last 3 Encounters:   12/11/20 136/88   12/08/20 113/81   11/24/20 118/86        Eating Habits Over Last Week:  Did you take in 64 oz of non-caloric fluids? yes     Did you consume your prescribed meal replacement regimen each day?  yes       Physical Activity Over the Past Week:    Aerobic exercise: 150 min  Resistance exercise: 90 workouts / week

## 2020-12-23 NOTE — PROGRESS NOTES
[facility] Progress Note:   F/up Provider Visit    Esmer Green is a 39 y.o. male who was seen by synchronous (real-time) audio-video technology on 12/23/2020. Consent:  He and/or his healthcare decision maker is aware that this patient-initiated Telehealth encounter is a billable service, with coverage as determined by his insurance carrier. He is aware that he may receive a bill and has provided verbal consent to proceed: Yes    I was in the office while conducting this encounter. Location of the patient: in car-not driving   Names and roles of any other persons participating in the telehealth service:   Start time: 1402  End time: [address]           CC: Weight Management      Esmer Green is a 39 y.o. male who is here for his  f/up medical provider visit for the [facility]. He is down 16 lbs since last month visit.  Goal weight: 185 lbs     Weight History    Ideal body weight: 77.6 kg (171 lb 1.2 oz)  Adjusted ideal body weight: 90.5 kg (199 lb 7.1 oz) (Based on [employer] Tables)      Wt Readings from Last 10 Encounters:   12/23/20 109.8 kg (242 lb)   12/11/20 111.4 kg (245 lb 9.6 oz)   12/08/20 114.9 kg (253 lb 6.6 oz)   11/24/20 113.4 kg (250 lb)   11/18/20 112.7 kg (248 lb 8 oz)   11/12/20 115.2 kg (254 lb)   11/10/20 117.2 kg (258 lb 6.4 oz)   10/27/20 117 kg (258 lb)   10/27/20 115.6 kg (254 lb 14.4 oz)   10/13/20 114.6 kg (252 lb 9.6 oz)       Weight Metrics 12/23/2020 12/17/2020 12/11/2020 12/11/2020 12/8/2020 11/24/2020 11/24/2020   Weight 242 lb - - 245 lb 9.6 oz 253 lb 6.6 oz - 250 lb   Waist Measure Inches - 44.5 45 - - 45.75 -   BMI 32.82 kg/m2 - - 33.31 kg/m2 34.37 kg/m2 - 33.91 kg/m2             History    Past Medical History:   Diagnosis Date    Asthma     Broken leg left    broke left leg and tore ligaments    Diabetes mellitus     no meds    Dupuytren's contracture     Essential hypertension     no meds    Fracture of left leg 08/2020    HTN (hypertension)     Ill-defined condition     neuropathy    Psoriatic arthritis, destructive type (HCC)     Sarcoidosis     says he had an arm mass removed and the path showed sarcoid, negative CXR       Past Surgical History:   Procedure Laterality Date    CLOSED RX NOSE/JAW FRAC+WIRES Right 1994    broken jaw had to wire it    HX CARPAL TUNNEL RELEASE      HX ORTHOPAEDIC      fx skull/ broken jaw     HX ORTHOPAEDIC Right     carpal tunnel    HX OTHER SURGICAL      lipoma removed     KS EXC SKIN BENIG 0.6-1CM TRUNK,ARM,LEG N/A 10/11/2018    Dr. Dennie Ken 1.1-2CM TRUNK,ARM,LEG N/A 10/11/2018    Dr. Dennie Ken 2.1-3CM Dolph Duglas N/A 10/11/2018    Dr. Isabelle Damon       Current Outpatient Medications   Medication Sig Dispense Refill    amitriptyline (ELAVIL) 25 mg tablet TAKE ONE TABLET BY MOUTH ONCE NIGHTLY 30 Tab 0    cyclobenzaprine (FLEXERIL) 5 mg tablet Take 1 tab by mouth BID as needed for muscle spasm 60 Tab 2    OTHER 1 Tab daily. Tumeric      magnesium 250 mg tab Take 1 Tab by mouth daily.  triamcinolone acetonide (KENALOG) 0.1 % topical cream Apply  to affected area two (2) times a day. use thin layer 15 g 0    multivitamin (ONE A DAY) tablet Take 1 Tab by mouth daily.  metaxalone (SKELAXIN) 800 mg tablet Take 800 mg by mouth three (3) times daily.  meloxicam (MOBIC) 15 mg tablet Take 15 mg by mouth daily.  adalimumab (HUMIRA PEN SC) 40 mg by SubCUTAneous route every seven (7) days.  topiramate (TOPAMAX) 25 mg tablet Take 1 in the evening for 1 week, then increase to 2 the second week and continue with 3 in the evening 90 Tab 1       Allergies   Allergen Reactions    Gabapentin Palpitations       Social History     Tobacco Use    Smoking status: Never Smoker    Smokeless tobacco: Never Used   Substance Use Topics    Alcohol use:  Yes     Alcohol/week: 2.0 standard drinks     Types: 2 Shots of liquor per week Frequency: 2-4 times a month     Comment: social    Drug use: Not Currently       Family History   Problem Relation Age of Onset    Asthma Mother     Stroke Father     Alcohol abuse Father     Substance Abuse Father     Diabetes Maternal Grandmother     Hypertension Maternal Grandmother     High Cholesterol Maternal Grandmother     Stroke Maternal Grandmother     Cancer Maternal Grandfather 79        prostate cancer and skin    Colon Cancer Maternal Grandfather     Depression Maternal Uncle        Family Status   Relation Name Status    Mother  Alive    Father   at age 61        unsure    Sister  Alive    MGM  Alive    [facility]  (Not Specified)           Review of Systems  Positives in [place]    Constitutional:  Unexpected weight loss, night sweats,fatigue/maliase/lethargy, change in sleep, fever, rash  Eyes:  Visual changes, headaches, eye pain, floaters  ENT:  Rhinorrhea, epistaxis, sinus pain, change in hearing, gingival bleeding, sore throat, dysphagia, dysphonia  CV:  Chest pain, shortness of breath, difficulty breathing, orthopnea, palpitations, loss of consciousness, claudication  Resp: Cough, wheeze, haemoptysis, sob, exercise intolerence  GI:  Abdominal pain, dysphagia, reflux, bloating, cramping. Obstipation, haematemesis, brbpr, hematochezia,dark tarry stools. Nausea, vomitting, diarrhea, constipation. : Change in frequency of urination, dysuria, hematuria, change in force of Stream  Neuro: Paresthesias, numbness, weakness, changes in balance, changes in speech, loss of control of bowel or bladder,   Psych:Changes in depression, anxiety, sleep patterns, change in energy Levels  Endocrine: Temperature intolerance, dry skin, hair loss, fatigue,  Episodes of hypoglycemia, changes in libido  Heme: Anemia, pupura, petechia  Skin: Rashes, itchiness, excessive bruising    Remainder of ROS as per HPI. Since your last visit, have you experienced any complications? no  If yes, please list:     Are you taking an appetite suppressant? no  If so:  Do you need a refill? no  Are you experiencine any Chest Pain, Palpitations or Dizziness? no    BP Readings from Last 3 Encounters:   12/11/20 136/88   12/08/20 113/81   11/24/20 118/86         Have you received any other medical care this week? no  If yes, where and for what? Have you discontinued or changed any medicine or dose of your medicine(s) since your last visit with [facility] provider? no    If yes, where and for what? Discontinued topiramate       Diet  How many ounces of calorie-free liquids did you consume each day? 120-160 oz    How many meal replacements did you take each day? 4 MR     Did you have any problems adhering to the program?  no   If yes, please explain:      Exercise  Aerobic exercise:  Weight training, jogging, cardio daily 60 min a day   Resistance exercise: 5 workouts / week  Any discomfort?  no     If yes, where? Objective  Visit Vitals  Ht 6' (1.829 m)   Wt 109.8 kg (242 lb)   BMI 32.82 kg/m²     No LMP for male patient.     Vital Signs: (As obtained by patient/caregiver at home)  Visit Vitals  Ht 6' (1.829 m)   Wt 109.8 kg (242 lb)   BMI 32.82 kg/m²        Constitutional: [x] Appears well-developed and well-nourished [x] No apparent distress      [] Abnormal -     Mental status: [x] Alert and awake  [x] Oriented to person/place/time [x] Able to follow commands    [] Abnormal -     Eyes:   EOM    [x]  Normal    [] Abnormal -   Sclera  [x]  Normal    [] Abnormal -          Discharge [x]  None visible   [] Abnormal -     HENT: [x] Normocephalic, atraumatic  [] Abnormal -   [x] Mouth/Throat: Mucous membranes are moist    External Ears [x] Normal  [] Abnormal -    Neck: [x] No visualized mass [] Abnormal -     Pulmonary/Chest: [x] Respiratory effort normal   [x] No visualized signs of difficulty breathing or respiratory distress        [] Abnormal -      Musculoskeletal:   [x] Normal gait with no signs of ataxia         [x] Normal range of motion of neck        [] Abnormal -     Neurological:        [x] No Facial Asymmetry (Cranial nerve 7 motor function) (limited exam due to video visit)          [x] No gaze palsy        [] Abnormal -          Skin:        [x] No significant exanthematous lesions or discoloration noted on facial skin         [] Abnormal -            Psychiatric:       [x] Normal Affect [] Abnormal -        [x] No Hallucinations        Assessment / Plan    Encounter Diagnoses   Name Primary?  Weight loss counseling, encounter for Yes    Weight loss advised     Dietary counseling and surveillance        1. Weight management    Degree of control: Doing very well    Progress was reviewed with patient    Goal(s) for next appointment: See below    Number of meal replacements per day: 4 MR     2. Labs    Latest results reviewed with patient   Routine monitoring labs ordered  Orders Placed This Encounter    URIC ACID    METABOLIC PANEL, COMPREHENSIVE       3. Goals   -15 to 20 lbs    3 MR and 1 grocery list meal- for breana. Meal should be mainly meat and vegetables to stay in ketosis     Discontinue magnesium-due to elevated levels      I have reviewed/discussed the above normal BMI with the patient. I have recommended the following interventions: dietary management education, guidance, and counseling, encourage exercise, monitor weight and prescribed dietary intake . Coding Help - Use CPT Codes 56629-10369, [id number] for Established and New Patients respectively, either employing EM elements or Time rules. Other codes (example consult codes) may also apply. I spent at least 15 minutes with this established patient, and >50% of the time was spent counseling and/or coordinating care regarding weight loss management     We discussed the expected course, resolution and complications of the diagnosis(es) in detail.   Medication risks, benefits, costs, interactions, and alternatives were discussed as indicated. I advised him to contact the office if his condition worsens, changes or fails to improve as anticipated. He expressed understanding with the diagnosis(es) and plan. Pursuant to the emergency declaration under the [facility], [facility] waiver authority and the [employer] Act, this Virtual  Visit was conducted, with patient's consent, to reduce the patient's risk of exposure to COVID-19 and provide continuity of care for an established patient.      Services were provided through a video synchronous discussion virtually to substitute for in-person clinic visit.       >50% of 15 min visit spent counseling       Sav Wells NP

## 2020-12-24 RX ORDER — TOPIRAMATE 25 MG/1
TABLET ORAL
Qty: 90 TAB | Refills: 0 | OUTPATIENT
Start: 2020-12-24

## 2020-12-29 ENCOUNTER — TELEPHONE (OUTPATIENT)
Dept: FAMILY MEDICINE CLINIC | Age: 45
End: 2020-12-29

## 2020-12-29 NOTE — TELEPHONE ENCOUNTER
Returned call to patient, Reached voicemail identified as 'Sharon Weiss\", left message, identified myself/facility/callback number, requested return call to facility.

## 2020-12-29 NOTE — TELEPHONE ENCOUNTER
Preop appt needed for surgery on 1/7/2021 or 1/14/2021. Pt is having left knee scope/ cyst incision and removal surgery by Dr Vero Live. Please advise.

## 2020-12-29 NOTE — TELEPHONE ENCOUNTER
Patient was returning the call, he states he thinks the pharmacy did an automatic refill request but he didn't request this medication. He says if we need to reach back out to him we can at [phone].

## 2020-12-31 NOTE — TELEPHONE ENCOUNTER
Left detail message for Xiomy Rice, that Violeta Valencia is schedule for a routine appointment with Dr. Jayesh Hayward on January 14,2021 at 3:30 pm, that we could possibly used that date for his pre-op and that more information is needed before confirming. Xiomy Rice is to call [facility] once voicemail is received.

## 2020-12-31 NOTE — TELEPHONE ENCOUNTER
Received return call from [facility] pre-op appointment confirmed for January 14,2021 at 3:30 pm. [facility] confirmed surgery surgery date is not schedule and is based on pre-op clearance date. Fax number given to fax over pre-op and pre-op labs for clearance.

## 2021-01-04 NOTE — TELEPHONE ENCOUNTER
Left message via voicemail for [place] to call FP in reference to [phone] [facility] surgery date.

## 2021-01-04 NOTE — TELEPHONE ENCOUNTER
[facility] schedule has not been schedule at of 01-.

## 2021-01-05 RX ORDER — AMITRIPTYLINE HYDROCHLORIDE 25 MG/1
TABLET, FILM COATED ORAL
Qty: 30 TAB | Refills: 0 | Status: SHIPPED | OUTPATIENT
Start: 2021-01-05 | End: 2021-01-14 | Stop reason: SDUPTHER

## 2021-01-06 NOTE — PROGRESS NOTES
[facility] [address]  [place], [address]  [phone] fax    Physical Therapy Discharge Summary  Patient name: César Wang Start of Care: 10/2/2020   Referral source: Lakisha Earl MD TBZ: 0/65/1620                Medical Diagnosis: Right ankle pain [M25.571]  Left ankle pain [M25.572]  Left knee pain [M25.562]  Payor: Jarrett Moores / Plan: Yuni Jignesh / Product Type: HMO /  Onset Date:6 weeks                Treatment Diagnosis: Left knee and B ankle pain   Prior Hospitalization: see medical history Provider#: [id number]   Medications: Verified on Patient summary List    Comorbidities: psoriatic arthritis, diabetes, HTN   Prior Level of Function: Pt able to participate in tennis, golf and running without knee and ankle pain  Visits from Start of Care: 10    Missed Visits: 1  Reporting Period : 10/29/2020 to 11/5/2020      Summary of Care:  Progress towards goals / Updated goals:  1. Pt will tolerate 5 minutes of treadmill jogging without pain to return to running regiment. PN: not running yet  Current: Progressing 11/5/20 Pt jogging on treadmill for 5 mins and notes intermittent pain in the left knee at about 3-4/10  2. Pt will demonstrate pain free golf swings to allow return to golfing. PN: right lateral ankle discomfort on finish    ASSESSMENT/RECOMMENDATIONS: Pt with good progress in clinic but reports increases in pain between sessions. Advised pt to f/u with MD due to non-provoked symptoms with plyometrics in clinic but reports of pain with activity outside of clinic.  Pt did not return thus will D/C from skilled PT    [x]Discontinue therapy: [x]Patient has reached or is progressing toward set goals          Jeromy Fox DPT, CMTPT 1/6/2021 1:02 PM

## 2021-01-13 ENCOUNTER — TELEPHONE (OUTPATIENT)
Dept: FAMILY MEDICINE CLINIC | Age: 46
End: 2021-01-13

## 2021-01-14 ENCOUNTER — OFFICE VISIT (OUTPATIENT)
Dept: FAMILY MEDICINE CLINIC | Age: 46
End: 2021-01-14
Payer: COMMERCIAL

## 2021-01-14 VITALS
RESPIRATION RATE: 16 BRPM | BODY MASS INDEX: 32.91 KG/M2 | OXYGEN SATURATION: 95 % | HEART RATE: 81 BPM | WEIGHT: 243 LBS | DIASTOLIC BLOOD PRESSURE: 74 MMHG | SYSTOLIC BLOOD PRESSURE: 116 MMHG | HEIGHT: 72 IN

## 2021-01-14 DIAGNOSIS — E11.8 TYPE 2 DIABETES MELLITUS WITH COMPLICATION, WITHOUT LONG-TERM CURRENT USE OF INSULIN (HCC): ICD-10-CM

## 2021-01-14 DIAGNOSIS — I10 ESSENTIAL HYPERTENSION: ICD-10-CM

## 2021-01-14 DIAGNOSIS — E66.9 OBESITY, UNSPECIFIED CLASSIFICATION, UNSPECIFIED OBESITY TYPE, UNSPECIFIED WHETHER SERIOUS COMORBIDITY PRESENT: ICD-10-CM

## 2021-01-14 DIAGNOSIS — S83.242A ACUTE MEDIAL MENISCUS TEAR, LEFT, INITIAL ENCOUNTER: Primary | ICD-10-CM

## 2021-01-14 DIAGNOSIS — Z01.818 PRE-OPERATIVE CLEARANCE: ICD-10-CM

## 2021-01-14 DIAGNOSIS — L40.50 PSORIATIC ARTHRITIS (HCC): ICD-10-CM

## 2021-01-14 PROCEDURE — 99214 OFFICE O/P EST MOD 30 MIN: CPT | Performed by: FAMILY MEDICINE

## 2021-01-14 RX ORDER — AMITRIPTYLINE HYDROCHLORIDE 25 MG/1
TABLET, FILM COATED ORAL
Qty: 90 TAB | Refills: 1 | Status: SHIPPED | OUTPATIENT
Start: 2021-01-14 | End: 2021-07-14 | Stop reason: DRUGHIGH

## 2021-01-14 NOTE — PROGRESS NOTES
1. Have you been to the ER, urgent care clinic since your last visit? Hospitalized since your last visit? No    2. Have you seen or consulted any other health care providers outside of the [address] [place] [address] since your last visit? Include any pap smears or colon screening.  Yes Where: Dr. Mary Barraza

## 2021-01-14 NOTE — PATIENT INSTRUCTIONS
A Healthy Lifestyle: Care Instructions Your Care Instructions A healthy lifestyle can help you feel good, stay at a healthy weight, and have plenty of energy for both work and play. A healthy lifestyle is something you can share with your whole family. A healthy lifestyle also can lower your risk for serious health problems, such as high blood pressure, heart disease, and diabetes. You can follow a few steps listed below to improve your health and the health of your family. Follow-up care is a key part of your treatment and safety. Be sure to make and go to all appointments, and call your doctor if you are having problems. It's also a good idea to know your test results and keep a list of the medicines you take. How can you care for yourself at home? · Do not eat too much sugar, fat, or fast foods. You can still have dessert and treats now and then. The goal is moderation. · Start small to improve your eating habits. Pay attention to portion sizes, drink less juice and soda pop, and eat more fruits and vegetables. ? Eat a healthy amount of food. A 3-ounce serving of meat, for example, is about the size of a deck of cards. Fill the rest of your plate with vegetables and whole grains. ? Limit the amount of soda and sports drinks you have every day. Drink more water when you are thirsty. ? Eat at least 5 servings of fruits and vegetables every day. It may seem like a lot, but it is not hard to reach this goal. A serving or helping is 1 piece of fruit, 1 cup of vegetables, or 2 cups of leafy, raw vegetables. Have an apple or some carrot sticks as an afternoon snack instead of a candy bar.  Try to have fruits and/or vegetables at every meal. 
 · Make exercise part of your daily routine. You may want to start with simple activities, such as walking, bicycling, or slow swimming. Try to be active 30 to 60 minutes every day. You do not need to do all 30 to 60 minutes all at once. For example, you can exercise 3 times a day for 10 or 20 minutes. Moderate exercise is safe for most people, but it is always a good idea to talk to your doctor before starting an exercise program. 
· Keep moving. Michelle Blevins the lawn, work in the garden, or [employer]. Take the stairs instead of the elevator at work. · If you smoke, quit. People who smoke have an increased risk for heart attack, stroke, cancer, and other lung illnesses. Quitting is hard, but there are ways to boost your chance of quitting tobacco for good. ? Use nicotine gum, patches, or lozenges. ? Ask your doctor about stop-smoking programs and medicines. ? Keep trying. In addition to reducing your risk of diseases in the future, you will notice some benefits soon after you stop using tobacco. If you have shortness of breath or asthma symptoms, they will likely get better within a few weeks after you quit. · Limit how much alcohol you drink. Moderate amounts of alcohol (up to 2 drinks a day for men, 1 drink a day for women) are okay. But drinking too much can lead to liver problems, high blood pressure, and other health problems. Family health If you have a family, there are many things you can do together to improve your health. · Eat meals together as a family as often as possible. · Eat healthy foods. This includes fruits, vegetables, lean meats and dairy, and whole grains. · Include your family in your fitness plan. Most people think of activities such as jogging or tennis as the way to fitness, but there are many ways you and your family can be more active. Anything that makes you breathe hard and gets your heart pumping is exercise. Here are some tips: ? Walk to do errands or to take your child to school or the bus. 
? Go for a family bike ride after dinner instead of watching TV. Where can you learn more? Go to http://www.gray.com/ Enter V181 in the search box to learn more about \"A Healthy Lifestyle: Care Instructions. \" Current as of: January 31, 2020               Content Version: 12.6 © 2006-2020 [employer]. Care instructions adapted under license by [employer] (which disclaims liability or warranty for this information). If you have questions about a medical condition or this instruction, always ask your healthcare professional. [address] any warranty or liability for your use of this information.

## 2021-01-16 PROBLEM — E66.01 SEVERE OBESITY WITH BODY MASS INDEX (BMI) OF 35.0 TO 39.9 WITH SERIOUS COMORBIDITY (HCC): Status: RESOLVED | Noted: 2018-08-13 | Resolved: 2021-01-16

## 2021-01-16 NOTE — PROGRESS NOTES
Chief Complaint   Patient presents with    Pre-op Exam     Pre-op exam for left knee arthroscopy with PMM to be performed by Dr. Ruma Pace @ [address] [place] [address] in [facility] on 2021 under general anesthesia     Preoperative Evaluation    Date of Exam: 2021    Layo Garza is a 39 y.o. male (:1975) who presents for preoperative evaluation. He sustained a knee injury and was diagnosed with a left knee meniscus tear. Latex Allergy: no    Medical History:     Past Medical History:   Diagnosis Date    Asthma     Broken leg left    broke left leg and tore ligaments    Diabetes mellitus     no meds    Dupuytren's contracture     Essential hypertension     no meds    Fracture of left leg 2020    HTN (hypertension)     Ill-defined condition     neuropathy    Psoriatic arthritis, destructive type (HCC)     Sarcoidosis     says he had an arm mass removed and the path showed sarcoid, negative CXR     Allergies: Allergies   Allergen Reactions    Gabapentin Palpitations      Medications:     Current Outpatient Medications   Medication Sig    amitriptyline (ELAVIL) 25 mg tablet TAKE ONE TABLET BY MOUTH ONCE NIGHTLY    cyclobenzaprine (FLEXERIL) 5 mg tablet Take 1 tab by mouth BID as needed for muscle spasm    topiramate (TOPAMAX) 25 mg tablet Take 1 in the evening for 1 week, then increase to 2 the second week and continue with 3 in the evening    OTHER 1 Tab daily. Tumeric    magnesium 250 mg tab Take 1 Tab by mouth daily.  triamcinolone acetonide (KENALOG) 0.1 % topical cream Apply  to affected area two (2) times a day. use thin layer    multivitamin (ONE A DAY) tablet Take 1 Tab by mouth daily.  metaxalone (SKELAXIN) 800 mg tablet Take 800 mg by mouth three (3) times daily.  meloxicam (MOBIC) 15 mg tablet Take 15 mg by mouth daily.  adalimumab (HUMIRA PEN SC) 40 mg by SubCUTAneous route every seven (7) days.      No current facility-administered medications for this visit. Surgical History:     Past Surgical History:   Procedure Laterality Date    CLOSED RX NOSE/JAW FRAC+WIRES Right 1994    broken jaw had to wire it    HX CARPAL TUNNEL RELEASE      HX ORTHOPAEDIC      fx skull/ broken jaw     HX ORTHOPAEDIC Right     carpal tunnel    HX OTHER SURGICAL      lipoma removed     RI EXC SKIN BENIG 0.6-1CM TRUNK,ARM,LEG N/A 10/11/2018    Dr. Corie Barron 1.1-2CM TRUNK,ARM,LEG N/A 10/11/2018    Dr. Corie Barron 2.1-3CM Caryn Abraham N/A 10/11/2018    Dr. Saeid Quiroga     Social History:     Social History     Socioeconomic History    Marital status: SINGLE     Spouse name: Not on file    Number of children: Not on file    Years of education: Not on file    Highest education level: Not on file   Occupational History    Occupation: unemployed   Tobacco Use    Smoking status: Never Smoker    Smokeless tobacco: Never Used   Substance and Sexual Activity    Alcohol use: Yes     Alcohol/week: 2.0 standard drinks     Types: 2 Shots of liquor per week     Frequency: 2-4 times a month     Comment: social    Drug use: Not Currently    Sexual activity: Yes     Partners: Female     Birth control/protection: Condom       Anesthesia Complications: None  History of abnormal bleeding : None    Objective:     ROS:    Feeling well. No dyspnea or chest pain on exertion. No abdominal pain, change in bowel habits, black or bloody stools. No urinary tract or prostatic symptoms. No neurological complaints. OBJECTIVE:   The patient appears well, alert, oriented x 3, in no distress. Visit Vitals  /74 (BP 1 Location: Left arm, BP Patient Position: Sitting)   Pulse 81   Resp 16   Ht 6' (1.829 m)   Wt 243 lb (110.2 kg)   SpO2 95%   BMI 32.96 kg/m²     ENT normal.  Neck supple. No adenopathy or thyromegaly. BRIAN. Lungs are clear, good air entry, no wheezes, rhonchi or rales.    Cardiovascular:S1 and S2 normal, no murmurs, regular rate and rhythm. Extremities show no edema. Neurological is normal without focal findings. DIAGNOSTICS:   Scanned to media  Results for orders placed or performed in visit on 21/02/95   METABOLIC PANEL, COMPREHENSIVE   Result Value Ref Range    Glucose 80 65 - 99 mg/dL    BUN 20 6 - 24 mg/dL    Creatinine 1.04 0.76 - 1.27 mg/dL    GFR est non-AA 86 >59 mL/min/1.73    GFR est  >59 mL/min/1.73    BUN/Creatinine ratio 19 9 - 20    Sodium 141 134 - 144 mmol/L    Potassium 4.6 3.5 - 5.2 mmol/L    Chloride 103 96 - 106 mmol/L    CO2 24 20 - 29 mmol/L    Calcium 9.9 8.7 - 10.2 mg/dL    Protein, total 7.4 6.0 - 8.5 g/dL    Albumin 4.8 4.0 - 5.0 g/dL    GLOBULIN, TOTAL 2.6 1.5 - 4.5 g/dL    A-G Ratio 1.8 1.2 - 2.2    Bilirubin, total 0.6 0.0 - 1.2 mg/dL    Alk. phosphatase 70 39 - 117 IU/L    AST (SGOT) 29 0 - 40 IU/L    ALT (SGPT) 23 0 - 44 IU/L   URIC ACID   Result Value Ref Range    Uric acid 4.3 3.8 - 8.4 mg/dL   MAGNESIUM   Result Value Ref Range    Magnesium 2.4 (H) 1.6 - 2.3 mg/dL       IMPRESSION:       ICD-10-CM ICD-9-CM    1. Acute medial meniscus tear, left, initial encounter  S83.242A 836.0    2. Pre-operative clearance  Z01.818 V72.84    3. Type 2 diabetes mellitus with complication, without long-term current use of insulin (HCC)  E11.8 250.90    4. Essential hypertension  I10 401.9    5. Psoriatic arthritis ([facility]  L40.50 696.0    6. Obesity, unspecified classification, unspecified obesity type, unspecified whether serious comorbidity present  E66.9 278.00      Cont current care. D/C NSAIDs prior. Psoriatic arthritis -He has been in communication with his rheumatologist and given guidance from their perspective. Pain controlled on Elavil. DM2 - diet and exercise controlled. Recent A1c showing control.     Low risk for planned surgery  No contraindications to planned surgery    Diane Kohler MD   1/14/2021

## 2021-01-18 ENCOUNTER — VIRTUAL VISIT (OUTPATIENT)
Dept: SURGERY | Age: 46
End: 2021-01-18
Payer: COMMERCIAL

## 2021-01-18 VITALS — BODY MASS INDEX: 32.51 KG/M2 | HEIGHT: 72 IN | WEIGHT: 240 LBS

## 2021-01-18 DIAGNOSIS — E66.9 OBESITY (BMI 30-39.9): ICD-10-CM

## 2021-01-18 DIAGNOSIS — Z71.3 DIETARY COUNSELING AND SURVEILLANCE: ICD-10-CM

## 2021-01-18 DIAGNOSIS — Z71.3 WEIGHT LOSS COUNSELING, ENCOUNTER FOR: Primary | ICD-10-CM

## 2021-01-18 DIAGNOSIS — E66.9 OBESITY, UNSPECIFIED CLASSIFICATION, UNSPECIFIED OBESITY TYPE, UNSPECIFIED WHETHER SERIOUS COMORBIDITY PRESENT: ICD-10-CM

## 2021-01-18 DIAGNOSIS — I10 HYPERTENSION, UNSPECIFIED TYPE: ICD-10-CM

## 2021-01-18 DIAGNOSIS — Z78.9 WEIGHT LOSS ADVISED: ICD-10-CM

## 2021-01-18 PROCEDURE — 99213 OFFICE O/P EST LOW 20 MIN: CPT | Performed by: NURSE PRACTITIONER

## 2021-01-18 NOTE — PROGRESS NOTES
Consent:  Emery Hernandez  and/or his healthcare decision maker is aware that this patient-initiated Telehealth encounter is a billable service, with coverage as determined by his insurance carrier. He is aware that he may receive a bill and has provided verbal consent to proceed: Yes    Provider location while conducting visit: in the office  Patient location: Home  Other person participating in the telehealth services: Raquel Garcia    This virtual visit was conducted via interactive/real-time audio/video using [email or web address] me   Visit start: 1500 Visit End:1515       Weight Loss Program Progress Note:       CC: Weight Management    Emery Hernandez is a 39 y.o. male who is here for his  f/up medical provider visit for the VLCD/LCD  Program who was seen by synchronous (real-time) audio-video technology on 1/18/2021. Per patient he is attending class as required.     -8lbs      Weight History  Weight Metrics 1/18/2021 1/14/2021 12/23/2020 12/17/2020 12/11/2020 12/11/2020 12/8/2020   Weight 240 lb 243 lb 242 lb - - 245 lb 9.6 oz 253 lb 6.6 oz   Waist Measure Inches - - - 44.5 45 - -   BMI 32.55 kg/m2 32.96 kg/m2 32.82 kg/m2 - - 33.31 kg/m2 34.37 kg/m2       Starting wt: 279  Goal wt: 185 lbs   EKG due: 220  Ideal body weight: 77.6 kg (171 lb 1.2 oz)  Adjusted ideal body weight: 90.1 kg (198 lb 10.3 oz)  Body mass index is 32.55 kg/m².       History    Past Medical History:   Diagnosis Date    Asthma     Broken leg left    broke left leg and tore ligaments    Diabetes mellitus     no meds    Dupuytren's contracture     Essential hypertension     no meds    Fracture of left leg 08/2020    HTN (hypertension)     Ill-defined condition     neuropathy    Psoriatic arthritis, destructive type (HCC)     Sarcoidosis     says he had an arm mass removed and the path showed sarcoid, negative CXR       Past Surgical History:   Procedure Laterality Date    CLOSED RX NOSE/JAW FRAC+WIRES Right 1994    broken jaw had to wire it    HX CARPAL TUNNEL RELEASE      HX ORTHOPAEDIC      fx skull/ broken jaw     HX ORTHOPAEDIC Right     carpal tunnel    HX OTHER SURGICAL      lipoma removed     AR EXC SKIN BENIG 0.6-1CM TRUNK,ARM,LEG N/A 10/11/2018    Dr. Jodi Chávez 1.1-2CM TRUNK,ARM,LEG N/A 10/11/2018    Dr. Jodi Chávez 2.1-3CM Luann Harper N/A 10/11/2018    Dr. Vinicio Mart       Current Outpatient Medications   Medication Sig Dispense Refill    amitriptyline (ELAVIL) 25 mg tablet TAKE ONE TABLET BY MOUTH ONCE NIGHTLY 90 Tab 1    cyclobenzaprine (FLEXERIL) 5 mg tablet Take 1 tab by mouth BID as needed for muscle spasm 60 Tab 2    topiramate (TOPAMAX) 25 mg tablet Take 1 in the evening for 1 week, then increase to 2 the second week and continue with 3 in the evening 90 Tab 1    OTHER 1 Tab daily. Tumeric      magnesium 250 mg tab Take 1 Tab by mouth daily.  triamcinolone acetonide (KENALOG) 0.1 % topical cream Apply  to affected area two (2) times a day. use thin layer 15 g 0    multivitamin (ONE A DAY) tablet Take 1 Tab by mouth daily.  metaxalone (SKELAXIN) 800 mg tablet Take 800 mg by mouth three (3) times daily.  meloxicam (MOBIC) 15 mg tablet Take 15 mg by mouth daily.  adalimumab (HUMIRA PEN SC) 40 mg by SubCUTAneous route every seven (7) days. Allergies   Allergen Reactions    Gabapentin Palpitations       Social History     Tobacco Use    Smoking status: Never Smoker    Smokeless tobacco: Never Used   Substance Use Topics    Alcohol use:  Yes     Alcohol/week: 2.0 standard drinks     Types: 2 Shots of liquor per week     Frequency: 2-4 times a month     Comment: social    Drug use: Not Currently       Family History   Problem Relation Age of Onset    Asthma Mother     Stroke Father     Alcohol abuse Father     Substance Abuse Father     Diabetes Maternal Grandmother     Hypertension Maternal Grandmother     High Cholesterol Maternal Grandmother  Stroke Maternal Grandmother     Cancer Maternal Grandfather 79        prostate cancer and skin    Colon Cancer Maternal Grandfather     Depression Maternal Uncle        Family Status   Relation Name Status    Mother  Alive    Father   at age 61        unsure    Sister  Alive    MGM  Alive    MGF  [address]  (Not Specified)         Medications:  Outpatient Medications Marked as Taking for the 21 encounter (Virtual Visit) with Ruben Montanez NP   Medication Sig Dispense Refill    amitriptyline (ELAVIL) 25 mg tablet TAKE ONE TABLET BY MOUTH ONCE NIGHTLY 90 Tab 1    cyclobenzaprine (FLEXERIL) 5 mg tablet Take 1 tab by mouth BID as needed for muscle spasm 60 Tab 2    topiramate (TOPAMAX) 25 mg tablet Take 1 in the evening for 1 week, then increase to 2 the second week and continue with 3 in the evening 90 Tab 1    OTHER 1 Tab daily. Tumeric      magnesium 250 mg tab Take 1 Tab by mouth daily.  triamcinolone acetonide (KENALOG) 0.1 % topical cream Apply  to affected area two (2) times a day. use thin layer 15 g 0    multivitamin (ONE A DAY) tablet Take 1 Tab by mouth daily.  metaxalone (SKELAXIN) 800 mg tablet Take 800 mg by mouth three (3) times daily.  meloxicam (MOBIC) 15 mg tablet Take 15 mg by mouth daily.  adalimumab (HUMIRA PEN SC) 40 mg by SubCUTAneous route every seven (7) days. Review of Systems  Review of Systems   Constitutional: Positive for weight loss. All other systems reviewed and are negative. Objective  Visit Vitals  Ht 6' (1.829 m)   Wt 108.9 kg (240 lb)   BMI 32.55 kg/m²    (As obtained by patient/caregiver at home)  No LMP for male patient. Physical Exam  Physical Exam  Vitals signs and nursing note reviewed. HENT:      Head: Normocephalic and atraumatic. Neck:      Musculoskeletal: Normal range of motion.    Pulmonary:      Effort: Pulmonary effort is normal.   Neurological:      Mental Status: He is alert and oriented to person, place, and time. Psychiatric:         Mood and Affect: Mood normal.         Behavior: Behavior normal.         No results found for this or any previous visit (from the past 672 hour(s)). Assessment / Plan    Encounter Diagnoses   Name Primary?  Weight loss counseling, encounter for Yes    Weight loss advised     Dietary counseling and surveillance     Obesity, unspecified classification, unspecified obesity type, unspecified whether serious comorbidity present     Obesity (BMI 30-39. 9)     Hypertension, unspecified type            1.  Weight management      Today, the patient is  Height: 6' (182.9 cm) tall, Weight: 108.9 kg (240 lb) lbs for a Body mass index is 32.55 kg/m². is suffering from obesity  which is further complicated by hypertension. Degree of control: continues to do very well, upcoming knee clean out    Progress was reviewed with patient. Goal(s) for next appointment:   -10lbs   I have reviewed/discussed the above normal BMI with the patient. I have recommended the following interventions: dietary management education, guidance, and counseling, encourage exercise, monitor weight and prescribed dietary intake . Branden Emery 2.  Labs    Latest results reviewed with patient   Routine monitoring labs ordered, if needed  Orders Placed This Encounter    METABOLIC PANEL, COMPREHENSIVE     We discussed the expected course, resolution and complications of the diagnosis(es) in detail. Medication risks, benefits, costs, interactions, and alternatives were discussed as indicated. I advised him to contact the office if his condition worsens, changes or fails to improve as anticipated. He expressed understanding with the diagnosis(es) and plan. Services were provided through a video synchronous discussion virtually to substitute for in-person clinic visit.   Pursuant to the emergency declaration under the [facility] [place], [address] waiver authority and the [employer] Act, this Virtual  Visit was conducted, with patient's consent, to reduce the patient's risk of exposure to COVID-19 and provide continuity of care for an established patient. Sreedhar Lozano NP     Dragon medical dictation software was used for portions of this report. Unintended transcription errors may occur.

## 2021-01-18 NOTE — PROGRESS NOTES
Calvin Magallanes is a 39 y.o. male  Chief Complaint   Patient presents with    Weight Management     monthly follow up     Nursing Note for Medical Monitoring in the [facility] Weight Loss Program      Calvin Magallanes is a 39 y.o. male who is enrolled in [facility]    Calvin Magallanes was prescribed the VLCD / LCD. Did you have any problems adhering to the program last week? no  If yes, please explain:     Since your last visit, have you experienced any complications? no    Have you received any other medical care this week? yes  If yes, where and for what? \"physical for knee surgery\"    Have you had any change in your medications since your last visit? no  If yes what? Are you taking an appetite suppressant? no   If yes:  Do you need a refill? BP Readings from Last 3 Encounters:   01/14/21 116/74   12/11/20 136/88   12/08/20 113/81        Eating Habits Over Last Week:  Did you take in 64 oz of non-caloric fluids? yes     Did you consume your prescribed meal replacement regimen each day?  yes       Physical Activity Over the Past Week:    Aerobic exercise: 120 min  Resistance exercise: 90 workouts / week

## 2021-01-26 NOTE — PROGRESS NOTES
[facility]  [address] [place] [address], [facility] [address]  Phone: [phone]  Fax: [phone]    Pt's YOB: 1975    ASSESSMENT   Diagnoses and all orders for this visit:    1. Chronic left shoulder pain  -     diclofenac (Voltaren) 1 % gel; Apply 4 grams to left shoulder up to 4 times per day, maximum 16 grams per joint per day. Dispense 5 100 gram tubes  -     MRI SHOULDER LT WO CONT; Future  -     XR SHOULDER LT AP/LAT MIN 2 V; Future    2. Muscle spasm  -     MRI SHOULDER LT WO CONT; Future    3. Myofascial pain  -     MRI SHOULDER LT WO CONT; Future    4. Claustrophobia  -     MRI SHOULDER LT WO CONT; Future         IMPRESSION AND PLAN:  Calvin Magallanes is a 39 y.o. right hand dominant male with history of cervical and left shoulder pain. Pt reports an intermittent popping sensation in the neck with left cervical flexion that is followed by severe pain radiating down the left arm. He has been using Voltaren gel on the left shoulder and notes temporary relief with physical therapy. 1) Pt was given information on shoulder arthritis exercises. 2) He received a refill of Voltaren 1% gel. 3) A left shoulder MRI was ordered. He has left shoulder pain ,left arm weakness, and limited range of motion despite using NSAID's and physical therapy-- concern for labral tear. 4) Mr. Raquel Walker has a reminder for a \"due or due soon\" health maintenance. I have asked that he contact his primary care provider, Hazel Devlin MD, for follow-up on this health maintenance. 5)  demonstrated consistency with prescribing. Follow-up and Dispositions    · Return in about 4 weeks (around 2/24/2021) for Medication follow up, Diagnostic Test follow up. HISTORY OF PRESENT ILLNESS:  Calvin Magallanes is a 39 y.o. right hand dominant male with history of cervical and left shoulder pain and presents to the office today for follow up.  He denies any change in symptoms since his last office visit. Pt reports an intermittent popping sensation in the neck with left cervical flexion that is followed by severe pain radiating down the left arm. He reports temporary improvement in his shoulder pain with physical therapy. Pt tapered off the Topamax since his last office visit without any change in his symptoms. He has been using Voltaren gel on the left shoulder but notes that he ran out since his last office visit. Pt reports that he had left knee surgery last week by Dr. Ed Holland and notes minimal pain since the surgery. Pt at this time desires to proceed with a left shoulder MRI. Pain Scale: 0 - No pain/10    PCP: Janie Villafuerte MD     Past Medical History:   Diagnosis Date    Asthma     Broken leg left    broke left leg and tore ligaments    Diabetes mellitus     no meds    Dupuytren's contracture     Essential hypertension     no meds    Fracture of left leg 08/2020    HTN (hypertension)     Ill-defined condition     neuropathy    Psoriatic arthritis, destructive type (HCC)     Sarcoidosis     says he had an arm mass removed and the path showed sarcoid, negative CXR        Social History     Socioeconomic History    Marital status: SINGLE     Spouse name: Not on file    Number of children: Not on file    Years of education: Not on file    Highest education level: Not on file   Occupational History    Occupation: unemployed   Social Needs    Financial resource strain: Not on file    Food insecurity     Worry: Not on file     Inability: Not on file   [employer] needs     Medical: Not on file     Non-medical: Not on file   Tobacco Use    Smoking status: Never Smoker    Smokeless tobacco: Never Used   Substance and Sexual Activity    Alcohol use:  Yes     Alcohol/week: 2.0 standard drinks     Types: 2 Shots of liquor per week     Frequency: 2-4 times a month     Comment: social    Drug use: Not Currently    Sexual activity: Yes Partners: Female     Birth control/protection: Condom   Lifestyle    Physical activity     Days per week: Not on file     Minutes per session: Not on file    Stress: Not on file   Relationships    Social connections     Talks on phone: Not on file     Gets together: Not on file     Attends Jewish service: Not on file     Active member of club or organization: Not on file     Attends meetings of clubs or organizations: Not on file     Relationship status: Not on file    Intimate partner violence     Fear of current or ex partner: Not on file     Emotionally abused: Not on file     Physically abused: Not on file     Forced sexual activity: Not on file   Other Topics Concern    Not on file   Social History Narrative    Not on file       Current Outpatient Medications   Medication Sig Dispense Refill    diclofenac (Voltaren) 1 % gel Apply 4 grams to left shoulder up to 4 times per day, maximum 16 grams per joint per day. Dispense 5 100 gram tubes 500 g 1    amitriptyline (ELAVIL) 25 mg tablet TAKE ONE TABLET BY MOUTH ONCE NIGHTLY 90 Tab 1    cyclobenzaprine (FLEXERIL) 5 mg tablet Take 1 tab by mouth BID as needed for muscle spasm 60 Tab 2    OTHER 1 Tab daily. Tumeric      magnesium 250 mg tab Take 1 Tab by mouth daily.  triamcinolone acetonide (KENALOG) 0.1 % topical cream Apply  to affected area two (2) times a day. use thin layer 15 g 0    multivitamin (ONE A DAY) tablet Take 1 Tab by mouth daily.  metaxalone (SKELAXIN) 800 mg tablet Take 800 mg by mouth three (3) times daily.  meloxicam (MOBIC) 15 mg tablet Take 15 mg by mouth daily.  adalimumab (HUMIRA PEN SC) 40 mg by SubCUTAneous route every seven (7) days.       topiramate (TOPAMAX) 25 mg tablet Take 1 in the evening for 1 week, then increase to 2 the second week and continue with 3 in the evening 90 Tab 1       Allergies   Allergen Reactions    Gabapentin Palpitations         REVIEW OF SYSTEMS    Constitutional: Negative for fever, chills, or weight change. Respiratory: Negative for cough or shortness of breath. Cardiovascular: Negative for chest pain or palpitations. Gastrointestinal: Negative for acid reflux, change in bowel habits, or constipation. Genitourinary: Negative for dysuria and flank pain. Musculoskeletal: Positive for cervical and left shoulder pain. Neurological: Negative for headaches, dizziness. Positive for left arm numbness. Psychiatric/Behavioral: Negative for difficulty with sleep. As per HPI    PHYSICAL EXAMINATION  Visit Vitals  /87 (BP 1 Location: Right arm, BP Patient Position: Sitting)   Pulse 74   Temp 97.5 °F (36.4 °C) (Skin)   Resp 12   Ht 6' (1.829 m)   Wt 245 lb (111.1 kg)   SpO2 96% Comment: RA   BMI 33.23 kg/m²       Constitutional: Awake, alert, and in no acute distress. Neurological: 1+ symmetrical DTRs in the upper extremities. 1+ symmetrical DTRs in the lower extremities. Sensation to light touch is intact. Negative Aceves's sign bilaterally. Skin: warm, dry, and intact. Musculoskeletal: Tight across the left upper trapezius. Decreased range of motion with side to side cervical flexion. Pain and decreased range of motion with abduction of the left shoulder. Positive impingement sign on the left. Negative empty can test bilaterally. Tenderness to palpation over the left AC joint. Biceps  Triceps Deltoids Wrist Ext Wrist Flex Hand Intrin   Right +4/5 +4/5 +4/5 +4/5 +4/5 +4/5   Left +4/5 +4/5 +4/5 +4/5 +4/5 +4/5     IMAGING:    Cervical spine MRI from 10/23/2020 was personally reviewed with the patient and demonstrated:  IMPRESSION:  C6-7: Moderate right uncovertebral joint hypertrophy.      Lumbar spine 4V x-rays from 10/13/2020 were personally reviewed with the patient and demonstrated:  Degenerative disc at L4-5. Multilevel degenerative facets. Mild straightening of the lumbar spine. Very minimal scoliosis.       Written by Ryan Gomez, as dictated by Sravani Baldwin Luz Perez MD.  I, Dr. Henrietta Jones confirm that all documentation is accurate.

## 2021-01-27 ENCOUNTER — OFFICE VISIT (OUTPATIENT)
Dept: ORTHOPEDIC SURGERY | Age: 46
End: 2021-01-27
Payer: COMMERCIAL

## 2021-01-27 VITALS
TEMPERATURE: 97.5 F | HEART RATE: 74 BPM | HEIGHT: 72 IN | WEIGHT: 245 LBS | BODY MASS INDEX: 33.18 KG/M2 | RESPIRATION RATE: 12 BRPM | DIASTOLIC BLOOD PRESSURE: 87 MMHG | OXYGEN SATURATION: 96 % | SYSTOLIC BLOOD PRESSURE: 123 MMHG

## 2021-01-27 DIAGNOSIS — M62.838 MUSCLE SPASM: ICD-10-CM

## 2021-01-27 DIAGNOSIS — G89.29 CHRONIC LEFT SHOULDER PAIN: ICD-10-CM

## 2021-01-27 DIAGNOSIS — G89.29 CHRONIC LEFT SHOULDER PAIN: Primary | ICD-10-CM

## 2021-01-27 DIAGNOSIS — M25.512 CHRONIC LEFT SHOULDER PAIN: ICD-10-CM

## 2021-01-27 DIAGNOSIS — F40.240 CLAUSTROPHOBIA: ICD-10-CM

## 2021-01-27 DIAGNOSIS — M79.18 MYOFASCIAL PAIN: ICD-10-CM

## 2021-01-27 DIAGNOSIS — M25.512 CHRONIC LEFT SHOULDER PAIN: Primary | ICD-10-CM

## 2021-01-27 PROCEDURE — 99213 OFFICE O/P EST LOW 20 MIN: CPT | Performed by: PHYSICAL MEDICINE & REHABILITATION

## 2021-01-27 RX ORDER — DICLOFENAC SODIUM 10 MG/G
GEL TOPICAL
Qty: 500 G | Refills: 1 | Status: SHIPPED | OUTPATIENT
Start: 2021-01-27 | End: 2021-02-19 | Stop reason: ALTCHOICE

## 2021-01-27 NOTE — PATIENT INSTRUCTIONS
Shoulder Arthritis: Exercises Introduction Here are some examples of exercises for you to try. The exercises may be suggested for a condition or for rehabilitation. Start each exercise slowly. Ease off the exercises if you start to have pain. You will be told when to start these exercises and which ones will work best for you. How to do the exercises Shoulder flexion (lying down) To make a wand for this exercise, use a piece of PVC pipe or a broom handle with the broom removed. Make the wand about a foot wider than your shoulders. 1. Lie on your back, holding a wand with both hands. Your palms should face down as you hold the wand. 2. Keeping your elbows straight, slowly raise your arms over your head. Raise them until you feel a stretch in your shoulders, upper back, and chest. 
3. Hold for 15 to 30 seconds. 4. Repeat 2 to 4 times. Shoulder rotation (lying down) To make a wand for this exercise, use a piece of PVC pipe or a broom handle with the broom removed. Make the wand about a foot wider than your shoulders. 1. Lie on your back. Hold a wand with both hands with your elbows bent and palms up. 2. Keep your elbows close to your body, and move the wand across your body toward the sore arm. 3. Hold for 8 to 12 seconds. 4. Repeat 2 to 4 times. Shoulder internal rotation with towel 1. Hold a towel above and behind your head with the arm that is not sore. 2. With your sore arm, reach behind your back and grasp the towel. 3. With the arm above your head, pull the towel upward. Do this until you feel a stretch on the front and outside of your sore shoulder. 4. Hold 15 to 30 seconds. 5. Repeat 2 to 4 times. Shoulder blade squeeze 1. Stand with your arms at your sides, and squeeze your shoulder blades together. Do not raise your shoulders up as you squeeze. 2. Hold 6 seconds. 3. Repeat 8 to 12 times. Resisted rows For this exercise, you will need elastic exercise material, such as surgical tubing or Thera-Band. 1. Put the band around a solid object at about waist level. (A bedpost will work well.) Each hand should hold an end of the band. 2. With your elbows at your sides and bent to 90 degrees, pull the band back. Your shoulder blades should move toward each other. Return to the starting position. 3. Repeat 8 to 12 times. External rotator strengthening exercise 1. Start by tying a piece of elastic exercise material to a doorknob. You can use surgical tubing or Thera-Band. (You may also hold one end of the band in each hand.) 2. Stand or sit with your shoulder relaxed and your elbow bent 90 degrees. Your upper arm should rest comfortably against your side. Squeeze a rolled towel between your elbow and your body for comfort. This will help keep your arm at your side. 3. Hold one end of the elastic band with the hand of the painful arm. 4. Start with your forearm across your belly. Slowly rotate the forearm out away from your body. Keep your elbow and upper arm tucked against the towel roll or the side of your body until you begin to feel tightness in your shoulder. Slowly move your arm back to where you started. 5. Repeat 8 to 12 times. Internal rotator strengthening exercise 1. Start by tying a piece of elastic exercise material to a doorknob. You can use surgical tubing or Thera-Band. 2. Stand or sit with your shoulder relaxed and your elbow bent 90 degrees. Your upper arm should rest comfortably against your side. Squeeze a rolled towel between your elbow and your body for comfort. This will help keep your arm at your side. 3. Hold one end of the elastic band in the hand of the painful arm. 4. Slowly rotate your forearm toward your body until it touches your belly. Slowly move it back to where you started. 5. Keep your elbow and upper arm firmly tucked against the towel roll or at your side. 6. Repeat 8 to 12 times. Pendulum swing If you have pain in your back, do not do this exercise. 1. Hold on to a table or the back of a chair with your good arm. Then bend forward a little and let your sore arm hang straight down. This exercise does not use the arm muscles. Rather, use your legs and your hips to create movement that makes your arm swing freely. 2. Use the movement from your hips and legs to guide the slightly swinging arm back and forth like a pendulum (or elephant trunk). Then guide it in circles that start small (about the size of a dinner plate). Make the circles a bit larger each day, as your pain allows. 3. Do this exercise for 5 minutes, 5 to 7 times each day. 4. As you have less pain, try bending over a little farther to do this exercise. This will increase the amount of movement at your shoulder. Follow-up care is a key part of your treatment and safety. Be sure to make and go to all appointments, and call your doctor if you are having problems. It's also a good idea to know your test results and keep a list of the medicines you take. Where can you learn more? Go to http://www.gray.com/ Enter H562 in the search box to learn more about \"Shoulder Arthritis: Exercises. \" Current as of: March 2, 2020               Content Version: 12.6 © 3854-6119 [employer]. Care instructions adapted under license by [employer] (which disclaims liability or warranty for this information). If you have questions about a medical condition or this instruction, always ask your healthcare professional. [place] [address] any warranty or liability for your use of this information.

## 2021-01-27 NOTE — PROGRESS NOTES
Rhiannon Florez presents today for   Chief Complaint   Patient presents with    Neck Pain       Is someone accompanying this pt? no    Is the patient using any DME equipment during OV? no    Depression Screening:  3 most recent PHQ Screens 1/14/2021   Little interest or pleasure in doing things Not at all   Feeling down, depressed, irritable, or hopeless Not at all   Total Score PHQ 2 0       Learning Assessment:  Learning Assessment 12/24/2019   PRIMARY LEARNER Patient   HIGHEST LEVEL OF EDUCATION - PRIMARY LEARNER  4 YEARS OF COLLEGE   BARRIERS PRIMARY LEARNER NONE   CO-LEARNER CAREGIVER No   CO-LEARNER NAME n/a   PRIMARY LANGUAGE ENGLISH   LEARNER PREFERENCE PRIMARY DEMONSTRATION   ANSWERED BY patient   RELATIONSHIP SELF       Abuse Screening:  Abuse Screening Questionnaire 1/14/2021   Do you ever feel afraid of your partner? N   Are you in a relationship with someone who physically or mentally threatens you? N   Is it safe for you to go home? Y       Coordination of Care:  1. Have you been to the ER, urgent care clinic since your last visit? no  Hospitalized since your last visit? No. Pt had an out pt procedure done on his knee    2. Have you seen or consulted any other health care providers outside of the [address] [place] [address] since your last visit? Yes, orthopedics and surgical weight loss specialist.  Include any pap smears or colon screening.  no

## 2021-01-27 NOTE — LETTER
1/27/2021 Patient: Beth Wang YOB: 1975 Date of Visit: 1/27/2021 Arianna Dhillon MD 
[address] [place] [address] [address] [facility] [employer] Dear Arianna Dhillon MD, Thank you for referring Mr. Beth Wang to [address] [place] [address] for evaluation. My notes for this consultation are attached. If you have questions, please do not hesitate to call me. I look forward to following your patient along with you. Sincerely, Constance Jean MD

## 2021-02-01 ENCOUNTER — HOSPITAL ENCOUNTER (OUTPATIENT)
Dept: GENERAL RADIOLOGY | Age: 46
Discharge: HOME | End: 2021-02-01
Payer: COMMERCIAL

## 2021-02-01 PROCEDURE — 73030 X-RAY EXAM OF SHOULDER: CPT

## 2021-02-03 DIAGNOSIS — F40.240 CLAUSTROPHOBIA: ICD-10-CM

## 2021-02-03 DIAGNOSIS — G89.29 CHRONIC LEFT SHOULDER PAIN: ICD-10-CM

## 2021-02-03 DIAGNOSIS — M25.512 CHRONIC LEFT SHOULDER PAIN: ICD-10-CM

## 2021-02-03 DIAGNOSIS — M79.18 MYOFASCIAL PAIN: ICD-10-CM

## 2021-02-03 DIAGNOSIS — M62.838 MUSCLE SPASM: ICD-10-CM

## 2021-02-15 ENCOUNTER — HOSPITAL ENCOUNTER (OUTPATIENT)
Dept: MRI IMAGING | Age: 46
Discharge: HOME | End: 2021-02-15
Attending: PHYSICAL MEDICINE & REHABILITATION
Payer: COMMERCIAL

## 2021-02-15 PROCEDURE — 73221 MRI JOINT UPR EXTREM W/O DYE: CPT

## 2021-02-19 ENCOUNTER — HOSPITAL ENCOUNTER (OUTPATIENT)
Dept: LAB | Age: 46
Discharge: HOME | End: 2021-02-19

## 2021-02-19 ENCOUNTER — OFFICE VISIT (OUTPATIENT)
Dept: SURGERY | Age: 46
End: 2021-02-19
Payer: COMMERCIAL

## 2021-02-19 VITALS
BODY MASS INDEX: 32.78 KG/M2 | WEIGHT: 242 LBS | HEART RATE: 69 BPM | DIASTOLIC BLOOD PRESSURE: 81 MMHG | OXYGEN SATURATION: 98 % | SYSTOLIC BLOOD PRESSURE: 118 MMHG | HEIGHT: 72 IN | TEMPERATURE: 97.4 F | RESPIRATION RATE: 16 BRPM

## 2021-02-19 DIAGNOSIS — Z78.9 WEIGHT LOSS ADVISED: ICD-10-CM

## 2021-02-19 DIAGNOSIS — E66.9 OBESITY (BMI 30-39.9): ICD-10-CM

## 2021-02-19 DIAGNOSIS — E66.9 OBESITY, UNSPECIFIED CLASSIFICATION, UNSPECIFIED OBESITY TYPE, UNSPECIFIED WHETHER SERIOUS COMORBIDITY PRESENT: ICD-10-CM

## 2021-02-19 DIAGNOSIS — Z71.3 WEIGHT LOSS COUNSELING, ENCOUNTER FOR: ICD-10-CM

## 2021-02-19 DIAGNOSIS — K75.81 NASH (NONALCOHOLIC STEATOHEPATITIS): Primary | ICD-10-CM

## 2021-02-19 LAB — XX-LABCORP SPECIMEN COL,LCBCF: NORMAL

## 2021-02-19 PROCEDURE — 99213 OFFICE O/P EST LOW 20 MIN: CPT | Performed by: NURSE PRACTITIONER

## 2021-02-19 PROCEDURE — 99001 SPECIMEN HANDLING PT-LAB: CPT

## 2021-02-19 NOTE — PROGRESS NOTES
[facility] Progress Note:   F/up Provider Visit    CC: Weight Management    Jazmine Ortega is a 55 y.o. male who is here for his  f/up medical provider visit for the VLCD Program. he did attend class last week. Weight History  Weight Metrics 2/23/2021 2/19/2021 2/19/2021 1/27/2021 1/18/2021 1/14/2021 12/23/2020   Weight 249 lb 6.4 oz - 242 lb 245 lb 240 lb 243 lb 242 lb   Waist Measure Inches - 44.5 - - - - -   BMI 33.82 kg/m2 - 32.82 kg/m2 33.23 kg/m2 32.55 kg/m2 32.96 kg/m2 32.82 kg/m2       Ideal body weight: 77.6 kg (171 lb 1.2 oz)  Adjusted ideal body weight: 90.5 kg (199 lb 7.1 oz)  Body mass index is 32.82 kg/m². History    Past Medical History:   Diagnosis Date    Asthma     Broken leg left    broke left leg and tore ligaments    Diabetes mellitus     no meds    Dupuytren's contracture     Essential hypertension     no meds    Fracture of left leg 08/2020    HTN (hypertension)     Ill-defined condition     neuropathy    Psoriatic arthritis, destructive type (HCC)     Sarcoidosis     says he had an arm mass removed and the path showed sarcoid, negative CXR       Past Surgical History:   Procedure Laterality Date    CLOSED RX NOSE/JAW FRAC+WIRES Right 1994    broken jaw had to wire it    HX CARPAL TUNNEL RELEASE      HX ORTHOPAEDIC      fx skull/ broken jaw     HX ORTHOPAEDIC Right     carpal tunnel    HX ORTHOPAEDIC Left 01/22/2021    knee arthroscopy    HX OTHER SURGICAL      lipoma removed     NH EXC SKIN BENIG 0.6-1CM TRUNK,ARM,LEG N/A 10/11/2018    Dr. Rosa Courser 1.1-2CM TRUNK,ARM,LEG N/A 10/11/2018    Dr. Rosa Courser 2.1-3CM TRUNK,ARM,LEG N/A 10/11/2018    Dr. Bentley Patterson       Current Outpatient Medications   Medication Sig Dispense Refill    adalimumab (HUMIRA PEN SC) 40 mg by SubCUTAneous route every seven (7) days.       cyclobenzaprine (FLEXERIL) 5 mg tablet TAKE ONE TABLET BY MOUTH TWICE A DAY AS NEEDED FOR MUSCLE SPASM 60 Tab 1    amitriptyline (ELAVIL) 25 mg tablet TAKE ONE TABLET BY MOUTH ONCE NIGHTLY 90 Tab 1       Allergies   Allergen Reactions    Gabapentin Palpitations       Social History     Tobacco Use    Smoking status: Never Smoker    Smokeless tobacco: Never Used   Substance Use Topics    Alcohol use: Yes     Alcohol/week: 2.0 standard drinks     Types: 2 Shots of liquor per week     Frequency: 2-4 times a month     Comment: social    Drug use: Not Currently       Family History   Problem Relation Age of Onset    Asthma Mother     Stroke Father     Alcohol abuse Father     Substance Abuse Father     Diabetes Maternal Grandmother     Hypertension Maternal Grandmother     High Cholesterol Maternal Grandmother     Stroke Maternal Grandmother     Cancer Maternal Grandfather 79        prostate cancer and skin    Colon Cancer Maternal Grandfather     Depression Maternal Uncle        Family Status   Relation Name Status    Mother  Alive    Father   at age 61        unsure    Sister  Alive    MGM  Alive    MGF  [address]  (Not Specified)         Medications:  Outpatient Medications Marked as Taking for the 21 encounter (Office Visit) with Sal So NP   Medication Sig Dispense Refill    adalimumab (HUMIRA PEN SC) 40 mg by SubCUTAneous route every seven (7) days. Review of Systems  Review of Systems   Constitutional: Positive for weight loss. All other systems reviewed and are negative. Objective  Visit Vitals  /81   Pulse 69   Temp 97.4 °F (36.3 °C) (Temporal)   Resp 16   Ht 6' (1.829 m)   Wt 109.8 kg (242 lb)   SpO2 98%   BMI 32.82 kg/m²     No LMP for male patient. Physical Exam  Physical Exam  Vitals signs and nursing note reviewed. Constitutional:       Appearance: He is obese. HENT:      Head: Normocephalic and atraumatic. Eyes:      Pupils: Pupils are equal, round, and reactive to light.    Neck:      Musculoskeletal: Normal range of motion. Cardiovascular:      Rate and Rhythm: Normal rate. Heart sounds: Normal heart sounds. Pulmonary:      Effort: Pulmonary effort is normal.      Breath sounds: Normal breath sounds. Musculoskeletal: Normal range of motion. Neurological:      General: No focal deficit present. Mental Status: He is alert and oriented to person, place, and time. Psychiatric:         Mood and Affect: Mood normal.         Behavior: Behavior normal.           Recent Results (from the past 672 hour(s))   METABOLIC PANEL, COMPREHENSIVE    Collection Time: 02/19/21 12:00 AM   Result Value Ref Range    Glucose 119 (H) 65 - 99 mg/dL    BUN 22 6 - 24 mg/dL    Creatinine 0.94 0.76 - 1.27 mg/dL    GFR est non-AA 97 >59 mL/min/1.73    GFR est  >59 mL/min/1.73    BUN/Creatinine ratio 23 (H) 9 - 20    Sodium 140 134 - 144 mmol/L    Potassium 4.0 3.5 - 5.2 mmol/L    Chloride 102 96 - 106 mmol/L    CO2 27 20 - 29 mmol/L    Calcium 9.5 8.7 - 10.2 mg/dL    Protein, total 7.0 6.0 - 8.5 g/dL    Albumin 4.6 4.0 - 5.0 g/dL    GLOBULIN, TOTAL 2.4 1.5 - 4.5 g/dL    A-G Ratio 1.9 1.2 - 2.2    Bilirubin, total 0.3 0.0 - 1.2 mg/dL    Alk. phosphatase 79 39 - 117 IU/L    AST (SGOT) 14 0 - 40 IU/L    ALT (SGPT) 18 0 - 44 IU/L   SPECIMEN STATUS REPORT    Collection Time: 02/19/21 12:00 AM   Result Value Ref Range    SPECIMEN STATUS REPORT COMMENT    [facility] SPECIMEN COL    Collection Time: 02/19/21 10:06 AM   Result Value Ref Range    [facility] SPEC[facility]. Specimens collected/sent to [facility]. Please direct inquiries to ([phone]). Assessment / Plan    Encounter Diagnoses   Name Primary?  SHEPPARD (nonalcoholic steatohepatitis) Yes    Weight loss counseling, encounter for     Weight loss advised     Obesity (BMI 30-39. 9)     Obesity, unspecified classification, unspecified obesity type, unspecified whether serious comorbidity present            1.  Weight management      Today, the patient is Height: 6' (182.9 cm) tall, Weight: 109.8 kg (242 lb) lbs for a Body mass index is 32.82 kg/m². is suffering from obesity  which is further complicated by diabetes and hypertension. Degree of control: continues to progress    Progress was reviewed with patient. Goal(s) for next appointment:   -5kbs     I have reviewed/discussed the above normal BMI with the patient. I have recommended the following interventions: dietary management education, guidance, and counseling, encourage exercise, monitor weight and prescribed dietary intake . Bimal Sanchez 2.  Labs    Latest results reviewed with patient   Routine monitoring labs ordered  Orders Placed This Encounter    METABOLIC PANEL, COMPREHENSIVE    METABOLIC PANEL, COMPREHENSIVE    SPECIMEN STATUS REPORT       ROBER Lugo-BC     Dragon medical dictation software was used for portions of this report. Unintended transcription errors may occur.

## 2021-02-19 NOTE — PROGRESS NOTES
Nursing Note for Medical Monitoring in the [facility]      Courtney Dorantes is a 55 y.o. male who is enrolled in [facility]    Courtney Dorantes was prescribed the VLCD / LCD. Did you have any problems adhering to the program last week? yes  If yes, please explain:     Since your last visit, have you experienced any complications? no    Have you received any other medical care this week? no  If yes, where and for what? Have you had any change in your medications since your last visit? no  If yes what? Are you taking an appetite suppressant? no   If yes:  Do you need a refill? BP Readings from Last 3 Encounters:   01/27/21 123/87   01/14/21 116/74   12/11/20 136/88        Eating Habits Over Last Week:  Did you take in 64 oz of non-caloric fluids? yes     Did you consume your prescribed meal replacement regimen each day?  yes       Physical Activity Over the Past Week:    Aerobic exercise: 30 min x 5 days a week  Resistance exercise: 30 min x 5 days a week workouts / week

## 2021-02-20 LAB
ALBUMIN SERPL-MCNC: 4.6 G/DL (ref 4–5)
ALBUMIN/GLOB SERPL: 1.9 {RATIO} (ref 1.2–2.2)
ALP SERPL-CCNC: 79 IU/L (ref 39–117)
ALT SERPL-CCNC: 18 IU/L (ref 0–44)
AST SERPL-CCNC: 14 IU/L (ref 0–40)
BILIRUB SERPL-MCNC: 0.3 MG/DL (ref 0–1.2)
BUN SERPL-MCNC: 22 MG/DL (ref 6–24)
BUN/CREAT SERPL: 23 (ref 9–20)
CALCIUM SERPL-MCNC: 9.5 MG/DL (ref 8.7–10.2)
CHLORIDE SERPL-SCNC: 102 MMOL/L (ref 96–106)
CO2 SERPL-SCNC: 27 MMOL/L (ref 20–29)
CREAT SERPL-MCNC: 0.94 MG/DL (ref 0.76–1.27)
GLOBULIN SER CALC-MCNC: 2.4 G/DL (ref 1.5–4.5)
GLUCOSE SERPL-MCNC: 119 MG/DL (ref 65–99)
POTASSIUM SERPL-SCNC: 4 MMOL/L (ref 3.5–5.2)
PROT SERPL-MCNC: 7 G/DL (ref 6–8.5)
SODIUM SERPL-SCNC: 140 MMOL/L (ref 134–144)
SPECIMEN STATUS REPORT, ROLRST: NORMAL

## 2021-02-23 ENCOUNTER — OFFICE VISIT (OUTPATIENT)
Dept: ORTHOPEDIC SURGERY | Age: 46
End: 2021-02-23
Payer: COMMERCIAL

## 2021-02-23 VITALS
WEIGHT: 249.4 LBS | BODY MASS INDEX: 33.82 KG/M2 | TEMPERATURE: 97.9 F | HEART RATE: 73 BPM | SYSTOLIC BLOOD PRESSURE: 116 MMHG | OXYGEN SATURATION: 93 % | DIASTOLIC BLOOD PRESSURE: 77 MMHG

## 2021-02-23 DIAGNOSIS — M50.90 CERVICAL DISC DISEASE: ICD-10-CM

## 2021-02-23 DIAGNOSIS — G89.29 CHRONIC LEFT SHOULDER PAIN: ICD-10-CM

## 2021-02-23 DIAGNOSIS — M25.512 CHRONIC LEFT SHOULDER PAIN: ICD-10-CM

## 2021-02-23 DIAGNOSIS — R29.898 LEFT ARM WEAKNESS: ICD-10-CM

## 2021-02-23 DIAGNOSIS — M75.112 NONTRAUMATIC INCOMPLETE TEAR OF LEFT ROTATOR CUFF: Primary | ICD-10-CM

## 2021-02-23 DIAGNOSIS — M62.838 MUSCLE SPASM: ICD-10-CM

## 2021-02-23 DIAGNOSIS — M79.18 MYOFASCIAL PAIN: ICD-10-CM

## 2021-02-23 PROCEDURE — 99213 OFFICE O/P EST LOW 20 MIN: CPT | Performed by: PHYSICAL MEDICINE & REHABILITATION

## 2021-02-23 NOTE — PROGRESS NOTES
[facility]  [address] [place], [facility] [address]  Phone: [phone]  Fax: [phone]    Pt's YOB: 1975    ASSESSMENT   Diagnoses and all orders for this visit:    1. Nontraumatic incomplete tear of left rotator cuff  -     REFERRAL TO ORTHOPEDICS    2. Left arm weakness  -     REFERRAL TO ORTHOPEDICS    3. Myofascial pain    4. Chronic left shoulder pain    5. Cervical disc disease         IMPRESSION AND PLAN:  Samuel Garcia is a 55 y.o. right hand dominant male with history of cervical and left shoulder pain. He admits to progressive pain in the left shoulder and note that he is no longer able to play golf secondary to pain. Pt uses Voltaren 1% gel as needed with benefit. 1) Pt was given information on rotator cuff exercises. 2) He was referred to Dr. Paula Godoy for further evaluation of the left shoulder. Samy Anne 3) Pt will continue using Voltaren 1% gel as prescribed and does not need a refill at this time. 4) Mr. Sho Squires has a reminder for a \"due or due soon\" health maintenance. I have asked that he contact his primary care provider, Chad Duque MD, for follow-up on this health maintenance. 5)  demonstrated consistency with prescribing. Follow-up and Dispositions    · Return if symptoms worsen or fail to improve. HISTORY OF PRESENT ILLNESS:  Samuel Garcia is a 55 y.o. right hand dominant male with history of cervical and left shoulder pain and presents to the office today for shoulder MRI follow up. Pt reports an intermittent popping sensation in the neck with left cervical flexion that is followed by severe pain radiating down the left arm. He admits to progressive pain in the left shoulder and note that he is no longer able to play golf secondary to pain. Pt also reports difficulty with sleep secondary to left shoulder pain.  He notes temporary relief when he had a shoulder injection over 1 year ago (with a provider who is no longer in network with his insurance). Pt uses Voltaren 1% gel as needed with benefit. He also takes Flexeril 5 mg intermittently as needed. Pt at this time desires to proceed with a referral to Dr. Sheryle Rua for further evaluation of the left shoulder. Pain Scale: 5/10    PCP: Alpa Summers MD     Past Medical History:   Diagnosis Date    Asthma     Broken leg left    broke left leg and tore ligaments    Diabetes mellitus     no meds    Dupuytren's contracture     Essential hypertension     no meds    Fracture of left leg 08/2020    HTN (hypertension)     Ill-defined condition     neuropathy    Psoriatic arthritis, destructive type (HCC)     Sarcoidosis     says he had an arm mass removed and the path showed sarcoid, negative CXR        Social History     Socioeconomic History    Marital status: SINGLE     Spouse name: Not on file    Number of children: Not on file    Years of education: Not on file    Highest education level: Not on file   Occupational History    Occupation: unemployed   Social Needs    Financial resource strain: Not on file    Food insecurity     Worry: Not on file     Inability: Not on file   [place] needs     Medical: Not on file     Non-medical: Not on file   Tobacco Use    Smoking status: Never Smoker    Smokeless tobacco: Never Used   Substance and Sexual Activity    Alcohol use:  Yes     Alcohol/week: 2.0 standard drinks     Types: 2 Shots of liquor per week     Frequency: 2-4 times a month     Comment: social    Drug use: Not Currently    Sexual activity: Yes     Partners: Female     Birth control/protection: Condom   Lifestyle    Physical activity     Days per week: Not on file     Minutes per session: Not on file    Stress: Not on file   Relationships    Social connections     Talks on phone: Not on file     Gets together: Not on file     Attends Ep[employer] service: Not on file     Active member of club or organization: Not on file     Attends meetings of clubs or organizations: Not on file     Relationship status: Not on file    Intimate partner violence     Fear of current or ex partner: Not on file     Emotionally abused: Not on file     Physically abused: Not on file     Forced sexual activity: Not on file   Other Topics Concern    Not on file   Social History Narrative    Not on file       Current Outpatient Medications   Medication Sig Dispense Refill    amitriptyline (ELAVIL) 25 mg tablet TAKE ONE TABLET BY MOUTH ONCE NIGHTLY 90 Tab 1    adalimumab (HUMIRA PEN SC) 40 mg by SubCUTAneous route every seven (7) days. Allergies   Allergen Reactions    Gabapentin Palpitations         REVIEW OF SYSTEMS    Constitutional: Negative for fever, chills, or weight change. Respiratory: Negative for cough or shortness of breath. Cardiovascular: Negative for chest pain or palpitations. Gastrointestinal: Negative for acid reflux, change in bowel habits, or constipation. Genitourinary: Negative for dysuria and flank pain. Musculoskeletal: Positive for left shoulder and cervical pain  Neurological: Negative for headaches, dizziness. Positive for left arm numbness. Endo/Heme/Allergies: Negative for increased bruising. Psychiatric/Behavioral: Positive for difficulty with sleep. As per HPI    PHYSICAL EXAMINATION  Visit Vitals  /77 (BP 1 Location: Left arm, BP Patient Position: Sitting)   Pulse 73   Temp 97.9 °F (36.6 °C) (Skin)   Wt 249 lb 6.4 oz (113.1 kg)   SpO2 93%   BMI 33.82 kg/m²       Constitutional: Awake, alert, and in no acute distress. Neurological: 1+ symmetrical DTRs in the upper extremities. 1+ symmetrical DTRs in the lower extremities. Sensation to light touch is intact. Negative Aceves's sign bilaterally. Skin: warm, dry, and intact.    Musculoskeletal: Tight across the left upper trapezius. Decreased range of motion with side to side cervical flexion. Pain and decreased range of motion with abduction of the left shoulder. Positive empty can test on the left. Positive impingement sign on the left. Biceps  Triceps Deltoids Wrist Ext Wrist Flex Hand Intrin   Right +4/5 +4/5 +4/5 +4/5 +4/5 +4/5   Left +4/5  4/5 +4/5 +4/5 +4/5 +4/5       IMAGING:    Left shoulder MRI from 2/3/2021 was personally reviewed with the patient and demonstrated:  Results from Orders Only encounter on 02/03/21   MRI SHOULDER LT WO CONT    Narrative EXAM: MRI OF THE LEFT SHOULDER    CLINICAL INDICATION/HISTORY: Left shoulder pain and limited range of motion    COMPARISON: X-ray left shoulder 2/1/2021. TECHNIQUE: T1 weighted sagittal and axial, STIR and T2 FSE sagittal, T2 FSE  axial.    _______________    FINDINGS:    ROTATOR CUFF:     >  Supraspinatus: Full-thickness tear extends through anterior and central  aspects of the distal supraspinatus tendon insertion with only high-grade  partial-thickness articular sided tearing through the posterior supraspinatus  insertional fibers. No muscle atrophy or edema.    >  Infraspinatus: Moderate infraspinatus insertional tendinosis with no focal  tearing. Fatty atrophy of the infraspinatus muscle.    > Subscapularis: Moderate subscapularis tendinosis with no focal tearing. No  muscle atrophy. > Teres minor: Teres minor tendon is intact. No muscle atrophy. LABRUM: Fraying of the anterior inferior labrum without complete tear. BICEPS TENDON COMPLEX: Mild intra-articular long biceps tendinosis extending  into the intertubercular groove. No biceps tendon tear. Extraarticular long  biceps tendon remains normal in caliber and signal intensity. CORACOACROMIAL ARCH: There are mild degenerative changes of the  acromioclavicular joint, to include marginal proliferative changes, and mild  pericapsular edema. Type II acromial morphology.     CAPSULE/LIGAMENTS: Abnormal, moderately prominent edema and replacement of fat  signal intensity are present throughout the rotator cuff interval.   Heterogeneous thickening and intermediate signal are seen throughout the  superior, middle, and inferior glenohumeral ligaments. Thickening and signal  abnormality are also present within the coracohumeral and coracoacromial  ligaments. BONES/JOINT SPACE: Cystic subcortical changes along the bicipital groove and  greater tubercle. No significant degenerative osteoarthropathy or chondromalacia  of the glenohumeral joint. There is no evidence for fracture, contusion, bony  [place], [place] Sachs lesion. No joint effusion. PERIARTICULAR: No fluid within the subacromial subdeltoid or subcoracoid bursae.    _______________      Impression 1. Full-thickness tear extends through anterior and central supraspinatus  insertional fibers. Partial-thickness tearing contiguously is seen within  posterior supraspinatus tendon insertion. 2. Moderate tendinosis of infraspinatus and subscapularis tendons. 3.  Abnormal findings within the rotator cuff interval, glenohumeral ligaments,  and coracohumeral and coracoacromial ligaments that, particularly in concert,  are suspicious for adhesive capsulitis. 4. Mild degenerative osteoarthropathy of the left acromioclavicular joint,  without morphology or secondary findings of subacromial impingement. Left shoulder 2V x-rays from 2/1/2021 were personally reviewed with the patient and demonstrated:  IMPRESSION  Findings/impression:  No acute fracture or dislocation. No interval change. Cervical spine MRI from 10/23/2020 was personally reviewed with the patient and demonstrated:  IMPRESSION:  C6-7: Moderate right uncovertebral joint hypertrophy.      Lumbar spine 4V x-rays from 10/13/2020 were personally reviewed with the patient and demonstrated:  Degenerative disc at L4-5. Multilevel degenerative facets. Mild straightening of the lumbar spine. Very minimal scoliosis.     Written by Divya Barrios, as dictated by Jamie Gaitan MD.  IDr. Selena Ra Kimberli Gibson confirm that all documentation is accurate.

## 2021-02-23 NOTE — LETTER
2/23/2021 Patient: Mary Lucio YOB: 1975 Date of Visit: 2/23/2021 Adi Hare MD 
[address] [place] [id number] [address] [address] [address] [employer] Dear Adi Hare MD, Thank you for referring Mr. Mary Lucio to [address] [place] [address] for evaluation. My notes for this consultation are attached. If you have questions, please do not hesitate to call me. I look forward to following your patient along with you. Sincerely, Aisha Williamson MD

## 2021-02-23 NOTE — PATIENT INSTRUCTIONS
Rotator Cuff: Exercises  Introduction  Here are some examples of exercises for you to try. The exercises may be suggested for a condition or for rehabilitation. Start each exercise slowly. Ease off the exercises if you start to have pain. You will be told when to start these exercises and which ones will work best for you. How to do the exercises  Pendulum swing   If you have pain in your back, do not do this exercise. 1. Hold on to a table or the back of a chair with your good arm. Then bend forward a little and let your sore arm hang straight down. This exercise does not use the arm muscles. Rather, use your legs and your hips to create movement that makes your arm swing freely. 2. Use the movement from your hips and legs to guide the slightly swinging arm back and forth like a pendulum (or elephant trunk). Then guide it in circles that start small (about the size of a dinner plate). Make the circles a bit larger each day, as your pain allows. 3. Do this exercise for 5 minutes, 5 to 7 times each day. 4. As you have less pain, try bending over a little farther to do this exercise. This will increase the amount of movement at your shoulder. Posterior stretching exercise   1. Hold the elbow of your injured arm with your other hand. 2. Use your hand to pull your injured arm gently up and across your body. You will feel a gentle stretch across the back of your injured shoulder. 3. Hold for at least 15 to 30 seconds. Then slowly lower your arm. 4. Repeat 2 to 4 times. Up-the-back stretch   Your doctor or physical therapist may want you to wait to do this stretch until you have regained most of your range of motion and strength. You can do this stretch in different ways. Hold any of these stretches for at least 15 to 30 seconds. Repeat them 2 to 4 times. 1. Light stretch: Put your hand in your back pocket. Let it rest there to stretch your shoulder. 2. Moderate stretch:  With your other hand, hold your injured arm (palm outward) behind your back by the wrist. Pull your arm up gently to stretch your shoulder. 3. Advanced stretch: Put a towel over your other shoulder. Put the hand of your injured arm behind your back. Now hold the back end of the towel. With the other hand, hold the front end of the towel in front of your body. Pull gently on the front end of the towel. This will bring your hand farther up your back to stretch your shoulder. Overhead stretch   1. Standing about an arm's length away, grasp onto a solid surface. You could use a countertop, a doorknob, or the back of a sturdy chair. 2. With your knees slightly bent, bend forward with your arms straight. Lower your upper body, and let your shoulders stretch. 3. As your shoulders are able to stretch farther, you may need to take a step or two backward. 4. Hold for at least 15 to 30 seconds. Then stand up and relax. If you had stepped back during your stretch, step forward so you can keep your hands on the solid surface. 5. Repeat 2 to 4 times. Shoulder flexion (lying down)   To make a wand for this exercise, use a piece of PVC pipe or a broom handle with the broom removed. Make the wand about a foot wider than your shoulders. 1. Lie on your back, holding a wand with both hands. Your palms should face down as you hold the wand. 2. Keeping your elbows straight, slowly raise your arms over your head. Raise them until you feel a stretch in your shoulders, upper back, and chest.  3. Hold for 15 to 30 seconds. 4. Repeat 2 to 4 times. Shoulder rotation (lying down)   To make a wand for this exercise, use a piece of PVC pipe or a broom handle with the broom removed. Make the wand about a foot wider than your shoulders. 1. Lie on your back. Hold a wand with both hands with your elbows bent and palms up. 2. Keep your elbows close to your body, and move the wand across your body toward the sore arm. 3. Hold for 8 to 12 seconds.   4. Repeat 2 to 4 times. Wall climbing (to the side)   Avoid any movement that is straight to your side, and be careful not to arch your back. Your arm should stay about 30 degrees to the front of your side. 1. Stand with your side to a wall so that your fingers can just touch it at an angle about 30 degrees toward the front of your body. 2. Walk the fingers of your injured arm up the wall as high as pain permits. Try not to shrug your shoulder up toward your ear as you move your arm up. 3. Hold that position for a count of at least 15 to 20.  4. Walk your fingers back down to the starting position. 5. Repeat at least 2 to 4 times. Try to reach higher each time. Wall climbing (to the front)   During this stretching exercise, be careful not to arch your back. 1. Face a wall, and stand so your fingers can just touch it. 2. Keeping your shoulder down, walk the fingers of your injured arm up the wall as high as pain permits. (Don't shrug your shoulder up toward your ear.)  3. Hold your arm in that position for at least 15 to 30 seconds. 4. Slowly walk your fingers back down to where you started. 5. Repeat at least 2 to 4 times. Try to reach higher each time. Shoulder blade squeeze   1. Stand with your arms at your sides, and squeeze your shoulder blades together. Do not raise your shoulders up as you squeeze. 2. Hold 6 seconds. 3. Repeat 8 to 12 times. Scapular exercise: Arm reach   1. Lie flat on your back. This exercise is a very slight motion that starts with your arms raised (elbows straight, arms straight). 2. From this position, reach higher toward the shamir or ceiling. Keep your elbows straight. All motion should be from your shoulder blade only. 3. Relax your arms back to where you started. 4. Repeat 8 to 12 times. Arm raise to the side   During this strengthening exercise, your arm should stay about 30 degrees to the front of your side.   1. Slowly raise your injured arm to the side, with your thumb facing up. Raise your arm 60 degrees at the most (shoulder level is 90 degrees). 2. Hold the position for 3 to 5 seconds. Then lower your arm back to your side. If you need to, bring your \"good\" arm across your body and place it under the elbow as you lower your injured arm. Use your good arm to keep your injured arm from dropping down too fast.  3. Repeat 8 to 12 times. 4. When you first start out, don't hold any extra weight in your hand. As you get stronger, you may use a 1-pound to 2-pound dumbbell or a small can of food. Shoulder flexor and extensor exercise   These are isometric exercises. That means you contract your muscles without actually moving. 1. Push forward (flex): Stand facing a wall or doorjamb, about 6 inches or less back. Hold your injured arm against your body. Make a closed fist with your thumb on top. Then gently push your hand forward into the wall with about 25% to 50% of your strength. Don't let your body move backward as you push. Hold for about 6 seconds. Relax for a few seconds. Repeat 8 to 12 times. 2. Push backward (extend): Stand with your back flat against a wall. Your upper arm should be against the wall, with your elbow bent 90 degrees (your hand straight ahead). Push your elbow gently back against the wall with about 25% to 50% of your strength. Don't let your body move forward as you push. Hold for about 6 seconds. Relax for a few seconds. Repeat 8 to 12 times. Scapular exercise: Wall push-ups   This exercise is best done with your fingers somewhat turned out, rather than straight up and down. 1. Stand facing a wall, about 12 inches to 18 inches away. 2. Place your hands on the wall at shoulder height. 3. Slowly bend your elbows and bring your face to the wall. Keep your back and hips straight. 4. Push back to where you started. 5. Repeat 8 to 12 times. 6. When you can do this exercise against a wall comfortably, you can try it against a counter.  You can then slowly progress to the end of a couch, then to a sturdy chair, and finally to the floor. Scapular exercise: Retraction   For this exercise, you will need elastic exercise material, such as surgical tubing or Thera-Band. 1. Put the band around a solid object at about waist level. (A bedpost will work well.) Each hand should hold an end of the band. 2. With your elbows at your sides and bent to 90 degrees, pull the band back. Your shoulder blades should move toward each other. Then move your arms back where you started. 3. Repeat 8 to 12 times. 4. If you have good range of motion in your shoulders, try this exercise with your arms lifted out to the sides. Keep your elbows at a 90-degree angle. Raise the elastic band up to about shoulder level. Pull the band back to move your shoulder blades toward each other. Then move your arms back where you started. Internal rotator strengthening exercise   1. Start by tying a piece of elastic exercise material to a doorknob. You can use surgical tubing or Thera-Band. 2. Stand or sit with your shoulder relaxed and your elbow bent 90 degrees. Your upper arm should rest comfortably against your side. Squeeze a rolled towel between your elbow and your body for comfort. This will help keep your arm at your side. 3. Hold one end of the elastic band in the hand of the painful arm. 4. Slowly rotate your forearm toward your body until it touches your belly. Slowly move it back to where you started. 5. Keep your elbow and upper arm firmly tucked against the towel roll or at your side. 6. Repeat 8 to 12 times. External rotator strengthening exercise   1. Start by tying a piece of elastic exercise material to a doorknob. You can use surgical tubing or Thera-Band. (You may also hold one end of the band in each hand.)  2. Stand or sit with your shoulder relaxed and your elbow bent 90 degrees. Your upper arm should rest comfortably against your side.  Squeeze a rolled towel between your elbow and your body for comfort. This will help keep your arm at your side. 3. Hold one end of the elastic band with the hand of the painful arm. 4. Start with your forearm across your belly. Slowly rotate the forearm out away from your body. Keep your elbow and upper arm tucked against the towel roll or the side of your body until you begin to feel tightness in your shoulder. Slowly move your arm back to where you started. 5. Repeat 8 to 12 times. Follow-up care is a key part of your treatment and safety. Be sure to make and go to all appointments, and call your doctor if you are having problems. It's also a good idea to know your test results and keep a list of the medicines you take. Where can you learn more? Go to http://www.gray.com/  Enter J005 in the search box to learn more about \"Rotator Cuff: Exercises. \"  Current as of: March 2, 2020               Content Version: 12.6  © 2006-2020 [employer]. Care instructions adapted under license by [employer] (which disclaims liability or warranty for this information). If you have questions about a medical condition or this instruction, always ask your healthcare professional. [place] [address] any warranty or liability for your use of this information.

## 2021-02-24 RX ORDER — CYCLOBENZAPRINE HCL 5 MG
TABLET ORAL
Qty: 60 TAB | Refills: 1 | Status: SHIPPED | OUTPATIENT
Start: 2021-02-24 | End: 2021-03-01

## 2021-02-26 DIAGNOSIS — M62.838 MUSCLE SPASM: ICD-10-CM

## 2021-03-01 RX ORDER — CYCLOBENZAPRINE HCL 5 MG
TABLET ORAL
Qty: 60 TAB | Refills: 1 | Status: SHIPPED | OUTPATIENT
Start: 2021-03-01 | End: 2021-12-16

## 2021-03-03 ENCOUNTER — OFFICE VISIT (OUTPATIENT)
Dept: SURGERY | Age: 46
End: 2021-03-03

## 2021-03-03 VITALS
OXYGEN SATURATION: 98 % | WEIGHT: 240.3 LBS | TEMPERATURE: 97.4 F | RESPIRATION RATE: 18 BRPM | HEIGHT: 72 IN | HEART RATE: 76 BPM | DIASTOLIC BLOOD PRESSURE: 82 MMHG | BODY MASS INDEX: 32.55 KG/M2 | SYSTOLIC BLOOD PRESSURE: 114 MMHG

## 2021-03-03 DIAGNOSIS — E66.9 OBESITY, UNSPECIFIED CLASSIFICATION, UNSPECIFIED OBESITY TYPE, UNSPECIFIED WHETHER SERIOUS COMORBIDITY PRESENT: Primary | ICD-10-CM

## 2021-03-03 RX ORDER — AMITRIPTYLINE HYDROCHLORIDE 25 MG/1
TABLET, FILM COATED ORAL
Qty: 30 TAB | Refills: 0 | OUTPATIENT
Start: 2021-03-03

## 2021-03-03 NOTE — TELEPHONE ENCOUNTER
Pt return call and stated he is trying to incontact with his phcy to find out what happened to the paper copy. Please call pt at your earliest convenience.

## 2021-03-03 NOTE — TELEPHONE ENCOUNTER
Liana Martin [phone] for patient to contact [facility]. Printed script for Amitriptyline given at 1/14/2021 visit with Dr. Robinson May.

## 2021-03-03 NOTE — PROGRESS NOTES
Samuel Garcia is a 55 y.o. male  Chief Complaint   Patient presents with    Weight Management     weekly weigh in     Patient attended a weekly class as part of the Medically Supervised Weight Loss Program.  This class was facilitated by a Registered Dietitian. Topics taught at class focused on diet, particularly carbohydrate counting and portion control. Classes also focused on behavior changes and the importance of establishing a daily exercise routine. Progress Note: Weekly Medical Monitoring in the [facility] Weight Loss Program    Is there anything that you or the patient needs to let the supervising provider know about? no    Over the past week, have you experienced any side-effects? no    Samuel Garcia is a 55 y.o. male who is enrolled in [facility]    Samuel Garcia was prescribed the VLCD / LCD. Visit Vitals  /82   Pulse 76   Temp 97.4 °F (36.3 °C) (Temporal)   Resp 18   Ht 6' (1.829 m)   Wt 240 lb 4.8 oz (109 kg)   SpO2 98%   BMI 32.59 kg/m²     Weight Metrics 3/3/2021 3/3/2021 2/23/2021 2/19/2021 2/19/2021 1/27/2021 1/18/2021   Weight - 240 lb 4.8 oz 249 lb 6.4 oz - 242 lb 245 lb 240 lb   Waist Measure Inches 44.5 - - 44.5 - - -   BMI - 32.59 kg/m2 33.82 kg/m2 - 32.82 kg/m2 33.23 kg/m2 32.55 kg/m2         Have you received any other medical care this week? yes  If yes, where and for what? \"Pre-Op (shoulder cuff surgery)    Have you had any change in your medications since your last visit? no  If yes what? Did you have any problems adhering to the program last week? no  If yes, please explain:       Eating Habits Over Last Week:  Did you take in 64 oz of non-caloric fluids? yes     Did you consume your prescribed meal replacement regimen each day?  yes       Physical Activity Over the Past Week:    Aerobic exercise: 90 min  Resistance exercise: 60 minutes of workouts / week

## 2021-03-16 ENCOUNTER — OFFICE VISIT (OUTPATIENT)
Dept: ORTHOPEDIC SURGERY | Age: 46
End: 2021-03-16
Payer: COMMERCIAL

## 2021-03-16 VITALS
HEIGHT: 72 IN | WEIGHT: 248 LBS | DIASTOLIC BLOOD PRESSURE: 82 MMHG | TEMPERATURE: 97.1 F | OXYGEN SATURATION: 96 % | HEART RATE: 66 BPM | SYSTOLIC BLOOD PRESSURE: 122 MMHG | BODY MASS INDEX: 33.59 KG/M2

## 2021-03-16 DIAGNOSIS — G89.29 CHRONIC RIGHT-SIDED LOW BACK PAIN WITH RIGHT-SIDED SCIATICA: Primary | ICD-10-CM

## 2021-03-16 DIAGNOSIS — M54.41 CHRONIC RIGHT-SIDED LOW BACK PAIN WITH RIGHT-SIDED SCIATICA: Primary | ICD-10-CM

## 2021-03-16 PROCEDURE — 99213 OFFICE O/P EST LOW 20 MIN: CPT | Performed by: NURSE PRACTITIONER

## 2021-03-16 NOTE — PROGRESS NOTES
Chief complaint   Chief Complaint   Patient presents with    Back Pain       History of Present Illness:  Pete Black is a  55 y.o.  male comes in today with complaint of low back pain. He states he has had chronic low back pain in the past but he is having a flare of pain for the past couple of weeks. He has tried he has tried rest, ice, heat and stretching. Usually he can get it to calm down with those measures but it is not really calming down right now. He rates his pain is a 7 out of 10. He gets pain in the low back that is radiating into the right buttock into the posterior knee. He states the back pain is a constant dull ache but it can progress to a sharp hot pain. He denies any fever bowel bladder dysfunction. He does report that in 2 days he is having left shoulder surgery by Dr. Piotr Dickson. He has had 2 come off of all his medications and cannot take any anti-inflammatories due to his upcoming surgery. He denies fever bowel bladder dysfunction. Physical Exam: The patient is a 66-year-old male well-developed well-nourished who is alert and oriented with a normal mood and affect. He has a full weightbearing nonantalgic gait. He did not use any assistive device. He has 5 out of 5 strength bilateral lower extremities. He has mild pain with hyperextension lumbar spine. Assessment and Plan: This is a patient who has chronic low back pain. Previous x-ray did show L4-5 degenerative changes with asymmetrical disc space collapse. He will likely need a MRI in the future. He is requesting to go ahead and do physical therapy for his back while he does his therapy for his shoulder. 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 He is a non-smoker. I will go ahead and put in the order for the physical therapy for his low back. We will see him back once he is healed from his shoulder.         Review of systems:    Past Medical History:   Diagnosis Date    Asthma     Broken leg left    broke left leg and tore ligaments    Diabetes mellitus     no meds    Dupuytren's contracture     Essential hypertension     no meds    Fracture of left leg 08/2020    HTN (hypertension)     Ill-defined condition     neuropathy    Psoriatic arthritis, destructive type (HCC)     Sarcoidosis     says he had an arm mass removed and the path showed sarcoid, negative CXR     Past Surgical History:   Procedure Laterality Date    CLOSED RX NOSE/JAW FRAC+WIRES Right 1994    broken jaw had to wire it    HX CARPAL TUNNEL RELEASE      HX ORTHOPAEDIC      fx skull/ broken jaw     HX ORTHOPAEDIC Right     carpal tunnel    HX ORTHOPAEDIC Left 01/22/2021    knee arthroscopy    HX OTHER SURGICAL      lipoma removed     AL EXC SKIN BENIG 0.6-1CM TRUNK,ARM,LEG N/A 10/11/2018    Dr. Maritza Nunez 1.1-2CM TRUNK,ARM,LEG N/A 10/11/2018    Dr. Maritza Nunez 2.1-3CM Shyrl Dines N/A 10/11/2018    Dr. Ward Soares History     Socioeconomic History    Marital status: SINGLE     Spouse name: Not on file    Number of children: Not on file    Years of education: Not on file    Highest education level: Not on file   Occupational History    Occupation: unemployed   Social Needs    Financial resource strain: Not on file    Food insecurity     Worry: Not on file     Inability: Not on file   [employer] needs     Medical: Not on file     Non-medical: Not on file   Tobacco Use    Smoking status: Never Smoker    Smokeless tobacco: Never Used   Substance and Sexual Activity    Alcohol use:  Yes     Alcohol/week: 2.0 standard drinks     Types: 2 Shots of liquor per week     Frequency: 2-4 times a month     Comment: social    Drug use: Not Currently    Sexual activity: Yes     Partners: Female     Birth control/protection: Condom   Lifestyle    Physical activity     Days per week: Not on file     Minutes per session: Not on file    Stress: Not on file   Relationships    Social connections     Talks on phone: Not on file     Gets together: Not on file     Attends Orthodox service: Not on file     Active member of club or organization: Not on file     Attends meetings of clubs or organizations: Not on file     Relationship status: Not on file    Intimate partner violence     Fear of current or ex partner: Not on file     Emotionally abused: Not on file     Physically abused: Not on file     Forced sexual activity: Not on file   Other Topics Concern    Not on file   Social History Narrative    Not on file     Family History   Problem Relation Age of Onset    Asthma Mother     Stroke Father     Alcohol abuse Father     Substance Abuse Father     Diabetes Maternal Grandmother     Hypertension Maternal Grandmother     High Cholesterol Maternal Grandmother     Stroke Maternal Grandmother     Cancer Maternal Grandfather 79        prostate cancer and skin    Colon Cancer Maternal Grandfather     Depression Maternal Uncle        Physical Exam:  Visit Vitals  /82 (BP 1 Location: Left upper arm, BP Patient Position: Sitting, BP Cuff Size: Adult long)   Pulse 66   Temp 97.1 °F (36.2 °C) (Tympanic)   Ht 6' (1.829 m)   Wt 248 lb (112.5 kg)   SpO2 96% Comment: RA   BMI 33.63 kg/m²     Pain Scale: 7/10       has been . reviewed and is appropriate          Diagnoses and all orders for this visit:    1. Chronic right-sided low back pain with right-sided sciatica  -     REFERRAL TO PHYSICAL THERAPY            Follow-up and Dispositions    · Return for once he is healed from his shoulder surgery.  .             We have informed Heather Gresham to notify us for immediate appointment if he has any worsening neurogical symptoms or if an emergency situation presents, then call 911

## 2021-03-16 NOTE — PROGRESS NOTES
Gita Ng presents today for   Chief Complaint   Patient presents with    Back Pain       Is someone accompanying this pt? no    Is the patient using any DME equipment during OV? no    Depression Screening:  3 most recent PHQ Screens 1/14/2021   Little interest or pleasure in doing things Not at all   Feeling down, depressed, irritable, or hopeless Not at all   Total Score PHQ 2 0       Learning Assessment:  Learning Assessment 12/24/2019   PRIMARY LEARNER Patient   HIGHEST LEVEL OF EDUCATION - PRIMARY LEARNER  4 YEARS OF COLLEGE   BARRIERS PRIMARY LEARNER NONE   CO-LEARNER CAREGIVER No   CO-LEARNER NAME n/a   PRIMARY LANGUAGE ENGLISH   LEARNER PREFERENCE PRIMARY DEMONSTRATION   ANSWERED BY patient   RELATIONSHIP SELF       Abuse Screening:  Abuse Screening Questionnaire 1/14/2021   Do you ever feel afraid of your partner? N   Are you in a relationship with someone who physically or mentally threatens you? N   Is it safe for you to go home? Y       Fall Risk  Fall Risk Assessment, last 12 mths 3/3/2021   Able to walk? Yes   Fall in past 12 months? 0   Do you feel unsteady? 0   Are you worried about falling 0       Coordination of Care:  1. Have you been to the ER, urgent care clinic since your last visit? no  Hospitalized since your last visit? no    2. Have you seen or consulted any other health care providers outside of the [address] [place] [address] since your last visit? Yes, ortho Include any pap smears or colon screening.  no

## 2021-04-09 ENCOUNTER — HOSPITAL ENCOUNTER (OUTPATIENT)
Dept: LAB | Age: 46
Discharge: HOME | End: 2021-04-09

## 2021-04-09 LAB — XX-LABCORP SPECIMEN COL,LCBCF: NORMAL

## 2021-04-09 PROCEDURE — 99001 SPECIMEN HANDLING PT-LAB: CPT

## 2021-04-10 LAB
ALBUMIN SERPL-MCNC: 4.9 G/DL (ref 4–5)
ALBUMIN/GLOB SERPL: 2 {RATIO} (ref 1.2–2.2)
ALP SERPL-CCNC: 74 IU/L (ref 39–117)
ALT SERPL-CCNC: 40 IU/L (ref 0–44)
AST SERPL-CCNC: 31 IU/L (ref 0–40)
BILIRUB SERPL-MCNC: 0.6 MG/DL (ref 0–1.2)
BUN SERPL-MCNC: 20 MG/DL (ref 6–24)
BUN/CREAT SERPL: 19 (ref 9–20)
CALCIUM SERPL-MCNC: 9.7 MG/DL (ref 8.7–10.2)
CHLORIDE SERPL-SCNC: 98 MMOL/L (ref 96–106)
CO2 SERPL-SCNC: 28 MMOL/L (ref 20–29)
CREAT SERPL-MCNC: 1.06 MG/DL (ref 0.76–1.27)
GLOBULIN SER CALC-MCNC: 2.4 G/DL (ref 1.5–4.5)
GLUCOSE SERPL-MCNC: 119 MG/DL (ref 65–99)
POTASSIUM SERPL-SCNC: 4 MMOL/L (ref 3.5–5.2)
PROT SERPL-MCNC: 7.3 G/DL (ref 6–8.5)
SODIUM SERPL-SCNC: 139 MMOL/L (ref 134–144)
SPECIMEN STATUS REPORT, ROLRST: NORMAL

## 2021-04-12 ENCOUNTER — VIRTUAL VISIT (OUTPATIENT)
Dept: SURGERY | Age: 46
End: 2021-04-12
Payer: COMMERCIAL

## 2021-04-12 VITALS — WEIGHT: 246 LBS | HEIGHT: 72 IN | BODY MASS INDEX: 33.32 KG/M2

## 2021-04-12 DIAGNOSIS — E66.9 OBESITY (BMI 30-39.9): ICD-10-CM

## 2021-04-12 DIAGNOSIS — Z78.9 WEIGHT LOSS ADVISED: ICD-10-CM

## 2021-04-12 DIAGNOSIS — Z71.3 DIETARY COUNSELING AND SURVEILLANCE: ICD-10-CM

## 2021-04-12 DIAGNOSIS — E66.9 OBESITY, UNSPECIFIED CLASSIFICATION, UNSPECIFIED OBESITY TYPE, UNSPECIFIED WHETHER SERIOUS COMORBIDITY PRESENT: Primary | ICD-10-CM

## 2021-04-12 DIAGNOSIS — Z71.3 WEIGHT LOSS COUNSELING, ENCOUNTER FOR: ICD-10-CM

## 2021-04-12 PROCEDURE — 99212 OFFICE O/P EST SF 10 MIN: CPT | Performed by: NURSE PRACTITIONER

## 2021-04-12 NOTE — PROGRESS NOTES
Ricardo Carpenter is a 55 y.o. male  Chief Complaint   Patient presents with    Weight Management     monthly follow up     Nursing Note for Medical Monitoring in the [facility]      Ricardo Carpenter is a 55 y.o. male who is enrolled in [facility]    Ricardo Carpenter was prescribed the VLCD / LCD. Did you have any problems adhering to the program last week? yes  If yes, please explain: \"no shake since surgery 3/18/2021\"    Since your last visit, have you experienced any complications? no    Have you received any other medical care this week? yes  If yes, where and for what? \"physical therapy on shoulder\"    Have you had any change in your medications since your last visit? no  If yes what? Are you taking an appetite suppressant? no   If yes:  Do you need a refill? BP Readings from Last 3 Encounters:   03/16/21 122/82   03/03/21 114/82   02/23/21 116/77        Eating Habits Over Last Week:  Did you take in 64 oz of non-caloric fluids?  yes     Did you consume your prescribed meal replacement regimen each day? no       Physical Activity Over the Past Week:    Aerobic exercise: 0 min  Resistance exercise: 0 workouts / week

## 2021-04-12 NOTE — PROGRESS NOTES
Consent:  Azucena Eaton  and/or his healthcare decision maker is aware that this patient-initiated Telehealth encounter is a billable service, with coverage as determined by his insurance carrier. He is aware that he may receive a bill and has provided verbal consent to proceed: Yes    Services were provided through a video synchronous discussion virtually to substitute for in-person clinic visit. Pursuant to the emergency declaration under the [address] [place] [address] waiver authority and the [employer] Act, this Virtual  Visit was conducted, with patient's consent, to reduce the patient's risk of exposure to COVID-19 and provide continuity of care for an established patient. Provider location while conducting visit: in the office   Patient location: Home  Other person participating in the telehealth services: Sola [place]    This virtual visit was conducted via interactive/real-time audio/video using [employer] Me   Visit start: 3591  Visit end: [facility] Progress Note:   F/up Provider Visit    CC: Weight Management    Azucena Eaton is a 55 y.o. male who is here for his  f/up medical provider visit for the VLCD Program. he did attend class last week. Restarting VLCD on Wed, cleared per surgeon. 7 more months prior to weight training on arms.     -2lbs   Weight History  Weight Metrics 4/12/2021 3/16/2021 3/3/2021 3/3/2021 2/23/2021 2/19/2021 2/19/2021   Weight 246 lb 248 lb - 240 lb 4.8 oz 249 lb 6.4 oz - 242 lb   Waist Measure Inches - - 44.5 - - 44.5 -   BMI 33.36 kg/m2 33.63 kg/m2 - 32.59 kg/m2 33.82 kg/m2 - 32.82 kg/m2       Ideal body weight: 77.6 kg (171 lb 1.2 oz)  Adjusted ideal body weight: 91.2 kg (201 lb 0.7 oz)  Body mass index is 33.36 kg/m².       History    Past Medical History:   Diagnosis Date    Asthma     Broken leg left    broke left leg and tore ligaments    Diabetes mellitus     no meds    Dupuytren's contracture     Essential hypertension     no meds    Fracture of left leg 08/2020    HTN (hypertension)     Ill-defined condition     neuropathy    Psoriatic arthritis, destructive type (HCC)     Sarcoidosis     says he had an arm mass removed and the path showed sarcoid, negative CXR       Past Surgical History:   Procedure Laterality Date    CLOSED RX NOSE/JAW FRAC+WIRES Right 1994    broken jaw had to wire it    HC CUFF CRYO SHOULDER [facility] USE ONLY Left 03/18/2021    left shoulder cuff surgery    HX CARPAL TUNNEL RELEASE      HX ORTHOPAEDIC      fx skull/ broken jaw     HX ORTHOPAEDIC Right     carpal tunnel    HX ORTHOPAEDIC Left 01/22/2021    knee arthroscopy    HX OTHER SURGICAL      lipoma removed     NH EXC SKIN BENIG 0.6-1CM TRUNK,ARM,LEG N/A 10/11/2018    Dr. Rajiv Fortune 1.1-2CM TRUNK,ARM,LEG N/A 10/11/2018    Dr. Rajiv Fortune 2.1-3CM TRUNK,ARM,LEG N/A 10/11/2018    Dr. Rickey Winter       Current Outpatient Medications   Medication Sig Dispense Refill    cyclobenzaprine (FLEXERIL) 5 mg tablet TAKE ONE TABLET BY MOUTH TWICE A DAY AS NEEDED FOR MUSCLE SPASM 60 Tab 1    amitriptyline (ELAVIL) 25 mg tablet TAKE ONE TABLET BY MOUTH ONCE NIGHTLY 90 Tab 1    adalimumab (HUMIRA PEN SC) 40 mg by SubCUTAneous route every seven (7) days. Allergies   Allergen Reactions    Gabapentin Palpitations       Social History     Tobacco Use    Smoking status: Never Smoker    Smokeless tobacco: Never Used   Substance Use Topics    Alcohol use:  Yes     Alcohol/week: 2.0 standard drinks     Types: 2 Shots of liquor per week     Frequency: 2-4 times a month     Comment: social    Drug use: Not Currently       Family History   Problem Relation Age of Onset    Asthma Mother     Stroke Father     Alcohol abuse Father     Substance Abuse Father     Diabetes Maternal Grandmother     Hypertension Maternal Grandmother     High Cholesterol Maternal Grandmother     Stroke Maternal Grandmother     Cancer Maternal Grandfather 79        prostate cancer and skin    Colon Cancer Maternal Grandfather     Depression Maternal Uncle        Family Status   Relation Name Status    Mother  Alive    Father   at age 61        unsure    Sister  Alive    MGM  Alive    Iesha 52  (Not Specified)     Review of Systems  Review of Systems   Constitutional: Positive for weight loss. All other systems reviewed and are negative. Objective  Visit Vitals  Ht 6' (1.829 m)   Wt 111.6 kg (246 lb)   BMI 33.36 kg/m²     No LMP for male patient. Physical Exam  Physical Exam  Vitals signs and nursing note reviewed. Constitutional:       Appearance: He is obese. HENT:      Head: Normocephalic and atraumatic. Eyes:      Pupils: Pupils are equal, round, and reactive to light. Neck:      Musculoskeletal: Normal range of motion. Cardiovascular:      Rate and Rhythm: Normal rate. Heart sounds: Normal heart sounds. Pulmonary:      Effort: Pulmonary effort is normal.      Breath sounds: Normal breath sounds. Musculoskeletal: Normal range of motion. Neurological:      General: No focal deficit present. Mental Status: He is alert and oriented to person, place, and time.    Psychiatric:         Mood and Affect: Mood normal.         Behavior: Behavior normal.           Recent Results (from the past 672 hour(s))   METABOLIC PANEL, COMPREHENSIVE    Collection Time: 21 12:00 AM   Result Value Ref Range    Glucose 119 (H) 65 - 99 mg/dL    BUN 20 6 - 24 mg/dL    Creatinine 1.06 0.76 - 1.27 mg/dL    GFR est non-AA 84 >59 mL/min/1.73    GFR est AA 97 >59 mL/min/1.73    BUN/Creatinine ratio 19 9 - 20    Sodium 139 134 - 144 mmol/L    Potassium 4.0 3.5 - 5.2 mmol/L    Chloride 98 96 - 106 mmol/L    CO2 28 20 - 29 mmol/L    Calcium 9.7 8.7 - 10.2 mg/dL    Protein, total 7.3 6.0 - 8.5 g/dL    Albumin 4.9 4.0 - 5.0 g/dL GLOBULIN, TOTAL 2.4 1.5 - 4.5 g/dL    A-G Ratio 2.0 1.2 - 2.2    Bilirubin, total 0.6 0.0 - 1.2 mg/dL    Alk. phosphatase 74 39 - 117 IU/L    AST (SGOT) 31 0 - 40 IU/L    ALT (SGPT) 40 0 - 44 IU/L   SPECIMEN STATUS REPORT    Collection Time: 04/09/21 12:00 AM   Result Value Ref Range    SPECIMEN STATUS REPORT COMMENT    [facility] SPECIMEN COL    Collection Time: 04/09/21 10:27 AM   Result Value Ref Range    [facility]. Specimens collected/sent to [facility]. Please direct inquiries to ([phone]). Assessment / Plan    Encounter Diagnoses   Name Primary?  Obesity, unspecified classification, unspecified obesity type, unspecified whether serious comorbidity present Yes    Weight loss counseling, encounter for     Weight loss advised     Obesity (BMI 30-39. 9)     Dietary counseling and surveillance            1.  Weight management      Today, the patient is  Height: 6' (182.9 cm) tall, Weight: 111.6 kg (246 lb) lbs for a Body mass index is 33.36 kg/m². is suffering from obesity  which is further complicated by diabetes and hypertension. Degree of control: continues to progress    Progress was reviewed with patient. Goal(s) for next appointment:   -5lbs    I have reviewed/discussed the above normal BMI with the patient. I have recommended the following interventions: dietary management education, guidance, and counseling, encourage exercise, monitor weight and prescribed dietary intake . Alexia Shabazz, FN-BC     Dragon medical dictation software was used for portions of this report. Unintended transcription errors may occur.

## 2021-06-02 ENCOUNTER — OFFICE VISIT (OUTPATIENT)
Dept: SURGERY | Age: 46
End: 2021-06-02
Payer: COMMERCIAL

## 2021-06-02 ENCOUNTER — HOSPITAL ENCOUNTER (OUTPATIENT)
Dept: LAB | Age: 46
Discharge: HOME | End: 2021-06-02

## 2021-06-02 VITALS
BODY MASS INDEX: 33.81 KG/M2 | WEIGHT: 249.6 LBS | TEMPERATURE: 97.1 F | SYSTOLIC BLOOD PRESSURE: 100 MMHG | DIASTOLIC BLOOD PRESSURE: 76 MMHG | RESPIRATION RATE: 18 BRPM | HEIGHT: 72 IN | HEART RATE: 82 BPM | OXYGEN SATURATION: 96 %

## 2021-06-02 DIAGNOSIS — E66.9 OBESITY, UNSPECIFIED CLASSIFICATION, UNSPECIFIED OBESITY TYPE, UNSPECIFIED WHETHER SERIOUS COMORBIDITY PRESENT: Primary | ICD-10-CM

## 2021-06-02 DIAGNOSIS — K75.81 NASH (NONALCOHOLIC STEATOHEPATITIS): ICD-10-CM

## 2021-06-02 DIAGNOSIS — Z78.9 WEIGHT LOSS ADVISED: ICD-10-CM

## 2021-06-02 DIAGNOSIS — Z71.3 DIETARY COUNSELING AND SURVEILLANCE: ICD-10-CM

## 2021-06-02 DIAGNOSIS — Z71.3 WEIGHT LOSS COUNSELING, ENCOUNTER FOR: ICD-10-CM

## 2021-06-02 LAB — SENTARA SPECIMEN COL,SENBCF: NORMAL

## 2021-06-02 PROCEDURE — 99001 SPECIMEN HANDLING PT-LAB: CPT

## 2021-06-02 PROCEDURE — 99214 OFFICE O/P EST MOD 30 MIN: CPT | Performed by: NURSE PRACTITIONER

## 2021-06-02 RX ORDER — METAXALONE 800 MG/1
800 TABLET ORAL 3 TIMES DAILY
COMMUNITY
End: 2021-12-16

## 2021-06-02 RX ORDER — MELOXICAM 15 MG/1
15 TABLET ORAL DAILY
COMMUNITY
End: 2021-12-16

## 2021-06-02 RX ORDER — BISMUTH SUBSALICYLATE 262 MG
1 TABLET,CHEWABLE ORAL DAILY
COMMUNITY
End: 2022-05-16

## 2021-06-02 NOTE — PROGRESS NOTES
[facility] Progress Note:   F/up Provider Visit      Oscar Morton is a 55 y.o. male who is here for his  f/up medical provider visit for the VLCD Program. he has been out on medical leave and not  attended class last week. Is thinking about hypnosis therapy. Did you have any problems adhering to the program last week? no  If yes, please explain:      Since your last visit, have you experienced any complications? no    Have you received any other medical care this week? yes  If yes, where and for what? PT    Have you had any change in your medications since your last visit? no  If yes what? Are you taking an appetite suppressant? no   If yes:  Do you need a refill? BP Readings from Last 3 Encounters:   06/02/21 100/76   03/16/21 122/82   03/03/21 114/82        Eating Habits Over Last Week:  Did you take in 64 oz of non-caloric fluids? yes     Did you consume your prescribed meal replacement regimen each day? no Was previously doing 2 meal products daily, yesterday started with 4 products. Physical Activity Over the Past Week:    Aerobic exercise: 0 min, just cleared with surgeon yesterday to start working out. Started yesterday with running/jogging  Resistance exercise: 0 workouts / week      Weight History  Weight Metrics 6/2/2021 6/2/2021 4/12/2021 3/16/2021 3/3/2021 3/3/2021 2/23/2021   Weight - 249 lb 9.6 oz 246 lb 248 lb - 240 lb 4.8 oz 249 lb 6.4 oz   Waist Measure Inches 46.75 - - - 44.5 - -   BMI - 33.85 kg/m2 33.36 kg/m2 33.63 kg/m2 - 32.59 kg/m2 33.82 kg/m2       Ideal body weight: 77.6 kg (171 lb 1.2 oz)  Adjusted ideal body weight: 91.8 kg (202 lb 7.8 oz)  Body mass index is 33.85 kg/m².       History    Past Medical History:   Diagnosis Date    Asthma     Broken leg left    broke left leg and tore ligaments    Diabetes mellitus     no meds    Dupuytren's contracture     Essential hypertension     no meds    Fracture of left leg 08/2020    HTN (hypertension)     Ill-defined condition     neuropathy    Psoriatic arthritis, destructive type ([facility] [address]     Sarcoidosis     says he had an arm mass removed and the path showed sarcoid, negative CXR       Past Surgical History:   Procedure Laterality Date    CLOSED RX NOSE/JAW FRAC+WIRES Right 1994    broken jaw had to wire it    HC CUFF CRYO SHOULDER [facility] USE ONLY Left 03/18/2021    left shoulder cuff surgery    HX CARPAL TUNNEL RELEASE      HX ORTHOPAEDIC      fx skull/ broken jaw     HX ORTHOPAEDIC Right     carpal tunnel    HX ORTHOPAEDIC Left 01/22/2021    knee arthroscopy    HX ORTHOPAEDIC      left rotator cuff    HX OTHER SURGICAL      lipoma removed     VT EXC SKIN BENIG 0.6-1CM TRUNK,ARM,LEG N/A 10/11/2018    Dr. Pa Rota 1.1-2CM TRUNK,ARM,LEG N/A 10/11/2018    Dr. Pa Rota 2.1-3CM TRUNK,ARM,LEG N/A 10/11/2018    Dr. Christiana Pierce       Current Outpatient Medications   Medication Sig Dispense Refill    metaxalone (SKELAXIN) 800 mg tablet Take 800 mg by mouth three (3) times daily.  meloxicam (MOBIC) 15 mg tablet Take 15 mg by mouth daily.  multivitamin (ONE A DAY) tablet Take 1 Tablet by mouth daily.  cyclobenzaprine (FLEXERIL) 5 mg tablet TAKE ONE TABLET BY MOUTH TWICE A DAY AS NEEDED FOR MUSCLE SPASM 60 Tab 1    amitriptyline (ELAVIL) 25 mg tablet TAKE ONE TABLET BY MOUTH ONCE NIGHTLY 90 Tab 1    adalimumab (HUMIRA PEN SC) 40 mg by SubCUTAneous route every seven (7) days. Allergies   Allergen Reactions    Gabapentin Palpitations       Social History     Tobacco Use    Smoking status: Never Smoker    Smokeless tobacco: Never Used   Substance Use Topics    Alcohol use:  Yes     Alcohol/week: 2.0 standard drinks     Types: 2 Shots of liquor per week     Comment: social    Drug use: Not Currently       Family History   Problem Relation Age of Onset   [place] Susanne Asthma Mother     Stroke Father     Alcohol abuse Father     Substance Abuse Father     Diabetes Maternal Grandmother     Hypertension Maternal Grandmother     High Cholesterol Maternal Grandmother     Stroke Maternal Grandmother     Cancer Maternal Grandfather 79        prostate cancer and skin    Colon Cancer Maternal Grandfather     Depression Maternal Uncle        Family Status   Relation Name Status    Mother  Alive    Father   at age 61        unsure    Sister  Alive    MGM  Alive    Iesha 52  (Not Specified)         Medications:  Outpatient Medications Marked as Taking for the 21 encounter (Office Visit) with Toño Gallardo NP   Medication Sig Dispense Refill    metaxalone (SKELAXIN) 800 mg tablet Take 800 mg by mouth three (3) times daily.  meloxicam (MOBIC) 15 mg tablet Take 15 mg by mouth daily.  multivitamin (ONE A DAY) tablet Take 1 Tablet by mouth daily.  cyclobenzaprine (FLEXERIL) 5 mg tablet TAKE ONE TABLET BY MOUTH TWICE A DAY AS NEEDED FOR MUSCLE SPASM 60 Tab 1    amitriptyline (ELAVIL) 25 mg tablet TAKE ONE TABLET BY MOUTH ONCE NIGHTLY 90 Tab 1    adalimumab (HUMIRA PEN SC) 40 mg by SubCUTAneous route every seven (7) days. Review of Systems  Review of Systems   Constitutional: Negative. Respiratory: Negative. Cardiovascular: Negative. Gastrointestinal: Negative. Musculoskeletal: Positive for joint pain. Skin: Negative. Neurological: Negative. Objective  Visit Vitals  /76   Pulse 82   Temp 97.1 °F (36.2 °C)   Resp 18   Ht 6' (1.829 m)   Wt 113.2 kg (249 lb 9.6 oz)   SpO2 96%   BMI 33.85 kg/m²     No LMP for male patient. Physical Exam  Physical Exam  Constitutional:       Appearance: Normal appearance. He is obese. HENT:      Head: Normocephalic and atraumatic. Cardiovascular:      Rate and Rhythm: Normal rate and regular rhythm. Pulses: Normal pulses. Heart sounds: Normal heart sounds.    Pulmonary:      Effort: Pulmonary effort is normal.      Breath sounds: Normal breath sounds. Abdominal:      Palpations: Abdomen is soft. Skin:     General: Skin is warm and dry. Neurological:      General: No focal deficit present. Mental Status: He is alert and oriented to person, place, and time. Psychiatric:         Mood and Affect: Mood normal.         Behavior: Behavior normal.           No results found for this or any previous visit (from the past 672 hour(s)). Assessment / Plan    Encounter Diagnoses   Name Primary?  Obesity, unspecified classification, unspecified obesity type, unspecified whether serious comorbidity present Yes    BMI 33.0-33.9,adult     Weight loss counseling, encounter for     Dietary counseling and surveillance     SHEPPARD (nonalcoholic steatohepatitis)     Weight loss advised            1.  Weight management      Today, the patient is  Height: 6' (182.9 cm) tall, Weight: 113.2 kg (249 lb 9.6 oz) lbs for a Body mass index is 33.85 kg/m². is suffering from obesity  which is further complicated by diabetes. Degree of control: maintaining has been doing LCD but now back to VLCD   Progress was reviewed with patient. Goal(s) for next appointment:   -5 to 10lbs     I have reviewed/discussed the above normal BMI with the patient. I have recommended the following interventions: dietary management education, guidance, and counseling, encourage exercise, monitor weight and prescribed dietary intake . Bobby Love 2.  Labs    Will get labs done today. Routine monitoring labs ordered for next visit.   Orders Placed This Encounter    MAGNESIUM    METABOLIC PANEL, COMPREHENSIVE    URIC ACID    metaxalone (SKELAXIN) 800 mg tablet    meloxicam (MOBIC) 15 mg tablet    multivitamin (ONE A DAY) tablet        >50% of 30 min visit spent counseling   AMANDA Dennis FNP-C

## 2021-06-04 LAB
ALBUMIN SERPL-MCNC: 5.1 G/DL (ref 4–5)
ALBUMIN/GLOB SERPL: 2.4 {RATIO} (ref 1.2–2.2)
ALP SERPL-CCNC: 86 IU/L (ref 48–121)
ALT SERPL-CCNC: 22 IU/L (ref 0–44)
AST SERPL-CCNC: 18 IU/L (ref 0–40)
BILIRUB SERPL-MCNC: 0.5 MG/DL (ref 0–1.2)
BUN SERPL-MCNC: 19 MG/DL (ref 6–24)
BUN/CREAT SERPL: 18 (ref 9–20)
CALCIUM SERPL-MCNC: 9.8 MG/DL (ref 8.7–10.2)
CHLORIDE SERPL-SCNC: 101 MMOL/L (ref 96–106)
CO2 SERPL-SCNC: 27 MMOL/L (ref 20–29)
CREAT SERPL-MCNC: 1.08 MG/DL (ref 0.76–1.27)
GLOBULIN SER CALC-MCNC: 2.1 G/DL (ref 1.5–4.5)
GLUCOSE SERPL-MCNC: 111 MG/DL (ref 65–99)
MAGNESIUM SERPL-MCNC: 2.2 MG/DL (ref 1.6–2.3)
POTASSIUM SERPL-SCNC: 4.7 MMOL/L (ref 3.5–5.2)
PROT SERPL-MCNC: 7.2 G/DL (ref 6–8.5)
SODIUM SERPL-SCNC: 141 MMOL/L (ref 134–144)
SPECIMEN STATUS REPORT, ROLRST: NORMAL
URATE SERPL-MCNC: 4.9 MG/DL (ref 3.8–8.4)

## 2021-06-16 ENCOUNTER — OFFICE VISIT (OUTPATIENT)
Dept: SURGERY | Age: 46
End: 2021-06-16

## 2021-06-16 VITALS
BODY MASS INDEX: 33.63 KG/M2 | DIASTOLIC BLOOD PRESSURE: 78 MMHG | HEIGHT: 72 IN | WEIGHT: 248.3 LBS | TEMPERATURE: 97.4 F | SYSTOLIC BLOOD PRESSURE: 108 MMHG | HEART RATE: 82 BPM

## 2021-06-16 DIAGNOSIS — E66.9 OBESITY, UNSPECIFIED CLASSIFICATION, UNSPECIFIED OBESITY TYPE, UNSPECIFIED WHETHER SERIOUS COMORBIDITY PRESENT: Primary | ICD-10-CM

## 2021-06-16 NOTE — PROGRESS NOTES
Chief Complaint   Patient presents with    Weight Management     Patient attended a weekly class as part of the Medically Supervised Weight Loss Program.  This class was facilitated by a Registered Dietitian. Topics taught at class focused on diet, particularly carbohydrate counting and portion control. Classes also focused on behavior changes and the importance of establishing a daily exercise routine. Progress Note: Weekly Medical Monitoring in the [facility] Weight Loss Program    Is there anything that you or the patient needs to let the supervising provider know about? no    Over the past week, have you experienced any side-effects? no    Adelaida Allen is a 55 y.o. male who is enrolled in [facility]    Adelaida Allen was prescribed the VLCD / LCD. Visit Vitals  Temp 97.4 °F (36.3 °C)   Ht 6' (1.829 m)   Wt 112.6 kg (248 lb 4.8 oz)   BMI 33.68 kg/m²     Weight Metrics 6/16/2021 6/16/2021 6/2/2021 6/2/2021 4/12/2021 3/16/2021 3/3/2021   Weight - 248 lb 4.8 oz - 249 lb 9.6 oz 246 lb 248 lb -   Waist Measure Inches 45 - 46.75 - - - 44.5   BMI - 33.68 kg/m2 - 33.85 kg/m2 33.36 kg/m2 33.63 kg/m2 -         Have you received any other medical care this week? yes  If yes, where and for what? Ortho and physical theraphy    Have you had any change in your medications since your last visit? no  If yes what? Did you have any problems adhering to the program last week? yes  If yes, please explain:       Eating Habits Over Last Week:  Did you take in 64 oz of non-caloric fluids? yes     Did you consume your prescribed meal replacement regimen each day?  yes       Physical Activity Over the Past Week:    Aerobic exercise: 4 hours min  Resistance exercise: 4 workouts / week

## 2021-06-23 ENCOUNTER — OFFICE VISIT (OUTPATIENT)
Dept: SURGERY | Age: 46
End: 2021-06-23

## 2021-06-23 VITALS
BODY MASS INDEX: 33.72 KG/M2 | WEIGHT: 249 LBS | HEART RATE: 82 BPM | SYSTOLIC BLOOD PRESSURE: 112 MMHG | HEIGHT: 72 IN | DIASTOLIC BLOOD PRESSURE: 84 MMHG | TEMPERATURE: 97.1 F

## 2021-06-23 DIAGNOSIS — E66.9 OBESITY, UNSPECIFIED CLASSIFICATION, UNSPECIFIED OBESITY TYPE, UNSPECIFIED WHETHER SERIOUS COMORBIDITY PRESENT: Primary | ICD-10-CM

## 2021-06-23 NOTE — PROGRESS NOTES
Chief Complaint   Patient presents with    Weight Management     Patient attended a weekly class as part of the Medically Supervised Weight Loss Program.  This class was facilitated by a Registered Dietitian. Topics taught at class focused on diet, particularly carbohydrate counting and portion control. Classes also focused on behavior changes and the importance of establishing a daily exercise routine. Progress Note: Weekly Medical Monitoring in the [facility] Weight Loss Program    Is there anything that you or the patient needs to let the supervising provider know about? no    Over the past week, have you experienced any side-effects? no    Bharati Marcos is a 55 y.o. male who is enrolled in [facility] Loss Program    Bharati Marcos was prescribed the VLCD / LCD. Visit Vitals  Temp 97.1 °F (36.2 °C)   Ht 6' (1.829 m)   Wt 112.9 kg (249 lb)   BMI 33.77 kg/m²     Weight Metrics 6/23/2021 6/23/2021 6/16/2021 6/16/2021 6/2/2021 6/2/2021 4/12/2021   Weight - 249 lb - 248 lb 4.8 oz - 249 lb 9.6 oz 246 lb   Waist Measure Inches 45.75 - 45 - 46.75 - -   BMI - 33.77 kg/m2 - 33.68 kg/m2 - 33.85 kg/m2 33.36 kg/m2         Have you received any other medical care this week? Yes If yes, where and for what? Physical theraphy    Have you had any change in your medications since your last visit? no  If yes what? Did you have any problems adhering to the program last week? no  If yes, please explain:       Eating Habits Over Last Week:  Did you take in 64 oz of non-caloric fluids? yes     Did you consume your prescribed meal replacement regimen each day?  yes       Physical Activity Over the Past Week:    Aerobic exercise: 120 min  Resistance exercise: 3 workouts / week

## 2021-06-30 ENCOUNTER — OFFICE VISIT (OUTPATIENT)
Dept: SURGERY | Age: 46
End: 2021-06-30

## 2021-06-30 VITALS
TEMPERATURE: 97.3 F | HEIGHT: 72 IN | DIASTOLIC BLOOD PRESSURE: 84 MMHG | SYSTOLIC BLOOD PRESSURE: 120 MMHG | WEIGHT: 246.2 LBS | BODY MASS INDEX: 33.35 KG/M2 | HEART RATE: 76 BPM

## 2021-06-30 DIAGNOSIS — E66.9 OBESITY, UNSPECIFIED CLASSIFICATION, UNSPECIFIED OBESITY TYPE, UNSPECIFIED WHETHER SERIOUS COMORBIDITY PRESENT: Primary | ICD-10-CM

## 2021-07-07 ENCOUNTER — OFFICE VISIT (OUTPATIENT)
Dept: SURGERY | Age: 46
End: 2021-07-07
Payer: COMMERCIAL

## 2021-07-07 VITALS
TEMPERATURE: 97 F | SYSTOLIC BLOOD PRESSURE: 110 MMHG | DIASTOLIC BLOOD PRESSURE: 81 MMHG | WEIGHT: 250 LBS | BODY MASS INDEX: 33.86 KG/M2 | OXYGEN SATURATION: 96 % | HEIGHT: 72 IN | HEART RATE: 77 BPM

## 2021-07-07 DIAGNOSIS — Z71.3 ENCOUNTER FOR WEIGHT LOSS COUNSELING: ICD-10-CM

## 2021-07-07 DIAGNOSIS — E66.9 OBESITY (BMI 30-39.9): Primary | ICD-10-CM

## 2021-07-07 DIAGNOSIS — Z71.3 DIETARY COUNSELING AND SURVEILLANCE: ICD-10-CM

## 2021-07-07 PROCEDURE — 99212 OFFICE O/P EST SF 10 MIN: CPT | Performed by: NURSE PRACTITIONER

## 2021-07-07 NOTE — Clinical Note
Sarika Huang,  Can you send the link for classes to this pt? He is doing VLCD, he took a break since he had surgery, but he should be compliant on everything else.

## 2021-07-07 NOTE — PROGRESS NOTES
[facility] Progress Note:   F/up Provider Visit      Kristi Mix is a 55 y.o. male who is here for his  f/up medical provider visit for the VLCD Program. he did not attend class last week. Compliant with VLCD.  + 1 lb  He has not been exercising as much due to lower back pain. Due to his left shoulder surgery he is sleeping differently which he thinks is causing the back pain. When he does exercise he is jogging or rowing. He does have PT once weekly for his surgery and follows up with his ortho surgeon on 8/9. He will have some chicken occasionally on days he exercises. 1 spoonful peanut butter occasionally     Did you have any problems adhering to the program last week? no  If yes, please explain:     Since your last visit, have you experienced any complications? no    Have you received any other medical care this week? yes  If yes, where and for what? Physical therapy    Have you had any change in your medications since your last visit? no  If yes what? Are you taking an appetite suppressant? no   If yes:  Do you need a refill? BP Readings from Last 3 Encounters:   07/07/21 110/81   06/30/21 120/84   06/23/21 112/84        Eating Habits Over Last Week:  Did you take in 64 oz of non-caloric fluids? yes     Did you consume your prescribed meal replacement regimen each day? yes       Physical Activity Over the Past Week:    Aerobic exercise: 150 min  Resistance exercise: 60 workouts / week      Weight History  Weight Metrics 7/7/2021 7/7/2021 6/30/2021 6/30/2021 6/23/2021 6/23/2021 6/16/2021   Weight - 250 lb - 246 lb 3.2 oz - 249 lb -   Waist Measure Inches 46.5 - 44.5 - 45.75 - 45   BMI - 33.91 kg/m2 - 33.39 kg/m2 - 33.77 kg/m2 -       Ideal body weight: 77.6 kg (171 lb 1.2 oz)  Adjusted ideal body weight: 91.9 kg (202 lb 10.3 oz)  Body mass index is 33.91 kg/m².       History    Past Medical History:   Diagnosis Date    Asthma     Broken leg left    broke left leg and tore ligaments    Diabetes mellitus     no meds    Dupuytren's contracture     Essential hypertension     no meds    Fracture of left leg 08/2020    HTN (hypertension)     Ill-defined condition     neuropathy    Psoriatic arthritis, destructive type (HCC)     Sarcoidosis     says he had an arm mass removed and the path showed sarcoid, negative CXR       Past Surgical History:   Procedure Laterality Date    CLOSED RX NOSE/JAW FRAC+WIRES Right 1994    broken jaw had to wire it    HC CUFF CRYO SHOULDER [facility] USE ONLY Left 03/18/2021    left shoulder cuff surgery    HX CARPAL TUNNEL RELEASE      HX ORTHOPAEDIC      fx skull/ broken jaw     HX ORTHOPAEDIC Right     carpal tunnel    HX ORTHOPAEDIC Left 01/22/2021    knee arthroscopy    HX ORTHOPAEDIC      left rotator cuff    HX OTHER SURGICAL      lipoma removed     NV EXC SKIN BENIG 0.6-1CM TRUNK,ARM,LEG N/A 10/11/2018    Dr. Sergio Blair 1.1-2CM TRUNK,ARM,LEG N/A 10/11/2018    Dr. Sergio Blair 2.1-3CM TRUNK,ARM,LEG N/A 10/11/2018    Dr. Jaime Peterson       Current Outpatient Medications   Medication Sig Dispense Refill    metaxalone (SKELAXIN) 800 mg tablet Take 800 mg by mouth three (3) times daily.  meloxicam (MOBIC) 15 mg tablet Take 15 mg by mouth daily.  multivitamin (ONE A DAY) tablet Take 1 Tablet by mouth daily.  cyclobenzaprine (FLEXERIL) 5 mg tablet TAKE ONE TABLET BY MOUTH TWICE A DAY AS NEEDED FOR MUSCLE SPASM 60 Tab 1    amitriptyline (ELAVIL) 25 mg tablet TAKE ONE TABLET BY MOUTH ONCE NIGHTLY 90 Tab 1    adalimumab (HUMIRA PEN SC) 40 mg by SubCUTAneous route every seven (7) days.          Allergies   Allergen Reactions    Gabapentin Palpitations       Social History     Tobacco Use    Smoking status: Never Smoker    Smokeless tobacco: Never Used   Substance Use Topics    Alcohol use: Not Currently     Alcohol/week: 2.0 standard drinks     Types: 2 Shots of liquor per week     Comment: social    Drug use: Not Currently       Family History   Problem Relation Age of Onset    Asthma Mother     Stroke Father     Alcohol abuse Father     Substance Abuse Father     Diabetes Maternal Grandmother     Hypertension Maternal Grandmother     High Cholesterol Maternal Grandmother     Stroke Maternal Grandmother     Cancer Maternal Grandfather 79        prostate cancer and skin    Colon Cancer Maternal Grandfather     Depression Maternal Uncle        Family Status   Relation Name Status    Mother  Alive    Father   at age 61        unsure    Sister  Alive    MGM  Alive    MGF  Alive    MUnc  (Not Specified)         Medications:  Outpatient Medications Marked as Taking for the 21 encounter (Office Visit) with Jamila Kwan NP   Medication Sig Dispense Refill    metaxalone (SKELAXIN) 800 mg tablet Take 800 mg by mouth three (3) times daily.  meloxicam (MOBIC) 15 mg tablet Take 15 mg by mouth daily.  multivitamin (ONE A DAY) tablet Take 1 Tablet by mouth daily.  cyclobenzaprine (FLEXERIL) 5 mg tablet TAKE ONE TABLET BY MOUTH TWICE A DAY AS NEEDED FOR MUSCLE SPASM 60 Tab 1    amitriptyline (ELAVIL) 25 mg tablet TAKE ONE TABLET BY MOUTH ONCE NIGHTLY 90 Tab 1    adalimumab (HUMIRA PEN SC) 40 mg by SubCUTAneous route every seven (7) days. Review of Systems  Review of Systems   Constitutional: Positive for weight loss. Negative for malaise/fatigue. Eyes: Negative. Respiratory: Negative. Cardiovascular: Negative. Gastrointestinal: Negative. Genitourinary: Negative. Skin: Negative. Neurological: Negative. Psychiatric/Behavioral: Negative. Objective  Visit Vitals  /81 (BP 1 Location: Left upper arm, BP Patient Position: Sitting)   Pulse 77   Temp 97 °F (36.1 °C)   Ht 6' (1.829 m)   Wt 113.4 kg (250 lb)   SpO2 96%   BMI 33.91 kg/m²     No LMP for male patient.       Physical Exam  Physical Exam  Vitals and nursing note reviewed. Constitutional:       Appearance: He is obese. HENT:      Head: Normocephalic and atraumatic. Cardiovascular:      Rate and Rhythm: Normal rate and regular rhythm. Pulses: Normal pulses. Heart sounds: Normal heart sounds. Pulmonary:      Effort: Pulmonary effort is normal.      Breath sounds: Normal breath sounds. Abdominal:      General: Bowel sounds are normal.      Palpations: Abdomen is soft. Musculoskeletal:         General: Normal range of motion. Skin:     General: Skin is warm and dry. Neurological:      General: No focal deficit present. Mental Status: He is alert and oriented to person, place, and time. Psychiatric:         Mood and Affect: Mood normal.         Behavior: Behavior normal.           No results found for this or any previous visit (from the past 672 hour(s)). Assessment / Plan    Encounter Diagnoses   Name Primary?  Obesity (BMI 30-39. 9) Yes    BMI 33.0-33.9,adult     Encounter for weight loss counseling     Dietary counseling and surveillance            1.  Weight management      Today, the patient is  Height: 6' (182.9 cm) tall, Weight: 113.4 kg (250 lb) lbs for a Body mass index is 33.91 kg/m². is suffering from obesity  which is further complicated by diabetes, hypertension and hepatic steatosis. Degree of control: Good   Progress was reviewed with patient. Goal(s) for next appointment: Increase activity as tolerated. Pt goal -12 lbs    I have reviewed/discussed the above normal BMI with the patient. I have recommended the following interventions: dietary management education, guidance, and counseling, encourage exercise, monitor weight and prescribed dietary intake . Leonel Francisco 2.  Labs    Latest results reviewed with patient    Routine monitoring labs ordered  Orders Placed This Encounter    METABOLIC PANEL, COMPREHENSIVE    URIC ACID         The primary encounter diagnosis was Obesity (BMI 30-39.9).  Diagnoses of BMI 33.0-33.9,adult, Encounter for weight loss counseling, and Dietary counseling and surveillance were also pertinent to this visit. Mr. Alisson Mora has a reminder for a \"due or due soon\" health maintenance. I have asked that he contact his primary care provider for follow-up on this health maintenance. Follow-up and Dispositions    · Return in about 4 weeks (around 8/4/2021), or if symptoms worsen or fail to improve. Routing History          A total time of 25 minutes was spent face-to-face with the patient during this encounter and over half of that time was spent on counseling.       Yehuda Blancas, NP

## 2021-07-07 NOTE — PROGRESS NOTES
L shoulder pain and is sleeping a different way, causing back pain  PT once weekly   8/9 next surgeon visit  Arthritis doctor 8/17  Jogging  Rowing machine   1 spoonful peanut butter occasionally  Chicken on days workout  -12 lbs

## 2021-07-14 ENCOUNTER — OFFICE VISIT (OUTPATIENT)
Dept: FAMILY MEDICINE CLINIC | Age: 46
End: 2021-07-14
Payer: COMMERCIAL

## 2021-07-14 VITALS
HEART RATE: 84 BPM | WEIGHT: 258 LBS | HEIGHT: 72 IN | BODY MASS INDEX: 34.95 KG/M2 | TEMPERATURE: 98.6 F | RESPIRATION RATE: 16 BRPM | DIASTOLIC BLOOD PRESSURE: 84 MMHG | SYSTOLIC BLOOD PRESSURE: 122 MMHG | OXYGEN SATURATION: 98 %

## 2021-07-14 DIAGNOSIS — E66.9 OBESITY, UNSPECIFIED CLASSIFICATION, UNSPECIFIED OBESITY TYPE, UNSPECIFIED WHETHER SERIOUS COMORBIDITY PRESENT: ICD-10-CM

## 2021-07-14 DIAGNOSIS — L40.50 PSORIATIC ARTHRITIS (HCC): ICD-10-CM

## 2021-07-14 DIAGNOSIS — I10 ESSENTIAL HYPERTENSION: ICD-10-CM

## 2021-07-14 DIAGNOSIS — E11.8 TYPE 2 DIABETES MELLITUS WITH COMPLICATION, WITHOUT LONG-TERM CURRENT USE OF INSULIN (HCC): Primary | ICD-10-CM

## 2021-07-14 PROCEDURE — 99214 OFFICE O/P EST MOD 30 MIN: CPT | Performed by: FAMILY MEDICINE

## 2021-07-14 RX ORDER — AMITRIPTYLINE HYDROCHLORIDE 10 MG/1
10 TABLET, FILM COATED ORAL
Qty: 90 TABLET | Refills: 0 | Status: SHIPPED | OUTPATIENT
Start: 2021-07-14 | End: 2021-10-13

## 2021-07-14 NOTE — PROGRESS NOTES
SUBJECTIVE  Chief Complaint   Patient presents with    Diabetes    Hypertension     Patient presents for follow-up. He had left knee arthroscopy earlier in the year. He has had some improvement. He had left shoulder surgery for RTC tears. Shoulder is feeling pretty good but working on ROM. He has psoriatic arthritis and sees rheumatology. He was having us manage tramadol but he is no longer on it. He is on Humira. He is taking Skelaxin in the morning as needed and flexeril at night as needed. He has diet-controlled diabetes and htn. No chest pain or exertional dyspnea. OBJECTIVE    Blood pressure 122/84, pulse 84, temperature 98.6 °F (37 °C), temperature source Temporal, resp. rate 16, height 6' (1.829 m), weight 258 lb (117 kg), SpO2 98 %. General:  Alert, cooperative, well appearing, in no apparent distress. CV:  The heart sounds are regular in rate and rhythm. There is a normal S1 and S2. There or no murmurs. Lungs: Inspiratory and expiratory efforts are full and unlabored. Lung sounds are clear and equal to auscultation throughout all lung fields without wheezing, rales, or rhonchi. Skin:  No rashes, no jaundice. Psych: normal affect. Mood good. Oriented x 3. Judgement and insight intact. Results for Bladimir Salas (MRN [id number]) as of 7/15/2021 23:10   Ref.  Range 6/3/2021 00:00   Sodium Latest Ref Range: 134 - 144 mmol/L 141   Potassium Latest Ref Range: 3.5 - 5.2 mmol/L 4.7   Chloride Latest Ref Range: 96 - 106 mmol/L 101   CO2 Latest Ref Range: 20 - 29 mmol/L 27   Glucose Latest Ref Range: 65 - 99 mg/dL 111 (H)   BUN Latest Ref Range: 6 - 24 mg/dL 19   Creatinine Latest Ref Range: 0.76 - 1.27 mg/dL 1.08   BUN/Creatinine ratio Latest Ref Range: 9 - 20  18   Calcium Latest Ref Range: 8.7 - 10.2 mg/dL 9.8   Magnesium Latest Ref Range: 1.6 - 2.3 mg/dL 2.2   GFR est non-AA Latest Ref Range: >59 mL/min/1.73 82   GFR est AA Latest Ref Range: >59 mL/min/1.73 95 Bilirubin, total Latest Ref Range: 0.0 - 1.2 mg/dL 0.5   Protein, total Latest Ref Range: 6.0 - 8.5 g/dL 7.2   Albumin Latest Ref Range: 4.0 - 5.0 g/dL 5.1 (H)   A-G Ratio Latest Ref Range: 1.2 - 2.2  2.4 (H)   ALT Latest Ref Range: 0 - 44 IU/L 22   AST Latest Ref Range: 0 - 40 IU/L 18   Alk. phosphatase Latest Ref Range: 48 - 121 IU/L 86   Uric acid Latest Ref Range: 3.8 - 8.4 mg/dL 4.9       ASSESSMENT / PLAN    ICD-10-CM ICD-9-CM    1. Type 2 diabetes mellitus with complication, without long-term current use of insulin (HCC)  E11.8 250.90 LIPID PANEL      HEMOGLOBIN A1C W/O EAG      MICROALBUMIN, UR, RAND W/ MICROALB/CREAT RATIO   2. Essential hypertension  I10 401.9 LIPID PANEL   3. Psoriatic arthritis ([facility] [address]  L40.50 696.0    4. Obesity, unspecified classification, unspecified obesity type, unspecified whether serious comorbidity present  E66.9 278.00      DM2 - cont diet and exercise. Follow A1c q6-12 months. Annual eye exam would be ideal.    HTN - controlled. Cont lifestyle modification as it has been effective. Psoriatic arthritis - cont per rheumatology. Agree with care plan. He would like to wean Elavil which we can start that. He will go from 25mg to 10mg nightly. Obesity-  Cont diet and exercise. All chart history elements were reviewed by me at the time of the visit even though marked at time of note closure. Patient understands our medical plan. Patient has provided input and agrees with goals. Alternatives have been explained and offered. All questions answered. The patient is to call if condition worsens or fails to improve. Follow-up and Dispositions    · Return in about 6 months (around 1/14/2022) for diabetes and blood pressure. Fasting labs due which he will get in a few weeks with his other labs.

## 2021-07-14 NOTE — PATIENT INSTRUCTIONS
A Healthy Lifestyle: Care Instructions  Your Care Instructions     A healthy lifestyle can help you feel good, stay at a healthy weight, and have plenty of energy for both work and play. A healthy lifestyle is something you can share with your whole family. A healthy lifestyle also can lower your risk for serious health problems, such as high blood pressure, heart disease, and diabetes. You can follow a few steps listed below to improve your health and the health of your family. Follow-up care is a key part of your treatment and safety. Be sure to make and go to all appointments, and call your doctor if you are having problems. It's also a good idea to know your test results and keep a list of the medicines you take. How can you care for yourself at home? · Do not eat too much sugar, fat, or fast foods. You can still have dessert and treats now and then. The goal is moderation. · Start small to improve your eating habits. Pay attention to portion sizes, drink less juice and soda pop, and eat more fruits and vegetables. ? Eat a healthy amount of food. A 3-ounce serving of meat, for example, is about the size of a deck of cards. Fill the rest of your plate with vegetables and whole grains. ? Limit the amount of soda and sports drinks you have every day. Drink more water when you are thirsty. ? Eat plenty of fruits and vegetables every day. Have an apple or some carrot sticks as an afternoon snack instead of a candy bar. Try to have fruits and/or vegetables at every meal.  · Make exercise part of your daily routine. You may want to start with simple activities, such as walking, bicycling, or slow swimming. Try to be active 30 to 60 minutes every day. You do not need to do all 30 to 60 minutes all at once. For example, you can exercise 3 times a day for 10 or 20 minutes.  Moderate exercise is safe for most people, but it is always a good idea to talk to your doctor before starting an exercise program.  · Keep moving. Stephanie Portal the lawn, work in the garden, or [place]. Take the stairs instead of the elevator at work. · If you smoke, quit. People who smoke have an increased risk for heart attack, stroke, cancer, and other lung illnesses. Quitting is hard, but there are ways to boost your chance of quitting tobacco for good. ? Use nicotine gum, patches, or lozenges. ? Ask your doctor about stop-smoking programs and medicines. ? Keep trying. In addition to reducing your risk of diseases in the future, you will notice some benefits soon after you stop using tobacco. If you have shortness of breath or asthma symptoms, they will likely get better within a few weeks after you quit. · Limit how much alcohol you drink. Moderate amounts of alcohol (up to 2 drinks a day for men, 1 drink a day for women) are okay. But drinking too much can lead to liver problems, high blood pressure, and other health problems. Family health  If you have a family, there are many things you can do together to improve your health. · Eat meals together as a family as often as possible. · Eat healthy foods. This includes fruits, vegetables, lean meats and dairy, and whole grains. · Include your family in your fitness plan. Most people think of activities such as jogging or tennis as the way to fitness, but there are many ways you and your family can be more active. Anything that makes you breathe hard and gets your heart pumping is exercise. Here are some tips:  ? Walk to do errands or to take your child to school or the bus.  ? Go for a family bike ride after dinner instead of watching TV. Where can you learn more? Go to http://www.gray.com/  Enter U036 in the search box to learn more about \"A Healthy Lifestyle: Care Instructions. \"  Current as of: September 23, 2020               Content Version: 12.8  © 6871-8323 [employer].    Care instructions adapted under license by [employer] (which disclaims liability or warranty for this information). If you have questions about a medical condition or this instruction, always ask your healthcare professional. [address] any warranty or liability for your use of this information.

## 2021-07-14 NOTE — PROGRESS NOTES
1. Have you been to the ER, urgent care clinic since your last visit? Hospitalized since your last visit? Yes, Erinn Mahan for rotator cuff    2. Have you seen or consulted any other health care providers outside of the [address] [place] [address] since your last visit? Include any pap smears or colon screening.  Yes Dr. Judi Villagomez, April 23

## 2021-07-21 ENCOUNTER — OFFICE VISIT (OUTPATIENT)
Dept: SURGERY | Age: 46
End: 2021-07-21

## 2021-07-21 VITALS
HEIGHT: 72 IN | TEMPERATURE: 97.5 F | RESPIRATION RATE: 18 BRPM | HEART RATE: 86 BPM | BODY MASS INDEX: 33.81 KG/M2 | DIASTOLIC BLOOD PRESSURE: 78 MMHG | SYSTOLIC BLOOD PRESSURE: 120 MMHG | WEIGHT: 249.6 LBS | OXYGEN SATURATION: 97 %

## 2021-07-21 DIAGNOSIS — E66.9 OBESITY, UNSPECIFIED CLASSIFICATION, UNSPECIFIED OBESITY TYPE, UNSPECIFIED WHETHER SERIOUS COMORBIDITY PRESENT: Primary | ICD-10-CM

## 2021-07-21 NOTE — PROGRESS NOTES
Barbara Nelson is a 55 y.o. male  Chief Complaint   Patient presents with    Weight Management     weekly weigh-in     Patient attended a weekly class as part of the Medically Supervised Weight Loss Program.  This class was facilitated by a Registered Dietitian. Topics taught at class focused on diet, particularly carbohydrate counting and portion control. Classes also focused on behavior changes and the importance of establishing a daily exercise routine. Progress Note: Weekly Medical Monitoring in the [facility] Loss Program    Is there anything that you or the patient needs to let the supervising provider know about? no    Over the past week, have you experienced any side-effects? no    Barbara Nelson is a 55 y.o. male who is enrolled in [facility]    Barbara Nelson was prescribed the VLCD / LCD. Visit Vitals  /78   Pulse 86   Temp 97.5 °F (36.4 °C) (Temporal)   Resp 18   Ht 6' (1.829 m)   Wt 249 lb 9.6 oz (113.2 kg)   SpO2 97%   BMI 33.85 kg/m²     Weight Metrics 7/21/2021 7/21/2021 7/14/2021 7/7/2021 7/7/2021 6/30/2021 6/30/2021   Weight - 249 lb 9.6 oz 258 lb - 250 lb - 246 lb 3.2 oz   Waist Measure Inches 44.5 - - 46.5 - 44.5 -   Exercise Mins/week 945 - - - - - -   BMI - 33.85 kg/m2 34.99 kg/m2 - 33.91 kg/m2 - 33.39 kg/m2         Have you received any other medical care this week? no  If yes, where and for what? Have you had any change in your medications since your last visit? no  If yes what? Did you have any problems adhering to the program last week? no  If yes, please explain:       Eating Habits Over Last Week:  Did you take in 64 oz of non-caloric fluids? yes     Did you consume your prescribed meal replacement regimen each day?  yes       Physical Activity Over the Past Week:    Aerobic exercise: 630 min  Resistance exercise: 315 workouts / week

## 2021-07-28 ENCOUNTER — CLINICAL SUPPORT (OUTPATIENT)
Dept: SURGERY | Age: 46
End: 2021-07-28

## 2021-07-28 VITALS
HEIGHT: 72 IN | OXYGEN SATURATION: 70 % | WEIGHT: 248.4 LBS | HEART RATE: 70 BPM | DIASTOLIC BLOOD PRESSURE: 86 MMHG | SYSTOLIC BLOOD PRESSURE: 124 MMHG | BODY MASS INDEX: 33.64 KG/M2 | TEMPERATURE: 97.7 F | RESPIRATION RATE: 16 BRPM

## 2021-07-28 DIAGNOSIS — E66.9 OBESITY, UNSPECIFIED CLASSIFICATION, UNSPECIFIED OBESITY TYPE, UNSPECIFIED WHETHER SERIOUS COMORBIDITY PRESENT: Primary | ICD-10-CM

## 2021-07-28 NOTE — PROGRESS NOTES
Evangelina Lowery is a 55 y.o. male  Chief Complaint   Patient presents with    Weight Management     weekly weigh-in     Patient attended a weekly class as part of the Medically Supervised Weight Loss Program.  This class was facilitated by a Registered Dietitian. Topics taught at class focused on diet, particularly carbohydrate counting and portion control. Classes also focused on behavior changes and the importance of establishing a daily exercise routine. Progress Note: Weekly Medical Monitoring in the [facility] Loss Program    Is there anything that you or the patient needs to let the supervising provider know about? no    Over the past week, have you experienced any side-effects? no    Evangelina Lowery is a 55 y.o. male who is enrolled in [facility]    Evangelina Lowery was prescribed the VLCD / LCD. Visit Vitals  /86   Pulse 70   Temp 97.7 °F (36.5 °C) (Temporal)   Resp 16   Ht 6' (1.829 m)   Wt 248 lb 6.4 oz (112.7 kg)   SpO2 (!) 70%   BMI 33.69 kg/m²     Weight Metrics 7/28/2021 7/28/2021 7/21/2021 7/21/2021 7/14/2021 7/7/2021 7/7/2021   Weight - 248 lb 6.4 oz - 249 lb 9.6 oz 258 lb - 250 lb   Waist Measure Inches 45.75 - 44.5 - - 46.5 -   Exercise Mins/week - - 945 - - - -   BMI - 33.69 kg/m2 - 33.85 kg/m2 34.99 kg/m2 - 33.91 kg/m2         Have you received any other medical care this week? no  If yes, where and for what? Have you had any change in your medications since your last visit? no  If yes what? Did you have any problems adhering to the program last week? no  If yes, please explain:       Eating Habits Over Last Week:  Did you take in 64 oz of non-caloric fluids? yes     Did you consume your prescribed meal replacement regimen each day?  yes       Physical Activity Over the Past Week:    Aerobic exercise: 10 hours  Resistance exercise: 7 hours of workouts / week

## 2021-08-04 ENCOUNTER — CLINICAL SUPPORT (OUTPATIENT)
Dept: SURGERY | Age: 46
End: 2021-08-04

## 2021-08-04 VITALS
HEIGHT: 72 IN | DIASTOLIC BLOOD PRESSURE: 84 MMHG | HEART RATE: 60 BPM | BODY MASS INDEX: 33.72 KG/M2 | WEIGHT: 249 LBS | TEMPERATURE: 97.2 F | SYSTOLIC BLOOD PRESSURE: 122 MMHG

## 2021-08-04 DIAGNOSIS — E66.9 OBESITY, UNSPECIFIED CLASSIFICATION, UNSPECIFIED OBESITY TYPE, UNSPECIFIED WHETHER SERIOUS COMORBIDITY PRESENT: Primary | ICD-10-CM

## 2021-08-04 NOTE — PROGRESS NOTES
Patient attended a weekly class as part of the Medically Supervised Weight Loss Program.  This class was facilitated by a Registered Dietitian. Topics taught at class focused on diet, particularly carbohydrate counting and portion control. Classes also focused on behavior changes and the importance of establishing a daily exercise routine. Progress Note: Weekly Medical Monitoring in the [facility] Program    Is there anything that you or the patient needs to let the supervising provider know about? no    Over the past week, have you experienced any side-effects? no    Jarett Callahan is a 55 y.o. male who is enrolled in [facility]    Jarett Callahan was prescribed the VLCD / LCD. Visit Vitals  /84   Pulse 60   Temp 97.2 °F (36.2 °C)   Ht 6' (1.829 m)   Wt 112.9 kg (249 lb)   BMI 33.77 kg/m²     Weight Metrics 8/4/2021 8/4/2021 7/28/2021 7/28/2021 7/21/2021 7/21/2021 7/14/2021   Weight - 249 lb - 248 lb 6.4 oz - 249 lb 9.6 oz 258 lb   Waist Measure Inches 44 - 45.75 - 44.5 - -   Exercise Mins/week - - - - 945 - -   BMI - 33.77 kg/m2 - 33.69 kg/m2 - 33.85 kg/m2 34.99 kg/m2         Have you received any other medical care this week? no  If yes, where and for what? Have you had any change in your medications since your last visit? no  If yes what? Did you have any problems adhering to the program last week? no  If yes, please explain:       Eating Habits Over Last Week:  Did you take in 64 oz of non-caloric fluids? yes    Did you consume your prescribed meal replacement regimen each day?  yes       Physical Activity Over the Past Week:    Aerobic exercise: 6 hours min  Resistance exercise: 5 hours workouts / week

## 2021-08-09 ENCOUNTER — OFFICE VISIT (OUTPATIENT)
Dept: SURGERY | Age: 46
End: 2021-08-09
Payer: COMMERCIAL

## 2021-08-09 VITALS
HEART RATE: 96 BPM | HEIGHT: 72 IN | WEIGHT: 244 LBS | TEMPERATURE: 97 F | DIASTOLIC BLOOD PRESSURE: 82 MMHG | OXYGEN SATURATION: 99 % | BODY MASS INDEX: 33.05 KG/M2 | SYSTOLIC BLOOD PRESSURE: 117 MMHG

## 2021-08-09 DIAGNOSIS — E66.9 OBESITY (BMI 30-39.9): ICD-10-CM

## 2021-08-09 DIAGNOSIS — Z71.3 DIETARY COUNSELING AND SURVEILLANCE: ICD-10-CM

## 2021-08-09 DIAGNOSIS — Z71.3 ENCOUNTER FOR WEIGHT LOSS COUNSELING: Primary | ICD-10-CM

## 2021-08-09 PROCEDURE — 99212 OFFICE O/P EST SF 10 MIN: CPT | Performed by: NURSE PRACTITIONER

## 2021-08-09 NOTE — PROGRESS NOTES
[facility] Progress Note:   F/up Provider Visit    CC: Weight Management    Andreia Sanchez is a 55 y.o. male who is here for his  f/up medical provider visit for the VLCD Program. he did not attend class last week. -6 lbs  4 MR  Stopped peanut butter. Steroid shot this morning in left shoulder and expected to repeat every 3-4 months. Increased amount of running and lost 6 lbs in first week after last visit, but is now experiencing blisters on right foot. Reports he is not hitting the ground flat with that foot. He is currently in PT and will have them evaluate. Due to this he has decreased the amount of running and thinks the decreased amount of weight loss is due to this. Did you have any problems adhering to the program last week? no  If yes, please explain:     Since your last visit, have you experienced any complications? no    Have you received any other medical care this week? yes  If yes, where and for what? Physical therapy    Have you had any change in your medications since your last visit? no  If yes what? Are you taking an appetite suppressant? no   If yes:  Do you need a refill? BP Readings from Last 3 Encounters:   08/09/21 117/82   08/04/21 122/84   07/28/21 124/86        Eating Habits Over Last Week:  Did you take in 64 oz of non-caloric fluids? yes     Did you consume your prescribed meal replacement regimen each day?  yes       Physical Activity Over the Past Week:    Aerobic exercise: 630 min  Resistance exercise: 420 min / week      Weight History  Weight Metrics 8/9/2021 8/9/2021 8/4/2021 8/4/2021 7/28/2021 7/28/2021 7/21/2021   Weight - 244 lb - 249 lb - 248 lb 6.4 oz -   Waist Measure Inches 46 - 44 - 45.75 - 44.5   Exercise Mins/week - - - - - - 945   BMI - 33.09 kg/m2 - 33.77 kg/m2 - 33.69 kg/m2 -       Starting wt: 279  Goal wt: 200  EKG due: 229  Ideal body weight: 77.6 kg (171 lb 1.2 oz)  Adjusted ideal body weight: 90.8 kg (200 lb 3.9 oz)  Body mass index is 33.09 kg/m². History    Past Medical History:   Diagnosis Date    Asthma     Broken leg left    broke left leg and tore ligaments    Diabetes mellitus     no meds    Dupuytren's contracture     Essential hypertension     no meds    Fracture of left leg 08/2020    HTN (hypertension)     Ill-defined condition     neuropathy    Psoriatic arthritis, destructive type (HCC)     Sarcoidosis     says he had an arm mass removed and the path showed sarcoid, negative CXR       Past Surgical History:   Procedure Laterality Date    CLOSED RX NOSE/JAW FRAC+WIRES Right 1994    broken jaw had to wire it    HC CUFF CRYO SHOULDER [facility] USE ONLY Left 03/18/2021    left shoulder cuff surgery    HX CARPAL TUNNEL RELEASE      HX ORTHOPAEDIC      fx skull/ broken jaw     HX ORTHOPAEDIC Right     carpal tunnel    HX ORTHOPAEDIC Left 01/22/2021    knee arthroscopy    HX ORTHOPAEDIC      left rotator cuff    HX OTHER SURGICAL      lipoma removed     UT EXC SKIN BENIG 0.6-1CM TRUNK,ARM,LEG N/A 10/11/2018    Dr. Mckenzie Levo 1.1-2CM TRUNK,ARM,LEG N/A 10/11/2018    Dr. Magaly Esparza 2.1-3CM TRUNK,ARM,LEG N/A 10/11/2018    Dr. Bobby Rodriguez       Current Outpatient Medications   Medication Sig Dispense Refill    OTHER Take 1 Tablet by mouth daily. Tumeric      amitriptyline (ELAVIL) 10 mg tablet Take 1 Tablet by mouth nightly. 90 Tablet 0    metaxalone (SKELAXIN) 800 mg tablet Take 800 mg by mouth three (3) times daily.  meloxicam (MOBIC) 15 mg tablet Take 15 mg by mouth daily.  multivitamin (ONE A DAY) tablet Take 1 Tablet by mouth daily.  cyclobenzaprine (FLEXERIL) 5 mg tablet TAKE ONE TABLET BY MOUTH TWICE A DAY AS NEEDED FOR MUSCLE SPASM 60 Tab 1    adalimumab (HUMIRA PEN SC) 40 mg by SubCUTAneous route every seven (7) days.          Allergies   Allergen Reactions    Gabapentin Palpitations       Social History     Tobacco Use    Smoking status: Never Smoker    Smokeless tobacco: Never Used   Substance Use Topics    Alcohol use: Not Currently     Alcohol/week: 2.0 standard drinks     Types: 2 Shots of liquor per week     Comment: social    Drug use: Not Currently       Family History   Problem Relation Age of Onset    Asthma Mother     Stroke Father     Alcohol abuse Father     Substance Abuse Father     Diabetes Maternal Grandmother     Hypertension Maternal Grandmother     High Cholesterol Maternal Grandmother     Stroke Maternal Grandmother     Cancer Maternal Grandfather 79        prostate cancer and skin    Colon Cancer Maternal Grandfather     Depression Maternal Uncle        Family Status   Relation Name Status    Mother  Alive    Father   at age 61        unsure    Sister  Alive    MGM  Alive    Iesha 52  (Not Specified)         Medications:  Outpatient Medications Marked as Taking for the 21 encounter (Office Visit) with Ana Luisa Black NP   Medication Sig Dispense Refill    OTHER Take 1 Tablet by mouth daily. Tumeric      amitriptyline (ELAVIL) 10 mg tablet Take 1 Tablet by mouth nightly. 90 Tablet 0    metaxalone (SKELAXIN) 800 mg tablet Take 800 mg by mouth three (3) times daily.  meloxicam (MOBIC) 15 mg tablet Take 15 mg by mouth daily.  multivitamin (ONE A DAY) tablet Take 1 Tablet by mouth daily.  cyclobenzaprine (FLEXERIL) 5 mg tablet TAKE ONE TABLET BY MOUTH TWICE A DAY AS NEEDED FOR MUSCLE SPASM 60 Tab 1    adalimumab (HUMIRA PEN SC) 40 mg by SubCUTAneous route every seven (7) days. Review of Systems  Review of Systems   Constitutional: Positive for weight loss. Negative for malaise/fatigue. Eyes: Negative. Respiratory: Negative. Cardiovascular: Negative. Gastrointestinal: Negative. Genitourinary: Negative. Musculoskeletal: Positive for joint pain. Skin: Negative. Neurological: Negative.           Objective  Visit Vitals  /82 (BP 1 Location: Left upper arm, BP Patient Position: Sitting)   Pulse 96   Temp 97 °F (36.1 °C)   Ht 6' (1.829 m)   Wt 110.7 kg (244 lb)   SpO2 99%   BMI 33.09 kg/m²     No LMP for male patient. Physical Exam  Physical Exam  Vitals and nursing note reviewed. Constitutional:       Appearance: He is obese. HENT:      Head: Normocephalic and atraumatic. Cardiovascular:      Rate and Rhythm: Normal rate and regular rhythm. Pulses: Normal pulses. Heart sounds: Normal heart sounds. Pulmonary:      Effort: Pulmonary effort is normal.      Breath sounds: Normal breath sounds. Abdominal:      General: Bowel sounds are normal.      Palpations: Abdomen is soft. Musculoskeletal:         General: Normal range of motion. Skin:     General: Skin is warm and dry. Neurological:      General: No focal deficit present. Mental Status: He is alert and oriented to person, place, and time. Psychiatric:         Mood and Affect: Mood normal.         Behavior: Behavior normal.           No results found for this or any previous visit (from the past 672 hour(s)). Assessment / Plan    Encounter Diagnoses   Name Primary?  Encounter for weight loss counseling Yes    Dietary counseling and surveillance     Obesity (BMI 30-39. 9)     BMI 33.0-33.9,adult            1.  Weight management      Today, the patient is  Height: 6' (182.9 cm) tall, Weight: 110.7 kg (244 lb) lbs for a Body mass index is 33.09 kg/m². is suffering from obesity  which is further complicated by diabetes, hypertension and hepatic steatosis. Degree of control: great, continue with VLCD   Progress was reviewed with patient. Goal(s) for next appointment:   Pt goal -12 lbs Instead of running try biking    I have reviewed/discussed the above normal BMI with the patient.   I have recommended the following interventions: dietary management education, guidance, and counseling, encourage exercise, monitor weight and prescribed dietary intake . Eder Moreno 2.  Labs    No labs completed prior to visit. Will do labs Wednesday. The primary encounter diagnosis was Encounter for weight loss counseling. Diagnoses of Dietary counseling and surveillance, Obesity (BMI 30-39.9), and BMI 33.0-33.9,adult were also pertinent to this visit. A total time of 25 minutes was spent face-to-face with the patient during this encounter and over half of that time was spent on cousneling.       SANDY Bazan

## 2021-08-17 ENCOUNTER — CLINICAL SUPPORT (OUTPATIENT)
Dept: SURGERY | Age: 46
End: 2021-08-17

## 2021-08-17 VITALS
SYSTOLIC BLOOD PRESSURE: 128 MMHG | TEMPERATURE: 97.5 F | RESPIRATION RATE: 18 BRPM | BODY MASS INDEX: 32.78 KG/M2 | HEART RATE: 66 BPM | HEIGHT: 72 IN | WEIGHT: 242 LBS | OXYGEN SATURATION: 96 % | DIASTOLIC BLOOD PRESSURE: 86 MMHG

## 2021-08-17 DIAGNOSIS — E66.9 OBESITY, UNSPECIFIED CLASSIFICATION, UNSPECIFIED OBESITY TYPE, UNSPECIFIED WHETHER SERIOUS COMORBIDITY PRESENT: Primary | ICD-10-CM

## 2021-08-17 NOTE — PROGRESS NOTES
Mikhail Peralta is a 55 y.o. male  Chief Complaint   Patient presents with    Weight Management     weekly weigh-in     Patient attended a weekly class as part of the Medically Supervised Weight Loss Program.  This class was facilitated by a Registered Dietitian. Topics taught at class focused on diet, particularly carbohydrate counting and portion control. Classes also focused on behavior changes and the importance of establishing a daily exercise routine. Progress Note: Weekly Medical Monitoring in the [facility] Weight Loss Program    Is there anything that you or the patient needs to let the supervising provider know about? no    Over the past week, have you experienced any side-effects? no    Mikhail Peralta is a 55 y.o. male who is enrolled in [facility]    Mikhail Peralta was prescribed the VLCD / LCD. Visit Vitals  /86   Pulse 66   Temp 97.5 °F (36.4 °C) (Temporal)   Resp 18   Ht 6' (1.829 m)   Wt 242 lb (109.8 kg)   SpO2 96%   BMI 32.82 kg/m²     Weight Metrics 8/17/2021 8/9/2021 8/9/2021 8/4/2021 8/4/2021 7/28/2021 7/28/2021   Weight 242 lb - 244 lb - 249 lb - 248 lb 6.4 oz   Waist Measure Inches - 46 - 44 - 45.75 -   Exercise Mins/week - - - - - - -   BMI 32.82 kg/m2 - 33.09 kg/m2 - 33.77 kg/m2 - 33.69 kg/m2         Have you received any other medical care this week? yes  If yes, where and for what? Physical Therapy    Have you had any change in your medications since your last visit? no  If yes what? Did you have any problems adhering to the program last week? no  If yes, please explain:       Eating Habits Over Last Week:  Did you take in 64 oz of non-caloric fluids? yes     Did you consume your prescribed meal replacement regimen each day?  yes       Physical Activity Over the Past Week:    Aerobic exercise: 840 min  Resistance exercise: 420 workouts / week

## 2021-08-24 ENCOUNTER — CLINICAL SUPPORT (OUTPATIENT)
Dept: SURGERY | Age: 46
End: 2021-08-24

## 2021-08-24 VITALS
DIASTOLIC BLOOD PRESSURE: 74 MMHG | RESPIRATION RATE: 18 BRPM | HEART RATE: 78 BPM | SYSTOLIC BLOOD PRESSURE: 104 MMHG | TEMPERATURE: 97.8 F | WEIGHT: 236.7 LBS | OXYGEN SATURATION: 97 % | BODY MASS INDEX: 32.06 KG/M2 | HEIGHT: 72 IN

## 2021-08-24 DIAGNOSIS — E66.9 OBESITY, UNSPECIFIED CLASSIFICATION, UNSPECIFIED OBESITY TYPE, UNSPECIFIED WHETHER SERIOUS COMORBIDITY PRESENT: Primary | ICD-10-CM

## 2021-08-31 ENCOUNTER — CLINICAL SUPPORT (OUTPATIENT)
Dept: SURGERY | Age: 46
End: 2021-08-31

## 2021-08-31 VITALS
DIASTOLIC BLOOD PRESSURE: 76 MMHG | HEART RATE: 80 BPM | BODY MASS INDEX: 32.06 KG/M2 | HEIGHT: 72 IN | WEIGHT: 236.7 LBS | TEMPERATURE: 97.2 F | RESPIRATION RATE: 18 BRPM | SYSTOLIC BLOOD PRESSURE: 120 MMHG

## 2021-08-31 DIAGNOSIS — E66.9 OBESITY, UNSPECIFIED CLASSIFICATION, UNSPECIFIED OBESITY TYPE, UNSPECIFIED WHETHER SERIOUS COMORBIDITY PRESENT: Primary | ICD-10-CM

## 2021-09-01 ENCOUNTER — HOSPITAL ENCOUNTER (OUTPATIENT)
Dept: LAB | Age: 46
Discharge: HOME | End: 2021-09-01

## 2021-09-01 LAB
XX-LABCORP SPECIMEN COL,LCBCF: NORMAL
XX-LABCORP SPECIMEN COL,LCBCF: NORMAL

## 2021-09-01 PROCEDURE — 99001 SPECIMEN HANDLING PT-LAB: CPT

## 2021-09-02 LAB
ALBUMIN SERPL-MCNC: 4.9 G/DL (ref 4–5)
ALBUMIN/CREAT UR: 4 MG/G CREAT (ref 0–29)
ALBUMIN/GLOB SERPL: 1.8 {RATIO} (ref 1.2–2.2)
ALP SERPL-CCNC: 92 IU/L (ref 48–121)
ALT SERPL-CCNC: 20 IU/L (ref 0–44)
AST SERPL-CCNC: 20 IU/L (ref 0–40)
BILIRUB SERPL-MCNC: 0.6 MG/DL (ref 0–1.2)
BUN SERPL-MCNC: 23 MG/DL (ref 6–24)
BUN/CREAT SERPL: 20 (ref 9–20)
CALCIUM SERPL-MCNC: 9.8 MG/DL (ref 8.7–10.2)
CHLORIDE SERPL-SCNC: 101 MMOL/L (ref 96–106)
CHOLEST SERPL-MCNC: 197 MG/DL (ref 100–199)
CO2 SERPL-SCNC: 28 MMOL/L (ref 20–29)
CREAT SERPL-MCNC: 1.14 MG/DL (ref 0.76–1.27)
CREAT UR-MCNC: 144.3 MG/DL
GLOBULIN SER CALC-MCNC: 2.7 G/DL (ref 1.5–4.5)
GLUCOSE SERPL-MCNC: 88 MG/DL (ref 65–99)
HBA1C MFR BLD: 5.4 % (ref 4.8–5.6)
HDLC SERPL-MCNC: 45 MG/DL
LDLC SERPL CALC-MCNC: 142 MG/DL (ref 0–99)
MICROALBUMIN UR-MCNC: 5.5 UG/ML
POTASSIUM SERPL-SCNC: 4.8 MMOL/L (ref 3.5–5.2)
PROT SERPL-MCNC: 7.6 G/DL (ref 6–8.5)
SODIUM SERPL-SCNC: 140 MMOL/L (ref 134–144)
TRIGL SERPL-MCNC: 57 MG/DL (ref 0–149)
URATE SERPL-MCNC: 4.7 MG/DL (ref 3.8–8.4)
VLDLC SERPL CALC-MCNC: 10 MG/DL (ref 5–40)

## 2021-09-03 ENCOUNTER — TELEPHONE (OUTPATIENT)
Dept: FAMILY MEDICINE CLINIC | Age: 46
End: 2021-09-03

## 2021-09-03 RX ORDER — AMITRIPTYLINE HYDROCHLORIDE 25 MG/1
TABLET, FILM COATED ORAL
Qty: 90 TABLET | Refills: 1 | OUTPATIENT
Start: 2021-09-03

## 2021-09-03 NOTE — TELEPHONE ENCOUNTER
LMOV to call us back if he needs this dose. We weaned him to 10mg at July appt and sent in a 3 month supply. No further action needed.

## 2021-09-08 ENCOUNTER — OFFICE VISIT (OUTPATIENT)
Dept: SURGERY | Age: 46
End: 2021-09-08
Payer: COMMERCIAL

## 2021-09-08 VITALS
BODY MASS INDEX: 31.69 KG/M2 | DIASTOLIC BLOOD PRESSURE: 80 MMHG | RESPIRATION RATE: 20 BRPM | WEIGHT: 234 LBS | TEMPERATURE: 96.6 F | SYSTOLIC BLOOD PRESSURE: 118 MMHG | HEIGHT: 72 IN | HEART RATE: 82 BPM

## 2021-09-08 DIAGNOSIS — E66.9 OBESITY (BMI 30-39.9): ICD-10-CM

## 2021-09-08 DIAGNOSIS — Z71.3 DIETARY COUNSELING AND SURVEILLANCE: ICD-10-CM

## 2021-09-08 DIAGNOSIS — Z71.3 ENCOUNTER FOR WEIGHT LOSS COUNSELING: Primary | ICD-10-CM

## 2021-09-08 PROCEDURE — 99213 OFFICE O/P EST LOW 20 MIN: CPT | Performed by: NURSE PRACTITIONER

## 2021-09-08 NOTE — TELEPHONE ENCOUNTER
Spoke with patient. The Amitriptyline 25 mg was on auto refill. Patient did not request the Amitriptyline 25 mg dose. Patient is currently taking Amitriptyline 10 mg and reports that is working much better than the 25 mg dose.

## 2021-09-08 NOTE — PROGRESS NOTES
[facility] Progress Note:   F/up Provider Visit    CC: Weight Management    Lincoln Junior is a 55 y.o. male who is here for his  f/up medical provider visit for the VLCD Program. he did not attend class last week.   -10lbs  4 MR  Was evaluated by PT for blisters and made recommended changes and blisters have resolved. He has increased running and walking. Running 3 miles daily and walking 3-4 miles daily with some strength training for 30-60 min daily. He did have some dietary indiscretions during the Labor Day holiday. Did you have any problems adhering to the program last week? no  If yes, please explain:     Since your last visit, have you experienced any complications? no    Have you received any other medical care this week? yes  If yes, where and for what? Physical therapy    Have you had any change in your medications since your last visit? no  If yes what? Are you taking an appetite suppressant? no   If yes:  Do you need a refill? BP Readings from Last 3 Encounters:   09/08/21 118/80   08/31/21 120/76   08/24/21 104/74        Eating Habits Over Last Week:  Did you take in 64 oz of non-caloric fluids? yes     Did you consume your prescribed meal replacement regimen each day? yes       Physical Activity Over the Past Week:    Aerobic exercise: 630 min  Resistance exercise: 420 min / week      Weight History  Weight Metrics 9/8/2021 9/8/2021 8/31/2021 8/31/2021 8/24/2021 8/24/2021 8/17/2021   Weight - 234 lb - 236 lb 11.2 oz - 236 lb 11.2 oz 242 lb   Waist Measure Inches 41 - 43.25 - 43.25 - 44   Exercise Mins/week - - - - - - -   Body Fat % 29.2 - - - - - -   BMI - 31.74 kg/m2 - 32.1 kg/m2 - 32.1 kg/m2 32.82 kg/m2       Starting wt: 279  Goal wt: 185  EKG due: 229  Ideal body weight: 77.6 kg (171 lb 1.2 oz)  Adjusted ideal body weight: 89 kg (196 lb 3.9 oz)  Body mass index is 31.74 kg/m².       History    Past Medical History:   Diagnosis Date    Asthma     Broken leg left    broke left leg and tore ligaments    Diabetes mellitus     no meds    Dupuytren's contracture     Essential hypertension     no meds    Fracture of left leg 08/2020    HTN (hypertension)     Ill-defined condition     neuropathy    Psoriatic arthritis, destructive type (HCC)     Sarcoidosis     says he had an arm mass removed and the path showed sarcoid, negative CXR       Past Surgical History:   Procedure Laterality Date    CLOSED RX NOSE/JAW FRAC+WIRES Right 1994    broken jaw had to wire it    HC CUFF CRYO SHOULDER [facility] USE ONLY Left 03/18/2021    left shoulder cuff surgery    HX CARPAL TUNNEL RELEASE      HX ORTHOPAEDIC      fx skull/ broken jaw     HX ORTHOPAEDIC Right     carpal tunnel    HX ORTHOPAEDIC Left 01/22/2021    knee arthroscopy    HX ORTHOPAEDIC      left rotator cuff    HX OTHER SURGICAL      lipoma removed     NE EXC SKIN BENIG 0.6-1CM TRUNK,ARM,LEG N/A 10/11/2018    Dr. Prabha Groves 1.1-2CM TRUNK,ARM,LEG N/A 10/11/2018    Dr. Prabha Groves 2.1-3CM TRUNK,ARM,LEG N/A 10/11/2018    Dr. Eduard Davenport       Current Outpatient Medications   Medication Sig Dispense Refill    OTHER Take 1 Tablet by mouth daily. Tumeric      amitriptyline (ELAVIL) 10 mg tablet Take 1 Tablet by mouth nightly. 90 Tablet 0    metaxalone (SKELAXIN) 800 mg tablet Take 800 mg by mouth three (3) times daily.  meloxicam (MOBIC) 15 mg tablet Take 15 mg by mouth daily.  multivitamin (ONE A DAY) tablet Take 1 Tablet by mouth daily.  cyclobenzaprine (FLEXERIL) 5 mg tablet TAKE ONE TABLET BY MOUTH TWICE A DAY AS NEEDED FOR MUSCLE SPASM 60 Tab 1    adalimumab (HUMIRA PEN SC) 40 mg by SubCUTAneous route every seven (7) days.          Allergies   Allergen Reactions    Gabapentin Palpitations       Social History     Tobacco Use    Smoking status: Never Smoker    Smokeless tobacco: Never Used   Substance Use Topics    Alcohol use: Not Currently Alcohol/week: 2.0 standard drinks     Types: 2 Shots of liquor per week     Comment: social    Drug use: Not Currently       Family History   Problem Relation Age of Onset    Asthma Mother     Stroke Father     Alcohol abuse Father     Substance Abuse Father     Diabetes Maternal Grandmother     Hypertension Maternal Grandmother     High Cholesterol Maternal Grandmother     Stroke Maternal Grandmother     Cancer Maternal Grandfather 79        prostate cancer and skin    Colon Cancer Maternal Grandfather     Depression Maternal Uncle        Family Status   Relation Name Status    Mother  Alive    Father   at age 61        unsure    Sister  Alive    MGM  Alive    [place] 52  (Not Specified)         Medications:  Outpatient Medications Marked as Taking for the 21 encounter (Office Visit) with Mercedez Levi NP   Medication Sig Dispense Refill    OTHER Take 1 Tablet by mouth daily. Tumeric      amitriptyline (ELAVIL) 10 mg tablet Take 1 Tablet by mouth nightly. 90 Tablet 0    metaxalone (SKELAXIN) 800 mg tablet Take 800 mg by mouth three (3) times daily.  meloxicam (MOBIC) 15 mg tablet Take 15 mg by mouth daily.  multivitamin (ONE A DAY) tablet Take 1 Tablet by mouth daily.  cyclobenzaprine (FLEXERIL) 5 mg tablet TAKE ONE TABLET BY MOUTH TWICE A DAY AS NEEDED FOR MUSCLE SPASM 60 Tab 1    adalimumab (HUMIRA PEN SC) 40 mg by SubCUTAneous route every seven (7) days. Review of Systems  Review of Systems   Constitutional: Positive for weight loss. Negative for malaise/fatigue. Eyes: Negative. Respiratory: Negative. Cardiovascular: Negative. Gastrointestinal: Negative. Genitourinary: Negative. Musculoskeletal: Positive for joint pain. Skin: Negative. Neurological: Negative.           Objective  Visit Vitals  /80 (BP 1 Location: Left upper arm, BP Patient Position: At rest, BP Cuff Size: Adult)   Pulse 82   Temp (!) 96.6 °F (35.9 °C) (Oral)   Resp 20   Ht 6' (1.829 m)   Wt 106.1 kg (234 lb)   BMI 31.74 kg/m²     No LMP for male patient. Physical Exam  Physical Exam  Vitals and nursing note reviewed. Constitutional:       Appearance: He is obese. HENT:      Head: Normocephalic and atraumatic. Cardiovascular:      Rate and Rhythm: Normal rate and regular rhythm. Pulses: Normal pulses. Heart sounds: Normal heart sounds. Pulmonary:      Effort: Pulmonary effort is normal.      Breath sounds: Normal breath sounds. Abdominal:      General: Bowel sounds are normal.      Palpations: Abdomen is soft. Musculoskeletal:         General: Normal range of motion. Skin:     General: Skin is warm and dry. Neurological:      General: No focal deficit present. Mental Status: He is alert and oriented to person, place, and time. Psychiatric:         Mood and Affect: Mood normal.         Behavior: Behavior normal.           Recent Results (from the past 672 hour(s))   METABOLIC PANEL, COMPREHENSIVE    Collection Time: 09/01/21 12:00 AM   Result Value Ref Range    Glucose 88 65 - 99 mg/dL    BUN 23 6 - 24 mg/dL    Creatinine 1.14 0.76 - 1.27 mg/dL    GFR est non-AA 77 >59 mL/min/1.73    GFR est AA 89 >59 mL/min/1.73    BUN/Creatinine ratio 20 9 - 20    Sodium 140 134 - 144 mmol/L    Potassium 4.8 3.5 - 5.2 mmol/L    Chloride 101 96 - 106 mmol/L    CO2 28 20 - 29 mmol/L    Calcium 9.8 8.7 - 10.2 mg/dL    Protein, total 7.6 6.0 - 8.5 g/dL    Albumin 4.9 4.0 - 5.0 g/dL    GLOBULIN, TOTAL 2.7 1.5 - 4.5 g/dL    A-G Ratio 1.8 1.2 - 2.2    Bilirubin, total 0.6 0.0 - 1.2 mg/dL    Alk.  phosphatase 92 48 - 121 IU/L    AST (SGOT) 20 0 - 40 IU/L    ALT (SGPT) 20 0 - 44 IU/L   LIPID PANEL    Collection Time: 09/01/21 12:00 AM   Result Value Ref Range    Cholesterol, total 197 100 - 199 mg/dL    Triglyceride 57 0 - 149 mg/dL    HDL Cholesterol 45 >39 mg/dL    VLDL, calculated 10 5 - 40 mg/dL    LDL, calculated 142 (H) 0 - 99 mg/dL MICROALBUMIN, UR, RAND W/ MICROALB/CREAT RATIO    Collection Time: 09/01/21 12:00 AM   Result Value Ref Range    Creatinine, urine 144.3 Not Estab. mg/dL    Microalbumin, urine 5.5 Not Estab. ug/mL    Microalb/Creat ratio (ug/mg creat.) 4 0 - 29 mg/g creat   HEMOGLOBIN A1C W/O EAG    Collection Time: 09/01/21 12:00 AM   Result Value Ref Range    Hemoglobin A1c 5.4 4.8 - 5.6 %   URIC ACID    Collection Time: 09/01/21 12:00 AM   Result Value Ref Range    Uric acid 4.7 3.8 - 8.4 mg/dL   [facility] SPECIMEN COL    Collection Time: 09/01/21 10:14 AM   Result Value Ref Range    [facility] SPEC[facility]. Specimens collected/sent to [facility]. Please direct inquiries to [phone]). [facility] SPECIMEN COL    Collection Time: 09/01/21 10:14 AM   Result Value Ref Range    [facility] SPEC[facility]. Specimens collected/sent to [facility]. Please direct inquiries to ([phone]). Assessment / Plan    Encounter Diagnoses   Name Primary?  Encounter for weight loss counseling Yes    Dietary counseling and surveillance     Obesity (BMI 30-39. 9)     BMI 31.0-31.9,adult        1. Weight management      Today, the patient is  Height: 6' (182.9 cm) tall, Weight: 106.1 kg (234 lb) lbs for a Body mass index is 31.74 kg/m². is suffering from obesity   Degree of control: great, continue with VLCD. Body comp did show some decrease in muscle mass, will add on bar/shake after morning workout instead of waiting until afternoon. Progress was reviewed with patient. Goal(s) for next appointment:   -10 lbs    I have reviewed/discussed the above normal BMI with the patient. I have recommended the following interventions: dietary management education, guidance, and counseling, encourage exercise, monitor weight and prescribed dietary intake . Jose David Hartman 2.  Labs    Latest results reviewed with patient. Routine monitoring labs ordered. The primary encounter diagnosis was Encounter for weight loss counseling. Diagnoses of Dietary counseling and surveillance, Obesity (BMI 30-39.9), and BMI 31.0-31.9,adult were also pertinent to this visit. Follow-up and Dispositions    · Return in about 4 weeks (around 10/6/2021). A total time of 25 minutes was spent face-to-face with the patient during this encounter and over half of that time was spent on cousneling.       Dodie Ardon, BRYCEP-C

## 2021-09-09 PROBLEM — E11.40 TYPE 2 DIABETES MELLITUS WITH DIABETIC NEUROPATHY (HCC): Status: RESOLVED | Noted: 2019-02-15 | Resolved: 2021-09-09

## 2021-09-21 ENCOUNTER — OFFICE VISIT (OUTPATIENT)
Dept: FAMILY MEDICINE CLINIC | Age: 46
End: 2021-09-21

## 2021-09-21 ENCOUNTER — CLINICAL SUPPORT (OUTPATIENT)
Dept: SURGERY | Age: 46
End: 2021-09-21

## 2021-09-21 VITALS
SYSTOLIC BLOOD PRESSURE: 104 MMHG | DIASTOLIC BLOOD PRESSURE: 78 MMHG | HEART RATE: 62 BPM | WEIGHT: 230.1 LBS | BODY MASS INDEX: 31.17 KG/M2 | TEMPERATURE: 97.2 F | HEIGHT: 72 IN

## 2021-09-21 VITALS
TEMPERATURE: 97.8 F | HEIGHT: 72 IN | WEIGHT: 232 LBS | BODY MASS INDEX: 31.42 KG/M2 | SYSTOLIC BLOOD PRESSURE: 100 MMHG | HEART RATE: 63 BPM | DIASTOLIC BLOOD PRESSURE: 68 MMHG | RESPIRATION RATE: 14 BRPM | OXYGEN SATURATION: 98 %

## 2021-09-21 DIAGNOSIS — E66.9 OBESITY, UNSPECIFIED CLASSIFICATION, UNSPECIFIED OBESITY TYPE, UNSPECIFIED WHETHER SERIOUS COMORBIDITY PRESENT: Primary | ICD-10-CM

## 2021-09-21 DIAGNOSIS — T14.8XXA SPLINTER: Primary | ICD-10-CM

## 2021-09-21 DIAGNOSIS — Z23 ENCOUNTER FOR IMMUNIZATION: ICD-10-CM

## 2021-09-21 PROCEDURE — 90686 IIV4 VACC NO PRSV 0.5 ML IM: CPT | Performed by: FAMILY MEDICINE

## 2021-09-21 PROCEDURE — 90471 IMMUNIZATION ADMIN: CPT | Performed by: FAMILY MEDICINE

## 2021-09-21 PROCEDURE — 99212 OFFICE O/P EST SF 10 MIN: CPT | Performed by: FAMILY MEDICINE

## 2021-09-21 NOTE — PROGRESS NOTES
Patient attended a weekly class as part of the Medically Supervised Weight Loss Program.  This class was facilitated by a Registered Dietitian. Topics taught at class focused on diet, particularly carbohydrate counting and portion control. Classes also focused on behavior changes and the importance of establishing a daily exercise routine. Progress Note: Weekly Medical Monitoring in the [facility]    Is there anything that you or the patient needs to let the supervising provider know about? no    Over the past week, have you experienced any side-effects? no    Danae Anthony is a 55 y.o. male who is enrolled in [facility]    Danae Anthony was prescribed the VLCD / LCD. Visit Vitals  /78   Pulse 62   Temp 97.2 °F (36.2 °C)   Ht 6' (1.829 m)   Wt 104.4 kg (230 lb 1.6 oz)   BMI 31.21 kg/m²     Weight Metrics 9/21/2021 9/21/2021 9/8/2021 9/8/2021 8/31/2021 8/31/2021 8/24/2021   Weight 230 lb 1.6 oz 232 lb - 234 lb - 236 lb 11.2 oz -   Waist Measure Inches 42.5 - 41 - 43.25 - 43.25   Exercise Mins/week - - - - - - -   Body Fat % - - 29.2 - - - -   BMI 31.21 kg/m2 31.46 kg/m2 - 31.74 kg/m2 - 32.1 kg/m2 -         Have you received any other medical care this week? no  If yes, where and for what? Have you had any change in your medications since your last visit? no  If yes what? Did you have any problems adhering to the program last week? no  If yes, please explain:       Eating Habits Over Last Week:  Did you take in 64 oz of non-caloric fluids? yes     Did you consume your prescribed meal replacement regimen each day?  yes       Physical Activity Over the Past Week:    Aerobic exercise: 630 min  Resistance exercise: 3.5 hours workouts / week

## 2021-09-21 NOTE — PROGRESS NOTES
SUBJECTIVE  Chief Complaint   Patient presents with    Skin Problem     left ankle-skin issue; no pain to area, no itching      Patient presents for unusual looking rash on left lateral heel area for a few days. OBJECTIVE    Blood pressure 100/68, pulse 63, temperature 97.8 °F (36.6 °C), temperature source Temporal, resp. rate 14, height 6' (1.829 m), weight 232 lb (105.2 kg), SpO2 98 %. General:  Alert, cooperative, well appearing, in no apparent distress. Skin:  Left lateral heel with a circular 2cm hair-like object embedded in superficial skin layer. ASSESSMENT / PLAN    ICD-10-CM ICD-9-CM    1. Splinter  T14. 8XXA 919.6    2. Encounter for immunization  Z23 V03.89 [place] IMMUNIZ ADMIN,1 SINGLE/COMB VAC/TOXOID      INFLUENZA VIRUS VAC QUAD,SPLIT,PRESV FREE SYRINGE IM     Splinter - hair embedded in skin pulled out. Flu vaccine administered. All chart history elements were reviewed by me at the time of the visit even though marked at time of note closure. Patient understands our medical plan. Patient has provided input and agrees with goals. Alternatives have been explained and offered. All questions answered. The patient is to call if condition worsens or fails to improve. Follow-up and Dispositions    · Return as scheduled.

## 2021-09-21 NOTE — PROGRESS NOTES
1. \"Have you been to the ER, urgent care clinic since your last visit? Hospitalized since your last visit? \" No    2. \"Have you seen or consulted any other health care providers outside of the [address] [place] [address] since your last visit? \" No     3. For patients aged 39-70: Has the patient had a colonoscopy? No     Eye exam? Dr. Patti Mayberry? Unknown       Physician order obtained. Patient completed adult immunization consent form. Allergies, contraindications and recommendations reviewed with patient. Seasonal influenza vaccine administered IM left deltoid. Patient tolerated well. Patient remained in office for 15 minutes after injection and no adverse reactions were noted.

## 2021-09-21 NOTE — PATIENT INSTRUCTIONS
Influenza (Flu) Vaccine (Inactivated or Recombinant): What You Need to Know  Why get vaccinated? Influenza vaccine can prevent influenza (flu). Flu is a contagious disease that spreads around the [place] every year, usually between October and May. Anyone can get the flu, but it is more dangerous for some people. Infants and young children, people 72years of age and older, pregnant women, and people with certain health conditions or a weakened immune system are at greatest risk of flu complications. Pneumonia, bronchitis, sinus infections and ear infections are examples of flu-related complications. If you have a medical condition, such as heart disease, cancer or diabetes, flu can make it worse. Flu can cause fever and chills, sore throat, muscle aches, fatigue, cough, headache, and runny or stuffy nose. Some people may have vomiting and diarrhea, though this is more common in children than adults. Each year, thousands of people in the [facility] die from flu, and many more are hospitalized. Flu vaccine prevents millions of illnesses and flu-related visits to the doctor each year. Influenza vaccine  CDC recommends everyone 10months of age and older get vaccinated every flu season. Children 6 months through 6years of age may need 2 doses during a single flu season. Everyone else needs only 1 dose each flu season. It takes about 2 weeks for protection to develop after vaccination. There are many flu viruses, and they are always changing. Each year a new flu vaccine is made to protect against three or four viruses that are likely to cause disease in the upcoming flu season. Even when the vaccine doesn't exactly match these viruses, it may still provide some protection. Influenza vaccine does not cause flu. Influenza vaccine may be given at the same time as other vaccines.   Talk with your health care provider  Tell your vaccine provider if the person getting the vaccine:  · Has had an allergic reaction after a previous dose of influenza vaccine, or has any severe, life-threatening allergies. · Has ever had Guillain-Barré Syndrome (also called GBS). In some cases, your health care provider may decide to postpone influenza vaccination to a future visit. People with minor illnesses, such as a cold, may be vaccinated. People who are moderately or severely ill should usually wait until they recover before getting influenza vaccine. Your health care provider can give you more information. Risks of a vaccine reaction  · Soreness, redness, and swelling where shot is given, fever, muscle aches, and headache can happen after influenza vaccine. · There may be a very small increased risk of Guillain-Barré Syndrome (GBS) after inactivated influenza vaccine (the flu shot). Neema Rodríguez children who get the flu shot along with pneumococcal vaccine (PCV13), and/or DTaP vaccine at the same time might be slightly more likely to have a seizure caused by fever. Tell your health care provider if a child who is getting flu vaccine has ever had a seizure. People sometimes faint after medical procedures, including vaccination. Tell your provider if you feel dizzy or have vision changes or ringing in the ears. As with any medicine, there is a very remote chance of a vaccine causing a severe allergic reaction, other serious injury, or death. What if there is a serious problem? An allergic reaction could occur after the vaccinated person leaves the clinic. If you see signs of a severe allergic reaction (hives, swelling of the face and throat, difficulty breathing, a fast heartbeat, dizziness, or weakness), call 9-1-1 and get the person to the nearest hospital.  For other signs that concern you, call your health care provider. Adverse reactions should be reported to the Vaccine Adverse Event Reporting System (VAERS). Your health care provider will usually file this report, or you can do it yourself.  Visit the VAERS website at www.vaers. [facility].gov or call 4-607.908.1741. VAERS is only for reporting reactions, and VAERS staff do not give medical advice. The National Vaccine Injury Compensation Program  The National Vaccine Injury Compensation Program (VICP) is a federal program that was created to compensate people who may have been injured by certain vaccines. Visit the VICP website at www.[facility].gov/vaccinecompensation or call 4-133.494.8571 to learn about the program and about filing a claim. There is a time limit to file a claim for compensation. How can I learn more? · Ask your healthcare provider. · Call your local or state health department. · Contact the Centers for Disease Control and Prevention (CDC):  ? Call 2-687.558.4354 (1-800-CDC-INFO) or  ? Visit CDC's website at www.cdc.gov/flu  Vaccine Information Statement (Interim)  Inactivated Influenza Vaccine  8/15/2019  [address] [place] 777TV-29  Department of Health and Human Services  Centers for Disease Control and Prevention  Many Vaccine Information Statements are available in Romansh and other languages. See www.immunize.org/vis. Muchas hojas de información sobre vacunas están disponibles en español y en otros idiomas. Visite www.immunize.org/vis. Care instructions adapted under license by [employer] (which disclaims liability or warranty for this information). If you have questions about a medical condition or this instruction, always ask your healthcare professional. [place] [address] any warranty or liability for your use of this information.

## 2021-09-29 ENCOUNTER — CLINICAL SUPPORT (OUTPATIENT)
Dept: SURGERY | Age: 46
End: 2021-09-29

## 2021-09-29 VITALS
WEIGHT: 228.2 LBS | SYSTOLIC BLOOD PRESSURE: 114 MMHG | HEIGHT: 72 IN | BODY MASS INDEX: 30.91 KG/M2 | DIASTOLIC BLOOD PRESSURE: 82 MMHG | HEART RATE: 78 BPM

## 2021-09-29 DIAGNOSIS — E66.9 OBESITY, UNSPECIFIED CLASSIFICATION, UNSPECIFIED OBESITY TYPE, UNSPECIFIED WHETHER SERIOUS COMORBIDITY PRESENT: Primary | ICD-10-CM

## 2021-09-29 NOTE — PROGRESS NOTES
Patient attended a weekly class as part of the Medically Supervised Weight Loss Program.  This class was facilitated by a Registered Dietitian. Topics taught at class focused on diet, particularly carbohydrate counting and portion control. Classes also focused on behavior changes and the importance of establishing a daily exercise routine. Progress Note: Weekly Medical Monitoring in the [facility] Program    Is there anything that you or the patient needs to let the supervising provider know about? no    Over the past week, have you experienced any side-effects? no    Kristi Mix is a 55 y.o. male who is enrolled in [facility]    Kristi Mix was prescribed the VLCD / LCD. Visit Vitals  /82   Pulse 78   Ht 6' (1.829 m)   Wt 103.5 kg (228 lb 3.2 oz)   BMI 30.95 kg/m²     Weight Metrics 9/29/2021 9/21/2021 9/21/2021 9/8/2021 9/8/2021 8/31/2021 8/31/2021   Weight 228 lb 3.2 oz 230 lb 1.6 oz 232 lb - 234 lb - 236 lb 11.2 oz   Waist Measure Inches - 42.5 - 41 - 43.25 -   Exercise Mins/week - - - - - - -   Body Fat % - - - 29.2 - - -   BMI 30.95 kg/m2 31.21 kg/m2 31.46 kg/m2 - 31.74 kg/m2 - 32.1 kg/m2         Have you received any other medical care this week? no  If yes, where and for what? Have you had any change in your medications since your last visit? no  If yes what? Did you have any problems adhering to the program last week? no  If yes, please explain:       Eating Habits Over Last Week:  Did you take in 64 oz of non-caloric fluids? yes     Did you consume your prescribed meal replacement regimen each day?  yes       Physical Activity Over the Past Week:    Aerobic exercise: 270 min  Resistance exercise: 0 workouts / week

## 2021-10-05 ENCOUNTER — CLINICAL SUPPORT (OUTPATIENT)
Dept: SURGERY | Age: 46
End: 2021-10-05

## 2021-10-05 VITALS
BODY MASS INDEX: 30.79 KG/M2 | SYSTOLIC BLOOD PRESSURE: 120 MMHG | TEMPERATURE: 97.3 F | HEART RATE: 66 BPM | RESPIRATION RATE: 18 BRPM | WEIGHT: 227.3 LBS | HEIGHT: 72 IN | DIASTOLIC BLOOD PRESSURE: 84 MMHG

## 2021-10-05 DIAGNOSIS — E66.9 OBESITY, UNSPECIFIED CLASSIFICATION, UNSPECIFIED OBESITY TYPE, UNSPECIFIED WHETHER SERIOUS COMORBIDITY PRESENT: Primary | ICD-10-CM

## 2021-10-05 NOTE — PROGRESS NOTES
Patient attended a weekly class as part of the Medically Supervised Weight Loss Program.  This class was facilitated by a Registered Dietitian. Topics taught at class focused on diet, particularly carbohydrate counting and portion control. Classes also focused on behavior changes and the importance of establishing a daily exercise routine. Progress Note: Weekly Medical Monitoring in the [facility] Program    Is there anything that you or the patient needs to let the supervising provider know about? no    Over the past week, have you experienced any side-effects? no    Juanita Duvall is a 55 y.o. male who is enrolled in [facility]    Juanita Duvall was prescribed the VLCD / LCD. Visit Vitals  Temp 97.3 °F (36.3 °C)   Ht 6' (1.829 m)   Wt 103.1 kg (227 lb 4.8 oz)   BMI 30.83 kg/m²     Weight Metrics 10/5/2021 10/5/2021 9/29/2021 9/21/2021 9/21/2021 9/8/2021 9/8/2021   Weight - 227 lb 4.8 oz 228 lb 3.2 oz 230 lb 1.6 oz 232 lb - 234 lb   Waist Measure Inches 42 - - 42.5 - 41 -   Exercise Mins/week - - - - - - -   Body Fat % - - - - - 29.2 -   BMI - 30.83 kg/m2 30.95 kg/m2 31.21 kg/m2 31.46 kg/m2 - 31.74 kg/m2         Have you received any other medical care this week? no  If yes, where and for what? Have you had any change in your medications since your last visit? no  If yes what? Did you have any problems adhering to the program last week? no  If yes, please explain:       Eating Habits Over Last Week:  Did you take in 64 oz of non-caloric fluids? yes     Did you consume your prescribed meal replacement regimen each day?  yes       Physical Activity Over the Past Week:    Aerobic exercise: 630 min  Resistance exercise: 7 workouts / week

## 2021-10-13 RX ORDER — AMITRIPTYLINE HYDROCHLORIDE 10 MG/1
TABLET, FILM COATED ORAL
Qty: 90 TABLET | Refills: 1 | Status: SHIPPED | OUTPATIENT
Start: 2021-10-13 | End: 2021-12-16

## 2021-11-08 ENCOUNTER — HOSPITAL ENCOUNTER (OUTPATIENT)
Dept: LAB | Age: 46
Discharge: HOME | End: 2021-11-08

## 2021-11-08 DIAGNOSIS — E66.9 OBESITY (BMI 30-39.9): ICD-10-CM

## 2021-11-08 DIAGNOSIS — Z71.3 DIETARY COUNSELING AND SURVEILLANCE: ICD-10-CM

## 2021-11-08 DIAGNOSIS — Z71.3 ENCOUNTER FOR WEIGHT LOSS COUNSELING: ICD-10-CM

## 2021-11-08 LAB — XX-LABCORP SPECIMEN COL,LCBCF: NORMAL

## 2021-11-08 PROCEDURE — 99001 SPECIMEN HANDLING PT-LAB: CPT

## 2021-11-09 LAB
ALBUMIN SERPL-MCNC: 5 G/DL (ref 4–5)
ALBUMIN/GLOB SERPL: 1.7 {RATIO} (ref 1.2–2.2)
ALP SERPL-CCNC: 83 IU/L (ref 44–121)
ALT SERPL-CCNC: 21 IU/L (ref 0–44)
AST SERPL-CCNC: 21 IU/L (ref 0–40)
BILIRUB SERPL-MCNC: 0.7 MG/DL (ref 0–1.2)
BUN SERPL-MCNC: 19 MG/DL (ref 6–24)
BUN/CREAT SERPL: 20 (ref 9–20)
CALCIUM SERPL-MCNC: 10.2 MG/DL (ref 8.7–10.2)
CHLORIDE SERPL-SCNC: 101 MMOL/L (ref 96–106)
CO2 SERPL-SCNC: 26 MMOL/L (ref 20–29)
CREAT SERPL-MCNC: 0.94 MG/DL (ref 0.76–1.27)
GLOBULIN SER CALC-MCNC: 2.9 G/DL (ref 1.5–4.5)
GLUCOSE SERPL-MCNC: 100 MG/DL (ref 65–99)
POTASSIUM SERPL-SCNC: 4.6 MMOL/L (ref 3.5–5.2)
PROT SERPL-MCNC: 7.9 G/DL (ref 6–8.5)
SODIUM SERPL-SCNC: 142 MMOL/L (ref 134–144)
URATE SERPL-MCNC: 4.3 MG/DL (ref 3.8–8.4)

## 2021-11-11 ENCOUNTER — OFFICE VISIT (OUTPATIENT)
Dept: SURGERY | Age: 46
End: 2021-11-11
Payer: COMMERCIAL

## 2021-11-11 VITALS
SYSTOLIC BLOOD PRESSURE: 116 MMHG | OXYGEN SATURATION: 98 % | HEIGHT: 72 IN | WEIGHT: 221 LBS | DIASTOLIC BLOOD PRESSURE: 81 MMHG | HEART RATE: 60 BPM | TEMPERATURE: 97 F | BODY MASS INDEX: 29.93 KG/M2

## 2021-11-11 DIAGNOSIS — Z86.39 HX OF OBESITY: ICD-10-CM

## 2021-11-11 DIAGNOSIS — E66.3 OVERWEIGHT: ICD-10-CM

## 2021-11-11 DIAGNOSIS — Z71.3 DIETARY COUNSELING AND SURVEILLANCE: Primary | ICD-10-CM

## 2021-11-11 DIAGNOSIS — Z71.3 ENCOUNTER FOR WEIGHT LOSS COUNSELING: ICD-10-CM

## 2021-11-11 PROBLEM — E11.9 TYPE 2 DIABETES MELLITUS (HCC): Status: ACTIVE | Noted: 2021-11-11

## 2021-11-11 PROCEDURE — 99213 OFFICE O/P EST LOW 20 MIN: CPT | Performed by: NURSE PRACTITIONER

## 2021-11-11 NOTE — PROGRESS NOTES
[facility] Progress Note:   F/up Provider Visit    CC: Weight Management    Eduard Damico is a 55 y.o. male who is here for his  f/up medical provider visit for the VLCD Program. he did attend class last week.   -13 lbs  Did not come in last month due to loss of insurance. Started new job and not able to exercise as much. 4-5 MR    Did you have any problems adhering to the program last week? no  If yes, please explain:     Since your last visit, have you experienced any complications? no    Have you received any other medical care this week? no  If yes, where and for what? Have you had any change in your medications since your last visit? no  If yes what? Are you taking an appetite suppressant? no   If yes:  Do you need a refill? BP Readings from Last 3 Encounters:   11/11/21 116/81   10/05/21 120/84   09/29/21 114/82        Eating Habits Over Last Week:  Did you take in 64 oz of non-caloric fluids? yes     Did you consume your prescribed meal replacement regimen each day? yes       Physical Activity Over the Past Week:    Aerobic exercise: 210 min  Resistance exercise: 0 workouts / week      Weight History  Weight Metrics 11/11/2021 11/11/2021 10/5/2021 10/5/2021 9/29/2021 9/21/2021 9/21/2021   Weight - 221 lb - 227 lb 4.8 oz 228 lb 3.2 oz 230 lb 1.6 oz 232 lb   Waist Measure Inches 41 - 42 - - 42.5 -   Exercise Mins/week - - - - - - -   Body Fat % - - - - - - -   BMI - 29.97 kg/m2 - 30.83 kg/m2 30.95 kg/m2 31.21 kg/m2 31.46 kg/m2     Starting wt: 279  Goal wt: 190  EKG due: 229  Ideal body weight: 77.6 kg (171 lb 1.2 oz)  Adjusted ideal body weight: 86.7 kg (191 lb 0.7 oz)  Body mass index is 29.97 kg/m².       History    Past Medical History:   Diagnosis Date    Asthma     Broken leg left    broke left leg and tore ligaments    Diabetes mellitus     no meds    Dupuytren's contracture     Essential hypertension     no meds    Fracture of left leg 08/2020    HTN (hypertension)     Ill-defined condition     neuropathy    Psoriatic arthritis, destructive type ([facility] [address]     Sarcoidosis     says he had an arm mass removed and the path showed sarcoid, negative CXR       Past Surgical History:   Procedure Laterality Date    CLOSED RX NOSE/JAW FRAC+WIRES Right 1994    broken jaw had to wire it    HC CUFF CRYO SHOULDER [facility] USE ONLY Left 03/18/2021    left shoulder cuff surgery    HX CARPAL TUNNEL RELEASE      HX ORTHOPAEDIC      fx skull/ broken jaw     HX ORTHOPAEDIC Right     carpal tunnel    HX ORTHOPAEDIC Left 01/22/2021    knee arthroscopy    HX ORTHOPAEDIC      left rotator cuff    HX OTHER SURGICAL      lipoma removed     FL EXC SKIN BENIG 0.6-1CM TRUNK,ARM,LEG N/A 10/11/2018    Dr. Danny Crystal 1.1-2CM TRUNK,ARM,LEG N/A 10/11/2018    Dr. Danny Crystal 2.1-3CM TRUNK,ARM,LEG N/A 10/11/2018    Dr. Christopher Sofia       Current Outpatient Medications   Medication Sig Dispense Refill    amitriptyline (ELAVIL) 10 mg tablet TAKE ONE TABLET BY MOUTH ONCE NIGHTLY 90 Tablet 1    OTHER Take 1 Tablet by mouth daily. Tumeric      metaxalone (SKELAXIN) 800 mg tablet Take 800 mg by mouth three (3) times daily.  meloxicam (MOBIC) 15 mg tablet Take 15 mg by mouth daily.  multivitamin (ONE A DAY) tablet Take 1 Tablet by mouth daily.  cyclobenzaprine (FLEXERIL) 5 mg tablet TAKE ONE TABLET BY MOUTH TWICE A DAY AS NEEDED FOR MUSCLE SPASM 60 Tab 1    adalimumab (HUMIRA PEN SC) 40 mg by SubCUTAneous route every seven (7) days.          Allergies   Allergen Reactions    Gabapentin Palpitations and Other (comments)     HR RACES       Social History     Tobacco Use    Smoking status: Never Smoker    Smokeless tobacco: Never Used   Substance Use Topics    Alcohol use: Not Currently     Alcohol/week: 2.0 standard drinks     Types: 2 Shots of liquor per week     Comment: social    Drug use: Not Currently       Family History   Problem Relation Age of Onset   [facility] Ast[facility] Mother     Stroke Father     Alcohol abuse Father     Substance Abuse Father     Diabetes Maternal Grandmother     Hypertension Maternal Grandmother     High Cholesterol Maternal Grandmother     Stroke Maternal Grandmother     Cancer Maternal Grandfather 79        prostate cancer and skin    Colon Cancer Maternal Grandfather     Depression Maternal Uncle        Family Status   Relation Name Status    Mother  Alive    Father   at age 61        unsure    Sister  Alive    MGM  Alive    Iesha 52  (Not Specified)         Medications:  Outpatient Medications Marked as Taking for the 21 encounter (Office Visit) with Gino Guerrier NP   Medication Sig Dispense Refill    amitriptyline (ELAVIL) 10 mg tablet TAKE ONE TABLET BY MOUTH ONCE NIGHTLY 90 Tablet 1    OTHER Take 1 Tablet by mouth daily. Tumeric      metaxalone (SKELAXIN) 800 mg tablet Take 800 mg by mouth three (3) times daily.  meloxicam (MOBIC) 15 mg tablet Take 15 mg by mouth daily.  multivitamin (ONE A DAY) tablet Take 1 Tablet by mouth daily.  cyclobenzaprine (FLEXERIL) 5 mg tablet TAKE ONE TABLET BY MOUTH TWICE A DAY AS NEEDED FOR MUSCLE SPASM 60 Tab 1    adalimumab (HUMIRA PEN SC) 40 mg by SubCUTAneous route every seven (7) days. Review of Systems  Review of Systems   Constitutional: Positive for weight loss. Negative for malaise/fatigue. Respiratory: Negative. Cardiovascular: Negative. Gastrointestinal: Negative. Genitourinary: Negative. Musculoskeletal: Positive for joint pain. Objective  Visit Vitals  /81 (BP 1 Location: Right arm, BP Patient Position: Sitting)   Pulse 60   Temp 97 °F (36.1 °C)   Ht 6' (1.829 m)   Wt 100.2 kg (221 lb)   SpO2 98%   BMI 29.97 kg/m²     No LMP for male patient. Physical Exam  Physical Exam  Vitals and nursing note reviewed. Constitutional:       Appearance: Normal appearance.    HENT:      Head: Normocephalic and atraumatic. Pulmonary:      Effort: Pulmonary effort is normal.   Musculoskeletal:         General: Normal range of motion. Neurological:      General: No focal deficit present. Mental Status: He is alert and oriented to person, place, and time. Psychiatric:         Mood and Affect: Mood normal.         Behavior: Behavior normal.           Recent Results (from the past 672 hour(s))   METABOLIC PANEL, COMPREHENSIVE    Collection Time: 11/08/21 12:00 AM   Result Value Ref Range    Glucose 100 (H) 65 - 99 mg/dL    BUN 19 6 - 24 mg/dL    Creatinine 0.94 0.76 - 1.27 mg/dL    GFR est non-AA 97 >59 mL/min/1.73    GFR est  >59 mL/min/1.73    BUN/Creatinine ratio 20 9 - 20    Sodium 142 134 - 144 mmol/L    Potassium 4.6 3.5 - 5.2 mmol/L    Chloride 101 96 - 106 mmol/L    CO2 26 20 - 29 mmol/L    Calcium 10.2 8.7 - 10.2 mg/dL    Protein, total 7.9 6.0 - 8.5 g/dL    Albumin 5.0 4.0 - 5.0 g/dL    GLOBULIN, TOTAL 2.9 1.5 - 4.5 g/dL    A-G Ratio 1.7 1.2 - 2.2    Bilirubin, total 0.7 0.0 - 1.2 mg/dL    Alk. phosphatase 83 44 - 121 IU/L    AST (SGOT) 21 0 - 40 IU/L    ALT (SGPT) 21 0 - 44 IU/L   URIC ACID    Collection Time: 11/08/21 12:00 AM   Result Value Ref Range    Uric acid 4.3 3.8 - 8.4 mg/dL   [facility] SPECIMEN COL    Collection Time: 11/08/21 12:12 PM   Result Value Ref Range    [facility]. Specimens collected/sent to [facility]. Please direct inquiries to ([phone]). Assessment / Plan    Encounter Diagnoses   Name Primary?  Dietary counseling and surveillance Yes    Encounter for weight loss counseling     Hx of obesity     Overweight     BMI 29.0-29.9,adult        1. Weight management      Today, the patient is  Height: 6' (182.9 cm) tall, Weight: 100.2 kg (221 lb) lbs for a Body mass index is 29.97 kg/m². is suffering from overweight      Degree of control: Great weight loss, concerned about losing muscle mass.  Will continue to add on periodic 5th shake, especially on work out days. Progress was reviewed with patient. Goal(s) for next appointment:   -10 lbs  EKG due next visit. I have reviewed/discussed the above normal BMI with the patient. I have recommended the following interventions: dietary management education, guidance, and counseling, encourage exercise, monitor weight and prescribed dietary intake . Rosy Herron 2.  Labs    Latest results reviewed with patient   Routine monitoring labs ordered  Orders Placed This Encounter    METABOLIC PANEL, COMPREHENSIVE    URIC ACID    EKG, 12 LEAD, INITIAL       The primary encounter diagnosis was Dietary counseling and surveillance. Diagnoses of Encounter for weight loss counseling, Hx of obesity, Overweight, and BMI 29.0-29.9,adult were also pertinent to this visit. Follow-up and Dispositions    · Return in about 4 weeks (around 12/9/2021). A total time of 25 minutes was spent face-to-face with the patient during this encounter and over half of that time was spent on cousneling.       SANDY Rowe

## 2021-11-11 NOTE — PATIENT INSTRUCTIONS
Dr. Karson Ledesma  [email or web address]    Dr. Hollie Agrawal  Phone: [facility]  [address]    Dr. Orlando Burdick  Phone [phone]  [facility].    [place], [address] [place] [address]    [address]  [address]

## 2021-11-30 ENCOUNTER — OFFICE VISIT (OUTPATIENT)
Dept: FAMILY MEDICINE CLINIC | Age: 46
End: 2021-11-30
Payer: MEDICARE

## 2021-11-30 VITALS
DIASTOLIC BLOOD PRESSURE: 78 MMHG | BODY MASS INDEX: 30.07 KG/M2 | TEMPERATURE: 97.8 F | RESPIRATION RATE: 14 BRPM | WEIGHT: 222 LBS | SYSTOLIC BLOOD PRESSURE: 110 MMHG | HEIGHT: 72 IN | OXYGEN SATURATION: 95 % | HEART RATE: 73 BPM

## 2021-11-30 DIAGNOSIS — M54.50 ACUTE RIGHT-SIDED LOW BACK PAIN WITHOUT SCIATICA: Primary | ICD-10-CM

## 2021-11-30 DIAGNOSIS — R21 RASH: ICD-10-CM

## 2021-11-30 PROCEDURE — G8510 SCR DEP NEG, NO PLAN REQD: HCPCS | Performed by: FAMILY MEDICINE

## 2021-11-30 PROCEDURE — G8752 SYS BP LESS 140: HCPCS | Performed by: FAMILY MEDICINE

## 2021-11-30 PROCEDURE — G8427 DOCREV CUR MEDS BY ELIG CLIN: HCPCS | Performed by: FAMILY MEDICINE

## 2021-11-30 PROCEDURE — G8417 CALC BMI ABV UP PARAM F/U: HCPCS | Performed by: FAMILY MEDICINE

## 2021-11-30 PROCEDURE — 99212 OFFICE O/P EST SF 10 MIN: CPT | Performed by: FAMILY MEDICINE

## 2021-11-30 PROCEDURE — G8754 DIAS BP LESS 90: HCPCS | Performed by: FAMILY MEDICINE

## 2021-11-30 NOTE — PROGRESS NOTES
1. \"Have you been to the ER, urgent care clinic since your last visit? Hospitalized since your last visit? \" No    2. \"Have you seen or consulted any other health care providers outside of the [address] [place] [address] since your last visit? \" No     3. For patients aged 39-70: Has the patient had a colonoscopy / FIT/ Cologuard? No     If the patient is female:    4. For patients aged 41-77: Has the patient had a mammogram within the past 2 years? NA based on age or sex    11. For patients aged 21-65: Has the patient had a pap smear?  NA based on age or sex

## 2021-11-30 NOTE — PROGRESS NOTES
SUBJECTIVE  Chief Complaint   Patient presents with    LOW BACK PAIN     low back pain into right side; HEP does not help      Patient presents complaining of a 3-4 week history of low back pain. Location: right low back, upper buttocks   Quality: sore, tight, initially was a 7-8/10 and now rated a 2/10  Injury: denies but started back up after getting aggressively back into exercise with running  Radiation: around to side and some RLQ  Has tried: saw a spine specialist and was placed on diclofenac despite being on mobic. Appears the spine doc did not know that. He was also placed on a prednisone taper which he thinks has helped. Aggravating factors: as he extends from a bent forward position, exercise activity  Relieving factors: prednisone, stretches  X-rays: at spine docs ([facility])  The patient denies any numbness, tingling, or weakness radiating into the lower extremity. Also concerned about genital warts and wanted testing. He says he had them in college but has had differing things he has been told. He became more concerned lately since he developed a small red area on the proximal shaft of the penis. He tried an OTC wart removal spray. It has been present for about 2 weeks. Says he thought maybe it was a stanley. Denies any history of painful genital lesions. OBJECTIVE    Blood pressure 110/78, pulse 73, temperature 97.8 °F (36.6 °C), temperature source Temporal, resp. rate 14, height 6' (1.829 m), weight 222 lb (100.7 kg), SpO2 95 %. General:  alert, cooperative, well appearing, in no apparent distress. Back:  Inspection of the back demonstrates there is no obvious deformity or misalignment. There is no bony tenderness along the lumbar vertebrae or sacroiliac joints. There is no paraspinal muscle tenderness. Range of motion testing demonstrates there is normal flexion, extension, side bending and rotation of the back.    Minimal discomfort when extending from a forward flexed position. Mild piriformis and glute triggers along with proximal IT band on the right. Neuro: no gross deficits   Skin:  3mm minimally raised papular lesion on the proximal shaft of penis. It is similar to surrounding normally appearing follicular skin. ASSESSMENT / PLAN    ICD-10-CM ICD-9-CM    1. Acute right-sided low back pain without sciatica  M54.50 724.2    2. Rash  R21 782.1      LBP - Work on heat and stretches of piriformis. Pt ed handout provided. Seems that spine doc wanted him to see us instead as this is nonsurgical.    I advised him on choosing between mobic vs diclofenac as they interact. Requesting notes and film read. Refer to PT if not better. Rash - appears as an irritated follicular hyperplastic lesion. Monitor to resolution over 2-4 weeks. We discussed genital warts and genital herpes in depth - testing (or lack thereof for warts), etiology, typical course of each. All chart history elements were reviewed by me at the time of the visit even though marked at time of note closure. Patient understands our medical plan. Patient has provided input and agrees with goals. Alternatives have been explained and offered. All questions answered. The patient is to call if condition worsens or fails to improve. Follow-up and Dispositions    · Return as scheduled.

## 2021-11-30 NOTE — PATIENT INSTRUCTIONS
Piriformis Syndrome: Care Instructions  Your Care Instructions     The piriformis muscle is deep under your rear end (buttock). One end of the muscle connects deep inside the pelvic area, and the other end attaches to the top of the thighbone. This muscle can press on the sciatic nerve that runs from your spine down your leg. When this happens, you may have pain, numbness, and tingling in the buttock and down the back of your leg. This is called piriformis syndrome. The pain may get worse when you sit for a long time or climb stairs. Also, you may be more likely to develop piriformis syndrome if you run or walk often. Your doctor will check for other causes of your pain before treating this syndrome. Treatment may include stretching exercises, massage, and medicine for the pain and swelling. If these do not help, you may get a shot of steroid medicine. Until the pain is gone, you may need to rest the muscle and limit activities like running. Exercises and a change in how you move and sit may be enough to stop the pressure on the nerve. Follow-up care is a key part of your treatment and safety. Be sure to make and go to all appointments, and call your doctor if you are having problems. It's also a good idea to know your test results and keep a list of the medicines you take. How can you care for yourself at home? · If your doctor thinks that strenuous exercise is causing your problem, stop or cut back on activities such as running. You may find swimming to be a good exercise for a while. · Stretch the piriformis muscle. ? Lie on your back. ? Bend one leg at the knee and keep the other leg flat on the ground. ? Raise your bent knee up and then move it across your body. Hold the outside of the knee with the opposite hand. ? Gently pull the knee with your hand toward the opposite shoulder. ? Hold the stretch for at least 15 to 30 seconds. Switch legs. ? Do the stretch several times each day.   · Massage the muscle to relieve pressure. ? Sit on the floor. Lean to one side so that the hip on your sore side is off the ground. Put a tennis ball under your buttock on that side. ? As you put weight onto the tennis ball, you may find spots that are especially sore. Move gently so that the tennis ball gently massages each of the sore spots. · Use ice or heat to help reduce pain. Put ice or a cold pack or a heating pad set on low or a warm cloth on the sore area for 10 to 20 minutes at a time. Put a thin cloth between the ice pack or heating pad and your skin. · Ask your doctor if you can take an over-the-counter pain medicine, such as acetaminophen (Tylenol), ibuprofen (Advil, Motrin), or naproxen (Aleve). Be safe with medicines. Read and follow all instructions on the label. · Have your doctor or a physical therapist watch how you move. You may need physical therapy or special inserts in your shoes (orthotics) to help you move in a way that does not put pressure on your nerves. When should you call for help? Watch closely for changes in your health, and be sure to contact your doctor if:    · You do not feel better after several weeks of home care.     · Your pain gets worse.     · Your leg becomes weak or numb. Where can you learn more? Go to http://www.gray.com/  Enter C901 in the search box to learn more about \"Piriformis Syndrome: Care Instructions. \"  Current as of: July 1, 2021               Content Version: 13.0  © 2006-2021 [employer]. Care instructions adapted under license by [employer] (which disclaims liability or warranty for this information). If you have questions about a medical condition or this instruction, always ask your healthcare professional. [place] [address] any warranty or liability for your use of this information.          Piriformis Syndrome: Exercises  Introduction  Here are some examples of exercises for you to try. The exercises may be suggested for a condition or for rehabilitation. Start each exercise slowly. Ease off the exercises if you start to have pain. You will be told when to start these exercises and which ones will work best for you. How to do the exercises  Hip rotator stretch    1. Lie on your back with both knees bent and your feet flat on the floor. 2. Put the ankle of your affected leg on your opposite thigh near your knee. 3. Use your hand to gently push your knee (on your affected leg) away from your body until you feel a gentle stretch around your hip. 4. Hold the stretch for 15 to 30 seconds. 5. Repeat 2 to 4 times. 6. Switch legs and repeat steps 1 through 5. Piriformis stretch    1. Lie on your back with your legs straight. 2. Lift your affected leg and bend your knee. With your opposite hand, reach across your body, and then gently pull your knee toward your opposite shoulder. 3. Hold the stretch for 15 to 30 seconds. 4. Repeat with your other leg. 5. Repeat 2 to 4 times on each side. Lower abdominal strengthening    1. Lie on your back with your knees bent and your feet flat on the floor. 2. Tighten your belly muscles by pulling your belly button in toward your spine. 3. Lift one foot off the floor and bring your knee toward your chest, so that your knee is straight above your hip and your leg is bent like the letter \"L. \"  4. Lift the other knee up to the same position. 5. Lower one leg at a time to the starting position. 6. Keep alternating legs until you have lifted each leg 8 to 12 times. 7. Be sure to keep your belly muscles tight and your back still as you are moving your legs. Be sure to breathe normally. Follow-up care is a key part of your treatment and safety. Be sure to make and go to all appointments, and call your doctor if you are having problems. It's also a good idea to know your test results and keep a list of the medicines you take.   Current as of: July 1, 2021               Content Version: 13.0  © 8611-3188 [employer]. Care instructions adapted under license by [employer] (which disclaims liability or warranty for this information). If you have questions about a medical condition or this instruction, always ask your healthcare professional. [address] any warranty or liability for your use of this information.

## 2021-12-14 ENCOUNTER — HOSPITAL ENCOUNTER (OUTPATIENT)
Dept: LAB | Age: 46
Discharge: HOME | End: 2021-12-14

## 2021-12-14 LAB — XX-LABCORP SPECIMEN COL,LCBCF: NORMAL

## 2021-12-14 PROCEDURE — 99001 SPECIMEN HANDLING PT-LAB: CPT

## 2021-12-15 LAB
ALBUMIN SERPL-MCNC: 4.4 G/DL (ref 4–5)
ALBUMIN/GLOB SERPL: 1.7 {RATIO} (ref 1.2–2.2)
ALP SERPL-CCNC: 79 IU/L (ref 44–121)
ALT SERPL-CCNC: 19 IU/L (ref 0–44)
AST SERPL-CCNC: 24 IU/L (ref 0–40)
BILIRUB SERPL-MCNC: 0.6 MG/DL (ref 0–1.2)
BUN SERPL-MCNC: 20 MG/DL (ref 6–24)
BUN/CREAT SERPL: 22 (ref 9–20)
CALCIUM SERPL-MCNC: 9.4 MG/DL (ref 8.7–10.2)
CHLORIDE SERPL-SCNC: 101 MMOL/L (ref 96–106)
CO2 SERPL-SCNC: 29 MMOL/L (ref 20–29)
CREAT SERPL-MCNC: 0.92 MG/DL (ref 0.76–1.27)
GLOBULIN SER CALC-MCNC: 2.6 G/DL (ref 1.5–4.5)
GLUCOSE SERPL-MCNC: 111 MG/DL (ref 65–99)
POTASSIUM SERPL-SCNC: 4.5 MMOL/L (ref 3.5–5.2)
PROT SERPL-MCNC: 7 G/DL (ref 6–8.5)
SODIUM SERPL-SCNC: 141 MMOL/L (ref 134–144)
URATE SERPL-MCNC: 5 MG/DL (ref 3.8–8.4)

## 2021-12-16 ENCOUNTER — OFFICE VISIT (OUTPATIENT)
Dept: SURGERY | Age: 46
End: 2021-12-16
Payer: MEDICARE

## 2021-12-16 VITALS
HEART RATE: 65 BPM | DIASTOLIC BLOOD PRESSURE: 82 MMHG | TEMPERATURE: 97.9 F | WEIGHT: 214 LBS | SYSTOLIC BLOOD PRESSURE: 114 MMHG | BODY MASS INDEX: 28.99 KG/M2 | OXYGEN SATURATION: 95 % | HEIGHT: 72 IN

## 2021-12-16 DIAGNOSIS — E66.3 OVERWEIGHT: ICD-10-CM

## 2021-12-16 DIAGNOSIS — Z71.3 DIETARY COUNSELING AND SURVEILLANCE: Primary | ICD-10-CM

## 2021-12-16 DIAGNOSIS — Z71.3 ENCOUNTER FOR WEIGHT LOSS COUNSELING: ICD-10-CM

## 2021-12-16 DIAGNOSIS — Z86.39 HX OF OBESITY: ICD-10-CM

## 2021-12-16 PROCEDURE — G8427 DOCREV CUR MEDS BY ELIG CLIN: HCPCS | Performed by: NURSE PRACTITIONER

## 2021-12-16 PROCEDURE — G8417 CALC BMI ABV UP PARAM F/U: HCPCS | Performed by: NURSE PRACTITIONER

## 2021-12-16 PROCEDURE — G8432 DEP SCR NOT DOC, RNG: HCPCS | Performed by: NURSE PRACTITIONER

## 2021-12-16 PROCEDURE — G8752 SYS BP LESS 140: HCPCS | Performed by: NURSE PRACTITIONER

## 2021-12-16 PROCEDURE — 99212 OFFICE O/P EST SF 10 MIN: CPT | Performed by: NURSE PRACTITIONER

## 2021-12-16 PROCEDURE — G8754 DIAS BP LESS 90: HCPCS | Performed by: NURSE PRACTITIONER

## 2021-12-28 ENCOUNTER — CLINICAL SUPPORT (OUTPATIENT)
Dept: SURGERY | Age: 46
End: 2021-12-28

## 2021-12-28 VITALS
RESPIRATION RATE: 18 BRPM | DIASTOLIC BLOOD PRESSURE: 78 MMHG | TEMPERATURE: 97.8 F | SYSTOLIC BLOOD PRESSURE: 120 MMHG | WEIGHT: 214.4 LBS | HEIGHT: 72 IN | HEART RATE: 72 BPM | BODY MASS INDEX: 29.04 KG/M2

## 2021-12-28 DIAGNOSIS — E66.9 OBESITY, UNSPECIFIED CLASSIFICATION, UNSPECIFIED OBESITY TYPE, UNSPECIFIED WHETHER SERIOUS COMORBIDITY PRESENT: Primary | ICD-10-CM

## 2021-12-28 NOTE — PROGRESS NOTES
Chief Complaint   Patient presents with    Weight Management     Patient attended a weekly class as part of the Medically Supervised Weight Loss Program.  This class was facilitated by a Registered Dietitian. Topics taught at class focused on diet, particularly carbohydrate counting and portion control. Classes also focused on behavior changes and the importance of establishing a daily exercise routine. Progress Note: Weekly Medical Monitoring in the [facility]    Is there anything that you or the patient needs to let the supervising provider know about? no    Over the past week, have you experienced any side-effects? no    Emily Kohler is a 55 y.o. male who is enrolled in [facility]    Emily Kohler was prescribed the VLCD / LCD. Visit Vitals  Pulse 72   Temp 97.8 °F (36.6 °C)   Resp 18   Ht 6' (1.829 m)   Wt 97.3 kg (214 lb 6.4 oz)   BMI 29.08 kg/m²     Weight Metrics 12/28/2021 12/21/2021 12/16/2021 12/16/2021 11/30/2021 11/11/2021 11/11/2021   Weight 214 lb 6.4 oz 216 lb - 214 lb 222 lb - 221 lb   Waist Measure Inches - - 40.5 - - 41 -   Exercise Mins/week - - - - - - -   Body Fat % - - - - - - -   BMI 29.08 kg/m2 29.29 kg/m2 - 29.02 kg/m2 30.11 kg/m2 - 29.97 kg/m2         Have you received any other medical care this week? no  If yes, where and for what? Have you had any change in your medications since your last visit? no  If yes what? Did you have any problems adhering to the program last week? no  If yes, please explain:       Eating Habits Over Last Week:  Did you take in 64 oz of non-caloric fluids? yes     Did you consume your prescribed meal replacement regimen each day?  yes       Physical Activity Over the Past Week:    Aerobic exercise: 630 min  Resistance exercise: 7 workouts / week

## 2022-01-05 ENCOUNTER — CLINICAL SUPPORT (OUTPATIENT)
Dept: SURGERY | Age: 47
End: 2022-01-05

## 2022-01-05 VITALS
SYSTOLIC BLOOD PRESSURE: 107 MMHG | HEART RATE: 78 BPM | DIASTOLIC BLOOD PRESSURE: 73 MMHG | WEIGHT: 215.4 LBS | BODY MASS INDEX: 29.17 KG/M2 | RESPIRATION RATE: 14 BRPM | TEMPERATURE: 97.7 F | OXYGEN SATURATION: 98 % | HEIGHT: 72 IN

## 2022-01-05 DIAGNOSIS — E66.9 OBESITY, UNSPECIFIED CLASSIFICATION, UNSPECIFIED OBESITY TYPE, UNSPECIFIED WHETHER SERIOUS COMORBIDITY PRESENT: Primary | ICD-10-CM

## 2022-01-05 NOTE — PROGRESS NOTES
Patient attended a weekly class as part of the Medically Supervised Weight Loss Program.  This class was facilitated by a Registered Dietitian. Topics taught at class focused on diet, particularly carbohydrate counting and portion control. Classes also focused on behavior changes and the importance of establishing a daily exercise routine. Progress Note: Weekly Medical Monitoring in the [facility]    Is there anything that you or the patient needs to let the supervising provider know about? no    Over the past week, have you experienced any side-effects? no    Tori Martinez is a 55 y.o. male who is enrolled in [facility]    Tori Martinez was prescribed the VLCD / LCD. Visit Vitals  /73   Pulse 78   Temp 97.7 °F (36.5 °C) (Temporal)   Resp 14   Ht 6' (1.829 m)   Wt 97.7 kg (215 lb 6.4 oz)   SpO2 98%   BMI 29.21 kg/m²     Weight Metrics 1/5/2022 1/5/2022 12/28/2021 12/28/2021 12/21/2021 12/16/2021 12/16/2021   Weight - 215 lb 6.4 oz - 214 lb 6.4 oz 216 lb - 214 lb   Waist Measure Inches 41.5 - 39.25 - - 40.5 -   Exercise Mins/week - - - - - - -   Body Fat % - - - - - - -   BMI - 29.21 kg/m2 - 29.08 kg/m2 29.29 kg/m2 - 29.02 kg/m2         Have you received any other medical care this week? no  If yes, where and for what? Have you had any change in your medications since your last visit? no  If yes what? Did you have any problems adhering to the program last week? no  If yes, please explain:       Eating Habits Over Last Week:  Did you take in 64 oz of non-caloric fluids? yes     Did you consume your prescribed meal replacement regimen each day?  yes       Physical Activity Over the Past Week:    Aerobic exercise: 230 min  Resistance exercise: 3 workouts / week

## 2022-01-10 ENCOUNTER — NURSE TRIAGE (OUTPATIENT)
Dept: OTHER | Facility: CLINIC | Age: 47
End: 2022-01-10

## 2022-01-10 NOTE — TELEPHONE ENCOUNTER
Received call from [facility] at [facility] with Red Flag Complaint. Subjective: Caller states \" has a sore throat with pain when swallowing and tiredness. Was unable to get in to [facility]. Was around someone who tested positive for flu 3 days ago  \"     Current Symptoms: see above    Onset: 3 days ago; gradual    Associated Symptoms: NA    Pain Severity: 6/10; burning and sharp; constant    Temperature: denies fever    What has been tried: cough drops, Dayquil    LMP: NA Pregnant: NA    Recommended disposition: see today in office    Care advice provided, patient verbalizes understanding; denies any other questions or concerns; instructed to call back for any new or worsening symptoms. Writer provided warm transfer to Oscar Londono at [facility] for appointment scheduling    Attention Provider: Thank you for allowing me to participate in the care of your patient. The patient was connected to triage in response to information provided to the ECC. Please do not respond through this encounter as the response is not directed to a shared pool.       Reason for Disposition   Patient wants to be seen    Additional Information   Negative: Diabetes mellitus or weak immune system (e.g., HIV positive, cancer chemo, splenectomy, organ transplant, chronic steroids)     On Humira    Protocols used: SORE THROAT-ADULT-OH

## 2022-01-19 ENCOUNTER — CLINICAL SUPPORT (OUTPATIENT)
Dept: SURGERY | Age: 47
End: 2022-01-19

## 2022-01-19 ENCOUNTER — HOSPITAL ENCOUNTER (OUTPATIENT)
Dept: LAB | Age: 47
Discharge: HOME | End: 2022-01-19

## 2022-01-19 ENCOUNTER — HOSPITAL ENCOUNTER (OUTPATIENT)
Dept: LAB | Age: 47
Discharge: HOME | End: 2022-01-19
Payer: COMMERCIAL

## 2022-01-19 ENCOUNTER — OFFICE VISIT (OUTPATIENT)
Dept: FAMILY MEDICINE CLINIC | Age: 47
End: 2022-01-19

## 2022-01-19 VITALS
BODY MASS INDEX: 28.99 KG/M2 | WEIGHT: 214 LBS | OXYGEN SATURATION: 98 % | DIASTOLIC BLOOD PRESSURE: 80 MMHG | HEIGHT: 72 IN | RESPIRATION RATE: 16 BRPM | HEART RATE: 77 BPM | SYSTOLIC BLOOD PRESSURE: 110 MMHG | TEMPERATURE: 98.6 F

## 2022-01-19 VITALS
HEART RATE: 62 BPM | TEMPERATURE: 97.4 F | DIASTOLIC BLOOD PRESSURE: 70 MMHG | HEIGHT: 72 IN | WEIGHT: 209.4 LBS | SYSTOLIC BLOOD PRESSURE: 118 MMHG | BODY MASS INDEX: 28.36 KG/M2 | RESPIRATION RATE: 20 BRPM

## 2022-01-19 DIAGNOSIS — I10 ESSENTIAL HYPERTENSION: ICD-10-CM

## 2022-01-19 DIAGNOSIS — G89.29 CHRONIC LOW BACK PAIN WITH RIGHT-SIDED SCIATICA, UNSPECIFIED BACK PAIN LATERALITY: ICD-10-CM

## 2022-01-19 DIAGNOSIS — E11.8 TYPE 2 DIABETES MELLITUS WITH COMPLICATION, WITHOUT LONG-TERM CURRENT USE OF INSULIN (HCC): Primary | ICD-10-CM

## 2022-01-19 DIAGNOSIS — L40.50 PSORIATIC ARTHRITIS (HCC): ICD-10-CM

## 2022-01-19 DIAGNOSIS — M54.41 CHRONIC LOW BACK PAIN WITH RIGHT-SIDED SCIATICA, UNSPECIFIED BACK PAIN LATERALITY: ICD-10-CM

## 2022-01-19 DIAGNOSIS — E66.3 OVERWEIGHT: ICD-10-CM

## 2022-01-19 DIAGNOSIS — Z71.3 DIETARY COUNSELING AND SURVEILLANCE: ICD-10-CM

## 2022-01-19 DIAGNOSIS — Z71.3 ENCOUNTER FOR WEIGHT LOSS COUNSELING: ICD-10-CM

## 2022-01-19 DIAGNOSIS — E66.9 OBESITY, UNSPECIFIED CLASSIFICATION, UNSPECIFIED OBESITY TYPE, UNSPECIFIED WHETHER SERIOUS COMORBIDITY PRESENT: Primary | ICD-10-CM

## 2022-01-19 DIAGNOSIS — Z12.11 COLON CANCER SCREENING: ICD-10-CM

## 2022-01-19 DIAGNOSIS — Z86.39 HX OF OBESITY: ICD-10-CM

## 2022-01-19 LAB
ATRIAL RATE: 62 BPM
CALCULATED P AXIS, ECG09: 63 DEGREES
CALCULATED R AXIS, ECG10: 48 DEGREES
CALCULATED T AXIS, ECG11: 56 DEGREES
DIAGNOSIS, 93000: NORMAL
P-R INTERVAL, ECG05: 150 MS
Q-T INTERVAL, ECG07: 412 MS
QRS DURATION, ECG06: 82 MS
QTC CALCULATION (BEZET), ECG08: 418 MS
VENTRICULAR RATE, ECG03: 62 BPM
XX-LABCORP SPECIMEN COL,LCBCF: NORMAL

## 2022-01-19 PROCEDURE — 99001 SPECIMEN HANDLING PT-LAB: CPT

## 2022-01-19 PROCEDURE — 99213 OFFICE O/P EST LOW 20 MIN: CPT | Performed by: FAMILY MEDICINE

## 2022-01-19 PROCEDURE — 93005 ELECTROCARDIOGRAM TRACING: CPT

## 2022-01-19 NOTE — PROGRESS NOTES
Chief Complaint   Patient presents with    Weight Management     Patient attended a weekly class as part of the Medically Supervised Weight Loss Program.  This class was facilitated by a Registered Dietitian. Topics taught at class focused on diet, particularly carbohydrate counting and portion control. Classes also focused on behavior changes and the importance of establishing a daily exercise routine. Progress Note: Weekly Medical Monitoring in the [facility] Program    Is there anything that you or the patient needs to let the supervising provider know about? no    Over the past week, have you experienced any side-effects? no    Jaylan Lynch is a 55 y.o. male who is enrolled in [facility]    Jaylan Lynch was prescribed the VLCD / LCD. Visit Vitals  /70   Pulse 62   Temp 97.4 °F (36.3 °C)   Resp 20   Ht 6' (1.829 m)   Wt 95 kg (209 lb 6.4 oz)   BMI 28.40 kg/m²     Weight Metrics 1/19/2022 1/5/2022 1/5/2022 12/28/2021 12/28/2021 12/21/2021 12/16/2021   Weight 209 lb 6.4 oz - 215 lb 6.4 oz - 214 lb 6.4 oz 216 lb -   Waist Measure Inches 37.25 41.5 - 39.25 - - 40.5   Exercise Mins/week - - - - - - -   Body Fat % - - - - - - -   BMI 28.4 kg/m2 - 29.21 kg/m2 - 29.08 kg/m2 29.29 kg/m2 -         Have you received any other medical care this week? no  If yes, where and for what? Have you had any change in your medications since your last visit? no  If yes what? Did you have any problems adhering to the program last week? no  If yes, please explain:       Eating Habits Over Last Week:  Did you take in 64 oz of non-caloric fluids? yes     Did you consume your prescribed meal replacement regimen each day?  yes       Physical Activity Over the Past Week:    Aerobic exercise: 7 hours  Resistance exercise: 7 workouts / week

## 2022-01-19 NOTE — PATIENT INSTRUCTIONS
A Healthy Lifestyle: Care Instructions  Your Care Instructions     A healthy lifestyle can help you feel good, stay at a healthy weight, and have plenty of energy for both work and play. A healthy lifestyle is something you can share with your whole family. A healthy lifestyle also can lower your risk for serious health problems, such as high blood pressure, heart disease, and diabetes. You can follow a few steps listed below to improve your health and the health of your family. Follow-up care is a key part of your treatment and safety. Be sure to make and go to all appointments, and call your doctor if you are having problems. It's also a good idea to know your test results and keep a list of the medicines you take. How can you care for yourself at home? · Do not eat too much sugar, fat, or fast foods. You can still have dessert and treats now and then. The goal is moderation. · Start small to improve your eating habits. Pay attention to portion sizes, drink less juice and soda pop, and eat more fruits and vegetables. ? Eat a healthy amount of food. A 3-ounce serving of meat, for example, is about the size of a deck of cards. Fill the rest of your plate with vegetables and whole grains. ? Limit the amount of soda and sports drinks you have every day. Drink more water when you are thirsty. ? Eat plenty of fruits and vegetables every day. Have an apple or some carrot sticks as an afternoon snack instead of a candy bar. Try to have fruits and/or vegetables at every meal.  · Make exercise part of your daily routine. You may want to start with simple activities, such as walking, bicycling, or slow swimming. Try to be active 30 to 60 minutes every day. You do not need to do all 30 to 60 minutes all at once. For example, you can exercise 3 times a day for 10 or 20 minutes.  Moderate exercise is safe for most people, but it is always a good idea to talk to your doctor before starting an exercise program.  · Keep moving. Bernie Grew the lawn, work in the garden, or [employer] Take the stairs instead of the elevator at work. · If you smoke, quit. People who smoke have an increased risk for heart attack, stroke, cancer, and other lung illnesses. Quitting is hard, but there are ways to boost your chance of quitting tobacco for good. ? Use nicotine gum, patches, or lozenges. ? Ask your doctor about stop-smoking programs and medicines. ? Keep trying. In addition to reducing your risk of diseases in the future, you will notice some benefits soon after you stop using tobacco. If you have shortness of breath or asthma symptoms, they will likely get better within a few weeks after you quit. · Limit how much alcohol you drink. Moderate amounts of alcohol (up to 2 drinks a day for men, 1 drink a day for women) are okay. But drinking too much can lead to liver problems, high blood pressure, and other health problems. Family health  If you have a family, there are many things you can do together to improve your health. · Eat meals together as a family as often as possible. · Eat healthy foods. This includes fruits, vegetables, lean meats and dairy, and whole grains. · Include your family in your fitness plan. Most people think of activities such as jogging or tennis as the way to fitness, but there are many ways you and your family can be more active. Anything that makes you breathe hard and gets your heart pumping is exercise. Here are some tips:  ? Walk to do errands or to take your child to school or the bus.  ? Go for a family bike ride after dinner instead of watching TV. Where can you learn more? Go to http://www.gray.com/  Enter U975 in the search box to learn more about \"A Healthy Lifestyle: Care Instructions. \"  Current as of: June 16, 2021               Content Version: 13.0  © 4926-1335 [employer], [employer].    Care instructions adapted under license by [employer] (which disclaims liability or warranty for this information). If you have questions about a medical condition or this instruction, always ask your healthcare professional. [address] any warranty or liability for your use of this information.

## 2022-01-21 LAB
ALBUMIN SERPL-MCNC: 4.8 G/DL (ref 4–5)
ALBUMIN/GLOB SERPL: 2.1 {RATIO} (ref 1.2–2.2)
ALP SERPL-CCNC: 89 IU/L (ref 44–121)
ALT SERPL-CCNC: 19 IU/L (ref 0–44)
AST SERPL-CCNC: 27 IU/L (ref 0–40)
BILIRUB SERPL-MCNC: 0.7 MG/DL (ref 0–1.2)
BUN SERPL-MCNC: 14 MG/DL (ref 6–24)
BUN/CREAT SERPL: 15 (ref 9–20)
CALCIUM SERPL-MCNC: 9.9 MG/DL (ref 8.7–10.2)
CHLORIDE SERPL-SCNC: 103 MMOL/L (ref 96–106)
CO2 SERPL-SCNC: 29 MMOL/L (ref 20–29)
CREAT SERPL-MCNC: 0.96 MG/DL (ref 0.76–1.27)
GLOBULIN SER CALC-MCNC: 2.3 G/DL (ref 1.5–4.5)
GLUCOSE SERPL-MCNC: 99 MG/DL (ref 65–99)
POTASSIUM SERPL-SCNC: 4.9 MMOL/L (ref 3.5–5.2)
PROT SERPL-MCNC: 7.1 G/DL (ref 6–8.5)
SODIUM SERPL-SCNC: 144 MMOL/L (ref 134–144)
URATE SERPL-MCNC: 6.5 MG/DL (ref 3.8–8.4)

## 2022-01-23 NOTE — PROGRESS NOTES
SUBJECTIVE  Chief Complaint   Patient presents with    Follow-up     chronic conditions     Patient presents for follow-up. No new complaints other than back pains intermittently over the years. He has tried formal PT, meds, core exercises. He is wondering about manual medicine. He has psoriatic arthritis and sees rheumatology. Overall doing well only on Humira. He has diet-controlled diabetes and htn. No chest pain or exertional dyspnea. OBJECTIVE    Blood pressure 110/80, pulse 77, temperature 98.6 °F (37 °C), temperature source Temporal, resp. rate 16, height 6' (1.829 m), weight 214 lb (97.1 kg), SpO2 98 %. General:  Alert, cooperative, well appearing, in no apparent distress. CV:  The heart sounds are regular in rate and rhythm. There is a normal S1 and S2. There or no murmurs. Lungs: Inspiratory and expiratory efforts are full and unlabored. Lung sounds are clear and equal to auscultation throughout all lung fields without wheezing, rales, or rhonchi. Skin:  No rashes, no jaundice. Psych: normal affect. Mood good. Oriented x 3. Judgement and insight intact. Results for Elizbeth Holstein (MRN [id number]) as of 1/23/2022 17:30   Ref.  Range 1/19/2022 00:00   Sodium Latest Ref Range: 134 - 144 mmol/L 144   Potassium Latest Ref Range: 3.5 - 5.2 mmol/L 4.9   Chloride Latest Ref Range: 96 - 106 mmol/L 103   CO2 Latest Ref Range: 20 - 29 mmol/L 29   Glucose Latest Ref Range: 65 - 99 mg/dL 99   BUN Latest Ref Range: 6 - 24 mg/dL 14   Creatinine Latest Ref Range: 0.76 - 1.27 mg/dL 0.96   BUN/Creatinine ratio Latest Ref Range: 9 - 20  15   Calcium Latest Ref Range: 8.7 - 10.2 mg/dL 9.9   GFR est non-AA Latest Ref Range: >59 mL/min/1.73 94   GFR est AA Latest Ref Range: >59 mL/min/1.73 109   Bilirubin, total Latest Ref Range: 0.0 - 1.2 mg/dL 0.7   Protein, total Latest Ref Range: 6.0 - 8.5 g/dL 7.1   Albumin Latest Ref Range: 4.0 - 5.0 g/dL 4.8   A-G Ratio Latest Ref Range: 1.2 - 2.2  2.1   ALT Latest Ref Range: 0 - 44 IU/L 19   AST Latest Ref Range: 0 - 40 IU/L 27   Alk. phosphatase Latest Ref Range: 44 - 121 IU/L 89   Uric acid Latest Ref Range: 3.8 - 8.4 mg/dL 6.5       ASSESSMENT / PLAN    ICD-10-CM ICD-9-CM    1. Type 2 diabetes mellitus with complication, without long-term current use of insulin (HCC)  E11.8 250.90    2. Essential hypertension  I10 401.9    3. Psoriatic arthritis ([facility] [address]  L40.50 696.0    4. Colon cancer screening  Z12.11 V76.51 REFERRAL TO GASTROENTEROLOGY   5. Chronic low back pain with right-sided sciatica, unspecified back pain laterality  M54.41 724.2 REFERRAL TO SPORTS MEDICINE    G89.29 724.3      338.29      DM2 - cont diet and exercise. Follow A1c q6-12 months. Annual eye exam would be ideal.    HTN - controlled off meds with weight loss. Cont lifestyle modification as it has been effective. Psoriatic arthritis - cont per rheumatology. Agree with care plan. Chronic low back pain - discussed referral to Dr. Dean Salvador. All chart history elements were reviewed by me at the time of the visit even though marked at time of note closure. Patient understands our medical plan. Patient has provided input and agrees with goals. Alternatives have been explained and offered. All questions answered. The patient is to call if condition worsens or fails to improve.      Follow-up and Dispositions    · Return September for physical.

## 2022-01-26 ENCOUNTER — OFFICE VISIT (OUTPATIENT)
Dept: SURGERY | Age: 47
End: 2022-01-26
Payer: COMMERCIAL

## 2022-01-26 VITALS
TEMPERATURE: 97.1 F | BODY MASS INDEX: 28.71 KG/M2 | DIASTOLIC BLOOD PRESSURE: 74 MMHG | SYSTOLIC BLOOD PRESSURE: 118 MMHG | HEART RATE: 60 BPM | OXYGEN SATURATION: 97 % | HEIGHT: 72 IN | WEIGHT: 212 LBS

## 2022-01-26 DIAGNOSIS — E66.3 OVERWEIGHT: ICD-10-CM

## 2022-01-26 DIAGNOSIS — Z71.3 DIETARY COUNSELING AND SURVEILLANCE: Primary | ICD-10-CM

## 2022-01-26 DIAGNOSIS — Z71.3 ENCOUNTER FOR WEIGHT LOSS COUNSELING: ICD-10-CM

## 2022-01-26 DIAGNOSIS — Z86.39 HX OF OBESITY: ICD-10-CM

## 2022-01-26 PROCEDURE — 99212 OFFICE O/P EST SF 10 MIN: CPT | Performed by: NURSE PRACTITIONER

## 2022-01-26 NOTE — PROGRESS NOTES
[facility] Progress Note:   F/up Provider Visit    CC: Weight Management    Jaylan Lynch is a 55 y.o. male who is here for his  f/up medical provider visit for the VLCD Program. he did attend class last week. -2 lbs    Sick with the flu 2 weeks ago. Ate crackers and other bland foods and was unable to exercise. Did you have any problems adhering to the program last week? yes  If yes, please explain: Flu    Since your last visit, have you experienced any complications? no    Have you received any other medical care this week? yes  If yes, where and for what? Physical therapy    Have you had any change in your medications since your last visit? no  If yes what? Are you taking an appetite suppressant? no   If yes:  Do you need a refill? BP Readings from Last 3 Encounters:   01/26/22 118/74   01/19/22 110/80   01/19/22 118/70        Eating Habits Over Last Week:  Did you take in 64 oz of non-caloric fluids? yes     Did you consume your prescribed meal replacement regimen each day? yes   MR 4    Physical Activity Over the Past Week:    Aerobic exercise: 420 min  Resistance exercise: 210 workouts / week      Weight History  Weight Metrics 1/26/2022 1/26/2022 1/19/2022 1/19/2022 1/19/2022 1/5/2022 1/5/2022   Weight - 212 lb - 209 lb 6.4 oz 214 lb - 215 lb 6.4 oz   Waist Measure Inches 39.5 - 37.25 - - 41.5 -   Exercise Mins/week - - - - - - -   Body Fat % - - - - - - -   BMI - 28.75 kg/m2 - 28.4 kg/m2 29.02 kg/m2 - 29.21 kg/m2       Starting wt: 279  Goal wt: 185  Last EKG 1/19/2022  Ideal body weight: 77.6 kg (171 lb 1.2 oz)  Adjusted ideal body weight: 85 kg (187 lb 7.1 oz)  Body mass index is 28.75 kg/m².       History    Past Medical History:   Diagnosis Date    Asthma     Broken leg left    broke left leg and tore ligaments    Diabetes mellitus     no meds    Dupuytren's contracture     Essential hypertension     no meds    Fracture of left leg 08/2020    HTN (hypertension)     Ill-defined condition     neuropathy    Psoriatic arthritis, destructive type ([facility] [address]     Sarcoidosis     says he had an arm mass removed and the path showed sarcoid, negative CXR       Past Surgical History:   Procedure Laterality Date    CLOSED RX NOSE/JAW FRAC+WIRES Right 1994    broken jaw had to wire it    HC CUFF CRYO SHOULDER [facility] USE ONLY Left 03/18/2021    left shoulder cuff surgery    HX CARPAL TUNNEL RELEASE      HX ORTHOPAEDIC      fx skull/ broken jaw     HX ORTHOPAEDIC Right     carpal tunnel    HX ORTHOPAEDIC Left 01/22/2021    knee arthroscopy    HX ORTHOPAEDIC      left rotator cuff    HX OTHER SURGICAL      lipoma removed     AL EXC SKIN BENIG 0.6-1CM TRUNK,ARM,LEG N/A 10/11/2018    Dr. Stacia Mantilla 1.1-2CM TRUNK,ARM,LEG N/A 10/11/2018    Dr. Stacia Mantilla 2.1-3CM TRUNK,ARM,LEG N/A 10/11/2018    Dr. Zoraida Mcfadden       Current Outpatient Medications   Medication Sig Dispense Refill    multivitamin (ONE A DAY) tablet Take 1 Tablet by mouth daily.  adalimumab (HUMIRA PEN SC) 40 mg by SubCUTAneous route every seven (7) days. Allergies   Allergen Reactions    Gabapentin Palpitations and Other (comments)     HR RACES       Social History     Tobacco Use    Smoking status: Never Smoker    Smokeless tobacco: Never Used   Substance Use Topics    Alcohol use:  Yes     Alcohol/week: 2.0 standard drinks     Types: 2 Shots of liquor per week     Comment: social    Drug use: Not Currently       Family History   Problem Relation Age of Onset    Asthma Mother     Stroke Father     Alcohol abuse Father     Substance Abuse Father     Diabetes Maternal Grandmother     Hypertension Maternal Grandmother     High Cholesterol Maternal Grandmother     Stroke Maternal Grandmother     Cancer Maternal Grandfather 79        prostate cancer and skin    Colon Cancer Maternal Grandfather 61    Depression Maternal Uncle        Family Status Relation Name Status    Mother  Alive    Father   at age 61        unsure    Sister  Alive    MGM  Alive    Iesha 52  (Not Specified)         Review of Systems  Review of Systems   Constitutional: Positive for weight loss. Musculoskeletal: Positive for joint pain. All other systems reviewed and are negative. Objective  Visit Vitals  /74 (BP 1 Location: Left upper arm, BP Patient Position: Sitting)   Pulse 60   Temp 97.1 °F (36.2 °C)   Ht 6' (1.829 m)   Wt 96.2 kg (212 lb)   SpO2 97%   BMI 28.75 kg/m²     No LMP for male patient. Physical Exam  Physical Exam  Vitals and nursing note reviewed. Constitutional:       Appearance: Normal appearance. HENT:      Head: Normocephalic and atraumatic. Pulmonary:      Effort: Pulmonary effort is normal.   Musculoskeletal:         General: Normal range of motion. Neurological:      General: No focal deficit present. Mental Status: He is alert and oriented to person, place, and time. Psychiatric:         Mood and Affect: Mood normal.         Behavior: Behavior normal.           Recent Results (from the past 672 hour(s))   METABOLIC PANEL, COMPREHENSIVE    Collection Time: 22 12:00 AM   Result Value Ref Range    Glucose 99 65 - 99 mg/dL    BUN 14 6 - 24 mg/dL    Creatinine 0.96 0.76 - 1.27 mg/dL    GFR est non-AA 94 >59 mL/min/1.73    GFR est  >59 mL/min/1.73    BUN/Creatinine ratio 15 9 - 20    Sodium 144 134 - 144 mmol/L    Potassium 4.9 3.5 - 5.2 mmol/L    Chloride 103 96 - 106 mmol/L    CO2 29 20 - 29 mmol/L    Calcium 9.9 8.7 - 10.2 mg/dL    Protein, total 7.1 6.0 - 8.5 g/dL    Albumin 4.8 4.0 - 5.0 g/dL    GLOBULIN, TOTAL 2.3 1.5 - 4.5 g/dL    A-G Ratio 2.1 1.2 - 2.2    Bilirubin, total 0.7 0.0 - 1.2 mg/dL    Alk.  phosphatase 89 44 - 121 IU/L    AST (SGOT) 27 0 - 40 IU/L    ALT (SGPT) 19 0 - 44 IU/L   URIC ACID    Collection Time: 22 12:00 AM   Result Value Ref Range    Uric acid 6.5 3.8 - 8.4 mg/dL   EKG, 12 LEAD, INITIAL    Collection Time: 01/19/22  9:53 AM   Result Value Ref Range    Ventricular Rate 62 BPM    Atrial Rate 62 BPM    P-R Interval 150 ms    QRS Duration 82 ms    Q-T Interval 412 ms    QTC Calculation (Bezet) 418 ms    Calculated P Axis 63 degrees    Calculated R Axis 48 degrees    Calculated T Axis 56 degrees    Diagnosis       Normal sinus rhythm  Possible Left atrial enlargement  Septal infarct , age undetermined  Abnormal ECG  When compared with ECG of 02-JAN-2020 00:40,  No significant change was found  Confirmed by Delfino Cheung ([id number]) on 1/19/2022 9:59:55 AM     [facility] SPECIMEN COL    Collection Time: 01/19/22 10:00 AM   Result Value Ref Range    [facility]. Specimens collected/sent to [facility]. Please direct inquiries to ([phone]). Assessment / Plan    Encounter Diagnoses   Name Primary?  Dietary counseling and surveillance Yes    Encounter for weight loss counseling     Hx of obesity     Overweight     BMI 28.0-28.9,adult      1. Weight management      Today, the patient is  Height: 6' (182.9 cm) tall, Weight: 96.2 kg (212 lb) lbs for a Body mass index is 28.75 kg/m². is suffering from overweight      Degree of control: Tough month due to illness and not able to exercise. Continue with VLCD, increase PA as tolerated. Progress was reviewed with patient. Goal(s) for next appointment:   -10 lbs  I have reviewed/discussed the above normal BMI with the patient. I have recommended the following interventions: dietary management education, guidance, and counseling, encourage exercise, monitor weight and prescribed dietary intake . To[address] Booker 2.  Labs    Latest results reviewed with patient - abnormal EKG, pt reports no chest pain or SOB. Discussed to re-check within next couple of months.     Routine monitoring labs ordered  Orders Placed This Encounter    LIPID PANEL    METABOLIC PANEL, COMPREHENSIVE    URIC ACID     The primary encounter diagnosis was Dietary counseling and surveillance. Diagnoses of Encounter for weight loss counseling, Hx of obesity, Overweight, and BMI 28.0-28.9,adult were also pertinent to this visit.     BRYCE EliasP-C

## 2022-02-08 ENCOUNTER — HOSPITAL ENCOUNTER (OUTPATIENT)
Dept: OCCUPATIONAL MEDICINE | Age: 47
Discharge: HOME | End: 2022-02-08
Attending: FAMILY MEDICINE

## 2022-02-08 ENCOUNTER — OFFICE VISIT (OUTPATIENT)
Dept: ORTHOPEDIC SURGERY | Age: 47
End: 2022-02-08
Payer: COMMERCIAL

## 2022-02-08 VITALS
HEART RATE: 65 BPM | OXYGEN SATURATION: 95 % | HEIGHT: 72 IN | WEIGHT: 221 LBS | BODY MASS INDEX: 29.93 KG/M2 | RESPIRATION RATE: 15 BRPM

## 2022-02-08 DIAGNOSIS — M99.03 LUMBAR REGION SOMATIC DYSFUNCTION: ICD-10-CM

## 2022-02-08 DIAGNOSIS — M76.31 ILIOTIBIAL BAND SYNDROME AFFECTING LOWER LEG, RIGHT: Primary | ICD-10-CM

## 2022-02-08 DIAGNOSIS — S76.011A SPRAIN, GLUTEUS MEDIUS, RIGHT, INITIAL ENCOUNTER: ICD-10-CM

## 2022-02-08 DIAGNOSIS — M99.05 PELVIC SOMATIC DYSFUNCTION: ICD-10-CM

## 2022-02-08 DIAGNOSIS — M76.31 ILIOTIBIAL BAND SYNDROME AFFECTING LOWER LEG, RIGHT: ICD-10-CM

## 2022-02-08 DIAGNOSIS — M70.61 GREATER TROCHANTERIC BURSITIS, RIGHT: ICD-10-CM

## 2022-02-08 DIAGNOSIS — M99.04 SACRAL REGION SOMATIC DYSFUNCTION: ICD-10-CM

## 2022-02-08 PROCEDURE — 99204 OFFICE O/P NEW MOD 45 MIN: CPT | Performed by: FAMILY MEDICINE

## 2022-02-08 PROCEDURE — 98926 OSTEOPATH MANJ 3-4 REGIONS: CPT | Performed by: FAMILY MEDICINE

## 2022-02-08 RX ORDER — DICLOFENAC SODIUM 50 MG/1
50 TABLET, DELAYED RELEASE ORAL 2 TIMES DAILY WITH MEALS
Qty: 60 TABLET | Refills: 1 | Status: SHIPPED | OUTPATIENT
Start: 2022-02-08 | End: 2022-03-01

## 2022-02-08 NOTE — LETTER
2/8/2022    Patient: Colleen Rendon   YOB: 1975   Date of Visit: 2/8/2022     Layton Johnson MD  [address] [place]  [address] [facility] [address]    Dear Layton Johnson MD,      Thank you for referring Mr. Colleen Rendon to [place] [phone]. for evaluation. My notes for this consultation are attached. If you have questions, please do not hesitate to call me. I look forward to following your patient along with you.       Sincerely,    Fernanda Aschoff, DO

## 2022-02-08 NOTE — PROGRESS NOTES
HISTORY OF PRESENT ILLNESS    Cierra Hernández 1975 is a 55y.o. year old male comes in today as new patient for: low back pain    Patients symptoms have been present for many years off on but last 1.5 years consistent and not much benefit after some PT then. Pain level 4/10 low back along iliac crest to front. It has worsened with sitting long period. Much better while running  Patient has tried:  No meds in a while. Has psoriatic arthritis current Tx Humira w/ good benefit. IMAGING: XR lumbar pending    Past Surgical History:   Procedure Laterality Date    CLOSED RX NOSE/JAW FRAC+WIRES Right 1994    broken jaw had to wire it    HC CUFF CRYO SHOULDER [facility] USE ONLY Left 03/18/2021    left shoulder cuff surgery    HX CARPAL TUNNEL RELEASE      HX ORTHOPAEDIC      fx skull/ broken jaw     HX ORTHOPAEDIC Right     carpal tunnel    HX ORTHOPAEDIC Left 01/22/2021    knee arthroscopy    HX ORTHOPAEDIC      left rotator cuff    HX OTHER SURGICAL      lipoma removed     AK EXC SKIN BENIG 0.6-1CM TRUNK,ARM,LEG N/A 10/11/2018    Dr. Sidhu Pac 1.1-2CM TRUNK,ARM,LEG N/A 10/11/2018    Dr. Sidhu Pac 2.1-3CM TRUNK,ARM,LEG N/A 10/11/2018    Dr. Charanjit Rodney History     Socioeconomic History    Marital status: SINGLE   Occupational History    Occupation: unemployed   Tobacco Use    Smoking status: Never Smoker    Smokeless tobacco: Never Used   Vaping Use    Vaping Use: Never used   Substance and Sexual Activity    Alcohol use: Yes     Alcohol/week: 2.0 standard drinks     Types: 2 Shots of liquor per week     Comment: social    Drug use: Not Currently    Sexual activity: Yes     Partners: Female     Birth control/protection: Condom      Current Outpatient Medications   Medication Sig Dispense Refill    multivitamin (ONE A DAY) tablet Take 1 Tablet by mouth daily.  adalimumab (HUMIRA PEN SC) 40 mg by SubCUTAneous route every seven (7) days. Past Medical History:   Diagnosis Date    Asthma     childhood    Broken leg left    broke left leg and tore ligaments    Diabetes mellitus     no meds    Dupuytren's contracture     Essential hypertension     no meds    Fracture of left leg 08/2020    HTN (hypertension)     Ill-defined condition     neuropathy    Psoriatic arthritis, destructive type (HCC)     Sarcoidosis     says he had an arm mass removed and the path showed sarcoid, negative CXR     Family History   Problem Relation Age of Onset    Asthma Mother     Stroke Father     Alcohol abuse Father     Substance Abuse Father     Diabetes Maternal Grandmother     Hypertension Maternal Grandmother     High Cholesterol Maternal Grandmother     Stroke Maternal Grandmother     Cancer Maternal Grandfather 79        prostate cancer and skin    Colon Cancer Maternal Grandfather 61    Depression Maternal Uncle      ROS:  Some tingle down right leg, no incont, fever    Objective:  Pulse 65   Resp 15   Ht 6' (1.829 m)   Wt 221 lb (100.2 kg)   SpO2 95%   BMI 29.97 kg/m²   NEURO:  Sensation intact to light touch. Reflexes +2/4 patellar and Achilles bilaterally. M/S:  Examined seated and supine. Slump negative. Standing flexion test positive right  Sphinx test positive left. ASIS low right  Iliac crests equal bilaterally Pubes equal bilaterally Medial malleolus low right  Sacral base posterior left  ANCELMO low left  TTA at L3, 4, 5 on left worse flexion Rib(s) no TTP and equal LE Strength +5/5 bilaterally Piriformis no bilaterally. TTP glute med right        Assessment/Plan:     ICD-10-CM ICD-9-CM    1. Iliotibial band syndrome affecting lower leg, right  M76.31 728.89 diclofenac EC (VOLTAREN) 50 mg EC tablet   2. Greater trochanteric bursitis, right  M70.61 726.5 diclofenac EC (VOLTAREN) 50 mg EC tablet   3. Sprain, gluteus medius, right, initial encounter  S76.011A 843.8 diclofenac EC (VOLTAREN) 50 mg EC tablet   4.  Lumbar region somatic dysfunction  M99.03 739.3 UT OSTEOPATHIC MANIP,3-4 BODY REGN   5. Sacral region somatic dysfunction  M99.04 739.4 UT OSTEOPATHIC MANIP,3-4 BODY REGN   6. Pelvic somatic dysfunction  M99.05 739.5 UT OSTEOPATHIC MANIP,3-4 BODY REGN       Patient (or guardian if minor) verbalizes understanding of evaluation and plan. Verbal consent obtained. Lumbar, Pelvic and Sacral SD treated with ME. Correction of previous malalignments verified after Tx. Pt tolerated well. Notes improvement of Sx and pain is now rated 2/10. HEP/stretches daily. Discussed stretching/strengthening/posture. Will start HEP and Rx for voltaren 50mg as above and plan follow-up 4 weeks.

## 2022-02-21 DIAGNOSIS — S76.011A SPRAIN, GLUTEUS MEDIUS, RIGHT, INITIAL ENCOUNTER: ICD-10-CM

## 2022-02-21 DIAGNOSIS — M70.61 GREATER TROCHANTERIC BURSITIS, RIGHT: ICD-10-CM

## 2022-02-21 DIAGNOSIS — M76.31 ILIOTIBIAL BAND SYNDROME AFFECTING LOWER LEG, RIGHT: Primary | ICD-10-CM

## 2022-02-21 RX ORDER — PREDNISONE 20 MG/1
TABLET ORAL
Qty: 22 TABLET | Refills: 0 | Status: SHIPPED | OUTPATIENT
Start: 2022-02-21 | End: 2022-03-01

## 2022-03-01 ENCOUNTER — OFFICE VISIT (OUTPATIENT)
Dept: SURGERY | Age: 47
End: 2022-03-01
Payer: COMMERCIAL

## 2022-03-01 VITALS
BODY MASS INDEX: 28.71 KG/M2 | HEART RATE: 79 BPM | HEIGHT: 72 IN | DIASTOLIC BLOOD PRESSURE: 96 MMHG | TEMPERATURE: 97 F | OXYGEN SATURATION: 96 % | WEIGHT: 212 LBS | SYSTOLIC BLOOD PRESSURE: 122 MMHG

## 2022-03-01 DIAGNOSIS — Z86.39 HX OF OBESITY: ICD-10-CM

## 2022-03-01 DIAGNOSIS — Z71.3 DIETARY COUNSELING AND SURVEILLANCE: Primary | ICD-10-CM

## 2022-03-01 DIAGNOSIS — Z71.3 ENCOUNTER FOR WEIGHT LOSS COUNSELING: ICD-10-CM

## 2022-03-01 DIAGNOSIS — E66.3 OVERWEIGHT: ICD-10-CM

## 2022-03-01 PROCEDURE — 99212 OFFICE O/P EST SF 10 MIN: CPT | Performed by: NURSE PRACTITIONER

## 2022-03-01 RX ORDER — MELOXICAM 7.5 MG/1
7.5 TABLET ORAL DAILY
COMMUNITY
End: 2022-05-16

## 2022-03-01 NOTE — PROGRESS NOTES
[facility] Progress Note:   F/up Provider Visit    CC: Weight Management    Dyllan Tomas is a 52 y.o. male who is here for his  f/up medical provider visit for the [facility] Program. he did attend class last week. -0 lbs  Had dietary indiscretions on birthday. Also had flare up with back pain and was prescribed prednisone, now on Mobic. Stress at work. Was cleared to exercise shoulder and has starting lifting weights. Did you have any problems adhering to the program last week? no  If yes, please explain:     Since your last visit, have you experienced any complications? yes    Have you received any other medical care this week? no  If yes, where and for what? Have you had any change in your medications since your last visit? no  If yes what? Are you taking an appetite suppressant? no   If yes:  Do you need a refill? BP Readings from Last 3 Encounters:   03/01/22 (!) 122/96   01/26/22 118/74   01/19/22 110/80        Eating Habits Over Last Week:  Did you take in 64 oz of non-caloric fluids? no     Did you consume your prescribed meal replacement regimen each day? yes   MR 4    Physical Activity Over the Past Week:    Aerobic exercise: 420 min  Resistance exercise: 720 workouts / week      Weight History  Weight Metrics 3/1/2022 3/1/2022 2/8/2022 1/26/2022 1/26/2022 1/19/2022 1/19/2022   Weight - 212 lb 221 lb - 212 lb - 209 lb 6.4 oz   Waist Measure Inches 39.5 - - 39.5 - 37.25 -   Exercise Mins/week - - - - - - -   Body Fat % - - - - - - -   BMI - 28.75 kg/m2 29.97 kg/m2 - 28.75 kg/m2 - 28.4 kg/m2       Starting wt: 279  Goal wt: 185  Last EKG 1/19/2022  Ideal body weight: 77.6 kg (171 lb 1.2 oz)  Adjusted ideal body weight: 85 kg (187 lb 7.1 oz)  Body mass index is 28.75 kg/m².       History    Past Medical History:   Diagnosis Date    Asthma     childhood    Broken leg left    broke left leg and tore ligaments    Diabetes mellitus     no meds    Dupuytren's contracture     Essential hypertension     no meds    Fracture of left leg 08/2020    HTN (hypertension)     Ill-defined condition     neuropathy    Psoriatic arthritis, destructive type (HCC)     Sarcoidosis     says he had an arm mass removed and the path showed sarcoid, negative CXR       Past Surgical History:   Procedure Laterality Date    CLOSED RX NOSE/JAW FRAC+WIRES Right 1994    broken jaw had to wire it    HC CUFF CRYO SHOULDER [facility] USE ONLY Left 03/18/2021    left shoulder cuff surgery    HX CARPAL TUNNEL RELEASE      HX ORTHOPAEDIC      fx skull/ broken jaw     HX ORTHOPAEDIC Right     carpal tunnel    HX ORTHOPAEDIC Left 01/22/2021    knee arthroscopy    HX ORTHOPAEDIC      left rotator cuff    HX OTHER SURGICAL      lipoma removed     NC EXC SKIN BENIG 0.6-1CM TRUNK,ARM,LEG N/A 10/11/2018    Dr. Jose Jacobs 1.1-2CM TRUNK,ARM,LEG N/A 10/11/2018    Dr. Jose Jacobs 2.1-3CM TRUNK,ARM,LEG N/A 10/11/2018    Dr. Saturnino Gates       Current Outpatient Medications   Medication Sig Dispense Refill    meloxicam (MOBIC) 7.5 mg tablet Take 7.5 mg by mouth daily.  multivitamin (ONE A DAY) tablet Take 1 Tablet by mouth daily.  adalimumab (HUMIRA PEN SC) 40 mg by SubCUTAneous route every seven (7) days. Allergies   Allergen Reactions    Gabapentin Palpitations and Other (comments)     HR RACES       Social History     Tobacco Use    Smoking status: Never Smoker    Smokeless tobacco: Never Used   Vaping Use    Vaping Use: Never used   Substance Use Topics    Alcohol use:  Yes     Alcohol/week: 2.0 standard drinks     Types: 2 Shots of liquor per week     Comment: social    Drug use: Not Currently       Family History   Problem Relation Age of Onset    Asthma Mother     Stroke Father     Alcohol abuse Father     Substance Abuse Father     Diabetes Maternal Grandmother     Hypertension Maternal Grandmother     High Cholesterol Maternal Grandmother     Stroke Maternal Grandmother     Cancer Maternal Grandfather 79        prostate cancer and skin    Colon Cancer Maternal Grandfather 61    Depression Maternal Uncle        Family Status   Relation Name Status    Mother  Alive    Father   at age 61        unsure    Sister  Alive    MGM  Alive    [facility]  (Not Specified)         Review of Systems  Review of Systems   Constitutional: Negative for malaise/fatigue and weight loss. Musculoskeletal: Positive for back pain and joint pain. All other systems reviewed and are negative. Objective  Visit Vitals  BP (!) 122/96 (BP 1 Location: Right arm, BP Patient Position: Sitting)   Pulse 79   Temp 97 °F (36.1 °C)   Ht 6' (1.829 m)   Wt 96.2 kg (212 lb)   SpO2 96%   BMI 28.75 kg/m²     No LMP for male patient. Physical Exam  Physical Exam  Vitals and nursing note reviewed. Constitutional:       Appearance: Normal appearance. HENT:      Head: Normocephalic and atraumatic. Pulmonary:      Effort: Pulmonary effort is normal.   Musculoskeletal:         General: Normal range of motion. Neurological:      General: No focal deficit present. Mental Status: He is alert and oriented to person, place, and time. Psychiatric:         Mood and Affect: Mood normal.         Behavior: Behavior normal.         No results found for this or any previous visit (from the past 672 hour(s)). Assessment / Plan    Encounter Diagnoses   Name Primary?  Dietary counseling and surveillance Yes    Encounter for weight loss counseling     Hx of obesity     Overweight     BMI 28.0-28.9,adult        1. Weight management      Today, the patient is  Height: 6' (182.9 cm) tall, Weight: 96.2 kg (212 lb) lbs for a Body mass index is 28.75 kg/m². is suffering from overweight      Degree of control:  Great adherence but had some setbacks this month with back pain and stress at work. Progress was reviewed with patient.     Goal(s) for next appointment:   -8 lbs  Starting to take creatinine and BCAAs since he is weight lifting. Recommend he have a MR or easily absorbable protein before weight training. I have reviewed/discussed the above normal BMI with the patient. I have recommended the following interventions: dietary management education, guidance, and counseling, encourage exercise, monitor weight and prescribed dietary intake . 2. Labs    No labs completed before visit, he will get these completed before visit next month. No new orders placed. The primary encounter diagnosis was Dietary counseling and surveillance. Diagnoses of Encounter for weight loss counseling, Hx of obesity, Overweight, and BMI 28.0-28.9,adult were also pertinent to this visit.       Lesia Crigler, ROBER-C

## 2022-03-08 ENCOUNTER — OFFICE VISIT (OUTPATIENT)
Dept: ORTHOPEDIC SURGERY | Age: 47
End: 2022-03-08
Payer: COMMERCIAL

## 2022-03-08 VITALS
BODY MASS INDEX: 30.1 KG/M2 | RESPIRATION RATE: 15 BRPM | HEART RATE: 72 BPM | WEIGHT: 222.2 LBS | HEIGHT: 72 IN | OXYGEN SATURATION: 96 %

## 2022-03-08 DIAGNOSIS — M99.05 PELVIC SOMATIC DYSFUNCTION: ICD-10-CM

## 2022-03-08 DIAGNOSIS — M99.04 SACRAL REGION SOMATIC DYSFUNCTION: ICD-10-CM

## 2022-03-08 DIAGNOSIS — L40.50 PSORIATIC ARTHRITIS (HCC): ICD-10-CM

## 2022-03-08 DIAGNOSIS — M51.36 DEGENERATION, INTERVERTEBRAL DISC, LUMBAR: Primary | ICD-10-CM

## 2022-03-08 DIAGNOSIS — M70.61 GREATER TROCHANTERIC BURSITIS, RIGHT: ICD-10-CM

## 2022-03-08 DIAGNOSIS — M99.03 LUMBAR REGION SOMATIC DYSFUNCTION: ICD-10-CM

## 2022-03-08 DIAGNOSIS — M76.31 ILIOTIBIAL BAND SYNDROME AFFECTING LOWER LEG, RIGHT: ICD-10-CM

## 2022-03-08 PROCEDURE — 99214 OFFICE O/P EST MOD 30 MIN: CPT | Performed by: FAMILY MEDICINE

## 2022-03-08 PROCEDURE — 98926 OSTEOPATH MANJ 3-4 REGIONS: CPT | Performed by: FAMILY MEDICINE

## 2022-03-08 RX ORDER — DICLOFENAC SODIUM 50 MG/1
50 TABLET, DELAYED RELEASE ORAL 2 TIMES DAILY WITH MEALS
Qty: 60 TABLET | Refills: 2 | Status: SHIPPED
Start: 2022-03-08 | End: 2022-05-16

## 2022-03-08 NOTE — PATIENT INSTRUCTIONS
Search YouTube for my channel:    Dr. Aminah Foley cuff  Low back/Piriformis  Runner's Knee  Hip Stretches  Plantar Fasciitis  IT Band  Concussion  Pes Anserine/Plica  Mejia Splints  Carpal Tunnel  Neck/Upper back

## 2022-03-08 NOTE — LETTER
3/8/2022    Patient: Dyllan Tomas   YOB: 1975   Date of Visit: 3/8/2022     Júnior Faulkner MD  [address]  [place]    Dear Júnior Faulkner MD,      Thank you for referring Mr. Dyllan Tomas to [facility] for evaluation. My notes for this consultation are attached. If you have questions, please do not hesitate to call me. I look forward to following your patient along with you.       Sincerely,    Aron Briceño, DO

## 2022-03-08 NOTE — PROGRESS NOTES
HISTORY OF PRESENT ILLNESS    Prem Licona 1975 is a 52y.o. year old male comes in today to be evaluated and treated for: back pain    Since last appt has noticed some improvement and now pain comes/goes. Pain level 5/10. Using HEP/voltaren oral/mobic with benefit. Will worsen a lot depending on work activities, although often activity makes back better and sitting a while is tough. No luck PT in past. Rx mobic from rheum and still taking Humira for psoriatic arthritis. IMAGING: XR lumbar 2/8/2022  IMPRESSION  Multilevel degenerative disc disease most pronounced at L2-L3 and L4-L5. Vertebral body height loss in the lower thoracic and upper lumbar spine  presumably degenerative. Correlate with point tenderness. Loss of lumbar lordosis, rotoscoliosis and multilevel spondylolisthesis. Past Surgical History:   Procedure Laterality Date    CLOSED RX NOSE/JAW FRAC+WIRES Right 1994    broken jaw had to wire it    HC CUFF CRYO SHOULDER [facility] USE ONLY Left 03/18/2021    left shoulder cuff surgery    HX CARPAL TUNNEL RELEASE      HX ORTHOPAEDIC      fx skull/ broken jaw     HX ORTHOPAEDIC Right     carpal tunnel    HX ORTHOPAEDIC Left 01/22/2021    knee arthroscopy    HX ORTHOPAEDIC      left rotator cuff    HX OTHER SURGICAL      lipoma removed     UT EXC SKIN BENIG 0.6-1CM TRUNK,ARM,LEG N/A 10/11/2018    Dr. Akhil Negro 1.1-2CM TRUNK,ARM,LEG N/A 10/11/2018    Dr. Akhil Negro 2.1-3CM TRUNK,ARM,LEG N/A 10/11/2018    Dr. Farr Short History     Socioeconomic History    Marital status: SINGLE   Occupational History    Occupation: unemployed   Tobacco Use    Smoking status: Never Smoker    Smokeless tobacco: Never Used   Vaping Use    Vaping Use: Never used   Substance and Sexual Activity    Alcohol use:  Yes     Alcohol/week: 2.0 standard drinks     Types: 2 Shots of liquor per week     Comment: social    Drug use: Not Currently    Sexual activity: Yes     Partners: Female     Birth control/protection: Condom     Current Outpatient Medications   Medication Sig Dispense Refill    meloxicam (MOBIC) 7.5 mg tablet Take 7.5 mg by mouth daily.  multivitamin (ONE A DAY) tablet Take 1 Tablet by mouth daily.  adalimumab (HUMIRA PEN SC) 40 mg by SubCUTAneous route every seven (7) days. Past Medical History:   Diagnosis Date    Asthma     childhood    Broken leg left    broke left leg and tore ligaments    Diabetes mellitus     no meds    Dupuytren's contracture     Essential hypertension     no meds    Fracture of left leg 08/2020    HTN (hypertension)     Ill-defined condition     neuropathy    Psoriatic arthritis, destructive type (HCC)     Sarcoidosis     says he had an arm mass removed and the path showed sarcoid, negative CXR     Family History   Problem Relation Age of Onset    Asthma Mother     Stroke Father     Alcohol abuse Father     Substance Abuse Father     Diabetes Maternal Grandmother     Hypertension Maternal Grandmother     High Cholesterol Maternal Grandmother     Stroke Maternal Grandmother     Cancer Maternal Grandfather 79        prostate cancer and skin    Colon Cancer Maternal Grandfather 61    Depression Maternal Uncle        ROS:  Some tingle down right leg, no incont, fever    Objective:  Pulse 72   Resp 15   Ht 6' (1.829 m)   Wt 222 lb 3.2 oz (100.8 kg)   SpO2 96%   BMI 30.14 kg/m²   NEURO:  Sensation intact to light touch. Reflexes +2/4 patellar and Achilles bilaterally. M/S:  Examined seated and supine. Slump negative. Standing flexion test positive right  Sphinx test positive left. ASIS low right  Iliac crests equal bilaterally Pubes equal bilaterally Medial malleolus low right  Sacral base posterior left  ANCELMO low left  TTA at L4 on left worse flexion Rib(s) no TTP and equal LE Strength +5/5 bilaterally Piriformis no bilaterally.  TTP glute med right      Assessment/Plan:     ICD-10-CM ICD-9-CM    1. Degeneration, intervertebral disc, lumbar  M51.36 722.52 diclofenac EC (VOLTAREN) 50 mg EC tablet   2. Iliotibial band syndrome affecting lower leg, right  M76.31 728.89 diclofenac EC (VOLTAREN) 50 mg EC tablet   3. Greater trochanteric bursitis, right  M70.61 726.5 diclofenac EC (VOLTAREN) 50 mg EC tablet   4. Psoriatic arthritis ([facility] [address]  L40.50 696.0    5. Pelvic somatic dysfunction  M99.05 739.5 HI OSTEOPATHIC MANIP,3-4 BODY REGN   6. Lumbar region somatic dysfunction  M99.03 739.3 HI OSTEOPATHIC MANIP,3-4 BODY REGN   7. Sacral region somatic dysfunction  M99.04 739.4 HI OSTEOPATHIC MANIP,3-4 BODY REGN       Patient (or guardian if minor) verbalizes understanding of evaluation and plan. Verbal consent obtained. Lumbar, Pelvic and Sacral SD treated with ME. Correction of previous malalignments verified after Tx. Pt tolerated well. Notes improvement of Sx and pain is now rated 3/10. HEP/stretches daily. Discussed stretching/strengthening/posture. Will continue HEP and Rx for voltaren oral and refer Dr. Som Lundy for PMR eval as above and plan follow-up as needed. Declines PT. Total time spent on encounter including chart/imaging/lab review and evaluation/documentation/demo home program/coordination of care/form completion but not including time for any procedures/manipulation 30 minutes.

## 2022-03-09 ENCOUNTER — CLINICAL SUPPORT (OUTPATIENT)
Dept: SURGERY | Age: 47
End: 2022-03-09

## 2022-03-09 VITALS — SYSTOLIC BLOOD PRESSURE: 121 MMHG | DIASTOLIC BLOOD PRESSURE: 79 MMHG | BODY MASS INDEX: 29.29 KG/M2 | WEIGHT: 216 LBS

## 2022-03-09 DIAGNOSIS — Z76.89 ENCOUNTER FOR WEIGHT MANAGEMENT: Primary | ICD-10-CM

## 2022-03-16 ENCOUNTER — CLINICAL SUPPORT (OUTPATIENT)
Dept: SURGERY | Age: 47
End: 2022-03-16

## 2022-03-16 VITALS — BODY MASS INDEX: 29.43 KG/M2 | DIASTOLIC BLOOD PRESSURE: 85 MMHG | WEIGHT: 217 LBS | SYSTOLIC BLOOD PRESSURE: 129 MMHG

## 2022-03-16 DIAGNOSIS — Z76.89 ENCOUNTER FOR WEIGHT MANAGEMENT: Primary | ICD-10-CM

## 2022-03-19 PROBLEM — E11.9 TYPE 2 DIABETES MELLITUS (HCC): Status: ACTIVE | Noted: 2021-11-11

## 2022-03-19 PROBLEM — M50.90 CERVICAL DISC DISEASE: Status: ACTIVE | Noted: 2019-07-15

## 2022-03-19 PROBLEM — K75.81 NASH (NONALCOHOLIC STEATOHEPATITIS): Status: ACTIVE | Noted: 2019-07-15

## 2022-03-19 PROBLEM — E55.9 VITAMIN D DEFICIENCY: Status: ACTIVE | Noted: 2019-07-15

## 2022-03-23 ENCOUNTER — CLINICAL SUPPORT (OUTPATIENT)
Dept: SURGERY | Age: 47
End: 2022-03-23

## 2022-03-23 VITALS — DIASTOLIC BLOOD PRESSURE: 75 MMHG | WEIGHT: 212 LBS | SYSTOLIC BLOOD PRESSURE: 115 MMHG | BODY MASS INDEX: 28.75 KG/M2

## 2022-03-23 DIAGNOSIS — Z76.89 ENCOUNTER FOR WEIGHT MANAGEMENT: Primary | ICD-10-CM

## 2022-03-29 ENCOUNTER — OFFICE VISIT (OUTPATIENT)
Dept: ORTHOPEDIC SURGERY | Age: 47
End: 2022-03-29

## 2022-03-29 ENCOUNTER — CLINICAL SUPPORT (OUTPATIENT)
Dept: SURGERY | Age: 47
End: 2022-03-29

## 2022-03-29 VITALS
TEMPERATURE: 96.2 F | SYSTOLIC BLOOD PRESSURE: 128 MMHG | HEIGHT: 72 IN | RESPIRATION RATE: 16 BRPM | WEIGHT: 214 LBS | HEART RATE: 69 BPM | DIASTOLIC BLOOD PRESSURE: 80 MMHG | BODY MASS INDEX: 28.99 KG/M2 | OXYGEN SATURATION: 95 %

## 2022-03-29 VITALS — WEIGHT: 214 LBS | BODY MASS INDEX: 29.02 KG/M2 | DIASTOLIC BLOOD PRESSURE: 82 MMHG | SYSTOLIC BLOOD PRESSURE: 135 MMHG

## 2022-03-29 DIAGNOSIS — M53.3 CHRONIC RIGHT SACROILIAC JOINT PAIN: Primary | ICD-10-CM

## 2022-03-29 DIAGNOSIS — M47.816 LUMBAR FACET ARTHROPATHY: ICD-10-CM

## 2022-03-29 DIAGNOSIS — M62.838 MUSCLE SPASM: ICD-10-CM

## 2022-03-29 DIAGNOSIS — Z76.89 ENCOUNTER FOR WEIGHT MANAGEMENT: Primary | ICD-10-CM

## 2022-03-29 DIAGNOSIS — G89.29 CHRONIC RIGHT SACROILIAC JOINT PAIN: Primary | ICD-10-CM

## 2022-03-29 PROCEDURE — 99213 OFFICE O/P EST LOW 20 MIN: CPT | Performed by: PHYSICAL MEDICINE & REHABILITATION

## 2022-03-29 NOTE — PROGRESS NOTES
[facility]  [address] [place], [facility] [address]  Phone: [phone]  Fax: [phone]    Pt's YOB: 1975    ASSESSMENT   Diagnoses and all orders for this visit:    1. Chronic right sacroiliac joint pain  -     SCHEDULE SURGERY    2. Muscle spasm    3. Lumbar facet arthropathy         IMPRESSION AND PLAN:  Jose Saavedra is a 52 y.o. right hand dominant male with history of cervical and left shoulder pain. He complains of lumbar pain radiating into the right hip, exacerbated with prolonged sitting or standing and relieved with activity, x 3 years. Pt notes no relief with OTC NSAID's.     1) Pt was given information on sacroiliac exercises. 2) Discussed treatment options with the patient including steroid injections. 3) A right sacroiliac injection was scheduled. 4) Mr. Poncho Bliss has a reminder for a \"due or due soon\" health maintenance. I have asked that he contact his primary care provider, Mehul Heath MD, for follow-up on this health maintenance. 5)  demonstrated consistency with prescribing. 6) May consider MRI pending response to injection  Follow-up and Dispositions    · Return in about 6 weeks (around 5/10/2022). HISTORY OF PRESENT ILLNESS:  Jose Saavedra is a 52 y.o. right hand dominant male with history of cervical and left shoulder pain and presents to the office today for follow up, having last been seen in the office on 02/23/2021. Pt complains of lumbar pain radiating into the right hip, exacerbated with prolonged sitting or standing and relieved with activity, x 3 years. He notes that his pain is exacerbated with abdominal exercises but not with running. Pt denies any leg weakness or numbness but notes right leg tingling during pain episodes. He also notes feelings of popping in the right lumbar/hip area with running and ambulation.  Pt reports that since his last office visit, he sustained a left leg fracture and an anterior cruciate ligament tear after his dog ran into him and he fell into a hole. Pt also reports that he lost over 100 lb since his last office visit via the [employer] weight loss program and continues to be enrolled in the program. He states that he has previously undergone multiple sessions of physical therapy. Pt supplements with magnesium and vitamin D and notes no relief with OTC NSAID's. He states that he also underwent left rotator cuff repair since his last office visit. Labs from 01/19/2022 were reviewed and demonstrated normal findings. Pt at this time desires to proceed with steroid injections. Pain Scale: 4/10    PCP: Alex Jones MD     Past Medical History:   Diagnosis Date    Asthma     childhood    Broken leg left    broke left leg and tore ligaments    Diabetes mellitus     no meds    Dupuytren's contracture     Essential hypertension     no meds    Fracture of left leg 08/2020    HTN (hypertension)     Ill-defined condition     neuropathy    Psoriatic arthritis, destructive type (HCC)     Sarcoidosis     says he had an arm mass removed and the path showed sarcoid, negative CXR        Social History     Socioeconomic History    Marital status: SINGLE     Spouse name: Not on file    Number of children: Not on file    Years of education: Not on file    Highest education level: Not on file   Occupational History    Occupation: unemployed   Tobacco Use    Smoking status: Never Smoker    Smokeless tobacco: Never Used   Vaping Use    Vaping Use: Never used   Substance and Sexual Activity    Alcohol use:  Yes     Alcohol/week: 2.0 standard drinks     Types: 2 Shots of liquor per week     Comment: social    Drug use: Not Currently    Sexual activity: Yes     Partners: Female     Birth control/protection: Condom   Other Topics Concern    Not on file   Social History Narrative    Not on file     Social Determinants of Health     Financial Resource Strain:     Difficulty of Paying Living Expenses: Not on file   Food Insecurity:     Worried About Running Out of Food in the Last Year: Not on file    Blayne of Food in the Last Year: Not on file   Transportation Needs:     Lack of Transportation (Medical): Not on file    Lack of Transportation (Non-Medical): Not on file   Physical Activity:     Days of Exercise per Week: Not on file    Minutes of Exercise per Session: Not on file   Stress:     Feeling of Stress : Not on file   Social Connections:     Frequency of Communication with Friends and Family: Not on file    Frequency of Social Gatherings with Friends and Family: Not on file    Attends Mu-ism Services: Not on file    Active Member of [address] [place] [address] or Organizations: Not on file    Attends Club or Organization Meetings: Not on file    Marital Status: Not on file   Intimate Partner Violence:     Fear of Current or Ex-Partner: Not on file    Emotionally Abused: Not on file    Physically Abused: Not on file    Sexually Abused: Not on file   Housing Stability:     Unable to Pay for Housing in the Last Year: Not on file    Number of [place] in the Last Year: Not on file    Unstable Housing in the Last Year: Not on file       Current Outpatient Medications   Medication Sig Dispense Refill    traMADoL (Ultram) 50 mg tablet Take 1 Tablet by mouth every six (6) hours as needed for Pain for up to 7 days. Max Daily Amount: 200 mg. 28 Tablet 0    diclofenac EC (VOLTAREN) 50 mg EC tablet Take 1 Tablet by mouth two (2) times daily (with meals). 60 Tablet 2    meloxicam (MOBIC) 7.5 mg tablet Take 7.5 mg by mouth daily.  multivitamin (ONE A DAY) tablet Take 1 Tablet by mouth daily.  adalimumab (HUMIRA PEN SC) 40 mg by SubCUTAneous route every seven (7) days.          Allergies   Allergen Reactions    Gabapentin Palpitations and Other (comments)     HR RACES         REVIEW OF SYSTEMS    Constitutional: Negative for fever and chills. Positive for intentional weight loss. Respiratory: Negative for cough or shortness of breath. Cardiovascular: Negative for chest pain or palpitations. Gastrointestinal: Negative for acid reflux, change in bowel habits, or constipation. Genitourinary: Negative for dysuria and flank pain. Musculoskeletal: Positive for lumbar and right lateral hip pain. Skin: Negative for rash. Neurological: Negative for headaches, dizziness, or numbness. Endo/Heme/Allergies: Negative for increased bruising. Psychiatric/Behavioral: Negative for difficulty with sleep. As per HPI    PHYSICAL EXAMINATION  Visit Vitals  /80 (BP 1 Location: Right arm, BP Patient Position: Sitting, BP Cuff Size: Adult)   Pulse 69   Temp (!) 96.2 °F (35.7 °C) (Tympanic)   Resp 16   Ht 6' (1.829 m)   Wt 214 lb (97.1 kg)   SpO2 95%   BMI 29.02 kg/m²       Constitutional: Awake, alert, and in no acute distress. Neurological:  Sensation to light touch is intact. Negative Aceves's sign bilaterally. Skin: warm, dry, and intact. Musculoskeletal: Good range of motion in the cervical spine on all planes. Good range of motion in the bilateral shoulders. No pain with extension, axial loading, or forward flexion. Mild left knee pain with internal or external rotation of the left hip. Negative straight leg raise bilaterally. Right posterior hip pain with right JHONATAN test. Tenderness to palpation over the right sacroiliac joint. Positive Gaenslen's test on the right. + Compression test      Biceps  Triceps Deltoids Wrist Ext Wrist Flex Hand Intrin   Right +4/5 +4/5 +4/5 +4/5 +4/5 +4/5   Left +4/5 +4/5 +4/5 +4/5 +4/5 +4/5      Hip Flex  Quads Hamstrings Ankle DF EHL Ankle PF   Right +4/5 +4/5 +4/5 +4/5 +4/5 +4/5   Left +4/5 +4/5 +4/5 +4/5 +4/5 +4/5     IMAGING:    Lumbar spine x-rays from 02/08/2022 were personally reviewed with the patient and demonstrated:    FINDINGS:     Partially imaged hip joints appear symmetric.  Sacroiliac joints appear  symmetric.     Mild rotoscoliosis. Mild loss of lumbar lordosis. Trace multilevel  spondylolisthesis of L1 on L2, L2 on L3 and L3 on L4.     Mild vertebral body height loss of T11, T12 and L1 presumably degenerative. Correlate with point tenderness.     Mild multilevel endplate osteophyte formation with some disc height loss most  pronounced at L4-L5 and to a lesser degree L2-L3.     IMPRESSION     Multilevel degenerative disc disease most pronounced at L2-L3 and L4-L5.     Vertebral body height loss in the lower thoracic and upper lumbar spine  presumably degenerative. Correlate with point tenderness.     Loss of lumbar lordosis, rotoscoliosis and multilevel spondylolisthesis.     Please see report for full findings and further details. MRI can be obtained for  further evaluation. Left shoulder MRI from 2/3/2021 was personally reviewed with the patient and demonstrated:       Results from Orders Only encounter on 02/03/21   MRI SHOULDER LT WO CONT     Narrative EXAM: MRI OF THE LEFT SHOULDER     CLINICAL INDICATION/HISTORY: Left shoulder pain and limited range of motion     COMPARISON: X-ray left shoulder 2/1/2021.     TECHNIQUE: T1 weighted sagittal and axial, STIR and T2 FSE sagittal, T2 FSE  axial.     _______________     FINDINGS:     ROTATOR CUFF:     >  Supraspinatus: Full-thickness tear extends through anterior and central  aspects of the distal supraspinatus tendon insertion with only high-grade  partial-thickness articular sided tearing through the posterior supraspinatus  insertional fibers. No muscle atrophy or edema.    >  Infraspinatus: Moderate infraspinatus insertional tendinosis with no focal  tearing. Fatty atrophy of the infraspinatus muscle.    > Subscapularis: Moderate subscapularis tendinosis with no focal tearing. No  muscle atrophy. > Teres minor: Teres minor tendon is intact.  No muscle atrophy.     LABRUM: Fraying of the anterior inferior labrum without complete tear.     BICEPS TENDON COMPLEX: Mild intra-articular long biceps tendinosis extending  into the intertubercular groove. No biceps tendon tear. Extraarticular long  biceps tendon remains normal in caliber and signal intensity.     CORACOACROMIAL ARCH: There are mild degenerative changes of the  acromioclavicular joint, to include marginal proliferative changes, and mild  pericapsular edema. Type II acromial morphology.     CAPSULE/LIGAMENTS: Abnormal, moderately prominent edema and replacement of fat  signal intensity are present throughout the rotator cuff interval.   Heterogeneous thickening and intermediate signal are seen throughout the  superior, middle, and inferior glenohumeral ligaments. Thickening and signal  abnormality are also present within the coracohumeral and coracoacromial  ligaments.       BONES/JOINT SPACE: Cystic subcortical changes along the bicipital groove and  greater tubercle. No significant degenerative osteoarthropathy or chondromalacia  of the glenohumeral joint. There is no evidence for fracture, contusion, bony  [place], Hill Sachs lesion. No joint effusion.     PERIARTICULAR: No fluid within the subacromial subdeltoid or subcoracoid bursae.     _______________        Impression 1. Full-thickness tear extends through anterior and central supraspinatus  insertional fibers. Partial-thickness tearing contiguously is seen within  posterior supraspinatus tendon insertion. 2. Moderate tendinosis of infraspinatus and subscapularis tendons. 3.  Abnormal findings within the rotator cuff interval, glenohumeral ligaments,  and coracohumeral and coracoacromial ligaments that, particularly in concert,  are suspicious for adhesive capsulitis.   4. Mild degenerative osteoarthropathy of the left acromioclavicular joint,  without morphology or secondary findings of subacromial impingement.         Left shoulder 2V x-rays from 2/1/2021 were personally reviewed with the patient and demonstrated:  IMPRESSION  Findings/impression:  No acute fracture or dislocation. No interval change.     Cervical spine MRI from 10/23/2020 was personally reviewed with the patient and demonstrated:  IMPRESSION:  C6-7: Moderate right uncovertebral joint hypertrophy.      Written by Delroy Ribeiro, as dictated by Kee Coyle MD.  I, Dr. Kee Coyle confirm that all documentation is accurate.

## 2022-03-29 NOTE — PROGRESS NOTES
Chief Complaint   Patient presents with    New Patient    Back Pain       Pt preferred language for health care discussion is english. Is someone accompanying this pt? no    Is the patient using any DME equipment during [address]? no    Depression Screening:  3 most recent [facility] Screens 1/19/2022 12/21/2021 11/30/2021 7/14/2021 1/14/2021 1/14/2021 12/8/2020   Little interest or pleasure in doing things Not at all Not at all Not at all Not at all Not at all Not at all Not at all   Feeling down, depressed, irritable, or hopeless Not at all Not at all Not at all Not at all Not at all Not at all Not at all   Total Score PHQ 2 0 0 0 0 0 0 0       Learning Assessment:  Learning Assessment 9/21/2021 12/24/2019 7/16/2019 11/9/2018   PRIMARY LEARNER Patient Patient Patient Patient   HIGHEST LEVEL OF EDUCATION - PRIMARY LEARNER  4 YEARS OF COLLEGE 4 YEARS OF COLLEGE 4 YEARS OF COLLEGE 4 YEARS OF COLLEGE   BARRIERS PRIMARY LEARNER NONE NONE NONE NONE     NONE - - -   CO-LEARNER CAREGIVER No No No No   CO-LEARNER NAME - n/a - -   PRIMARY LANGUAGE ENGLISH ENGLISH ENGLISH ENGLISH   LEARNER PREFERENCE PRIMARY DEMONSTRATION DEMONSTRATION DEMONSTRATION DEMONSTRATION   ANSWERED BY patient patient patient patient   [address] Maintenance reviewed and discussed per provider. Yes        Advance Directive:  1. Do you have an advance directive in place? Patient Reply:no    2. If not, would you like material regarding how to put one in place? Patient Reply: no    Coordination of Care:  1. Have you been to the ER, urgent care clinic since your last visit? Hospitalized since your last visit? no    2. Have you seen or consulted any other health care providers outside of the [address] [place] [address] since your last visit? Include any pap smears or colon screening.  no

## 2022-03-29 NOTE — PATIENT INSTRUCTIONS
Sacroiliac Pain: Exercises  Introduction  Here are some examples of exercises for you to try. The exercises may be suggested for a condition or for rehabilitation. Start each exercise slowly. Ease off the exercises if you start to have pain. You will be told when to start these exercises and which ones will work best for you. How to do the exercises  Knee-to-chest stretch    1. Do not do the knee-to-chest exercise if it causes or increases back or leg pain. 2. Lie on your back with your knees bent and your feet flat on the floor. You can put a small pillow under your head and neck if it is more comfortable. 3. Grasp your hands under one knee and bring the knee to your chest, keeping the other foot flat on the floor. 4. Keep your lower back pressed to the floor. Hold for at least 15 to 30 seconds. 5. Relax and lower the knee to the starting position. Repeat with the other leg. 6. Repeat 2 to 4 times with each leg. 7. To get more stretch, keep your other leg flat on the floor while pulling your knee to your chest.  Bridging    1. Lie on your back with both knees bent. Your knees should be bent about 90 degrees. 2. Tighten your belly muscles by pulling in your belly button toward your spine. Then push your feet into the floor, squeeze your buttocks, and lift your hips off the floor until your shoulders, hips, and knees are all in a straight line. 3. Hold for about 6 seconds as you continue to breathe normally, and then slowly lower your hips back down to the floor and rest for up to 10 seconds. 4. Repeat 8 to 12 times. Hip extension    1. Get down on your hands and knees on the floor. 2. Keeping your back and neck straight, lift one leg straight out behind you. When you lift your leg, keep your hips level. Don't let your back twist, and don't let your hip drop toward the floor. 3. Hold for 6 seconds. Repeat 8 to 12 times with each leg.   4. If you feel steady and strong when you do this exercise, you can make it more difficult. To do this, when you lift your leg, also lift the opposite arm straight out in front of you. For example, lift the left leg and the right arm at the same time. (This is sometimes called the \"bird dog exercise. \") Hold for 6 seconds, and repeat 8 to 12 times on each side. Clamshell    1. Lie on your side with a pillow under your head. Keep your feet and knees together and your knees bent. 2. Raise your top knee, but keep your feet together. Do not let your hips roll back. Your legs should open up like a clamshell. 3. Hold for 6 seconds. 4. Slowly lower your knee back down. Rest for 10 seconds. 5. Repeat 8 to 12 times. 6. Switch to your other side and repeat steps 1 through 5. Hamstring wall stretch    1. Lie on your back in a doorway, with one leg through the open door. 2. Slide your affected leg up the wall to straighten your knee. You should feel a gentle stretch down the back of your leg. 3. Hold the stretch for at least 1 minute to begin. Then try to lengthen the time you hold the stretch to as long as 6 minutes. 4. Switch legs, and repeat steps 1 through 3.  5. Repeat 2 to 4 times. 6. If you do not have a place to do this exercise in a doorway, there is another way to do it:  7. Lie on your back, and bend one knee. 8. Loop a towel under the ball and toes of that foot, and hold the ends of the towel in your hands. 9. Straighten your knee, and slowly pull back on the towel. You should feel a gentle stretch down the back of your leg. 10. Switch legs, and repeat steps 1 through 3.  11. Repeat 2 to 4 times. 1. Do not arch your back. 2. Do not bend either knee. 3. Keep one heel touching the floor and the other heel touching the wall. Do not point your toes. Lower abdominal strengthening    1. Lie on your back with your knees bent and your feet flat on the floor. 2. Tighten your belly muscles by pulling your belly button in toward your spine.   3. Lift one foot off the floor and bring your knee toward your chest, so that your knee is straight above your hip and your leg is bent like the letter \"L. \"  4. Lift the other knee up to the same position. 5. Lower one leg at a time to the starting position. 6. Keep alternating legs until you have lifted each leg 8 to 12 times. 7. Be sure to keep your belly muscles tight and your back still as you are moving your legs. Be sure to breathe normally. Piriformis stretch    1. Lie on your back with your legs straight. 2. Lift your affected leg, and bend your knee. With your opposite hand, reach across your body, and then gently pull your knee toward your opposite shoulder. 3. Hold the stretch for 15 to 30 seconds. 4. Switch legs and repeat steps 1 through 3.  5. Repeat 2 to 4 times. Follow-up care is a key part of your treatment and safety. Be sure to make and go to all appointments, and call your doctor if you are having problems. It's also a good idea to know your test results and keep a list of the medicines you take. Where can you learn more? Go to http://www.gray.com/  Enter R713 in the search box to learn more about \"Sacroiliac Pain: Exercises. \"  Current as of: July 1, 2021               Content Version: 13.2  © 2006-2022 [employer]. Care instructions adapted under license by [employer] (which disclaims liability or warranty for this information). If you have questions about a medical condition or this instruction, always ask your healthcare professional. [address] any warranty or liability for your use of this information.

## 2022-04-06 ENCOUNTER — APPOINTMENT (OUTPATIENT)
Dept: GENERAL RADIOLOGY | Age: 47
End: 2022-04-06
Attending: PHYSICAL MEDICINE & REHABILITATION
Payer: COMMERCIAL

## 2022-04-06 ENCOUNTER — CLINICAL SUPPORT (OUTPATIENT)
Dept: SURGERY | Age: 47
End: 2022-04-06

## 2022-04-06 ENCOUNTER — HOSPITAL ENCOUNTER (OUTPATIENT)
Age: 47
Setting detail: OUTPATIENT SURGERY
Discharge: HOME | End: 2022-04-06
Attending: PHYSICAL MEDICINE & REHABILITATION | Admitting: PHYSICAL MEDICINE & REHABILITATION
Payer: COMMERCIAL

## 2022-04-06 VITALS
RESPIRATION RATE: 16 BRPM | HEART RATE: 51 BPM | DIASTOLIC BLOOD PRESSURE: 78 MMHG | OXYGEN SATURATION: 95 % | TEMPERATURE: 98.3 F | SYSTOLIC BLOOD PRESSURE: 124 MMHG

## 2022-04-06 VITALS — BODY MASS INDEX: 28.86 KG/M2 | WEIGHT: 212.8 LBS | SYSTOLIC BLOOD PRESSURE: 127 MMHG | DIASTOLIC BLOOD PRESSURE: 87 MMHG

## 2022-04-06 DIAGNOSIS — Z76.89 ENCOUNTER FOR WEIGHT MANAGEMENT: Primary | ICD-10-CM

## 2022-04-06 DIAGNOSIS — M53.3 SACROILIAC JOINT PAIN: ICD-10-CM

## 2022-04-06 LAB — GLUCOSE BLD STRIP.AUTO-MCNC: 114 MG/DL (ref 70–110)

## 2022-04-06 PROCEDURE — 77030039433 HC TY MYLEOGRAM BD -B: Performed by: PHYSICAL MEDICINE & REHABILITATION

## 2022-04-06 PROCEDURE — 27096 INJECT SACROILIAC JOINT: CPT | Performed by: PHYSICAL MEDICINE & REHABILITATION

## 2022-04-06 PROCEDURE — 77030003676 HC NDL SPN MPRI -A: Performed by: PHYSICAL MEDICINE & REHABILITATION

## 2022-04-06 PROCEDURE — 76010000009 HC PAIN MGT 0 TO 30 MIN PROC: Performed by: PHYSICAL MEDICINE & REHABILITATION

## 2022-04-06 PROCEDURE — 74011250637 HC RX REV CODE- 250/637: Performed by: PHYSICAL MEDICINE & REHABILITATION

## 2022-04-06 PROCEDURE — 82962 GLUCOSE BLOOD TEST: CPT

## 2022-04-06 PROCEDURE — 74011000250 HC RX REV CODE- 250: Performed by: PHYSICAL MEDICINE & REHABILITATION

## 2022-04-06 PROCEDURE — 74011250636 HC RX REV CODE- 250/636: Performed by: PHYSICAL MEDICINE & REHABILITATION

## 2022-04-06 PROCEDURE — 2709999900 HC NON-CHARGEABLE SUPPLY: Performed by: PHYSICAL MEDICINE & REHABILITATION

## 2022-04-06 PROCEDURE — 74011000636 HC RX REV CODE- 636: Performed by: PHYSICAL MEDICINE & REHABILITATION

## 2022-04-06 RX ORDER — DEXAMETHASONE SODIUM PHOSPHATE 100 MG/10ML
INJECTION INTRAMUSCULAR; INTRAVENOUS AS NEEDED
Status: DISCONTINUED | OUTPATIENT
Start: 2022-04-06 | End: 2022-04-06 | Stop reason: HOSPADM

## 2022-04-06 RX ORDER — LIDOCAINE HYDROCHLORIDE 10 MG/ML
INJECTION, SOLUTION EPIDURAL; INFILTRATION; INTRACAUDAL; PERINEURAL AS NEEDED
Status: DISCONTINUED | OUTPATIENT
Start: 2022-04-06 | End: 2022-04-06 | Stop reason: HOSPADM

## 2022-04-06 RX ORDER — DIAZEPAM 5 MG/1
5-20 TABLET ORAL ONCE
Status: COMPLETED | OUTPATIENT
Start: 2022-04-06 | End: 2022-04-06

## 2022-04-06 RX ADMIN — DIAZEPAM 5 MG: 5 TABLET ORAL at 13:44

## 2022-04-06 NOTE — PERIOP NOTES
Patient verbally consented to HIPAA and verbalized understanding of discharge instructions. Signature pad not working.

## 2022-04-06 NOTE — H&P
Date of Surgery Update:  Lashanda Nguyen was seen and examined. History and physical has been reviewed. The patient has been examined. There have been no significant clinical changes since the last office visit. The patient was counseled at length about the risks of velma Covid-19 during their perioperative period and any recovery window from their procedure. The patient was made aware that velma Covid-19  may worsen their prognosis for recovering from their procedure and lend to a higher morbidity and/or mortality risk. All material risks, benefits, and reasonable alternatives including postponing the procedure were discussed. The patient does  wish to proceed with the procedure at this time.     Pre Injection pain level:       7/10  Post Injection pain level:        0/10        Signed By: Ricky Jones MD     April 6, 2022 1:39 PM

## 2022-04-06 NOTE — PROCEDURES
Procedure Note    Patient Name: Coral Hagen    Date of Procedure: April 6, 2022    Preoperative Diagnosis: Sacroiliac Joint Dysfunction    Post Operative Diagnosis: same    Procedure: SI Joint Injection right     Consent: Informed consent was obtained prior to the procedure. The patient was given the opportunity to ask questions regarding the procedure and its associated risks. In addition to the potential risks associated with the procedure itself, the patient was informed both verbally and in writing of potential side effects of the use of glucocorticoids. The patient appeared to comprehend the informed consent and desired to have the procedure performed. Procedure: The patient was placed in the prone position on the flouroscopy table and the back was prepped and draped in the usual sterile manner. A #22 gauge spinal needle was then advanced to lie within the SI joint after local Lidocaine 1% injection. 1 cc isovue used to confirm the position. A total of 10 mg of preservative free dexamethasone and 5 cc of Lidocaine was introduced into and around the SI joint. The injection area was cleaned and bandaids applied. No excessive bleeding was noted. Patient dressed and was discharged to home with instructions. Discussion: The patient tolerated the procedure well.      Katiana Frazier MD  April 6, 2022

## 2022-04-08 ENCOUNTER — TELEPHONE (OUTPATIENT)
Dept: SURGERY | Age: 47
End: 2022-04-08

## 2022-04-08 DIAGNOSIS — Z86.39 HX OF OBESITY: ICD-10-CM

## 2022-04-08 DIAGNOSIS — E66.3 OVERWEIGHT: ICD-10-CM

## 2022-04-08 DIAGNOSIS — Z76.89 ENCOUNTER FOR WEIGHT MANAGEMENT: Primary | ICD-10-CM

## 2022-04-08 DIAGNOSIS — Z71.3 DIETARY COUNSELING AND SURVEILLANCE: ICD-10-CM

## 2022-04-08 NOTE — TELEPHONE ENCOUNTER
Went to [facility] because he thought he was dehydrated complaints of fatigue and poor skin turgor per patient they ran urine and explained not dehydrated and recommended hje have thyroid level checked discussed with Hemant Henderson NP and orders placed pt made aware

## 2022-04-11 ENCOUNTER — HOSPITAL ENCOUNTER (OUTPATIENT)
Dept: LAB | Age: 47
Discharge: HOME | End: 2022-04-11

## 2022-04-11 LAB — XX-LABCORP SPECIMEN COL,LCBCF: NORMAL

## 2022-04-11 PROCEDURE — 99001 SPECIMEN HANDLING PT-LAB: CPT

## 2022-04-12 ENCOUNTER — OFFICE VISIT (OUTPATIENT)
Dept: SURGERY | Age: 47
End: 2022-04-12
Payer: COMMERCIAL

## 2022-04-12 VITALS
DIASTOLIC BLOOD PRESSURE: 84 MMHG | BODY MASS INDEX: 28.58 KG/M2 | TEMPERATURE: 97 F | HEART RATE: 72 BPM | WEIGHT: 211 LBS | SYSTOLIC BLOOD PRESSURE: 131 MMHG | OXYGEN SATURATION: 95 % | HEIGHT: 72 IN

## 2022-04-12 DIAGNOSIS — Z71.3 DIETARY COUNSELING AND SURVEILLANCE: Primary | ICD-10-CM

## 2022-04-12 DIAGNOSIS — Z76.89 ENCOUNTER FOR WEIGHT MANAGEMENT: ICD-10-CM

## 2022-04-12 DIAGNOSIS — E66.3 OVERWEIGHT: ICD-10-CM

## 2022-04-12 DIAGNOSIS — Z86.39 HX OF OBESITY: ICD-10-CM

## 2022-04-12 DIAGNOSIS — L98.7 EXCESSIVE AND REDUNDANT SKIN AND SUBCUTANEOUS TISSUE: ICD-10-CM

## 2022-04-12 LAB
ALBUMIN SERPL-MCNC: 4.4 G/DL (ref 4–5)
ALBUMIN/GLOB SERPL: 1.6 {RATIO} (ref 1.2–2.2)
ALP SERPL-CCNC: 99 IU/L (ref 44–121)
ALT SERPL-CCNC: 16 IU/L (ref 0–44)
AST SERPL-CCNC: 17 IU/L (ref 0–40)
BILIRUB SERPL-MCNC: 0.5 MG/DL (ref 0–1.2)
BUN SERPL-MCNC: 18 MG/DL (ref 6–24)
BUN/CREAT SERPL: 18 (ref 9–20)
CALCIUM SERPL-MCNC: 9.9 MG/DL (ref 8.7–10.2)
CHLORIDE SERPL-SCNC: 99 MMOL/L (ref 96–106)
CO2 SERPL-SCNC: 25 MMOL/L (ref 20–29)
CREAT SERPL-MCNC: 0.99 MG/DL (ref 0.76–1.27)
EGFR: 95 ML/MIN/1.73
GLOBULIN SER CALC-MCNC: 2.7 G/DL (ref 1.5–4.5)
GLUCOSE SERPL-MCNC: 99 MG/DL (ref 65–99)
POTASSIUM SERPL-SCNC: 4.8 MMOL/L (ref 3.5–5.2)
PROT SERPL-MCNC: 7.1 G/DL (ref 6–8.5)
SODIUM SERPL-SCNC: 140 MMOL/L (ref 134–144)
TSH SERPL DL<=0.005 MIU/L-ACNC: 1.02 UIU/ML (ref 0.45–4.5)
URATE SERPL-MCNC: 4.9 MG/DL (ref 3.8–8.4)

## 2022-04-12 PROCEDURE — 99212 OFFICE O/P EST SF 10 MIN: CPT | Performed by: NURSE PRACTITIONER

## 2022-04-12 NOTE — PATIENT INSTRUCTIONS
Dr. Liliana Burrell Oconnor  Phone: [address]. [address]  [place], [address]    Dr. Mendiola November  Phone [phone]  [facility]. [address]    [address], [address]    Dr. Amalia Francois  [phone]  [email or web address]

## 2022-04-12 NOTE — PROGRESS NOTES
[facility] Progress Note:   F/up Provider Visit    CC: Weight Management    Nas Velazquez is a 52 y.o. male who is here for his  f/up medical provider visit for the VLCD Program. he did attend class last week. -1 lb  Still having back pain which has limited some exercise. Frustrated with lack of weight loss, denies any dietary indiscretions. Interested in excess skin removal surgery. Had urgent care visit for possible dehydration, was recommended to check TSH. Did you have any problems adhering to the program last week? no  If yes, please explain:     Since your last visit, have you experienced any complications? no    Have you received any other medical care this week? yes  If yes, where and for what? Have you had any change in your medications since your last visit? no  If yes what? Are you taking an appetite suppressant? no   If yes:  Do you need a refill? BP Readings from Last 3 Encounters:   04/12/22 131/84   04/06/22 124/78   04/06/22 127/87        Eating Habits Over Last Week:  Did you take in 64 oz of non-caloric fluids? yes     Did you consume your prescribed meal replacement regimen each day? yes   MR 4    Physical Activity Over the Past Week:    Aerobic exercise: 420 min  Resistance exercise: 315  workouts / week      Weight History  Weight Metrics 4/12/2022 4/12/2022 4/6/2022 3/29/2022 3/29/2022 3/23/2022 3/16/2022   Weight - 211 lb 212 lb 12.8 oz 214 lb 214 lb 212 lb 217 lb   Waist Measure Inches 38.5 - - - - - -   Exercise Mins/week - - - - - - -   Body Fat % - - - - - - -   BMI - 28.62 kg/m2 28.86 kg/m2 29.02 kg/m2 29.02 kg/m2 28.75 kg/m2 29.43 kg/m2     Starting wt: 279  Goal wt: 185  EKG due: 1/19/2022  Ideal body weight: 77.6 kg (171 lb 1.2 oz)  Adjusted ideal body weight: 84.8 kg (187 lb 0.7 oz)  Body mass index is 28.62 kg/m².       History    Past Medical History:   Diagnosis Date    Asthma     childhood    Broken leg left    broke left leg and tore ligaments    Diabetes mellitus     no meds    Dupuytren's contracture     Essential hypertension     no meds    Fracture of left leg 08/2020    HTN (hypertension)     Ill-defined condition     neuropathy    Psoriatic arthritis, destructive type (HCC)     Sarcoidosis     says he had an arm mass removed and the path showed sarcoid, negative CXR       Past Surgical History:   Procedure Laterality Date    CLOSED RX NOSE/JAW FRAC+WIRES Right 1994    broken jaw had to wire it    HC CUFF CRYO SHOULDER [facility] USE ONLY Left 03/18/2021    left shoulder cuff surgery    HX CARPAL TUNNEL RELEASE      HX ORTHOPAEDIC      fx skull/ broken jaw     HX ORTHOPAEDIC Right     carpal tunnel    HX ORTHOPAEDIC Left 01/22/2021    knee arthroscopy    HX ORTHOPAEDIC      left rotator cuff    HX OTHER SURGICAL      lipoma removed     MS EXC SKIN BENIG 0.6-1CM TRUNK,ARM,LEG N/A 10/11/2018    Dr. Beal Boehringer 1.1-2CM TRUNK,ARM,LEG N/A 10/11/2018    Dr. Beal Boehringer 2.1-3CM TRUNK,ARM,LEG N/A 10/11/2018    Dr. Bautista June       Current Outpatient Medications   Medication Sig Dispense Refill    diclofenac EC (VOLTAREN) 50 mg EC tablet Take 1 Tablet by mouth two (2) times daily (with meals). 60 Tablet 2    meloxicam (MOBIC) 7.5 mg tablet Take 7.5 mg by mouth daily.  multivitamin (ONE A DAY) tablet Take 1 Tablet by mouth daily.  adalimumab (HUMIRA PEN SC) 40 mg by SubCUTAneous route every seven (7) days. Allergies   Allergen Reactions    Gabapentin Palpitations and Other (comments)     HR RACES       Social History     Tobacco Use    Smoking status: Never Smoker    Smokeless tobacco: Never Used   Vaping Use    Vaping Use: Never used   Substance Use Topics    Alcohol use:  Yes     Alcohol/week: 2.0 standard drinks     Types: 2 Shots of liquor per week     Comment: social    Drug use: Not Currently       Family History   Problem Relation Age of Onset    Asthma Mother  Stroke Father     Alcohol abuse Father     Substance Abuse Father     Diabetes Maternal Grandmother     Hypertension Maternal Grandmother     High Cholesterol Maternal Grandmother     Stroke Maternal Grandmother     Cancer Maternal Grandfather 79        prostate cancer and skin    Colon Cancer Maternal Grandfather 61    Depression Maternal Uncle        Family Status   Relation Name Status    Mother  Alive    Father   at age 61        unsure    Sister  Alive    MGM  Alive    Iesha 52  (Not Specified)         Review of Systems  Review of Systems   Constitutional: Positive for malaise/fatigue and weight loss. Cardiovascular: Negative. Gastrointestinal: Negative. Genitourinary: Negative. Musculoskeletal: Positive for back pain and joint pain. Skin: Negative. Objective  Visit Vitals  /84 (BP 1 Location: Right arm, BP Patient Position: Sitting)   Pulse 72   Temp 97 °F (36.1 °C)   Ht 6' (1.829 m)   Wt 95.7 kg (211 lb)   SpO2 95%   BMI 28.62 kg/m²     No LMP for male patient. Physical Exam  Physical Exam  Vitals and nursing note reviewed. Constitutional:       Appearance: Normal appearance. HENT:      Head: Normocephalic and atraumatic. Pulmonary:      Effort: Pulmonary effort is normal.   Musculoskeletal:         General: Normal range of motion. Neurological:      General: No focal deficit present. Mental Status: He is alert and oriented to person, place, and time.    Psychiatric:         Mood and Affect: Mood normal.         Behavior: Behavior normal.         Recent Results (from the past 672 hour(s))   GLUCOSE, POC    Collection Time: 22  1:28 PM   Result Value Ref Range    Glucose (POC) 114 (H) 70 - 993 mg/dL   METABOLIC PANEL, COMPREHENSIVE    Collection Time: 22 12:00 AM   Result Value Ref Range    Glucose 99 65 - 99 mg/dL    BUN 18 6 - 24 mg/dL    Creatinine 0.99 0.76 - 1.27 mg/dL    eGFR 95 >59 mL/min/1.73    BUN/Creatinine ratio 18 9 - 20    Sodium 140 134 - 144 mmol/L    Potassium 4.8 3.5 - 5.2 mmol/L    Chloride 99 96 - 106 mmol/L    CO2 25 20 - 29 mmol/L    Calcium 9.9 8.7 - 10.2 mg/dL    Protein, total 7.1 6.0 - 8.5 g/dL    Albumin 4.4 4.0 - 5.0 g/dL    GLOBULIN, TOTAL 2.7 1.5 - 4.5 g/dL    A-G Ratio 1.6 1.2 - 2.2    Bilirubin, total 0.5 0.0 - 1.2 mg/dL    Alk. phosphatase 99 44 - 121 IU/L    AST (SGOT) 17 0 - 40 IU/L    ALT (SGPT) 16 0 - 44 IU/L   TSH RFX ON ABNORMAL TO FREE T4    Collection Time: 04/11/22 12:00 AM   Result Value Ref Range    TSH 1.020 0.450 - 4.500 uIU/mL   URIC ACID    Collection Time: 04/11/22 12:00 AM   Result Value Ref Range    Uric acid 4.9 3.8 - 8.4 mg/dL   [facility] SPECIMEN COL    Collection Time: 04/11/22 10:50 AM   Result Value Ref Range    [facility] SPEC[facility]. Specimens collected/sent to [facility]. Please direct inquiries to ([phone]). Assessment / Plan    Encounter Diagnoses   Name Primary?  Dietary counseling and surveillance Yes    Encounter for weight management     Hx of obesity     Overweight     BMI 28.0-28.9,adult        1. Weight management      Today, the patient is  Height: 6' (182.9 cm) tall, Weight: 95.7 kg (211 lb) lbs for a Body mass index is 28.62 kg/m². is suffering from overweight      Degree of control: Great adherence, but minimal weight loss the past couple of months. Will transition to LCD (3 MR +1 meal) and increase strength training. Progress was reviewed with patient. Goal(s) for next appointment:   -5 lbs    I have reviewed/discussed the above normal BMI with the patient. I have recommended the following interventions: dietary management education, guidance, and counseling, encourage exercise, monitor weight and prescribed dietary intake . Mita Maid 2.  Labs    Latest results reviewed with patient (normal TSH, CMP)    3. Excess Skin   List of plastic surgeons provided to pt.      The primary encounter diagnosis was Dietary counseling and surveillance. Diagnoses of Encounter for weight management, Hx of obesity, Overweight, and BMI 28.0-28.9,adult were also pertinent to this visit.     CLARENCE RomeroC

## 2022-04-24 NOTE — PROGRESS NOTES
Chief Complaint   Patient presents with    Weight Management     Patient attended a weekly class as part of the Medically Supervised Weight Loss Program.  This class was facilitated by a Registered Dietitian. Topics taught at class focused on diet, particularly carbohydrate counting and portion control. Classes also focused on behavior changes and the importance of establishing a daily exercise routine. Progress Note: Weekly Medical Monitoring in the [facility]    Is there anything that you or the patient needs to let the supervising provider know about? no    Over the past week, have you experienced any side-effects? no    Joseph Kaye is a 55 y.o. male who is enrolled in [facility]    Joseph Kaye was prescribed the VLCD / LCD. Visit Vitals  Temp 97.8 °F (36.6 °C)   Ht 6' (1.829 m)   Wt 107.4 kg (236 lb 11.2 oz)   BMI 32.10 kg/m²     Weight Metrics 8/24/2021 8/24/2021 8/17/2021 8/9/2021 8/9/2021 8/4/2021 8/4/2021   Weight - 236 lb 11.2 oz 242 lb - 244 lb - 249 lb   Waist Measure Inches 43.25 - 44 46 - 44 -   Exercise Mins/week - - - - - - -   BMI - 32.1 kg/m2 32.82 kg/m2 - 33.09 kg/m2 - 33.77 kg/m2         Have you received any other medical care this week? no  If yes, where and for what? Have you had any change in your medications since your last visit? no  If yes what? Did you have any problems adhering to the program last week? no  If yes, please explain:       Eating Habits Over Last Week:  Did you take in 64 oz of non-caloric fluids? yes     Did you consume your prescribed meal replacement regimen each day?  yes       Physical Activity Over the Past Week:    Aerobic exercise: 10.5 hoursResistance exercise: 7 hours workouts / week
Car seat

## 2022-05-16 ENCOUNTER — VIRTUAL VISIT (OUTPATIENT)
Dept: FAMILY MEDICINE CLINIC | Age: 47
End: 2022-05-16
Payer: COMMERCIAL

## 2022-05-16 DIAGNOSIS — J40 BRONCHITIS: ICD-10-CM

## 2022-05-16 DIAGNOSIS — J06.9 UPPER RESPIRATORY TRACT INFECTION, UNSPECIFIED TYPE: Primary | ICD-10-CM

## 2022-05-16 PROCEDURE — 99214 OFFICE O/P EST MOD 30 MIN: CPT | Performed by: FAMILY MEDICINE

## 2022-05-16 RX ORDER — HYDROCODONE POLISTIREX AND CHLORPHENIRAMINE POLISTIREX 10; 8 MG/5ML; MG/5ML
5 SUSPENSION, EXTENDED RELEASE ORAL
Qty: 70 ML | Refills: 0 | Status: SHIPPED | OUTPATIENT
Start: 2022-05-16 | End: 2022-05-23

## 2022-05-16 NOTE — PATIENT INSTRUCTIONS
Bronchitis: Care Instructions  Your Care Instructions     Bronchitis is inflammation of the bronchial tubes, which carry air to the lungs. The tubes swell and produce mucus, or phlegm. The mucus and inflamed bronchial tubes make you cough. You may have trouble breathing. Most cases of bronchitis are caused by viruses like those that cause colds. Antibiotics usually do not help and they may be harmful. Bronchitis usually develops rapidly and lasts about 2 to 3 weeks in otherwise healthy people. Follow-up care is a key part of your treatment and safety. Be sure to make and go to all appointments, and call your doctor if you are having problems. It's also a good idea to know your test results and keep a list of the medicines you take. How can you care for yourself at home? · Take all medicines exactly as prescribed. Call your doctor if you think you are having a problem with your medicine. · Get some extra rest.  · Take an over-the-counter pain medicine, such as acetaminophen (Tylenol), ibuprofen (Advil, Motrin), or naproxen (Aleve) to reduce fever and relieve body aches. Read and follow all instructions on the label. · Do not take two or more pain medicines at the same time unless the doctor told you to. Many pain medicines have acetaminophen, which is Tylenol. Too much acetaminophen (Tylenol) can be harmful. · Take an over-the-counter cough medicine to help quiet a dry, hacking cough so that you can sleep. Avoid cough medicines that have more than one active ingredient. Read and follow all instructions on the label. · Do not smoke. Smoking can make bronchitis worse. If you need help quitting, talk to your doctor about stop-smoking programs and medicines. These can increase your chances of quitting for good. When should you call for help? Call 911 anytime you think you may need emergency care. For example, call if:    · You have severe trouble breathing.    Call your doctor now or seek immediate medical care if:    · You have new or worse trouble breathing.     · You cough up dark brown or bloody mucus (sputum).     · You have a new or higher fever.     · You have a new rash. Watch closely for changes in your health, and be sure to contact your doctor if:    · You cough more deeply or more often, especially if you notice more mucus or a change in the color of your mucus.     · You are not getting better as expected. Where can you learn more? Go to http://www.gray.com/  Enter H333 in the search box to learn more about \"Bronchitis: Care Instructions. \"  Current as of: July 6, 2021               Content Version: 13.2  © 2006-2022 [employer]. Care instructions adapted under license by [employer] (which disclaims liability or warranty for this information). If you have questions about a medical condition or this instruction, always ask your healthcare professional. [address] any warranty or liability for your use of this information.

## 2022-05-16 NOTE — PROGRESS NOTES
1. \"Have you been to the ER, urgent care clinic since your last visit? Hospitalized since your last visit? \" Yes Where: Urgent care clinic unknown     2. \"Have you seen or consulted any other health care providers outside of the [address] [place] [address] since your last visit? \" No     3. For patients aged 39-70: Has the patient had a colonoscopy / FIT/ Cologuard? No      If the patient is female:    4. For patients aged 41-77: Has the patient had a mammogram within the past 2 years? NA - based on age or sex      11. For patients aged 21-65: Has the patient had a pap smear?  NA - based on age or sex

## 2022-05-16 NOTE — PROGRESS NOTES
Stacie Waterman, who was evaluated through a synchronous (real-time) audio-video encounter, and/or his healthcare decision maker, is aware that it is a billable service, which includes applicable co-pays, with coverage as determined by his insurance carrier. He provided verbal consent to proceed and patient identification was verified. This visit was conducted pursuant to the emergency declaration under the [facility] [address] and the [employer] and [employer] Act. A caregiver was present when appropriate. Ability to conduct physical exam was limited. The patient was located at home in a state where the provider was licensed to provide care. SUBJECTIVE  Chief Complaint   Patient presents with    Nasal Congestion     taking Dayquil     Cough     productive cough with brown phlegm     The patient presents complaining of a 5 day history of recent URI symptoms. Start with a slight loss of voice / raspy. Started coughing a few days in. No dyspnea but is fatigued. No fevers but has had some chills. Has nasal congestion and drainage. The patient also complains of a cough that is described as productive - dark yellow / white / clear intermittent. .   Had a negative home covid test.  Took that 2 days in. The patient denies any nausea or vomiting. The patient denies chest pain or chest tightness, or shortness of breath. The patient has tried taking Dayquil / Nyquil with slight relief. Cough seems to have gotten worse last night. OBJECTIVE     General:  Alert, cooperative, well appearing, in no apparent distress. Lungs: Inspiratory and expiratory efforts are full and unlabored. ASSESSMENT / PLAN    ICD-10-CM ICD-9-CM    1. Upper respiratory tract infection, unspecified type  J06.9 465.9    2.  Bronchitis  J40 490 HYDROcodone-chlorpheniramine (TUSSIONEX) 10-8 mg/5 mL suspension     Advised to d/c dayquil and nyquil and start tussionex 5mL po q12h as needed for cough. Monitor to resolution. If fevers or worsens, send MyChart or call for antibiotic. He did have allergies earlier this Spring, so it may be a good idea to maximize allergy interventions if this lingers. All chart history elements were reviewed by me at the time of the visit even though marked at time of note closure. Patient understands our medical plan. Patient has provided input and agrees with goals. Alternatives have been explained and offered. All questions answered. The patient is to call if condition worsens or fails to improve.

## 2022-05-18 ENCOUNTER — TELEPHONE (OUTPATIENT)
Dept: FAMILY MEDICINE CLINIC | Age: 47
End: 2022-05-18

## 2022-05-18 NOTE — TELEPHONE ENCOUNTER
Spoke with patient. He tested positive for Covid using a home test kit. Patient wants to know if there is something that he can be given for it.

## 2022-05-18 NOTE — TELEPHONE ENCOUNTER
Discussed options with patients. He opts for OTC. Counseled on use of motrin and tylenol since he has mainly fevers but no SOB or CP. He will monitor for now.

## 2022-06-07 ENCOUNTER — OFFICE VISIT (OUTPATIENT)
Dept: SURGERY | Age: 47
End: 2022-06-07
Payer: COMMERCIAL

## 2022-06-07 VITALS
OXYGEN SATURATION: 95 % | HEART RATE: 65 BPM | SYSTOLIC BLOOD PRESSURE: 119 MMHG | HEIGHT: 72 IN | DIASTOLIC BLOOD PRESSURE: 85 MMHG | BODY MASS INDEX: 28.17 KG/M2 | WEIGHT: 208 LBS | TEMPERATURE: 97 F

## 2022-06-07 DIAGNOSIS — Z86.39 HX OF OBESITY: ICD-10-CM

## 2022-06-07 DIAGNOSIS — E66.3 OVERWEIGHT: ICD-10-CM

## 2022-06-07 DIAGNOSIS — L98.7 EXCESSIVE AND REDUNDANT SKIN AND SUBCUTANEOUS TISSUE: ICD-10-CM

## 2022-06-07 DIAGNOSIS — Z76.89 ENCOUNTER FOR WEIGHT MANAGEMENT: ICD-10-CM

## 2022-06-07 DIAGNOSIS — Z71.3 DIETARY COUNSELING AND SURVEILLANCE: Primary | ICD-10-CM

## 2022-06-07 PROCEDURE — 99212 OFFICE O/P EST SF 10 MIN: CPT | Performed by: NURSE PRACTITIONER

## 2022-06-07 RX ORDER — ADALIMUMAB 40MG/0.4ML
40 KIT SUBCUTANEOUS
COMMUNITY
Start: 2022-05-19

## 2022-06-07 NOTE — PATIENT INSTRUCTIONS
Dr. Tomer Syed  [phone]    Dr. Gabriele Ferraro  Phone: [address]  [place], [place] [address]    Dr. Fracisco Winter  Phone [phone]  [facility].    [facility], [address]    [address]  [place]    Dr. Kelin Thakur [address] [facility] [address]  [place], [facility] [place]  [phone]

## 2022-06-07 NOTE — PROGRESS NOTES
[facility] Progress Note:   F/up Provider Visit    CC: Weight Management    Pipo Singh is a 52 y.o. male who is here for his  f/up medical provider visit for the LCD Program. he did attend class last week. -3 lbs  Was sick with covid and pneumonia last month. Was unable to exercise or stay on diet. Started feeling 100% a couple of days ago. Did you have any problems adhering to the program last week? no  If yes, please explain:     Since your last visit, have you experienced any complications? no    Have you received any other medical care this week? no  If yes, where and for what? Have you had any change in your medications since your last visit? yes  If yes what? Humira     Are you taking an appetite suppressant? no   If yes:  Do you need a refill? BP Readings from Last 3 Encounters:   06/07/22 119/85   04/12/22 131/84   04/06/22 124/78        Eating Habits Over Last Week:  Did you take in 64 oz of non-caloric fluids? yes     Did you consume your prescribed meal replacement regimen each day? yes   MR 3  Protein and vegetables    Physical Activity Over the Past Week:    Aerobic exercise: 450 min  Resistance exercise: 150  workouts / week      Weight History  Weight Metrics 6/7/2022 4/12/2022 4/12/2022 4/6/2022 3/29/2022 3/29/2022 3/23/2022   Weight 208 lb - 211 lb 212 lb 12.8 oz 214 lb 214 lb 212 lb   Waist Measure Inches - 38.5 - - - - -   Exercise Mins/week - - - - - - -   Body Fat % - - - - - - -   BMI 28.21 kg/m2 - 28.62 kg/m2 28.86 kg/m2 29.02 kg/m2 29.02 kg/m2 28.75 kg/m2       Starting wt: 279  Goal wt: 185  EKG due: Last one 1/19/2022  Ideal body weight: 77.6 kg (171 lb 1.2 oz)  Adjusted ideal body weight: 84.3 kg (185 lb 13.5 oz)  Body mass index is 28.21 kg/m².       History    Past Medical History:   Diagnosis Date    Asthma     childhood    Broken leg left    broke left leg and tore ligaments    Diabetes mellitus     no meds    Dupuytren's contracture  Essential hypertension     no meds    Fracture of left leg 08/2020    HTN (hypertension)     Ill-defined condition     neuropathy    Psoriatic arthritis, destructive type (HCC)     Sarcoidosis     says he had an arm mass removed and the path showed sarcoid, negative CXR       Past Surgical History:   Procedure Laterality Date    CLOSED RX NOSE/JAW FRAC+WIRES Right 1994    broken jaw had to wire it    HC CUFF CRYO SHOULDER [facility] USE ONLY Left 03/18/2021    left shoulder cuff surgery    HX CARPAL TUNNEL RELEASE      HX ORTHOPAEDIC      fx skull/ broken jaw     HX ORTHOPAEDIC Right     carpal tunnel    HX ORTHOPAEDIC Left 01/22/2021    knee arthroscopy    HX ORTHOPAEDIC      left rotator cuff    HX OTHER SURGICAL      lipoma removed     TN EXC SKIN BENIG 0.6-1CM TRUNK,ARM,LEG N/A 10/11/2018    Dr. Cesario Quezada 1.1-2CM TRUNK,ARM,LEG N/A 10/11/2018    Dr. Cesario Quezada 2.1-3CM TRUNK,ARM,LEG N/A 10/11/2018    Dr. Meka Serna       Current Outpatient Medications   Medication Sig Dispense Refill    Humira,CF, Pen 40 mg/0.4 mL injection pen          Allergies   Allergen Reactions    Gabapentin Palpitations and Other (comments)     HR RACES       Social History     Tobacco Use    Smoking status: Never Smoker    Smokeless tobacco: Never Used   Vaping Use    Vaping Use: Never used   Substance Use Topics    Alcohol use:  Yes     Alcohol/week: 2.0 standard drinks     Types: 2 Shots of liquor per week     Comment: social    Drug use: Not Currently       Family History   Problem Relation Age of Onset    Asthma Mother     Stroke Father     Alcohol abuse Father     Substance Abuse Father     Diabetes Maternal Grandmother     Hypertension Maternal Grandmother     High Cholesterol Maternal Grandmother     Stroke Maternal Grandmother     Cancer Maternal Grandfather 79        prostate cancer and skin    Colon Cancer Maternal Grandfather 61    Depression Maternal Uncle Family Status   Relation Name Status    Mother  Alive    Father   at age 61        unsure    Sister  Alive    MGM  Alive    Iesha 52  (Not Specified)         Review of Systems  Review of Systems   Constitutional: Positive for malaise/fatigue and weight loss. Respiratory: Negative. Cardiovascular: Negative. Gastrointestinal: Negative. Musculoskeletal: Positive for back pain and joint pain. Neurological: Negative. Objective  Visit Vitals  /85 (BP 1 Location: Left upper arm)   Pulse 65   Temp 97 °F (36.1 °C)   Ht 6' (1.829 m)   Wt 94.3 kg (208 lb)   SpO2 95%   BMI 28.21 kg/m²     No LMP for male patient. Physical Exam  Physical Exam  Vitals and nursing note reviewed. Constitutional:       Appearance: He is obese. HENT:      Head: Normocephalic and atraumatic. Pulmonary:      Effort: Pulmonary effort is normal.   Musculoskeletal:         General: Normal range of motion. Neurological:      General: No focal deficit present. Mental Status: He is alert and oriented to person, place, and time. Psychiatric:         Mood and Affect: Mood normal.         Behavior: Behavior normal.       No results found for this or any previous visit (from the past 672 hour(s)). Assessment / Plan    Encounter Diagnoses   Name Primary?  Dietary counseling and surveillance Yes    Encounter for weight management     Hx of obesity     Overweight     BMI 28.0-28.9,adult     Excessive and redundant skin and subcutaneous tissue        1. Weight management      Today, the patient is  Height: 6' (182.9 cm) tall, Weight: 94.3 kg (208 lb) lbs for a Body mass index is 28.21 kg/m². is suffering from overweight      Degree of control: Tough month with illness, back on track this week since he is feeling better. Re-start LCD: 3 MR + 1 meal. Increase strength training. Limit runs to days not focusing on strength.  May need to increase protein by adding in additional shake or chicken to assist with goals of increasing muscle mass. Progress was reviewed with patient. Goal(s) for next appointment:   -5 lbs     I have reviewed/discussed the above normal BMI with the patient. I have recommended the following interventions: dietary management education, guidance, and counseling, encourage exercise, monitor weight and prescribed dietary intake . Grace Byrd The primary encounter diagnosis was Dietary counseling and surveillance. Diagnoses of Encounter for weight management, Hx of obesity, Overweight, BMI 28.0-28.9,adult, and Excessive and redundant skin and subcutaneous tissue were also pertinent to this visit.     Vasile Ayala, ROBER-C

## 2022-07-12 ENCOUNTER — OFFICE VISIT (OUTPATIENT)
Dept: SURGERY | Age: 47
End: 2022-07-12
Payer: COMMERCIAL

## 2022-07-12 VITALS
WEIGHT: 207 LBS | BODY MASS INDEX: 28.04 KG/M2 | HEART RATE: 60 BPM | DIASTOLIC BLOOD PRESSURE: 80 MMHG | OXYGEN SATURATION: 95 % | SYSTOLIC BLOOD PRESSURE: 117 MMHG | HEIGHT: 72 IN | TEMPERATURE: 98 F

## 2022-07-12 DIAGNOSIS — Z71.3 DIETARY COUNSELING AND SURVEILLANCE: Primary | ICD-10-CM

## 2022-07-12 DIAGNOSIS — Z86.39 HX OF OBESITY: ICD-10-CM

## 2022-07-12 DIAGNOSIS — L98.7 EXCESSIVE AND REDUNDANT SKIN AND SUBCUTANEOUS TISSUE: ICD-10-CM

## 2022-07-12 DIAGNOSIS — Z76.89 ENCOUNTER FOR WEIGHT MANAGEMENT: ICD-10-CM

## 2022-07-12 DIAGNOSIS — E66.3 OVERWEIGHT: ICD-10-CM

## 2022-07-12 PROCEDURE — 99212 OFFICE O/P EST SF 10 MIN: CPT | Performed by: NURSE PRACTITIONER

## 2022-07-12 NOTE — PROGRESS NOTES
[facility] Progress Note:   F/up Provider Visit    CC: Weight Management    Eduardo Gonazlez is a 52 y.o. male who is here for his  f/up medical provider visit for the LCD Program. he did  attend class last week. -1 lb and 2 inches in waist circumference  Broke hand so was unable to start weight training. Next follow up with ortho for workout clearance on 7/25. Did you have any problems adhering to the program last week? no  If yes, please explain:     Since your last visit, have you experienced any complications? no    Have you received any other medical care this week? no  If yes, where and for what? Have you had any change in your medications since your last visit? no  If yes what? Are you taking an appetite suppressant? no   If yes:  Do you need a refill? BP Readings from Last 3 Encounters:   07/12/22 117/80   06/07/22 119/85   04/12/22 131/84        Eating Habits Over Last Week:  Did you take in 64 oz of non-caloric fluids? yes     Did you consume your prescribed meal replacement regimen each day? yes   MR 3    Physical Activity Over the Past Week:    Aerobic exercise: 0 min  Resistance exercise: 0 workouts / week      Weight History  Weight Metrics 7/12/2022 7/12/2022 6/7/2022 4/12/2022 4/12/2022 4/6/2022 3/29/2022   Weight - 207 lb 208 lb - 211 lb 212 lb 12.8 oz 214 lb   Waist Measure Inches 37 - - 38.5 - - -   Exercise Mins/week - - - - - - -   Body Fat % - - - - - - -   BMI - 28.07 kg/m2 28.21 kg/m2 - 28.62 kg/m2 28.86 kg/m2 29.02 kg/m2       Starting wt: 279  Goal wt: 185  EKG due: Last one 1/19/2022  Ideal body weight: 77.6 kg (171 lb 1.2 oz)  Adjusted ideal body weight: 84.1 kg (185 lb 7.1 oz)  Body mass index is 28.07 kg/m².       History    Past Medical History:   Diagnosis Date    Asthma     childhood    Broken leg left    broke left leg and tore ligaments    Diabetes mellitus     no meds    Dupuytren's contracture     Essential hypertension     no meds  Fracture of left leg 08/2020    HTN (hypertension)     Ill-defined condition     neuropathy    Psoriatic arthritis, destructive type (HCC)     Sarcoidosis     says he had an arm mass removed and the path showed sarcoid, negative CXR       Past Surgical History:   Procedure Laterality Date    CLOSED RX NOSE/JAW FRAC+WIRES Right 1994    broken jaw had to wire it    HC CUFF CRYO SHOULDER [facility] USE ONLY Left 03/18/2021    left shoulder cuff surgery    HX CARPAL TUNNEL RELEASE      HX ORTHOPAEDIC      fx skull/ broken jaw     HX ORTHOPAEDIC Right     carpal tunnel    HX ORTHOPAEDIC Left 01/22/2021    knee arthroscopy    HX ORTHOPAEDIC      left rotator cuff    HX OTHER SURGICAL      lipoma removed     ME EXC SKIN BENIG 0.6-1CM TRUNK,ARM,LEG N/A 10/11/2018    Dr. Joe Gage 1.1-2CM TRUNK,ARM,LEG N/A 10/11/2018    Dr. Joe Gage 2.1-3CM TRUNK,ARM,LEG N/A 10/11/2018    Dr. Candy Laguna       Current Outpatient Medications   Medication Sig Dispense Refill    Humira,CF, Pen 40 mg/0.4 mL injection pen          Allergies   Allergen Reactions    Gabapentin Palpitations and Other (comments)     HR RACES       Social History     Tobacco Use    Smoking status: Never Smoker    Smokeless tobacco: Never Used   Vaping Use    Vaping Use: Never used   Substance Use Topics    Alcohol use:  Yes     Alcohol/week: 2.0 standard drinks     Types: 2 Shots of liquor per week     Comment: social    Drug use: Not Currently       Family History   Problem Relation Age of Onset    Asthma Mother     Stroke Father     Alcohol abuse Father     Substance Abuse Father     Diabetes Maternal Grandmother     Hypertension Maternal Grandmother     High Cholesterol Maternal Grandmother     Stroke Maternal Grandmother     Cancer Maternal Grandfather 79        prostate cancer and skin    Colon Cancer Maternal Grandfather 61    Depression Maternal Uncle        Family Status   Relation Name Status  Mother  Alive    Father   at age 61        unsure    Sister  Alive    MGM  Alive    Iesha 52  (Not Specified)         Review of Systems  Review of Systems   Constitutional: Positive for weight loss. Negative for malaise/fatigue. Respiratory: Negative. Cardiovascular: Negative. Gastrointestinal: Negative. Musculoskeletal: Positive for back pain and joint pain. Skin: Negative. Neurological: Negative. Objective  Visit Vitals  /80 (BP 1 Location: Right arm, BP Patient Position: Sitting)   Pulse 60   Temp 98 °F (36.7 °C)   Ht 6' (1.829 m)   Wt 93.9 kg (207 lb)   SpO2 95%   BMI 28.07 kg/m²     No LMP for male patient. Physical Exam  Physical Exam  Vitals and nursing note reviewed. Constitutional:       Appearance: Normal appearance. HENT:      Head: Normocephalic and atraumatic. Pulmonary:      Effort: Pulmonary effort is normal.   Musculoskeletal:         General: Normal range of motion. Neurological:      General: No focal deficit present. Mental Status: He is alert and oriented to person, place, and time. Psychiatric:         Mood and Affect: Mood normal.         Behavior: Behavior normal.       No results found for this or any previous visit (from the past 672 hour(s)). Assessment / Plan    Encounter Diagnoses   Name Primary?  Dietary counseling and surveillance Yes    Encounter for weight management     Hx of obesity     Overweight     BMI 28.0-28.9,adult     Excessive and redundant skin and subcutaneous tissue      1. Weight management      Today, the patient is  Height: 6' (182.9 cm) tall, Weight: 93.9 kg (207 lb) lbs for a Body mass index is 28.07 kg/m². is suffering from overweight      Degree of control: Good even with injury and not able to exercise. Continue with LCD (3 MR + 1 meal). Can add small amount of complex carbohydrate on heavy workout days.  Follow up in 6 weeks after clearance by ortho and started training for at least 3 weeks. Progress was reviewed with patient. Goal(s) for next appointment:   -5 lbs    I have reviewed/discussed the above normal BMI with the patient. I have recommended the following interventions: dietary management education, guidance, and counseling, encourage exercise, monitor weight and prescribed dietary intake . The primary encounter diagnosis was Dietary counseling and surveillance. Diagnoses of Encounter for weight management, Hx of obesity, Overweight, BMI 28.0-28.9,adult, and Excessive and redundant skin and subcutaneous tissue were also pertinent to this visit. Follow-up and Dispositions    · Return in about 6 weeks (around 8/23/2022).        SANDY Day

## 2022-09-01 ENCOUNTER — OFFICE VISIT (OUTPATIENT)
Dept: SURGERY | Age: 47
End: 2022-09-01
Payer: COMMERCIAL

## 2022-09-01 VITALS
TEMPERATURE: 98 F | HEIGHT: 72 IN | BODY MASS INDEX: 27.63 KG/M2 | DIASTOLIC BLOOD PRESSURE: 83 MMHG | OXYGEN SATURATION: 95 % | WEIGHT: 204 LBS | SYSTOLIC BLOOD PRESSURE: 124 MMHG | HEART RATE: 56 BPM

## 2022-09-01 DIAGNOSIS — Z71.3 DIETARY COUNSELING AND SURVEILLANCE: Primary | ICD-10-CM

## 2022-09-01 DIAGNOSIS — Z76.89 ENCOUNTER FOR WEIGHT MANAGEMENT: ICD-10-CM

## 2022-09-01 DIAGNOSIS — E66.3 OVERWEIGHT: ICD-10-CM

## 2022-09-01 DIAGNOSIS — Z86.39 HX OF OBESITY: ICD-10-CM

## 2022-09-01 PROCEDURE — 99213 OFFICE O/P EST LOW 20 MIN: CPT | Performed by: NURSE PRACTITIONER

## 2022-09-01 NOTE — PROGRESS NOTES
[facility] Progress Note:   F/up Provider Visit    CC: Weight Management    Eduardo Gonzalez is a 52 y.o. male who is here for his  f/up medical provider visit for the LCD Program. he did attend class last week. -3 lbs  Was not cleared by ortho to start weight training until last week. Started running again on Friday, looking for a  to work with for strength training. Reporting cramps in lateral lower legs at night. Reports low blood sugar, he started getting lightheaded and shaky a couple times this past week, he checked his BG and it was in 30s-50s. Resolved with intake of simple carbohydrates. Did you have any problems adhering to the program last week? yes  If yes, please explain:     Since your last visit, have you experienced any complications? no    Have you received any other medical care this week? no  If yes, where and for what? Have you had any change in your medications since your last visit? no  If yes what? Are you taking an appetite suppressant? no   If yes:  Do you need a refill? BP Readings from Last 3 Encounters:   09/01/22 124/83   07/12/22 117/80   06/07/22 119/85        Eating Habits Over Last Week:  Did you take in 64 oz of non-caloric fluids?  yes     Did you consume your prescribed meal replacement regimen each day? yes   MR 3    Physical Activity Over the Past Week:    Aerobic exercise: 180  min  Resistance exercise: 0 workouts / week      Weight History  Weight Metrics 9/1/2022 9/1/2022 7/12/2022 7/12/2022 6/7/2022 4/12/2022 4/12/2022   Weight - 204 lb - 207 lb 208 lb - 211 lb   Waist Measure Inches 40 - 37 - - 38.5 -   Exercise Mins/week - - - - - - -   Body Fat % - - - - - - -   BMI - 27.67 kg/m2 - 28.07 kg/m2 28.21 kg/m2 - 28.62 kg/m2     Starting wt: 279  Goal wt: 185  EKG due: Last one 1/19/2022  Ideal body weight: 77.6 kg (171 lb 1.2 oz)  Adjusted ideal body weight: 83.6 kg (184 lb 3.9 oz)  Body mass index is 27.67 kg/m². History    Past Medical History:   Diagnosis Date    Asthma     childhood    Broken leg left    broke left leg and tore ligaments    Diabetes mellitus     no meds    Dupuytren's contracture     Essential hypertension     no meds    Fracture of left leg 08/2020    HTN (hypertension)     Hx of colonoscopy with polypectomy 08/22/2022    Dr. Tres Self hemorrhoids,mild diverticulosis; polyps (adenoma); repeat colonoscopy in 5 years    Ill-defined condition     neuropathy    Psoriatic arthritis, destructive type ([address]     Sarcoidosis     says he had an arm mass removed and the path showed sarcoid, negative CXR       Past Surgical History:   Procedure Laterality Date    CLOSED RX NOSE/JAW FRAC+WIRES Right 1994    broken jaw had to wire it    HC CUFF CRYO SHOULDER [facility] USE ONLY Left 03/18/2021    left shoulder cuff surgery    HX CARPAL TUNNEL RELEASE      HX ORTHOPAEDIC      fx skull/ broken jaw     HX ORTHOPAEDIC Right     carpal tunnel    HX ORTHOPAEDIC Left 01/22/2021    knee arthroscopy    HX ORTHOPAEDIC      left rotator cuff    HX OTHER SURGICAL      lipoma removed     KS EXC SKIN BENALONDRA 0.6-1CM TRUNK,ARM,LEG N/A 10/11/2018    Dr. Oziel Herrera 1.1-2CM TRUNK,ARM,LEG N/A 10/11/2018    Dr. Oziel Herrera 2.1-3CM Emmalene Rushing N/A 10/11/2018    Dr. Scott Gordon       Current Outpatient Medications   Medication Sig Dispense Refill    Humira,CF, Pen 40 mg/0.4 mL injection pen          Allergies   Allergen Reactions    Gabapentin Palpitations and Other (comments)     HR RACES       Social History     Tobacco Use    Smoking status: Never    Smokeless tobacco: Never   Vaping Use    Vaping Use: Never used   Substance Use Topics    Alcohol use:  Yes     Alcohol/week: 2.0 standard drinks     Types: 2 Shots of liquor per week     Comment: social    Drug use: Not Currently       Family History   Problem Relation Age of Onset    Asthma Mother     Stroke Father     Alcohol abuse Father Substance Abuse Father     Diabetes Maternal Grandmother     Hypertension Maternal Grandmother     High Cholesterol Maternal Grandmother     Stroke Maternal Grandmother     Cancer Maternal Grandfather 79        prostate cancer and skin    Colon Cancer Maternal Grandfather 61    Depression Maternal Uncle        Family Status   Relation Name Status    Mother  Alive    Father   at age 61        unsure    Sister  Alive    MGM  Alive    [place]  (Not Specified)         Review of Systems  Review of Systems   Constitutional:  Positive for weight loss. Negative for malaise/fatigue. Respiratory: Negative. Cardiovascular: Negative. Gastrointestinal: Negative. Musculoskeletal:  Positive for back pain and joint pain. Neurological: Negative. Objective  Visit Vitals  /83 (BP 1 Location: Right arm, BP Patient Position: Sitting)   Pulse (!) 56   Temp 98 °F (36.7 °C) (Temporal)   Ht 6' (1.829 m)   Wt 92.5 kg (204 lb)   SpO2 95%   BMI 27.67 kg/m²     No LMP for male patient. Physical Exam  Physical Exam  Vitals and nursing note reviewed. Constitutional:       Appearance: Normal appearance. HENT:      Head: Normocephalic and atraumatic. Pulmonary:      Effort: Pulmonary effort is normal.   Musculoskeletal:         General: Normal range of motion. Neurological:      General: No focal deficit present. Mental Status: He is alert and oriented to person, place, and time. Psychiatric:         Mood and Affect: Mood normal.         Behavior: Behavior normal.         No results found for this or any previous visit (from the past 672 hour(s)). Assessment / Plan    Encounter Diagnoses   Name Primary? Dietary counseling and surveillance Yes    Encounter for weight management     Hx of obesity     Overweight     BMI 27.0-27.9,adult      1.   Weight management      Today, the patient is  Height: 6' (182.9 cm) tall, Weight: 92.5 kg (204 lb) lbs for a Body mass index is 27.67 kg/m².  is suffering from overweight      Degree of control: Good even with very limited activity (only started running again last week) and some dietary indiscretions. Continue with LCD (2-3 MR + 1-2 meals). May need to take out one shake and do meal instead on heavy workout days or days he exerts himself at work. Discussed to avoid going too long in between meals to prevent low blood sugar. Progress was reviewed with patient. Goal(s) for next appointment:   -5 lbs    I have reviewed/discussed the above normal BMI with the patient. I have recommended the following interventions: dietary management education, guidance, and counseling, encourage exercise, monitor weight, and prescribed dietary intake . 2.  Cramping   Discussed to restart MVI or incorporate OTC magnesium before bed. The primary encounter diagnosis was Dietary counseling and surveillance. Diagnoses of Encounter for weight management, Hx of obesity, Overweight, and BMI 27.0-27.9,adult were also pertinent to this visit.     CLARENCE RoweC

## 2022-09-11 ENCOUNTER — HOSPITAL ENCOUNTER (EMERGENCY)
Age: 47
Discharge: HOME | End: 2022-09-12
Attending: EMERGENCY MEDICINE
Payer: COMMERCIAL

## 2022-09-11 DIAGNOSIS — S12.9XXA CLOSED FRACTURE OF TRANSVERSE PROCESS OF CERVICAL VERTEBRA, INITIAL ENCOUNTER (HCC): ICD-10-CM

## 2022-09-11 DIAGNOSIS — M54.9 ACUTE BILATERAL BACK PAIN, UNSPECIFIED BACK LOCATION: ICD-10-CM
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
## 2022-09-11 DIAGNOSIS — S09.90XA INJURY OF HEAD, INITIAL ENCOUNTER: ICD-10-CM

## 2022-09-11 PROCEDURE — 99284 EMERGENCY DEPT VISIT MOD MDM: CPT

## 2022-09-11 NOTE — Clinical Note
[address] [facility] EMERGENCY DEPT  [id number] [address]  [phone]    Work/School Note    Date: 9/11/2022    To Whom It May concern:    Karina Villeda was seen and treated today in the emergency room by the following provider(s):  Attending Provider: Darien Mixon MD.      Karina Villeda is excused from work/school on 9/12/2022 through 9/14/2022. He is medically clear to return to work/school on 9/15/2022.          Sincerely,          Jax De La Cruz MD

## 2022-09-12 ENCOUNTER — APPOINTMENT (OUTPATIENT)
Dept: GENERAL RADIOLOGY | Age: 47
End: 2022-09-12
Attending: EMERGENCY MEDICINE
Payer: COMMERCIAL

## 2022-09-12 ENCOUNTER — APPOINTMENT (OUTPATIENT)
Dept: CT IMAGING | Age: 47
End: 2022-09-12
Attending: EMERGENCY MEDICINE
Payer: COMMERCIAL

## 2022-09-12 VITALS
HEIGHT: 72 IN | RESPIRATION RATE: 17 BRPM | TEMPERATURE: 97.4 F | WEIGHT: 208 LBS | DIASTOLIC BLOOD PRESSURE: 85 MMHG | BODY MASS INDEX: 28.17 KG/M2 | OXYGEN SATURATION: 96 % | HEART RATE: 65 BPM | SYSTOLIC BLOOD PRESSURE: 133 MMHG

## 2022-09-12 PROCEDURE — 72125 CT NECK SPINE W/O DYE: CPT

## 2022-09-12 PROCEDURE — 70450 CT HEAD/BRAIN W/O DYE: CPT

## 2022-09-12 PROCEDURE — 71046 X-RAY EXAM CHEST 2 VIEWS: CPT

## 2022-09-12 RX ORDER — HYDROCODONE BITARTRATE AND ACETAMINOPHEN 5; 325 MG/1; MG/1
1 TABLET ORAL
Qty: 14 TABLET | Refills: 0 | Status: SHIPPED | OUTPATIENT
Start: 2022-09-12 | End: 2022-09-19

## 2022-09-12 NOTE — ED PROVIDER NOTES
Pt c/o fall, slipped on stairs, 3 days ago, fell down few steps onto head, + loc x 15 sec or so. C/o neck /upper back pain and headache since w blurred vision off and on today, none curr. No dizziness or confusion. Also mild left upper flank pain where he hit banister, per pt. No cp or abd pain. No weakness or numbness. No sob. No meds for pain pta. No confusion.         Past Medical History:   Diagnosis Date    Asthma     childhood    Broken leg left    broke left leg and tore ligaments    Diabetes mellitus     no meds    Dupuytren's contracture     Essential hypertension     no meds    Fracture of left leg 08/2020    HTN (hypertension)     Hx of colonoscopy with polypectomy 08/22/2022    Dr. Mcneil Read hemorrhoids,mild diverticulosis; polyps (adenoma); repeat colonoscopy in 5 years    Ill-defined condition     neuropathy    Psoriatic arthritis, destructive type ([facility] [address]     Sarcoidosis     says he had an arm mass removed and the path showed sarcoid, negative CXR       Past Surgical History:   Procedure Laterality Date    CLOSED RX NOSE/JAW FRAC+WIRES Right 1994    broken jaw had to wire it    HC CUFF CRYO SHOULDER [facility] USE ONLY Left 03/18/2021    left shoulder cuff surgery    HX CARPAL TUNNEL RELEASE      HX ORTHOPAEDIC      fx skull/ broken jaw     HX ORTHOPAEDIC Right     carpal tunnel    HX ORTHOPAEDIC Left 01/22/2021    knee arthroscopy    HX ORTHOPAEDIC      left rotator cuff    HX OTHER SURGICAL      lipoma removed     CT EXC SKIN BENIG 0.6-1CM TRUNK,ARM,LEG N/A 10/11/2018    Dr. Mojgan Briggs 1.1-2CM TRUNK,ARM,LEG N/A 10/11/2018    Dr. Mojgan Briggs 2.1-3CM Casimer Kingman N/A 10/11/2018    Dr. Ely Luu         Family History:   Problem Relation Age of Onset    Asthma Mother     Stroke Father     Alcohol abuse Father     Substance Abuse Father     Diabetes Maternal Grandmother     Hypertension Maternal Grandmother     High Cholesterol Maternal Grandmother     Stroke Maternal Grandmother     Cancer Maternal Grandfather 79        prostate cancer and skin    Colon Cancer Maternal Grandfather 61    Depression Maternal Uncle        Social History     Socioeconomic History    Marital status: SINGLE     Spouse name: Not on file    Number of children: Not on file    Years of education: Not on file    Highest education level: Not on file   Occupational History    Occupation: unemployed   Tobacco Use    Smoking status: Never    Smokeless tobacco: Never   Vaping Use    Vaping Use: Never used   Substance and Sexual Activity    Alcohol use: Yes     Alcohol/week: 2.0 standard drinks     Types: 2 Shots of liquor per week     Comment: social    Drug use: Not Currently    Sexual activity: Yes     Partners: Female     Birth control/protection: Condom   Other Topics Concern    Not on file   Social History Narrative    Not on file     Social Determinants of Health     Financial Resource Strain: Not on file   Food Insecurity: Not on file   Transportation Needs: Not on file   Physical Activity: Not on file   Stress: Not on file   Social Connections: Not on file   Intimate Partner Violence: Not on file   Housing Stability: Not on file         ALLERGIES: Gabapentin    Review of Systems   Constitutional:  Negative for diaphoresis and fever. HENT:  Negative for congestion. Respiratory:  Negative for cough and shortness of breath. Cardiovascular:  Negative for chest pain. Gastrointestinal:  Negative for abdominal pain and nausea. Musculoskeletal:  Positive for back pain. Skin:  Negative for rash. Neurological:  Positive for headaches. Negative for dizziness. All other systems reviewed and are negative. Vitals:    09/11/22 2349   BP: (!) 127/90   Pulse: 70   Resp: 16   Temp: 97.9 °F (36.6 °C)   SpO2: 95%   Weight: 94.3 kg (208 lb)   Height: 6' (1.829 m)            Physical Exam  Vitals and nursing note reviewed. Constitutional:       Appearance: He is well-developed.    HENT:      Head: Normocephalic and atraumatic. Eyes:      Conjunctiva/sclera: Conjunctivae normal.   Cardiovascular:      Rate and Rhythm: Normal rate and regular rhythm. Pulmonary:      Effort: Pulmonary effort is normal.      Breath sounds: No wheezing. Abdominal:      Palpations: Abdomen is soft. Tenderness: There is no abdominal tenderness. Musculoskeletal:         General: Tenderness present. Cervical back: Normal range of motion. Tenderness present. Comments: Left upper parathoracic ttp/spasm. + mild B/l paralumbar ttp/spasm, no mid lumbar ttp. Skin:     General: Skin is warm and dry. Capillary Refill: Capillary refill takes less than 2 seconds. Findings: No rash. Neurological:      General: No focal deficit present. Mental Status: He is alert and oriented to person, place, and time. Motor: No weakness. Psychiatric:         Mood and Affect: Mood normal.        MDM         Procedures        Vitals:  Patient Vitals for the past 12 hrs:   Temp Pulse Resp BP SpO2   09/11/22 2349 97.9 °F (36.6 °C) 70 16 (!) 127/90 95 %         Medications ordered:   Medications - No data to display      Lab findings:  No results found for this or any previous visit (from the past 12 hour(s)). X-Ray, CT or other radiology findings or impressions:  CT SPINE CERV WO CONT   Final Result      1. Minimally displaced fracture of the right C7 transverse process. Thank you for this referral.      CT HEAD WO CONT   Final Result      1. No acute intracranial pathology. XR CHEST PA LAT    (Results Pending)             Progress notes, Consult notes or additional Procedure notes:   2:36 AM findings reviewed w dr Evangelina Wood, states non operative fx, no tx except pain meds and f/up. Pt agrees w f /up plan. Offered ct chest, pt says mild pain, declines further w/up, declines soft cervical collar, pt declines says mild neck pain only . No emc. Neuro intact. Stable for dc and close f/up. Detailed ret inst given. Diagnosis:   1. Fall down stairs, initial encounter    2. Injury of head, initial encounter    3. Closed fracture of transverse process of cervical vertebra, initial encounter ([facility] [address]    4. Acute bilateral back pain, unspecified back location        Disposition: home    Follow-up Information       Follow up With Specialties Details Why [facility] Emergency Medicine Go to  As needed, If symptoms worsen [address] [facility], [address]   [address] [place] [address]      Ebony Hill MD Orthopedic Surgery Schedule an appointment as soon as possible for a visit in 2 days  [address] [place] [address]  [phone]               Patient's Medications   Start Taking    HYDROCODONE-ACETAMINOPHEN (NORCO) 5-325 MG PER TABLET    Take 1 Tablet by mouth every six (6) hours as needed for Pain for up to 7 days. Max Daily Amount: 4 Tablets.    Continue Taking    HUMIRA,CF, PEN 40 MG/0.4 ML INJECTION PEN       These Medications have changed    No medications on file   Stop Taking    No medications on file

## 2022-09-12 NOTE — DISCHARGE INSTRUCTIONS
Return for pain, fever not resolving with motrin or tylenol, shortness of breath, vomiting, decreased fluid intake, weakness, numbness, any urinary or bowel changes, dizziness, or any change or concerns.

## 2022-09-12 NOTE — ED NOTES
I have reviewed discharge instructions with the patient. The patient verbalized understanding. Patient armband removed and given to patient to take home. Patient was informed of the privacy risks if armband lost or stolen  Current Discharge Medication List        START taking these medications    Details   HYDROcodone-acetaminophen (NORCO) 5-325 mg per tablet Take 1 Tablet by mouth every six (6) hours as needed for Pain for up to 7 days. Max Daily Amount: 4 Tablets.   Qty: 14 Tablet, Refills: 0  Start date: 9/12/2022, End date: 9/19/2022    Associated Diagnoses: Closed fracture of transverse process of cervical vertebra, initial encounter ([facility] [address]

## 2022-09-28 NOTE — PROGRESS NOTES
1. \"Have you been to the ER, urgent care clinic since your last visit? Hospitalized since your last visit? \" Yes Where: [facility] on 9/11/2022 for a fall    2. \"Have you seen or consulted any other health care providers outside of the [address] [place] [address] since your last visit? \" Yes Where: Dr. Tomy Martínez      3. For patients aged 39-70: Has the patient had a colonoscopy / FIT/ Cologuard? Yes - no Care Gap present-due 8/2032      If the patient is female:    4. For patients aged 41-77: Has the patient had a mammogram within the past 2 years? NA - based on age or sex      11. For patients aged 21-65: Has the patient had a pap smear? NA - based on age or sex    Patient declines flu and TDAP vaccines.

## 2022-09-29 ENCOUNTER — OFFICE VISIT (OUTPATIENT)
Dept: FAMILY MEDICINE CLINIC | Age: 47
End: 2022-09-29
Payer: COMMERCIAL

## 2022-09-29 VITALS
RESPIRATION RATE: 16 BRPM | OXYGEN SATURATION: 97 % | TEMPERATURE: 97.5 F | DIASTOLIC BLOOD PRESSURE: 84 MMHG | SYSTOLIC BLOOD PRESSURE: 110 MMHG | WEIGHT: 206 LBS | BODY MASS INDEX: 27.94 KG/M2 | HEART RATE: 68 BPM

## 2022-09-29 DIAGNOSIS — L40.50 PSORIATIC ARTHRITIS (HCC): ICD-10-CM

## 2022-09-29 DIAGNOSIS — S12.9XXD CLOSED FRACTURE OF TRANSVERSE PROCESS OF CERVICAL VERTEBRA, SUBSEQUENT ENCOUNTER: ICD-10-CM

## 2022-09-29 DIAGNOSIS — I10 ESSENTIAL HYPERTENSION: ICD-10-CM

## 2022-09-29 DIAGNOSIS — E11.8 TYPE 2 DIABETES MELLITUS WITH COMPLICATION, WITHOUT LONG-TERM CURRENT USE OF INSULIN (HCC): ICD-10-CM

## 2022-09-29 DIAGNOSIS — Z00.00 WELL ADULT EXAM: Primary | ICD-10-CM

## 2022-09-29 DIAGNOSIS — M54.41 CHRONIC LOW BACK PAIN WITH RIGHT-SIDED SCIATICA, UNSPECIFIED BACK PAIN LATERALITY: ICD-10-CM

## 2022-09-29 DIAGNOSIS — G89.29 CHRONIC LOW BACK PAIN WITH RIGHT-SIDED SCIATICA, UNSPECIFIED BACK PAIN LATERALITY: ICD-10-CM

## 2022-09-29 PROCEDURE — 99396 PREV VISIT EST AGE 40-64: CPT | Performed by: FAMILY MEDICINE
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
## 2022-09-29 NOTE — PATIENT INSTRUCTIONS
Well Visit, Ages 25 to 48: Care Instructions  Overview     Well visits can help you stay healthy. Your doctor has checked your overall health and may have suggested ways to take good care of yourself. Your doctor also may have recommended tests. At home, you can help prevent illness with healthy eating, regular exercise, and other steps. Follow-up care is a key part of your treatment and safety. Be sure to make and go to all appointments, and call your doctor if you are having problems. It's also a good idea to know your test results and keep a list of the medicines you take. How can you care for yourself at home? Get screening tests that you and your doctor decide on. Screening helps find diseases before any symptoms appear. Eat healthy foods. Choose fruits, vegetables, whole grains, protein, and low-fat dairy foods. Limit fat, especially saturated fat. Reduce salt in your diet. Limit alcohol. If you are a man, have no more than 2 drinks a day or 14 drinks a week. If you are a woman, have no more than 1 drink a day or 7 drinks a week. Get at least 30 minutes of physical activity on most days of the week. Walking is a good choice. You also may want to do other activities, such as running, swimming, cycling, or playing tennis or team sports. Discuss any changes in your exercise program with your doctor. Reach and stay at a healthy weight. This will lower your risk for many problems, such as obesity, diabetes, heart disease, and high blood pressure. Do not smoke or allow others to smoke around you. If you need help quitting, talk to your doctor about stop-smoking programs and medicines. These can increase your chances of quitting for good. Care for your mental health. It is easy to get weighed down by worry and stress. Learn strategies to manage stress, like deep breathing and mindfulness, and stay connected with your family and community.  If you find you often feel sad or hopeless, talk with your doctor. Treatment can help. Talk to your doctor about whether you have any risk factors for sexually transmitted infections (STIs). You can help prevent STIs if you wait to have sex with a new partner (or partners) until you've each been tested for STIs. It also helps if you use condoms (male or female condoms) and if you limit your sex partners to one person who only has sex with you. Vaccines are available for some STIs, such as HPV. Use birth control if it's important to you to prevent pregnancy. Talk with your doctor about the choices available and what might be best for you. If you think you may have a problem with alcohol or drug use, talk to your doctor. This includes prescription medicines (such as amphetamines and opioids) and illegal drugs (such as cocaine and methamphetamine). Your doctor can help you figure out what type of treatment is best for you. Protect your skin from too much sun. When you're outdoors from 10 a.m. to 4 p.m., stay in the shade or cover up with clothing and a hat with a wide brim. Wear sunglasses that block UV rays. Even when it's cloudy, put broad-spectrum sunscreen (SPF 30 or higher) on any exposed skin. See a dentist one or two times a year for checkups and to have your teeth cleaned. Wear a seat belt in the car. When should you call for help? Watch closely for changes in your health, and be sure to contact your doctor if you have any problems or symptoms that concern you. Where can you learn more? Go to http://www.[employer].com/  Enter P072 in the search box to learn more about \"Well Visit, Ages 25 to 48: Care Instructions. \"  Current as of: October 6, 2021               Content Version: 13.2  © 4382-1217 [employer]. Care instructions adapted under license by [employer] (which disclaims liability or warranty for this information).  If you have questions about a medical condition or this instruction, always ask your healthcare professional. [place] [address] any warranty or liability for your use of this information.     [facility]  Address: [address] [facility], [address] [place] [address],[address]  [place], [address]  Phone: [phone]

## 2022-10-14 ENCOUNTER — HOSPITAL ENCOUNTER (OUTPATIENT)
Dept: GENERAL RADIOLOGY | Age: 47
Discharge: HOME | End: 2022-10-14
Payer: COMMERCIAL

## 2022-10-14 DIAGNOSIS — T14.8XXA CRUSHING INJURY OF MULTIPLE SITES OF TRUNK: ICD-10-CM

## 2022-10-14 PROCEDURE — 72040 X-RAY EXAM NECK SPINE 2-3 VW: CPT

## 2022-10-19 ENCOUNTER — TELEPHONE (OUTPATIENT)
Dept: SURGERY | Age: 47
End: 2022-10-19

## 2022-10-19 NOTE — TELEPHONE ENCOUNTER
LM for patient to call back [facility] office to go over registration for [facility] orientation.

## 2022-12-22 ENCOUNTER — OFFICE VISIT (OUTPATIENT)
Dept: SURGERY | Age: 47
End: 2022-12-22
Payer: COMMERCIAL

## 2022-12-22 VITALS
TEMPERATURE: 98.1 F | SYSTOLIC BLOOD PRESSURE: 123 MMHG | BODY MASS INDEX: 27.77 KG/M2 | DIASTOLIC BLOOD PRESSURE: 82 MMHG | HEART RATE: 64 BPM | OXYGEN SATURATION: 99 % | RESPIRATION RATE: 16 BRPM | HEIGHT: 72 IN | WEIGHT: 205 LBS

## 2022-12-22 DIAGNOSIS — Z71.3 DIETARY COUNSELING AND SURVEILLANCE: ICD-10-CM

## 2022-12-22 DIAGNOSIS — Z76.89 ENCOUNTER FOR WEIGHT MANAGEMENT: ICD-10-CM

## 2022-12-22 DIAGNOSIS — Z86.39 HX OF OBESITY: ICD-10-CM

## 2022-12-22 DIAGNOSIS — E66.3 OVERWEIGHT: Primary | ICD-10-CM

## 2022-12-22 PROCEDURE — 99213 OFFICE O/P EST LOW 20 MIN: CPT | Performed by: NURSE PRACTITIONER

## 2022-12-22 NOTE — PROGRESS NOTES
Nae Livingston is a 52 y.o. male (: 1975) presenting to address:    Chief Complaint   Patient presents with    Follow-up     Medical weight loss       Medication list and allergies have been reviewed with Nae Livingston and updated as of today's date. I have gone over all Medical, Surgical and Social History with Nae Livingston and updated/added the information accordingly. 1. Have you been to the ER, urgent care clinic since your last visit? Hospitalized since your last visit? No    2. Have you seen or consulted any other health care providers outside of the [address] [place] [address] since your last visit? Include any pap smears or colon screening. Yes urology.

## 2022-12-22 NOTE — PROGRESS NOTES
[facility] Progress Note:   F/up Provider Visit    CC: Weight Management    Cierra Hernández is a 52 y.o. male who is here for his  f/up medical provider visit for the LCD Program. he did not attend class last week. +1 lb since last visit in September. Continues to do 3 MR+1 meal.   Had C5 fracture due to fall and has not been strength training. Continues to jog. Did you have any problems adhering to the program last week? no  If yes, please explain:     Since your last visit, have you experienced any complications? no    Have you received any other medical care this week? no  If yes, where and for what? Have you had any change in your medications since your last visit? no  If yes what? Are you taking an appetite suppressant? no   If yes:  Do you need a refill? BP Readings from Last 3 Encounters:   12/22/22 123/82   09/29/22 110/84   09/12/22 133/85        Eating Habits Over Last Week:  Did you take in 64 oz of non-caloric fluids? yes     Did you consume your prescribed meal replacement regimen each day? yes       Physical Activity Over the Past Week:    Aerobic exercise: 180 min  Resistance exercise: 0 workouts / week      Weight History  Weight Metrics 12/22/2022 11/18/2022 9/29/2022 9/11/2022 9/1/2022 9/1/2022 7/12/2022   Weight 205 lb 213 lb 9.6 oz 206 lb 208 lb - 204 lb -   Waist Measure Inches - - - - 40 - 37   Exercise Mins/week - - - - - - -   Body Fat % - - - - - - -   BMI 27.8 kg/m2 28.97 kg/m2 27.94 kg/m2 28.21 kg/m2 - 27.67 kg/m2 -       Starting wt: 279  Goal wt: 185  EKG due: Last 1/19/2022  Ideal body weight: 77.6 kg (171 lb 1.2 oz)  Adjusted ideal body weight: 83.8 kg (184 lb 10.3 oz)  Body mass index is 27.8 kg/m².       History    Past Medical History:   Diagnosis Date    Asthma     childhood    Broken leg left    broke left leg and tore ligaments    Diabetes mellitus     no meds    Dupuytren's contracture     Essential hypertension     no meds Fracture of left leg 08/2020    HTN (hypertension)     Hx of colonoscopy with polypectomy 08/22/2022    Dr. Meghann Limon hemorrhoids,mild diverticulosis; polyps (adenoma); repeat colonoscopy in 5 years    Psoriatic arthritis, destructive type ([facility] [address]     Sarcoidosis     says he had an arm mass removed and the path showed sarcoid, negative CXR       Past Surgical History:   Procedure Laterality Date    CLOSED RX NOSE/JAW FRAC+WIRES Right 1994    broken jaw had to wire it    HC CUFF CRYO SHOULDER [facility] USE ONLY Left 03/18/2021    left shoulder cuff surgery    HX CARPAL TUNNEL RELEASE      HX ORTHOPAEDIC      fx skull/ broken jaw     HX ORTHOPAEDIC Right     carpal tunnel    HX ORTHOPAEDIC Left 01/22/2021    knee arthroscopy    HX ORTHOPAEDIC      left rotator cuff    HX OTHER SURGICAL      lipoma removed     MS EXC SKIN BENIG 0.6-1CM TRUNK,ARM,LEG N/A 10/11/2018    Dr. Luis Manuel Carter 1.1-2CM TRUNK,ARM,LEG N/A 10/11/2018    Dr. Luis Manuel Carter 2.1-3CM Pablo Plunk N/A 10/11/2018    Dr. Francisco Urbina       Current Outpatient Medications   Medication Sig Dispense Refill    adalimumab (Humira Pen) 40 mg/0.8 mL injection pen by SubCUTAneous route once. Humira,CF, Pen 40 mg/0.4 mL injection pen 40 mg by SubCUTAneous route every seven (7) days. Allergies   Allergen Reactions    Gabapentin Palpitations and Other (comments)     HR RACES       Social History     Tobacco Use    Smoking status: Never    Smokeless tobacco: Never   Vaping Use    Vaping Use: Never used   Substance Use Topics    Alcohol use:  Yes     Alcohol/week: 2.0 standard drinks     Types: 2 Shots of liquor per week     Comment: weekends    Drug use: Not Currently       Family History   Problem Relation Age of Onset    Asthma Mother     Stroke Father     Alcohol abuse Father     Substance Abuse Father     Diabetes Maternal Grandmother     Hypertension Maternal Grandmother     High Cholesterol Maternal Grandmother     Stroke Maternal Grandmother     Cancer Maternal Grandfather 79        prostate cancer and skin    Colon Cancer Maternal Grandfather 61    Depression Maternal Uncle        Family Status   Relation Name Status    Mother  Alive    Father   at age 61        unsure    Sister  Alive    MGM  Alive    [place]  (Not Specified)         Review of Systems  Review of Systems   Constitutional:  Negative for malaise/fatigue and weight loss. Respiratory: Negative. Cardiovascular:  Negative for chest pain and palpitations. Gastrointestinal: Negative. Musculoskeletal:  Positive for back pain, joint pain and neck pain. Neurological: Negative. Objective  Visit Vitals  /82   Pulse 64   Temp 98.1 °F (36.7 °C)   Resp 16   Ht 6' (1.829 m)   Wt 93 kg (205 lb)   SpO2 99%   BMI 27.80 kg/m²     No LMP for male patient. Measurements:  Neck: 15  Arm: 12  Waist: 41    Physical Exam  Physical Exam  Vitals and nursing note reviewed. Constitutional:       Appearance: Normal appearance. HENT:      Head: Normocephalic and atraumatic. Pulmonary:      Effort: Pulmonary effort is normal.   Musculoskeletal:         General: Normal range of motion. Neurological:      General: No focal deficit present. Mental Status: He is alert and oriented to person, place, and time. Psychiatric:         Mood and Affect: Mood normal.         Behavior: Behavior normal.       No results found for this or any previous visit (from the past 672 hour(s)). Assessment / Plan    Encounter Diagnoses   Name Primary? Overweight Yes    Hx of obesity     BMI 27.0-27.9,adult     Dietary counseling and surveillance     Encounter for weight management      1. Weight management      Today, the patient is  Height: 6' (182.9 cm) tall, Weight: 93 kg (205 lb) lbs for a Body mass index is 27.8 kg/m². is suffering from overweight      Degree of control: Doing well at maintaining over past 3 months.  Once cleared to strength train in January after neck injury, will incorporate into routine. Progress was reviewed with patient. Goal(s) for next appointment:   -5 lbs  Continue with low impact activity until cleared to incorporate strength training. Continue with LCD (3 MR+1 meal). May need to readjust to incorporate more solid protein and vegetables on heavy workout days. Prevent going too long in between meals to prevent low blood sugar. I have reviewed/discussed the above normal BMI with the patient. I have recommended the following interventions: dietary management education, guidance, and counseling, encourage exercise, monitor weight, and prescribed dietary intake . The primary encounter diagnosis was Overweight. Diagnoses of Hx of obesity, BMI 27.0-27.9,adult, Dietary counseling and surveillance, and Encounter for weight management were also pertinent to this visit. Follow-up and Dispositions    Return in about 4 weeks (around 1/19/2023).        SANDY Barry

## 2023-01-24 ENCOUNTER — OFFICE VISIT (OUTPATIENT)
Dept: SURGERY | Age: 48
End: 2023-01-24
Payer: COMMERCIAL

## 2023-01-24 VITALS
TEMPERATURE: 98.2 F | RESPIRATION RATE: 16 BRPM | HEART RATE: 58 BPM | DIASTOLIC BLOOD PRESSURE: 80 MMHG | OXYGEN SATURATION: 98 % | HEIGHT: 72 IN | BODY MASS INDEX: 28.66 KG/M2 | WEIGHT: 211.6 LBS | SYSTOLIC BLOOD PRESSURE: 123 MMHG

## 2023-01-24 DIAGNOSIS — E66.3 OVERWEIGHT: Primary | ICD-10-CM

## 2023-01-24 DIAGNOSIS — Z71.3 DIETARY COUNSELING AND SURVEILLANCE: ICD-10-CM

## 2023-01-24 DIAGNOSIS — Z76.89 ENCOUNTER FOR WEIGHT MANAGEMENT: ICD-10-CM

## 2023-01-24 DIAGNOSIS — Z86.39 HX OF OBESITY: ICD-10-CM

## 2023-01-24 PROBLEM — Z98.52 S/P VASECTOMY: Status: ACTIVE | Noted: 2023-01-24

## 2023-01-24 PROCEDURE — 99212 OFFICE O/P EST SF 10 MIN: CPT | Performed by: NURSE PRACTITIONER

## 2023-01-24 NOTE — PROGRESS NOTES
Lili Franco is a 52 y.o. male (: 1975) presenting to address:    Chief Complaint   Patient presents with    Follow-up     [facility]       Medication list and allergies have been reviewed with Lili Franco and updated as of today's date. I have gone over all Medical, Surgical and Social History with Lili Franco and updated/added the information accordingly. 1. Have you been to the ER, urgent care clinic since your last visit? Hospitalized since your last visit? No    2. Have you seen or consulted any other health care providers outside of the [address] [place] [address] since your last visit? Include any pap smears or colon screening.  Yes urology

## 2023-01-24 NOTE — PROGRESS NOTES
[facility] Progress Note:   F/up Provider Visit    CC: Weight Management    Jyoti Arthur is a 52 y.o. male who is here for his  f/up medical provider visit for the LCD Program. he did attend class last week. +6 lbs  Reports he got very off track with diet over the holidays. Had vasectomy performed on 1/17, recovering well and not cleared for exercise for another 1-2 weeks. Then will start to incorporate strength training, initially with body weight and then will go to the gym. Did you have any problems adhering to the program last week? yes  If yes, please explain: see HPI    Since your last visit, have you experienced any complications? no    Have you received any other medical care this week? yes  If yes, where and for what? See HPI    Have you had any change in your medications since your last visit? no  If yes what? Are you taking an appetite suppressant? no   If yes:  Do you need a refill? BP Readings from Last 3 Encounters:   01/24/23 123/80   12/22/22 123/82   09/29/22 110/84        Eating Habits Over Last Week:  Did you take in 64 oz of non-caloric fluids? yes     Did you consume your prescribed meal replacement regimen each day? no       Physical Activity Over the Past Week:    Aerobic exercise: 180 min  Resistance exercise: 0 workouts / week      Weight History  Weight Metrics 1/24/2023 12/22/2022 11/18/2022 9/29/2022 9/11/2022 9/1/2022 9/1/2022   Weight 211 lb 9.6 oz 205 lb 213 lb 9.6 oz 206 lb 208 lb - 204 lb   Waist Measure Inches - - - - - 40 -   Exercise Mins/week - - - - - - -   Body Fat % - - - - - - -   BMI 28.7 kg/m2 27.8 kg/m2 28.97 kg/m2 27.94 kg/m2 28.21 kg/m2 - 27.67 kg/m2     Starting wt: 279  Goal wt: 185  EKG due: last 1/19/2022  Ideal body weight: 77.6 kg (171 lb 1.2 oz)  Adjusted ideal body weight: 85 kg (187 lb 4.6 oz)  Body mass index is 28.7 kg/m².       History    Past Medical History:   Diagnosis Date    Asthma     childhood    Broken leg left    broke left leg and tore ligaments    Diabetes mellitus     no meds    Dupuytren's contracture     Essential hypertension     no meds    Fracture of left leg 08/2020    HTN (hypertension)     Hx of colonoscopy with polypectomy 08/22/2022    Dr. Lucrecia Calderon hemorrhoids,mild diverticulosis; polyps (adenoma); repeat colonoscopy in 5 years    Psoriatic arthritis, destructive type ([address]     Sarcoidosis     says he had an arm mass removed and the path showed sarcoid, negative CXR       Past Surgical History:   Procedure Laterality Date    CLOSED RX NOSE/JAW FRAC+WIRES Right 1994    broken jaw had to wire it    HC CUFF CRYO SHOULDER [facility] USE ONLY Left 03/18/2021    left shoulder cuff surgery    HX CARPAL TUNNEL RELEASE      HX ORTHOPAEDIC      fx skull/ broken jaw     HX ORTHOPAEDIC Right     carpal tunnel    HX ORTHOPAEDIC Left 01/22/2021    knee arthroscopy    HX ORTHOPAEDIC      left rotator cuff    HX OTHER SURGICAL      lipoma removed     HX VASECTOMY  01/20/2023    VASECTOMY, , [facility]    KS EXC B9 LESION MRGN XCP SK TG T/A/L 0.6-1.0 CM N/A 10/11/2018    Dr. Faustina Santana EXC B9 LESION MRGN XCP SK TG T/A/L 1.1-2.0 CM N/A 10/11/2018    Dr. Odilon Way    KS EXC B9 LESION MRGN XCP SK TG T/A/L 2.1-3.0 CM N/A 10/11/2018    Dr. Odilon Way       Current Outpatient Medications   Medication Sig Dispense Refill    adalimumab (Humira Pen) 40 mg/0.8 mL injection pen by SubCUTAneous route once. Humira,CF, Pen 40 mg/0.4 mL injection pen 40 mg by SubCUTAneous route every seven (7) days. Allergies   Allergen Reactions    Gabapentin Palpitations and Other (comments)     HR RACES       Social History     Tobacco Use    Smoking status: Never    Smokeless tobacco: Never   Vaping Use    Vaping Use: Never used   Substance Use Topics    Alcohol use:  Yes     Alcohol/week: 2.0 standard drinks     Types: 2 Shots of liquor per week     Comment: weekends    Drug use: Not Currently       Family History   Problem Relation Age of Onset    Asthma Mother     Stroke Father     Alcohol abuse Father     Substance Abuse Father     Diabetes Maternal Grandmother     Hypertension Maternal Grandmother     High Cholesterol Maternal Grandmother     Stroke Maternal Grandmother     Cancer Maternal Grandfather 79        prostate cancer and skin    Colon Cancer Maternal Grandfather 61    Depression Maternal Uncle        Family Status   Relation Name Status    Mother  Alive    Father   at age 61        unsure    Sister  Alive    MGM  Alive    [place]  (Not Specified)         Review of Systems  Review of Systems   Constitutional:  Negative for malaise/fatigue and weight loss. Respiratory: Negative. Cardiovascular:  Negative for chest pain and palpitations. Gastrointestinal: Negative. Musculoskeletal:  Positive for back pain and joint pain. Neurological: Negative. Objective  Visit Vitals  /80   Pulse (!) 58   Temp 98.2 °F (36.8 °C)   Resp 16   Ht 6' (1.829 m)   Wt 96 kg (211 lb 9.6 oz)   SpO2 98%   BMI 28.70 kg/m²     No LMP for male patient. Waist: 42\"  Arm: 11.5\"  Neck: 16.5\"    Physical Exam  Physical Exam  Vitals and nursing note reviewed. Constitutional:       Appearance: Normal appearance. HENT:      Head: Normocephalic and atraumatic. Pulmonary:      Effort: Pulmonary effort is normal.   Musculoskeletal:         General: Normal range of motion. Neurological:      General: No focal deficit present. Mental Status: He is alert and oriented to person, place, and time. Psychiatric:         Mood and Affect: Mood normal.         Behavior: Behavior normal.     No results found for this or any previous visit (from the past 672 hour(s)). Assessment / Plan    Encounter Diagnoses   Name Primary? Overweight Yes    Hx of obesity     BMI 28.0-28.9,adult     Dietary counseling and surveillance     Encounter for weight management      1.   Weight management      Today, the patient is  Height: 6' (182.9 cm) tall, Weight: 96 kg (211 lb 9.6 oz) lbs for a Body mass index is 28.7 kg/m². is suffering from overweight      Degree of control: Fair over the past month but had some dietary indiscretions over the holidays as well as limited exercise. Will start incorporating strength training once cleared by surgery. Get back on track with LCD (3 MR + 1 meal). May need to discuss pharmaceutical assistance at next visit as he has been at a plateau for a while and is struggling to reach his goal to get below 200 lbs. Progress was reviewed with patient. Goal(s) for next appointment:   -5 lbs    I have reviewed/discussed the above normal BMI with the patient. I have recommended the following interventions: dietary management education, guidance, and counseling, encourage exercise, monitor weight, and prescribed dietary intake . Bimal Sanchez The primary encounter diagnosis was Overweight. Diagnoses of Hx of obesity, BMI 28.0-28.9,adult, Dietary counseling and surveillance, and Encounter for weight management were also pertinent to this visit. Follow-up and Dispositions    Return in about 4 weeks (around 2/21/2023).          SANDY Shrestha

## 2023-02-28 ENCOUNTER — OFFICE VISIT (OUTPATIENT)
Age: 48
End: 2023-02-28
Payer: COMMERCIAL

## 2023-02-28 VITALS
HEART RATE: 59 BPM | BODY MASS INDEX: 27.36 KG/M2 | OXYGEN SATURATION: 95 % | HEIGHT: 72 IN | TEMPERATURE: 97 F | SYSTOLIC BLOOD PRESSURE: 115 MMHG | DIASTOLIC BLOOD PRESSURE: 83 MMHG | WEIGHT: 202 LBS

## 2023-02-28 DIAGNOSIS — E66.3 OVERWEIGHT: Primary | ICD-10-CM

## 2023-02-28 DIAGNOSIS — Z71.3 ENCOUNTER FOR WEIGHT LOSS COUNSELING: ICD-10-CM

## 2023-02-28 DIAGNOSIS — Z71.3 DIETARY COUNSELING AND SURVEILLANCE: ICD-10-CM

## 2023-02-28 DIAGNOSIS — L98.7 EXCESSIVE AND REDUNDANT SKIN AND SUBCUTANEOUS TISSUE: ICD-10-CM

## 2023-02-28 PROCEDURE — G8417 CALC BMI ABV UP PARAM F/U: HCPCS | Performed by: NURSE PRACTITIONER

## 2023-02-28 PROCEDURE — G8484 FLU IMMUNIZE NO ADMIN: HCPCS | Performed by: NURSE PRACTITIONER

## 2023-02-28 PROCEDURE — G8428 CUR MEDS NOT DOCUMENT: HCPCS | Performed by: NURSE PRACTITIONER

## 2023-02-28 PROCEDURE — 4004F PT TOBACCO SCREEN RCVD TLK: CPT | Performed by: NURSE PRACTITIONER

## 2023-02-28 PROCEDURE — 99212 OFFICE O/P EST SF 10 MIN: CPT | Performed by: NURSE PRACTITIONER

## 2023-02-28 ASSESSMENT — ENCOUNTER SYMPTOMS
ABDOMINAL PAIN: 0
CONSTIPATION: 0
DIARRHEA: 0
SHORTNESS OF BREATH: 0
VOMITING: 0
COUGH: 0
BACK PAIN: 1
NAUSEA: 0

## 2023-02-28 NOTE — PROGRESS NOTES
[facility] Progress Note:   F/up Provider Visit    CC: Weight Management    Montana Garcia is a 50 y.o. male who is here for his  f/up medical provider visit for the LCD Program. he did attend class last week. -9 lbs  Had GI bug for one week (n/v and diarrhea) and mainly ate soup and crackers. Otherwise has been consistent with diet and started incorporating more strength training. Did you have any problems adhering to the program last week? no  If yes, please explain:     Since your last visit, have you experienced any complications? no    Have you received any other medical care this week? no  If yes, where and for what? Have you had any change in your medications since your last visit? no  If yes what? Are you taking an appetite suppressant? no   If yes:  Do you need a refill? BP Readings from Last 3 Encounters:   02/28/23 115/83   01/24/23 123/80   12/22/22 123/82        Eating Habits Over Last Week:  Did you take in 64 oz of non-caloric fluids? yes     Did you consume your prescribed meal replacement regimen each day? yes   MR 3    Physical Activity Over the Past Week:    Aerobic exercise: 300 min  Resistance exercise: 2 workouts / week      Weight History  Weight Metrics 2/28/2023 1/24/2023 12/22/2022 11/18/2022 9/29/2022 9/11/2022 9/1/2022   Weight 202 lb 211 lb 9.6 oz 205 lb 213 lb 9.6 oz 206 lb 208 lb 204 lb   Neck (Inches) 16.5 - - - - - -   Waist Measure Inches 37.5 - - - - - -   BMI (Calculated) 27.5 kg/m2 28.8 kg/m2 27.9 kg/m2 29 kg/m2 28 kg/m2 28.3 kg/m2 27.7 kg/m2     Starting wt: 279  Goal wt: 185  EKG due: last 1/19/2022  Ideal body weight: 77.6 kg (171 lb 1.2 oz)  Adjusted ideal body weight: 83.2 kg (183 lb 7.1 oz)  Body mass index is 27.4 kg/m².       History    Past Medical History:   Diagnosis Date    Asthma     childhood    Broken leg left    broke left leg and tore ligaments    Diabetes mellitus ([address]     no meds    Dupuytren's contracture Essential hypertension     no meds    Fracture of left leg 08/2020    HTN (hypertension)     Hx of colonoscopy with polypectomy 08/22/2022    Dr. Mondragon Los hemorrhoids,mild diverticulosis; polyps (adenoma); repeat colonoscopy in 5 years    Psoriatic arthritis, destructive type ([facility] [address]     Sarcoidosis     says he had an arm mass removed and the path showed sarcoid, negative CXR       Past Surgical History:   Procedure Laterality Date    CARPAL TUNNEL RELEASE      EXC SKIN BENIG 0.6-1CM TRUNK,ARM,LEG N/A 10/11/2018    Dr. Galeano [facility] 1.1-2CM TRUNK,ARM,LEG N/A 10/11/2018    Dr. Galeano [facility] 2.1-3CM [place] Din N/A 10/11/2018    Dr. Pereyra [facility]      fx skull/ broken jaw     ORTHOPEDIC SURGERY Right     carpal tunnel    ORTHOPEDIC SURGERY Left 01/22/2021    knee arthroscopy    ORTHOPEDIC SURGERY      left rotator cuff    OTHER SURGICAL HISTORY      lipoma removed     VASECTOMY  01/20/2023    VASECTOMY, , [facility]       Current Outpatient Medications   Medication Sig Dispense Refill    Adalimumab (HUMIRA PEN) 40 MG/0.4ML PNKT Inject 40 mg into the skin every 7 days      adalimumab (HUMIRA) 40 MG/0.8ML injection Inject into the skin once (Patient not taking: Reported on 2/28/2023)       No current facility-administered medications for this visit. Allergies   Allergen Reactions    Gabapentin Other (See Comments) and Palpitations     HR RACES       Social History     Tobacco Use    Smoking status: Never    Smokeless tobacco: Never   Substance Use Topics    Alcohol use:  Yes     Alcohol/week: 2.0 standard drinks    Drug use: Not Currently       Family History   Problem Relation Age of Onset    Alcohol Abuse Father     Stroke Father     Cancer Maternal Grandfather 79        prostate cancer and skin    Stroke Maternal Grandmother     High Cholesterol Maternal Grandmother     Hypertension Maternal Grandmother     Colon Cancer Maternal Grandfather 61    Depression Maternal Uncle     Asthma Mother     Substance Abuse Father     Diabetes Maternal Grandmother        Family Status   Relation Name Status    Father   at age 60        unsure    MGF  Alive    MGM  Alive    [employer]  (Not Specified)    Mother  Alive    Sister  Alive         Review of Systems  Review of Systems   Constitutional:  Negative for chills, fatigue, fever and unexpected weight change.   Respiratory:  Negative for cough and shortness of breath.    Cardiovascular:  Negative for chest pain and palpitations.   Gastrointestinal:  Negative for abdominal pain, constipation, diarrhea, nausea and vomiting.   Musculoskeletal:  Positive for arthralgias and back pain.   Neurological: Negative.        Objective  Vitals:    23 1146   BP: 115/83   Site: Right Upper Arm   Position: Sitting   Pulse: 59   Temp: 97 °F (36.1 °C)   TempSrc: Temporal   SpO2: 95%   Weight: 202 lb (91.6 kg)   Height: 6' (1.829 m)       No LMP for male patient.    Waist: 37.5\"  Arm: 11.5\"  Neck: 16.5\"    Physical Exam  Physical Exam  Vitals and nursing note reviewed.   Constitutional:       Appearance: Normal appearance.   HENT:      Head: Normocephalic and atraumatic.   Pulmonary:      Effort: Pulmonary effort is normal.   Musculoskeletal:         General: Normal range of motion.   Neurological:      General: No focal deficit present.      Mental Status: He is alert and oriented to person, place, and time.   Psychiatric:         Mood and Affect: Mood normal.         Behavior: Behavior normal.       No results found for this or any previous visit (from the past 672 hour(s)).    Assessment / Plan    Encounter Diagnoses   Name Primary?    Overweight Yes    BMI 27.0-27.9,adult     Dietary counseling and surveillance     Encounter for weight loss counseling     Excessive and redundant skin and subcutaneous tissue      1.  Weight management      Today, the patient is  Height: 6' (182.9 cm) tall, Weight: 202 lb (91.6 kg) lbs for a Body mass index  is 27.4 kg/m².  is suffering from overweight     Degree of control: Great month this past month. Would like to be 185 by May which would be roughly 8 lb weight loss for next two months. Will continue LCD (3 MR + 1 meal) and increase strength training/cardio as tolerated.    Progress was reviewed with patient.    Goal(s) for next appointment:   -8 lbs    Briefly discussed phentermine as he had a weight loss plateau previous to this month, may need to discuss again if he encounters another weight loss plateau.     I have reviewed/discussed the above normal BMI with the patient.  I have recommended the following interventions: dietary management education, guidance, and counseling, encourage exercise, monitor weight, and prescribed dietary intake . .        The primary encounter diagnosis was Overweight. Diagnoses of BMI 27.0-27.9,adult, Dietary counseling and surveillance, Encounter for weight loss counseling, and Excessive and redundant skin and subcutaneous tissue were also pertinent to this visit.    Return in about 4 weeks (around 3/28/2023) for Weight Management.       Magui Ronquillo, MEGC

## 2023-03-28 ENCOUNTER — OFFICE VISIT (OUTPATIENT)
Age: 48
End: 2023-03-28
Payer: COMMERCIAL

## 2023-03-28 VITALS
WEIGHT: 204 LBS | OXYGEN SATURATION: 96 % | BODY MASS INDEX: 27.63 KG/M2 | SYSTOLIC BLOOD PRESSURE: 120 MMHG | HEIGHT: 72 IN | HEART RATE: 61 BPM | TEMPERATURE: 97 F | DIASTOLIC BLOOD PRESSURE: 83 MMHG

## 2023-03-28 DIAGNOSIS — Z71.3 DIETARY COUNSELING AND SURVEILLANCE: ICD-10-CM

## 2023-03-28 DIAGNOSIS — Z71.3 ENCOUNTER FOR WEIGHT LOSS COUNSELING: ICD-10-CM

## 2023-03-28 DIAGNOSIS — E66.3 OVERWEIGHT: Primary | ICD-10-CM

## 2023-03-28 PROCEDURE — G8484 FLU IMMUNIZE NO ADMIN: HCPCS | Performed by: NURSE PRACTITIONER

## 2023-03-28 PROCEDURE — 4004F PT TOBACCO SCREEN RCVD TLK: CPT | Performed by: NURSE PRACTITIONER

## 2023-03-28 PROCEDURE — 99213 OFFICE O/P EST LOW 20 MIN: CPT | Performed by: NURSE PRACTITIONER

## 2023-03-28 PROCEDURE — G8417 CALC BMI ABV UP PARAM F/U: HCPCS | Performed by: NURSE PRACTITIONER

## 2023-03-28 PROCEDURE — G8427 DOCREV CUR MEDS BY ELIG CLIN: HCPCS | Performed by: NURSE PRACTITIONER

## 2023-03-28 RX ORDER — PHENTERMINE HYDROCHLORIDE 37.5 MG/1
37.5 TABLET ORAL
Qty: 30 TABLET | Refills: 0 | Status: SHIPPED | OUTPATIENT
Start: 2023-03-28 | End: 2023-04-27

## 2023-03-28 NOTE — PROGRESS NOTES
Grandmother     Colon Cancer Maternal Grandfather 61    Depression Maternal Uncle     Asthma Mother     Substance Abuse Father     Diabetes Maternal Grandmother        Family Status   Relation Name Status    Father   at age 61        unsure    MGF  Alive    [address]  (Not Specified)    Mother  Alive    Sister  Alive         Review of Systems  Review of Systems   Constitutional:  Negative for chills and fever. Respiratory:  Negative for cough and shortness of breath. Cardiovascular:  Negative for chest pain and palpitations. Gastrointestinal:  Negative for abdominal pain, constipation, diarrhea, nausea and vomiting. Musculoskeletal:  Positive for arthralgias and back pain. Neurological: Negative. Objective  Vitals:    23 1141   BP: 120/83   Site: Right Upper Arm   Position: Sitting   Pulse: 61   Temp: 97 °F (36.1 °C)   TempSrc: Temporal   SpO2: 96%   Weight: 204 lb (92.5 kg)   Height: 6' (1.829 m)      No LMP for male patient. Waist: 38\"  Neck: 16.5\"  Arm: 12\"    Physical Exam  Physical Exam  Vitals and nursing note reviewed. Constitutional:       Appearance: Normal appearance. HENT:      Head: Normocephalic and atraumatic. Pulmonary:      Effort: Pulmonary effort is normal.   Musculoskeletal:         General: Normal range of motion. Neurological:      General: No focal deficit present. Mental Status: He is alert and oriented to person, place, and time. Psychiatric:         Mood and Affect: Mood normal.         Behavior: Behavior normal.         No results found for this or any previous visit (from the past 672 hour(s)). Assessment / Plan    Encounter Diagnoses   Name Primary? Overweight Yes    BMI 27.0-27.9,adult     Dietary counseling and surveillance     Encounter for weight loss counseling        1. Weight management      Today, the patient is  Height: 6' (182.9 cm) tall, Weight: 204 lb (92.5 kg) lbs for a Body mass index is 27.67 kg/m².   is

## 2023-03-31 ASSESSMENT — ENCOUNTER SYMPTOMS
ABDOMINAL PAIN: 0
DIARRHEA: 0
CONSTIPATION: 0
SHORTNESS OF BREATH: 0
NAUSEA: 0
BACK PAIN: 1
VOMITING: 0
COUGH: 0

## 2023-04-03 PROBLEM — E11.40 TYPE 2 DIABETES MELLITUS WITH DIABETIC NEUROPATHY (HCC): Status: RESOLVED | Noted: 2019-02-15 | Resolved: 2021-09-09

## 2023-05-04 ENCOUNTER — OFFICE VISIT (OUTPATIENT)
Age: 48
End: 2023-05-04
Payer: COMMERCIAL

## 2023-05-04 VITALS
HEART RATE: 73 BPM | OXYGEN SATURATION: 97 % | SYSTOLIC BLOOD PRESSURE: 115 MMHG | WEIGHT: 207 LBS | TEMPERATURE: 97 F | DIASTOLIC BLOOD PRESSURE: 83 MMHG | HEIGHT: 72 IN | BODY MASS INDEX: 28.04 KG/M2

## 2023-05-04 DIAGNOSIS — Z71.3 ENCOUNTER FOR WEIGHT LOSS COUNSELING: ICD-10-CM

## 2023-05-04 DIAGNOSIS — Z79.899 FOLLOW-UP ENCOUNTER INVOLVING MEDICATION: ICD-10-CM

## 2023-05-04 DIAGNOSIS — E66.3 OVERWEIGHT: Primary | ICD-10-CM

## 2023-05-04 DIAGNOSIS — Z71.3 DIETARY COUNSELING AND SURVEILLANCE: ICD-10-CM

## 2023-05-04 PROCEDURE — 4004F PT TOBACCO SCREEN RCVD TLK: CPT | Performed by: NURSE PRACTITIONER

## 2023-05-04 PROCEDURE — 99213 OFFICE O/P EST LOW 20 MIN: CPT | Performed by: NURSE PRACTITIONER

## 2023-05-04 PROCEDURE — G8427 DOCREV CUR MEDS BY ELIG CLIN: HCPCS | Performed by: NURSE PRACTITIONER

## 2023-05-04 PROCEDURE — G8417 CALC BMI ABV UP PARAM F/U: HCPCS | Performed by: NURSE PRACTITIONER

## 2023-05-04 RX ORDER — PHENTERMINE HYDROCHLORIDE 37.5 MG/1
37.5 TABLET ORAL
COMMUNITY
End: 2023-05-08 | Stop reason: SDUPTHER

## 2023-05-08 DIAGNOSIS — Z71.3 DIETARY COUNSELING AND SURVEILLANCE: ICD-10-CM

## 2023-05-08 DIAGNOSIS — Z71.3 ENCOUNTER FOR WEIGHT LOSS COUNSELING: ICD-10-CM

## 2023-05-08 DIAGNOSIS — E66.3 OVERWEIGHT: Primary | ICD-10-CM

## 2023-05-08 RX ORDER — LEVOCETIRIZINE DIHYDROCHLORIDE 5 MG/1
5 TABLET, FILM COATED ORAL NIGHTLY
COMMUNITY

## 2023-05-08 RX ORDER — PHENTERMINE HYDROCHLORIDE 37.5 MG/1
37.5 TABLET ORAL
Qty: 30 TABLET | Refills: 0 | Status: SHIPPED | OUTPATIENT
Start: 2023-05-08 | End: 2023-06-07

## 2023-05-08 ASSESSMENT — ENCOUNTER SYMPTOMS
CONSTIPATION: 0
NAUSEA: 0
VOMITING: 0
DIARRHEA: 0
SHORTNESS OF BREATH: 0
ABDOMINAL PAIN: 0
BACK PAIN: 1
COUGH: 0

## 2023-06-06 ENCOUNTER — OFFICE VISIT (OUTPATIENT)
Age: 48
End: 2023-06-06
Payer: COMMERCIAL

## 2023-06-06 VITALS
TEMPERATURE: 97 F | WEIGHT: 200 LBS | DIASTOLIC BLOOD PRESSURE: 89 MMHG | OXYGEN SATURATION: 96 % | SYSTOLIC BLOOD PRESSURE: 116 MMHG | HEIGHT: 72 IN | HEART RATE: 72 BPM | BODY MASS INDEX: 27.09 KG/M2

## 2023-06-06 DIAGNOSIS — Z71.3 DIETARY COUNSELING AND SURVEILLANCE: ICD-10-CM

## 2023-06-06 DIAGNOSIS — E66.3 OVERWEIGHT: ICD-10-CM

## 2023-06-06 DIAGNOSIS — Z71.3 ENCOUNTER FOR WEIGHT LOSS COUNSELING: ICD-10-CM

## 2023-06-06 PROCEDURE — G8417 CALC BMI ABV UP PARAM F/U: HCPCS | Performed by: NURSE PRACTITIONER

## 2023-06-06 PROCEDURE — 4004F PT TOBACCO SCREEN RCVD TLK: CPT | Performed by: NURSE PRACTITIONER

## 2023-06-06 PROCEDURE — G8427 DOCREV CUR MEDS BY ELIG CLIN: HCPCS | Performed by: NURSE PRACTITIONER

## 2023-06-06 PROCEDURE — 99213 OFFICE O/P EST LOW 20 MIN: CPT | Performed by: NURSE PRACTITIONER

## 2023-06-06 RX ORDER — PHENTERMINE HYDROCHLORIDE 37.5 MG/1
37.5 TABLET ORAL
Qty: 30 TABLET | Refills: 0 | Status: SHIPPED | OUTPATIENT
Start: 2023-06-06 | End: 2023-07-06

## 2023-06-06 NOTE — PROGRESS NOTES
be compliant with diet and exercise, with good weight loss last month. Doing well on phentermine 37.5 mg full tab, will continue and refill placed. Continue with LCD (3MR + 1 meal). Continue with phentermine full tab daily for appetite suppression. Goal(s) for next appointment:   -7 lbs    I have reviewed/discussed the above normal BMI with the patient. I have recommended the following interventions: dietary management education, guidance, and counseling, encourage exercise, monitor weight, and prescribed dietary intake . Diagnoses of Overweight, Body mass index (BMI) 28.0-28.9, adult, Dietary counseling and surveillance, and Encounter for weight loss counseling were pertinent to this visit. Return in about 4 weeks (around 7/4/2023) for Weight Management.        MEG MurphyC

## 2023-06-08 ASSESSMENT — ENCOUNTER SYMPTOMS
SHORTNESS OF BREATH: 0
VOMITING: 0
DIARRHEA: 0
CONSTIPATION: 0
ABDOMINAL PAIN: 0
NAUSEA: 0
COUGH: 0

## 2023-07-06 ENCOUNTER — OFFICE VISIT (OUTPATIENT)
Age: 48
End: 2023-07-06
Payer: COMMERCIAL

## 2023-07-06 VITALS
WEIGHT: 199 LBS | HEART RATE: 77 BPM | DIASTOLIC BLOOD PRESSURE: 86 MMHG | BODY MASS INDEX: 26.95 KG/M2 | OXYGEN SATURATION: 97 % | HEIGHT: 72 IN | SYSTOLIC BLOOD PRESSURE: 126 MMHG

## 2023-07-06 DIAGNOSIS — Z71.3 DIETARY COUNSELING AND SURVEILLANCE: ICD-10-CM

## 2023-07-06 DIAGNOSIS — E66.3 OVERWEIGHT: ICD-10-CM

## 2023-07-06 DIAGNOSIS — Z71.3 ENCOUNTER FOR WEIGHT LOSS COUNSELING: ICD-10-CM

## 2023-07-06 PROCEDURE — G8417 CALC BMI ABV UP PARAM F/U: HCPCS | Performed by: NURSE PRACTITIONER

## 2023-07-06 PROCEDURE — G8428 CUR MEDS NOT DOCUMENT: HCPCS | Performed by: NURSE PRACTITIONER

## 2023-07-06 PROCEDURE — 4004F PT TOBACCO SCREEN RCVD TLK: CPT | Performed by: NURSE PRACTITIONER

## 2023-07-06 PROCEDURE — 99213 OFFICE O/P EST LOW 20 MIN: CPT | Performed by: NURSE PRACTITIONER

## 2023-07-06 RX ORDER — PHENTERMINE HYDROCHLORIDE 37.5 MG/1
37.5 TABLET ORAL
Qty: 30 TABLET | Refills: 0 | Status: SHIPPED | OUTPATIENT
Start: 2023-07-06 | End: 2023-08-05

## 2023-07-07 ASSESSMENT — ENCOUNTER SYMPTOMS
VOMITING: 0
BACK PAIN: 1
ABDOMINAL PAIN: 0
SHORTNESS OF BREATH: 0
DIARRHEA: 0
COUGH: 0
NAUSEA: 0
CONSTIPATION: 0

## 2023-08-15 ENCOUNTER — OFFICE VISIT (OUTPATIENT)
Age: 48
End: 2023-08-15
Payer: COMMERCIAL

## 2023-08-15 VITALS
DIASTOLIC BLOOD PRESSURE: 90 MMHG | WEIGHT: 210 LBS | HEART RATE: 86 BPM | TEMPERATURE: 97 F | HEIGHT: 72 IN | BODY MASS INDEX: 28.44 KG/M2 | SYSTOLIC BLOOD PRESSURE: 138 MMHG | OXYGEN SATURATION: 99 %

## 2023-08-15 DIAGNOSIS — Z71.3 DIETARY COUNSELING AND SURVEILLANCE: ICD-10-CM

## 2023-08-15 DIAGNOSIS — Z79.899 FOLLOW-UP ENCOUNTER INVOLVING MEDICATION: ICD-10-CM

## 2023-08-15 DIAGNOSIS — Z71.3 ENCOUNTER FOR WEIGHT LOSS COUNSELING: ICD-10-CM

## 2023-08-15 DIAGNOSIS — E66.3 OVERWEIGHT: Primary | ICD-10-CM

## 2023-08-15 PROCEDURE — G8428 CUR MEDS NOT DOCUMENT: HCPCS | Performed by: NURSE PRACTITIONER

## 2023-08-15 PROCEDURE — 99212 OFFICE O/P EST SF 10 MIN: CPT | Performed by: NURSE PRACTITIONER

## 2023-08-15 PROCEDURE — 4004F PT TOBACCO SCREEN RCVD TLK: CPT | Performed by: NURSE PRACTITIONER

## 2023-08-15 PROCEDURE — G8417 CALC BMI ABV UP PARAM F/U: HCPCS | Performed by: NURSE PRACTITIONER

## 2023-08-17 ASSESSMENT — ENCOUNTER SYMPTOMS
ABDOMINAL PAIN: 0
DIARRHEA: 0
NAUSEA: 0
COUGH: 0
CONSTIPATION: 0
SHORTNESS OF BREATH: 0
VOMITING: 0
BACK PAIN: 1

## 2023-08-30 ENCOUNTER — PATIENT MESSAGE (OUTPATIENT)
Age: 48
End: 2023-08-30

## 2023-09-19 ENCOUNTER — OFFICE VISIT (OUTPATIENT)
Age: 48
End: 2023-09-19
Payer: COMMERCIAL

## 2023-09-19 VITALS
HEIGHT: 72 IN | TEMPERATURE: 97 F | BODY MASS INDEX: 27.5 KG/M2 | HEART RATE: 67 BPM | DIASTOLIC BLOOD PRESSURE: 88 MMHG | WEIGHT: 203 LBS | SYSTOLIC BLOOD PRESSURE: 130 MMHG

## 2023-09-19 DIAGNOSIS — Z71.3 ENCOUNTER FOR WEIGHT LOSS COUNSELING: ICD-10-CM

## 2023-09-19 DIAGNOSIS — Z79.899 FOLLOW-UP ENCOUNTER INVOLVING MEDICATION: ICD-10-CM

## 2023-09-19 DIAGNOSIS — Z71.3 DIETARY COUNSELING AND SURVEILLANCE: ICD-10-CM

## 2023-09-19 DIAGNOSIS — E66.3 OVERWEIGHT: Primary | ICD-10-CM

## 2023-09-19 PROCEDURE — G8427 DOCREV CUR MEDS BY ELIG CLIN: HCPCS | Performed by: NURSE PRACTITIONER

## 2023-09-19 PROCEDURE — G8417 CALC BMI ABV UP PARAM F/U: HCPCS | Performed by: NURSE PRACTITIONER

## 2023-09-19 PROCEDURE — 99213 OFFICE O/P EST LOW 20 MIN: CPT | Performed by: NURSE PRACTITIONER

## 2023-09-19 PROCEDURE — 4004F PT TOBACCO SCREEN RCVD TLK: CPT | Performed by: NURSE PRACTITIONER

## 2023-09-19 RX ORDER — PHENTERMINE HYDROCHLORIDE 37.5 MG/1
37.5 TABLET ORAL
Qty: 30 TABLET | Refills: 0 | Status: SHIPPED | OUTPATIENT
Start: 2023-09-19 | End: 2023-10-19

## 2023-09-19 NOTE — PROGRESS NOTES
[facility] Progress Note:   F/up Provider Visit    CC: Weight Management    Tea Samuel is a 50 y.o. male who is here for his  f/up medical provider visit for the LCD Program. he did attend class last week. -7 lbs  Reports he had heat exhaustion at work twice, did not exercise much those weeks. No longer experiencing dizziness reported at last visit. Feels that portion sizes have increased with meals, did not do whey protein after workouts as he wasn't pushing too hard on workouts. Planning to start incorporating mountain biking again. Currently taking phentermine 37.5 mg daily for appetite suppression, denies any SE and desires to continue. Did you have any problems adhering to the program last week? no  If yes, please explain:     Since your last visit, have you experienced any complications? no    Have you received any other medical care this week? no  If yes, where and for what? Have you had any change in your medications since your last visit? no  If yes what? Are you taking an appetite suppressant? yes   If yes:  Do you need a refill? Yes    BP Readings from Last 3 Encounters:   09/19/23 130/88   08/15/23 (!) 138/90   07/06/23 126/86        Eating Habits Over Last Week:  Did you take in 64 oz of non-caloric fluids?  yes     Did you consume your prescribed meal replacement regimen each day? yes   MR 3    Physical Activity Over the Past Week:    Aerobic exercise: 0 min  Resistance exercise: 0 workouts / week      Weight History      9/19/2023    11:35 AM 8/15/2023    11:45 AM 7/6/2023     3:05 PM 6/6/2023    11:34 AM 5/4/2023    11:28 AM 3/28/2023    11:41 AM 3/28/2023    11:00 AM   Weight Metrics   Weight 203 lb 210 lb 199 lb 200 lb 207 lb 204 lb    Neck (Inches)       16.5 in   Waist Measure Inches       38 in   BMI (Calculated) 27.6 kg/m2 28.5 kg/m2 27 kg/m2 27.2 kg/m2 28.1 kg/m2 27.7 kg/m2      Starting wt: 279  Goal wt: <200  EKG due: Last 1/19/2022  Ideal

## 2023-09-25 ASSESSMENT — ENCOUNTER SYMPTOMS
SHORTNESS OF BREATH: 0
NAUSEA: 0
BACK PAIN: 1
DIARRHEA: 0
ABDOMINAL PAIN: 0
COUGH: 0
VOMITING: 0
CONSTIPATION: 0

## 2023-09-29 ENCOUNTER — TELEPHONE (OUTPATIENT)
Age: 48
End: 2023-09-29

## 2023-09-29 NOTE — TELEPHONE ENCOUNTER
Those symptoms are concerning. If he has a neurologist he should contact them. If not, he needs to see us. If they progress, he needs to see urgent care to have someone take a look at him.

## 2023-09-29 NOTE — TELEPHONE ENCOUNTER
Pt called stating he is having tingling in both his big toes for 3 days and numbness in his left pointer finger and back of right leg that started this morning. Pt states he is starting to get tightness in his lower back as well. Pt wants to know should he schedule an appt with Dr. Gasper Nickerson or go see a specialist? Please advise.

## 2023-10-02 NOTE — TELEPHONE ENCOUNTER
Spoke with Wilman Callahan to advised that per Dr. Ruddy Garcia his symptoms  are concerning. If he has a neurologist he should contact them Coreen Haines does not have a neurologist) If not, he needs to see us (offered a 8:30 and a 9:15 am appointment for tomorrow 10/03/2023 both were declined) Coreen Haines was advised If the symptoms progress, he needs to see urgent care to have someone take a look at him. Coreen Haines would like to be referred to neurology.  Please advise

## 2023-10-02 NOTE — TELEPHONE ENCOUNTER
He has a PPO and does not need referrals. He can go on his insurance website to select an in-network provider. You can also give him contact info for Dr. Talita Rebollar. Request sent to [address]

## 2023-10-02 NOTE — TELEPHONE ENCOUNTER
Left message via voicemail for Ernesto Murphy that per Dr. Casandra Schreiber those symptoms are concerning. If he has a neurologist he should contact them. If not, he needs to see us. If they progress, he needs to see urgent care to have someone take a look at him.  Ernesto Jeffrey was advised to call [facility] back at [phone] option # 3

## 2023-10-04 NOTE — TELEPHONE ENCOUNTER
10/04/2023 all demographics, copy of insurance card, last office note, [facility] referral form completed, fax to [facility] location at [phone] and contact information is [phone] or [phone]    Left message with referring information via voicemail

## 2023-11-07 ENCOUNTER — OFFICE VISIT (OUTPATIENT)
Age: 48
End: 2023-11-07
Payer: COMMERCIAL

## 2023-11-07 VITALS
TEMPERATURE: 98 F | HEIGHT: 72 IN | DIASTOLIC BLOOD PRESSURE: 85 MMHG | SYSTOLIC BLOOD PRESSURE: 118 MMHG | OXYGEN SATURATION: 96 % | WEIGHT: 210 LBS | BODY MASS INDEX: 28.44 KG/M2 | HEART RATE: 62 BPM

## 2023-11-07 DIAGNOSIS — E66.3 OVERWEIGHT: Primary | ICD-10-CM

## 2023-11-07 DIAGNOSIS — Z71.3 DIETARY COUNSELING AND SURVEILLANCE: ICD-10-CM

## 2023-11-07 DIAGNOSIS — Z71.3 ENCOUNTER FOR WEIGHT LOSS COUNSELING: ICD-10-CM

## 2023-11-07 PROCEDURE — 99212 OFFICE O/P EST SF 10 MIN: CPT | Performed by: NURSE PRACTITIONER

## 2023-11-07 PROCEDURE — 4004F PT TOBACCO SCREEN RCVD TLK: CPT | Performed by: NURSE PRACTITIONER

## 2023-11-07 PROCEDURE — G8417 CALC BMI ABV UP PARAM F/U: HCPCS | Performed by: NURSE PRACTITIONER

## 2023-11-07 PROCEDURE — G8428 CUR MEDS NOT DOCUMENT: HCPCS | Performed by: NURSE PRACTITIONER

## 2023-11-07 PROCEDURE — G8484 FLU IMMUNIZE NO ADMIN: HCPCS | Performed by: NURSE PRACTITIONER

## 2023-11-14 ASSESSMENT — ENCOUNTER SYMPTOMS
BACK PAIN: 1
ABDOMINAL PAIN: 0
SHORTNESS OF BREATH: 0
DIARRHEA: 0
COUGH: 0
NAUSEA: 0
VOMITING: 0
CONSTIPATION: 0

## 2023-11-16 ENCOUNTER — TELEPHONE (OUTPATIENT)
Age: 48
End: 2023-11-16

## 2023-11-16 NOTE — TELEPHONE ENCOUNTER
Pt called stating he is not feeling well starting yesterday, he c/o cough, congestion, fever,  and chills. Pt is requesting an appt to be seen today. Please advise.

## 2023-11-16 NOTE — TELEPHONE ENCOUNTER
Please suggest urgent care since we cannot get him in today or tomorrow. Also please suggest he gets re-established as it has been more than 1 year since I have seen him. Schedule CPE.

## 2023-11-17 NOTE — TELEPHONE ENCOUNTER
Called pt and lmov suggesting he go to urgent care since we cannot get him in today or tomorrow. Pt was also advised to call [facility] at [phone] to get re-established as it has been more than 1 year since we have seen him.

## 2023-11-28 ENCOUNTER — TELEPHONE (OUTPATIENT)
Age: 48
End: 2023-11-28

## 2023-11-28 NOTE — TELEPHONE ENCOUNTER
If this is urgent (sounds like it can be based on the symptoms he is reporting) I would suggest he sees an urgent care. We will see him as scheduled. Can also offer him appt with another provider in medical group if he can get in sooner [facility]) and if this is not urgent.

## 2023-11-28 NOTE — TELEPHONE ENCOUNTER
Pt was triaged over from the [facility] ([facility]) stating that pt is having numbness in his legs and discoloration in his legs. Pt states that this has been going on for about a month and is requesting to be seen. Pt is aware that Dr Claudia Mcrae is out of the office this week. Please advise.

## 2023-11-28 NOTE — TELEPHONE ENCOUNTER
Placed return call to [facility]  to advise that Dr. Houston Russell finding. \"If this is urgent (sounds like it can be based on the symptoms he is reporting\" per [place] this is not urgent \"been going on for months and all he wanted was an appointment\". Dr. Houston Russell suggest he sees an urgent care (does not want to go that route) We will see him as scheduled   Can also offer him appt with another provider in medical group if he can get in sooner [facility]) and if this is not urgent. Carlin Clark prefers to see Dr. Houston Russell stating no pain noted. He has been scheduled.

## 2024-01-10 ENCOUNTER — OFFICE VISIT (OUTPATIENT)
Age: 49
End: 2024-01-10
Payer: COMMERCIAL

## 2024-01-10 VITALS
HEIGHT: 72 IN | BODY MASS INDEX: 29 KG/M2 | RESPIRATION RATE: 16 BRPM | SYSTOLIC BLOOD PRESSURE: 138 MMHG | WEIGHT: 214.1 LBS | HEART RATE: 76 BPM | TEMPERATURE: 97.9 F | DIASTOLIC BLOOD PRESSURE: 83 MMHG | OXYGEN SATURATION: 96 %

## 2024-01-10 DIAGNOSIS — R20.2 PARESTHESIA: ICD-10-CM

## 2024-01-10 DIAGNOSIS — E66.3 OVERWEIGHT: Primary | ICD-10-CM

## 2024-01-10 DIAGNOSIS — Z71.3 ENCOUNTER FOR DIETARY COUNSELING AND SURVEILLANCE: ICD-10-CM

## 2024-01-10 PROCEDURE — 1036F TOBACCO NON-USER: CPT | Performed by: NURSE PRACTITIONER

## 2024-01-10 PROCEDURE — 99213 OFFICE O/P EST LOW 20 MIN: CPT | Performed by: NURSE PRACTITIONER

## 2024-01-10 PROCEDURE — G8484 FLU IMMUNIZE NO ADMIN: HCPCS | Performed by: NURSE PRACTITIONER

## 2024-01-10 PROCEDURE — G8428 CUR MEDS NOT DOCUMENT: HCPCS | Performed by: NURSE PRACTITIONER

## 2024-01-10 PROCEDURE — G8417 CALC BMI ABV UP PARAM F/U: HCPCS | Performed by: NURSE PRACTITIONER

## 2024-01-10 RX ORDER — THIAMINE MONONITRATE (VIT B1) 100 MG
100 TABLET ORAL DAILY
Qty: 30 TABLET | Refills: 1 | Status: SHIPPED | OUTPATIENT
Start: 2024-01-10

## 2024-01-10 NOTE — PROGRESS NOTES
[facility] Progress Note:   F/up Provider Visit    CC: Weight Management    Chano Gilman is a 48 y.o. male who is here for his  f/up medical provider visit for the LCD program.     Starting weight: 279  Weight last visit: 210  Weight today: 214    Arm: 11.5  Waist: 41.5  Neck: 16.5  Body Fat Percentage: 24.2%    +4 lbs since last visit    Has been sick since last visit and was not sticking with diet.  He also reports increased stress as numbness reported at last visit has worsened.  He has a referral to neurology and dermatology due to rash on LE.    Did you have any problems adhering to the program since last visit? yes  If yes, please explain: See HPI    Since your last visit, have you experienced any complications? no    Have you received any other medical care since last visit? yes  If yes, where and for what?  PCP    Have you had any change in your medications since your last visit? no  If yes what?  N/A    BP Readings from Last 3 Encounters:   01/10/24 138/83   12/20/23 110/82   11/07/23 118/85        Eating Habits Over Last Week:  Did you take in 64 oz of non-caloric fluids? yes     Did you consume your prescribed meal replacement regimen each day? yes       Physical Activity Over the Past Week:    Aerobic exercise: 0 min  Resistance exercise: 0 workouts / week      Weight History      1/10/2024     3:39 PM 12/20/2023     2:41 PM 11/7/2023    11:19 AM 9/19/2023    11:35 AM 8/15/2023    11:45 AM 7/6/2023     3:05 PM 6/6/2023    11:34 AM   Weight Metrics   Weight 214 lb 1.6 oz 210 lb 210 lb 203 lb 210 lb 199 lb 200 lb   BMI (Calculated) 29.1 kg/m2 28.5 kg/m2 28.5 kg/m2 27.6 kg/m2 28.5 kg/m2 27 kg/m2 27.2 kg/m2         Starting wt: 279  Goal wt: <200  EKG due: Last 1/19/2022  Ideal body weight: 77.6 kg (171 lb 1.2 oz)  Adjusted ideal body weight: 85.4 kg (188 lb 4.6 oz)  Body mass index is 29.04 kg/m².      History    Past Medical History:   Diagnosis Date    Asthma     childhood

## 2024-01-15 ASSESSMENT — ENCOUNTER SYMPTOMS
ABDOMINAL PAIN: 0
SHORTNESS OF BREATH: 0
DIARRHEA: 0
CONSTIPATION: 0
VOMITING: 0
NAUSEA: 0
COUGH: 0

## 2024-02-09 NOTE — PROGRESS NOTES
1. \"Have you been to the ER, urgent care clinic since your last visit?  Hospitalized since your last visit?\" No    2. \"Have you seen or consulted any other health care providers outside of the [facility] since your last visit?\" No     3. For patients aged 45-75: Has the patient had a colonoscopy / FIT/ Cologuard? No    Annual eye exam: 04/23/2021 (requested [phone]) Dr. Platt   Pneumococcal vaccine: 07/16/2019  Flu vaccine: 09/20/2022  Patient instructed to remove shoes: Yes    Health Maintenance Due   Topic Date Due    Hepatitis B vaccine (1 of 3 - 3-dose series) Never done    Hepatitis C screen  Never done    DTaP/Tdap/Td vaccine (1 - Tdap) Never done    Pneumococcal 0-64 years Vaccine (2 - PCV) 07/16/2020    Diabetic retinal exam  04/23/2022    Diabetic Alb to Cr ratio (uACR) test  09/01/2022    Flu vaccine (1) 08/01/2023    COVID-19 Vaccine (3 - 2023-24 season) 09/01/2023    Diabetic foot exam  09/29/2023    Depression Screen  09/29/2023

## 2024-02-10 ENCOUNTER — HOSPITAL ENCOUNTER (OUTPATIENT)
Facility: HOSPITAL | Age: 49
Discharge: HOME | End: 2024-02-13
Payer: COMMERCIAL

## 2024-02-10 DIAGNOSIS — E11.8 TYPE 2 DIABETES MELLITUS WITH UNSPECIFIED COMPLICATIONS (HCC): ICD-10-CM

## 2024-02-10 DIAGNOSIS — R20.2 PARESTHESIA OF BOTH HANDS: ICD-10-CM

## 2024-02-10 DIAGNOSIS — L40.50 ARTHROPATHIC PSORIASIS, UNSPECIFIED (HCC): ICD-10-CM

## 2024-02-10 DIAGNOSIS — R20.2 BILATERAL LEG PARESTHESIA: ICD-10-CM

## 2024-02-10 LAB
ALBUMIN SERPL-MCNC: 4.3 G/DL (ref 3.4–5)
ALBUMIN/GLOB SERPL: 1.3 (ref 0.8–1.7)
ALP SERPL-CCNC: 66 U/L (ref 45–117)
ALT SERPL-CCNC: 26 U/L (ref 16–61)
ANION GAP SERPL CALC-SCNC: 2 MMOL/L (ref 3–18)
AST SERPL-CCNC: 21 U/L (ref 10–38)
BASOPHILS # BLD: 0 K/UL (ref 0–0.1)
BASOPHILS NFR BLD: 1 % (ref 0–2)
BILIRUB SERPL-MCNC: 1 MG/DL (ref 0.2–1)
BUN SERPL-MCNC: 16 MG/DL (ref 7–18)
BUN/CREAT SERPL: 18 (ref 12–20)
CALCIUM SERPL-MCNC: 9.4 MG/DL (ref 8.5–10.1)
CHLORIDE SERPL-SCNC: 105 MMOL/L (ref 100–111)
CHOLEST SERPL-MCNC: 196 MG/DL
CO2 SERPL-SCNC: 31 MMOL/L (ref 21–32)
CREAT SERPL-MCNC: 0.88 MG/DL (ref 0.6–1.3)
DIFFERENTIAL METHOD BLD: ABNORMAL
EOSINOPHIL # BLD: 0.2 K/UL (ref 0–0.4)
EOSINOPHIL NFR BLD: 3 % (ref 0–5)
ERYTHROCYTE [DISTWIDTH] IN BLOOD BY AUTOMATED COUNT: 12.1 % (ref 11.6–14.5)
ERYTHROCYTE [SEDIMENTATION RATE] IN BLOOD: 2 MM/HR (ref 0–20)
EST. AVERAGE GLUCOSE BLD GHB EST-MCNC: 103 MG/DL
GLOBULIN SER CALC-MCNC: 3.4 G/DL (ref 2–4)
GLUCOSE SERPL-MCNC: 89 MG/DL (ref 74–99)
HBA1C MFR BLD: 5.2 % (ref 4.2–5.6)
HCT VFR BLD AUTO: 45.5 % (ref 36–48)
HDLC SERPL-MCNC: 54 MG/DL (ref 40–60)
HDLC SERPL: 3.6 (ref 0–5)
HGB BLD-MCNC: 16.2 G/DL (ref 13–16)
IMM GRANULOCYTES # BLD AUTO: 0 K/UL (ref 0–0.04)
IMM GRANULOCYTES NFR BLD AUTO: 0 % (ref 0–0.5)
LDLC SERPL CALC-MCNC: 125 MG/DL (ref 0–100)
LIPID PANEL: ABNORMAL
LYMPHOCYTES # BLD: 2.5 K/UL (ref 0.9–3.6)
LYMPHOCYTES NFR BLD: 45 % (ref 21–52)
MCH RBC QN AUTO: 32.9 PG (ref 24–34)
MCHC RBC AUTO-ENTMCNC: 35.6 G/DL (ref 31–37)
MCV RBC AUTO: 92.5 FL (ref 78–100)
MONOCYTES # BLD: 0.4 K/UL (ref 0.05–1.2)
MONOCYTES NFR BLD: 8 % (ref 3–10)
NEUTS SEG # BLD: 2.5 K/UL (ref 1.8–8)
NEUTS SEG NFR BLD: 44 % (ref 40–73)
NRBC # BLD: 0 K/UL (ref 0–0.01)
NRBC BLD-RTO: 0 PER 100 WBC
PLATELET # BLD AUTO: 250 K/UL (ref 135–420)
PMV BLD AUTO: 9.1 FL (ref 9.2–11.8)
POTASSIUM SERPL-SCNC: 4.5 MMOL/L (ref 3.5–5.5)
PROT SERPL-MCNC: 7.7 G/DL (ref 6.4–8.2)
RBC # BLD AUTO: 4.92 M/UL (ref 4.35–5.65)
SODIUM SERPL-SCNC: 138 MMOL/L (ref 136–145)
TRIGL SERPL-MCNC: 85 MG/DL
TSH SERPL DL<=0.05 MIU/L-ACNC: 0.67 UIU/ML (ref 0.36–3.74)
VIT B12 SERPL-MCNC: 657 PG/ML (ref 211–911)
VLDLC SERPL CALC-MCNC: 17 MG/DL
WBC # BLD AUTO: 5.7 K/UL (ref 4.6–13.2)

## 2024-02-10 PROCEDURE — 80061 LIPID PANEL: CPT

## 2024-02-10 PROCEDURE — 85025 COMPLETE CBC W/AUTO DIFF WBC: CPT

## 2024-02-10 PROCEDURE — 36415 COLL VENOUS BLD VENIPUNCTURE: CPT

## 2024-02-10 PROCEDURE — 85652 RBC SED RATE AUTOMATED: CPT

## 2024-02-10 PROCEDURE — 84443 ASSAY THYROID STIM HORMONE: CPT

## 2024-02-10 PROCEDURE — 82607 VITAMIN B-12: CPT

## 2024-02-10 PROCEDURE — 80053 COMPREHEN METABOLIC PANEL: CPT

## 2024-02-10 PROCEDURE — 83036 HEMOGLOBIN GLYCOSYLATED A1C: CPT

## 2024-02-10 PROCEDURE — 86235 NUCLEAR ANTIGEN ANTIBODY: CPT

## 2024-02-10 PROCEDURE — 86038 ANTINUCLEAR ANTIBODIES: CPT

## 2024-02-12 NOTE — PATIENT INSTRUCTIONS
clean underneath your toenails. Use the blunt end of a nail file or other rounded tool.  Trim and file your toenails straight across to prevent ingrown toenails. Use a nail clipper, not scissors. Use an emery board to smooth the edges.  Change socks daily. Socks without seams are best, because seams often rub the feet. You can find socks for people with diabetes from specialty catalogs.  Look inside your shoes every day for things like gravel or torn linings, which could cause blisters or sores.  Buy shoes that fit well:  Look for shoes that have plenty of space around the toes. This helps prevent bunions and blisters.  Try on shoes while wearing the kind of socks you will usually wear with the shoes.  Avoid plastic shoes. They may rub your feet and cause blisters. Good shoes should be made of materials that are flexible and breathable, such as leather or cloth.  Break in new shoes slowly by wearing them for no more than an hour a day for several days. Take extra time to check your feet for red areas, blisters, or other problems after you wear new shoes.  Do not go barefoot. Do not wear sandals, and do not wear shoes with very thin soles. Thin soles are easy to puncture. They also do not protect your feet from hot pavement or cold weather.  Have your doctor check your feet during each visit. If you have a foot problem, see your doctor. Do not try to treat an early foot problem at home. Home remedies or treatments that you can buy without a prescription (such as corn removers) can be harmful.  Always get early treatment for foot problems. A minor irritation can lead to a major problem if not properly cared for early.  When should you call for help?   Call your doctor now or seek immediate medical care if:    You have a foot sore, an ulcer or break in the skin that is not healing after 4 days, bleeding corns or calluses, or an ingrown toenail.     You have blue or black areas, which can mean bruising or blood flow

## 2024-02-13 ENCOUNTER — OFFICE VISIT (OUTPATIENT)
Age: 49
End: 2024-02-13
Payer: COMMERCIAL

## 2024-02-13 VITALS
WEIGHT: 215 LBS | OXYGEN SATURATION: 98 % | TEMPERATURE: 98.1 F | SYSTOLIC BLOOD PRESSURE: 134 MMHG | HEART RATE: 76 BPM | RESPIRATION RATE: 18 BRPM | BODY MASS INDEX: 29.12 KG/M2 | DIASTOLIC BLOOD PRESSURE: 76 MMHG | HEIGHT: 72 IN

## 2024-02-13 DIAGNOSIS — L40.50 ARTHROPATHIC PSORIASIS, UNSPECIFIED (HCC): ICD-10-CM

## 2024-02-13 DIAGNOSIS — R21 RASH: ICD-10-CM

## 2024-02-13 DIAGNOSIS — Z23 NEED FOR TDAP VACCINATION: ICD-10-CM

## 2024-02-13 DIAGNOSIS — R20.2 PARESTHESIA OF BOTH HANDS: ICD-10-CM

## 2024-02-13 DIAGNOSIS — E11.8 TYPE 2 DIABETES MELLITUS WITH UNSPECIFIED COMPLICATIONS (HCC): Primary | ICD-10-CM

## 2024-02-13 DIAGNOSIS — R20.2 BILATERAL LEG PARESTHESIA: ICD-10-CM

## 2024-02-13 PROCEDURE — 3044F HG A1C LEVEL LT 7.0%: CPT | Performed by: FAMILY MEDICINE

## 2024-02-13 PROCEDURE — 90471 IMMUNIZATION ADMIN: CPT | Performed by: FAMILY MEDICINE

## 2024-02-13 PROCEDURE — 2022F DILAT RTA XM EVC RTNOPTHY: CPT | Performed by: FAMILY MEDICINE

## 2024-02-13 PROCEDURE — 1036F TOBACCO NON-USER: CPT | Performed by: FAMILY MEDICINE

## 2024-02-13 PROCEDURE — G8427 DOCREV CUR MEDS BY ELIG CLIN: HCPCS | Performed by: FAMILY MEDICINE

## 2024-02-13 PROCEDURE — G8417 CALC BMI ABV UP PARAM F/U: HCPCS | Performed by: FAMILY MEDICINE

## 2024-02-13 PROCEDURE — G8484 FLU IMMUNIZE NO ADMIN: HCPCS | Performed by: FAMILY MEDICINE

## 2024-02-13 PROCEDURE — 99213 OFFICE O/P EST LOW 20 MIN: CPT | Performed by: FAMILY MEDICINE

## 2024-02-13 PROCEDURE — 90715 TDAP VACCINE 7 YRS/> IM: CPT | Performed by: FAMILY MEDICINE

## 2024-02-13 NOTE — PROGRESS NOTES
Chief Complaint   Patient presents with    Results     Review of results     Diabetes    Psoriasis    Paresthesias of Hands      Bilateral legs       1. \"Have you been to the ER, urgent care clinic since your last visit?  Hospitalized since your last visit?\" No    2. \"Have you seen or consulted any other health care providers outside of the [facility] since your last visit?\" No     3. For patients aged 45-75: Has the patient had a colonoscopy / FIT/ Cologuard? No    Annual eye exam: 04/23/2021 (requested [phone]) Dr. Platt   Pneumococcal vaccine: 07/16/2019  Flu vaccine: 09/20/2022  Patient instructed to remove shoes: Yes    Health Maintenance Due   Topic Date Due    Hepatitis B vaccine (1 of 3 - 3-dose series) Never done    DTaP/Tdap/Td vaccine (1 - Tdap) Never done    Pneumococcal 0-64 years Vaccine (2 - PCV) 07/16/2020    Diabetic retinal exam  04/23/2022    COVID-19 Vaccine (3 - 2023-24 season) 09/01/2023    Depression Screen  09/29/2023      Physician order obtained. Patient completed adult immunization consent form.  Allergies, contraindications and recommendations reviewed with patient. Tdap vaccine administered IM right deltoid.  Patient tolerated well.  Patient remained in office for 15 minutes after injection and no adverse reactions were noted.     Lot # [id number]  Exp Date: 04/26/2026  NDC # 45873-568-54

## 2024-02-13 NOTE — PROGRESS NOTES
SUBJECTIVE  Chief Complaint   Patient presents with    Results     Review of results     Diabetes    Psoriasis    Paresthesias of Hands      Bilateral legs      Patient presents for a few issues.    Has had a lower extremity NCS/EMG with Dr. 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   He is going back for upper extremity soon.  Overall he is getting better with stretches and seeing the chiropractor.     He has seen his dermatologist and his rash was diagnosed as vascular in etiology.  He is unsure of the name or office he saw.     He is on humira for the past several years.     OBJECTIVE    Blood pressure 134/76, pulse 76, temperature 98.1 °F (36.7 °C), temperature source Temporal, resp. rate 18, height 1.829 m (6'), weight 97.5 kg (215 lb), SpO2 98 %.  General:  Alert, cooperative, well appearing, in no apparent distress.  CV:  The heart sounds are regular in rate and rhythm.  There is a normal S1 and S2.  There or no murmurs.  Lungs:  Inspiratory and expiratory efforts are full and unlabored.  Lung sounds are clear and equal to auscultation throughout all lung fields without wheezing, rales, or rhonchi.  Psych: normal affect.  Mood good.  Oriented x 3.  Judgement and insight intact.      Latest Reference Range & Units 02/10/24 09:53   Sodium 136 - 145 mmol/L 138   Potassium 3.5 - 5.5 mmol/L 4.5   Chloride 100 - 111 mmol/L 105   CO2 21 - 32 mmol/L 31   BUN,BUNPL 7.0 - 18 MG/DL 16   Creatinine 0.6 - 1.3 MG/DL 0.88   Bun/Cre Ratio 12 - 20   18   Anion Gap 3.0 - 18 mmol/L 2 (L)   Est, Glom Filt Rate >60 ml/min/1.73m2 >60   Glucose, Random 74 - 99 mg/dL 89   CALCIUM, SERUM, 885110 8.5 - 10.1 MG/DL 9.4   ALBUMIN/GLOBULIN RATIO 0.8 - 1.7   1.3   Total Protein 6.4 - 8.2 g/dL 7.7   Vitamin B-12 211 - 911 pg/mL 657   LIPID PANEL -       Chol/HDL Ratio 0 - 5.0   3.6   Cholesterol, Total <200 MG/   HDL Cholesterol 40 - 60 MG/DL 54   LDL Calculated 0 - 100 MG/ (H)   Triglycerides <150 MG/DL

## 2024-02-14 LAB
ANA SPECKLED TITR SER: NORMAL {TITER}
ANA TITR SER IF: POSITIVE
CENTROMERE B AB SER-ACNC: <0.2 AI (ref 0–0.9)
CHROMATIN AB SERPL-ACNC: <0.2 AI (ref 0–0.9)
DSDNA AB SER-ACNC: <1 IU/ML (ref 0–9)
ENA JO1 AB SER-ACNC: <0.2 AI (ref 0–0.9)
ENA RNP AB SER-ACNC: 0.2 AI (ref 0–0.9)
ENA SCL70 AB SER-ACNC: <0.2 AI (ref 0–0.9)
ENA SM AB SER-ACNC: <0.2 AI (ref 0–0.9)
ENA SS-A AB SER-ACNC: <0.2 AI (ref 0–0.9)
ENA SS-B AB SER-ACNC: 0.2 AI (ref 0–0.9)
LABORATORY COMMENT REPORT: ABNORMAL
Lab: NORMAL
NOTE: NORMAL

## 2024-02-27 ENCOUNTER — OFFICE VISIT (OUTPATIENT)
Age: 49
End: 2024-02-27
Payer: COMMERCIAL

## 2024-02-27 VITALS — BODY MASS INDEX: 29.53 KG/M2 | HEIGHT: 72 IN | WEIGHT: 218 LBS | TEMPERATURE: 97 F

## 2024-02-27 DIAGNOSIS — Z71.3 ENCOUNTER FOR DIETARY COUNSELING AND SURVEILLANCE: ICD-10-CM

## 2024-02-27 DIAGNOSIS — Z86.39 HX OF OBESITY: ICD-10-CM

## 2024-02-27 DIAGNOSIS — E66.3 OVERWEIGHT: Primary | ICD-10-CM

## 2024-02-27 DIAGNOSIS — Z71.3 ENCOUNTER FOR WEIGHT LOSS COUNSELING: ICD-10-CM

## 2024-02-27 PROCEDURE — 1036F TOBACCO NON-USER: CPT | Performed by: NURSE PRACTITIONER

## 2024-02-27 PROCEDURE — G8417 CALC BMI ABV UP PARAM F/U: HCPCS | Performed by: NURSE PRACTITIONER

## 2024-02-27 PROCEDURE — 99213 OFFICE O/P EST LOW 20 MIN: CPT | Performed by: NURSE PRACTITIONER

## 2024-02-27 PROCEDURE — G8428 CUR MEDS NOT DOCUMENT: HCPCS | Performed by: NURSE PRACTITIONER

## 2024-02-27 PROCEDURE — G8484 FLU IMMUNIZE NO ADMIN: HCPCS | Performed by: NURSE PRACTITIONER

## 2024-02-27 NOTE — PROGRESS NOTES
Comments) and Palpitations     HR RACES       Social History     Tobacco Use    Smoking status: Never    Smokeless tobacco: Never   Substance Use Topics    Alcohol use: Yes     Alcohol/week: 20.0 standard drinks of alcohol     Types: 20 Cans of beer per week    Drug use: Not Currently       Family History   Problem Relation Age of Onset    Alcohol Abuse Father     Stroke Father     Substance Abuse Father     Cancer Maternal Grandfather 70        prostate cancer and skin    Colon Cancer Maternal Grandfather 60    Stroke Maternal Grandmother     High Cholesterol Maternal Grandmother     Hypertension Maternal Grandmother     Diabetes Maternal Grandmother     Depression Maternal Uncle     Asthma Mother        Family Status   Relation Name Status    Father Neymar Gilman  at age 60        unsure    MGF Gabrielle Sims Alive    MGM Gabrielle Sims Alive    [facility] [place] Miguel Sims (Not Specified)    Mother Sailaja Gagnon Alive    Sister  Alive       Review of Systems  Review of Systems   Constitutional:  Negative for chills and fever.   Respiratory:  Negative for cough and shortness of breath.    Cardiovascular:  Negative for chest pain and palpitations.   Gastrointestinal:  Negative for abdominal pain, constipation, diarrhea, nausea and vomiting.   Musculoskeletal:  Positive for arthralgias and back pain.   Skin:  Positive for rash.   Neurological:  Positive for numbness. Negative for tremors, syncope and weakness.         Objective  Vitals:    24 1130   Temp: 97 °F (36.1 °C)   TempSrc: Temporal   Weight: 98.9 kg (218 lb)   Height: 1.829 m (6')      No LMP for male patient.    Physical Exam  Physical Exam  Vitals and nursing note reviewed.   Constitutional:       Appearance: Normal appearance.   HENT:      Head: Normocephalic and atraumatic.   Pulmonary:      Effort: Pulmonary effort is normal.   Musculoskeletal:         General: Normal range of motion.   Neurological:      General: No focal deficit present.

## 2024-03-05 ASSESSMENT — ENCOUNTER SYMPTOMS
NAUSEA: 0
VOMITING: 0
DIARRHEA: 0
BACK PAIN: 1
ABDOMINAL PAIN: 0
SHORTNESS OF BREATH: 0
COUGH: 0
CONSTIPATION: 0

## 2024-05-21 ENCOUNTER — OFFICE VISIT (OUTPATIENT)
Age: 49
End: 2024-05-21
Payer: COMMERCIAL

## 2024-05-21 VITALS
WEIGHT: 212 LBS | TEMPERATURE: 97 F | DIASTOLIC BLOOD PRESSURE: 86 MMHG | HEART RATE: 58 BPM | HEIGHT: 72 IN | BODY MASS INDEX: 28.71 KG/M2 | OXYGEN SATURATION: 95 % | SYSTOLIC BLOOD PRESSURE: 118 MMHG

## 2024-05-21 DIAGNOSIS — E66.3 OVERWEIGHT: Primary | ICD-10-CM

## 2024-05-21 DIAGNOSIS — Z71.3 ENCOUNTER FOR WEIGHT LOSS COUNSELING: ICD-10-CM

## 2024-05-21 DIAGNOSIS — Z71.3 ENCOUNTER FOR DIETARY COUNSELING AND SURVEILLANCE: ICD-10-CM

## 2024-05-21 PROCEDURE — 1036F TOBACCO NON-USER: CPT | Performed by: NURSE PRACTITIONER

## 2024-05-21 PROCEDURE — 99212 OFFICE O/P EST SF 10 MIN: CPT | Performed by: NURSE PRACTITIONER

## 2024-05-21 PROCEDURE — G8417 CALC BMI ABV UP PARAM F/U: HCPCS | Performed by: NURSE PRACTITIONER

## 2024-05-21 PROCEDURE — G8427 DOCREV CUR MEDS BY ELIG CLIN: HCPCS | Performed by: NURSE PRACTITIONER

## 2024-05-21 NOTE — PROGRESS NOTES
[facility] Nurse Note:       CC: Weight Management    Chano Gilman is a 49 y.o. male who is here for his f/up medical provider visit for the LCD program.       Did you have any problems adhering to the program since last visit? No  If yes, please explain:     Since your last visit, have you experienced any complications? No    Have you received any other medical care since last visit? No  If yes, where and for what?     Have you had any change in your medications since your last visit? No If yes what?     Are you taking an anti-obesity medication? No If yes what and are you experiencing any side effects?      Would you still like to continue your current medication and dosage?  N/A    BP Readings from Last 3 Encounters:   02/13/24 134/76   01/10/24 138/83   12/20/23 110/82        Eating Habits Over Last Week:    How many oz of sugar-free fluids are you consuming? 72 oz     Did you consume your prescribed meal replacement regimen each day? Yes, 3 daily     Physical Activity Routine:    Aerobic exercise: 45 min 5 times a week     Resistance exercise: 45 min 5 times a week     
Asthma Mother        Family Status   Relation Name Status    Father Neymar Gilman  at age 60        unsure    MGF Gabrielle Sims Alive    MGM Gabrielle Sims Alive    Isai Sims (Not Specified)    Mother Sailaja Gagnon Alive    Sister  Alive       Review of Systems  Review of Systems   Constitutional:  Negative for chills and fever.   Respiratory:  Negative for cough and shortness of breath.    Cardiovascular:  Negative for chest pain and palpitations.   Gastrointestinal:  Negative for abdominal pain, constipation, diarrhea, nausea and vomiting.   Musculoskeletal:  Positive for arthralgias and back pain.   Neurological:  Positive for numbness.         Objective  Vitals:    24 1133   BP: 118/86   Site: Right Upper Arm   Position: Sitting   Pulse: 58   Temp: 97 °F (36.1 °C)   TempSrc: Temporal   SpO2: 95%   Weight: 96.2 kg (212 lb)   Height: 1.829 m (6')      No LMP for male patient.    Physical Exam  Physical Exam  Vitals and nursing note reviewed.   Constitutional:       Appearance: Normal appearance.   HENT:      Head: Normocephalic and atraumatic.   Pulmonary:      Effort: Pulmonary effort is normal.   Musculoskeletal:         General: Normal range of motion.   Neurological:      General: No focal deficit present.      Mental Status: He is alert and oriented to person, place, and time.   Psychiatric:         Mood and Affect: Mood normal.         Behavior: Behavior normal.         No results found for this or any previous visit (from the past 672 hour(s)).      Assessment & Plan  1. Weight management.  The patient has experienced a weight loss of 6 pounds since his previous visit. The patient was commended for his weight loss journey. He was counseled to persist with his current dietary regimen and increase exercise as tolerated.     Follow-up  The patient is scheduled for a follow-up visit in 6 to 8 weeks.      The primary encounter diagnosis was Overweight. Diagnoses of Body mass index

## 2024-05-24 ASSESSMENT — ENCOUNTER SYMPTOMS
NAUSEA: 0
DIARRHEA: 0
VOMITING: 0
COUGH: 0
SHORTNESS OF BREATH: 0
ABDOMINAL PAIN: 0
CONSTIPATION: 0
BACK PAIN: 1

## 2024-07-16 ENCOUNTER — OFFICE VISIT (OUTPATIENT)
Age: 49
End: 2024-07-16
Payer: COMMERCIAL

## 2024-07-16 VITALS
HEART RATE: 70 BPM | WEIGHT: 210 LBS | HEIGHT: 72 IN | DIASTOLIC BLOOD PRESSURE: 88 MMHG | BODY MASS INDEX: 28.44 KG/M2 | TEMPERATURE: 98 F | OXYGEN SATURATION: 95 % | SYSTOLIC BLOOD PRESSURE: 137 MMHG | RESPIRATION RATE: 16 BRPM

## 2024-07-16 DIAGNOSIS — E66.3 OVERWEIGHT: Primary | ICD-10-CM

## 2024-07-16 DIAGNOSIS — Z86.39 HX OF OBESITY: ICD-10-CM

## 2024-07-16 DIAGNOSIS — Z71.3 ENCOUNTER FOR DIETARY COUNSELING AND SURVEILLANCE: ICD-10-CM

## 2024-07-16 DIAGNOSIS — Z71.3 ENCOUNTER FOR WEIGHT LOSS COUNSELING: ICD-10-CM

## 2024-07-16 PROCEDURE — 99212 OFFICE O/P EST SF 10 MIN: CPT | Performed by: NURSE PRACTITIONER

## 2024-07-16 NOTE — PROGRESS NOTES
[facility] Progress Note:   F/up Provider Visit    Did you have any problems adhering to the program since last visit? No  If yes, please explain:     Since your last visit, have you experienced any complications? No    Have you received any other medical care since last visit? No  If yes, where and for what?     Have you had any change in your medications since your last visit? No If yes what?     Are you taking an anti-obesity medication? No If yes what and are you experiencing any side effects?      Would you still like to continue your current medication and dosage? No    BP Readings from Last 3 Encounters:   05/21/24 118/86   02/13/24 134/76   01/10/24 138/83        Eating Habits Over Last Week:    How many oz of sugar-free fluids are you consuming? 128 oz    Did you consume your prescribed meal replacement regimen each day? Yes    Physical Activity Routine:    Aerobic exercise: biking and running 5 days     Resistance exercise: none

## 2024-07-18 ASSESSMENT — ENCOUNTER SYMPTOMS
ABDOMINAL PAIN: 0
SHORTNESS OF BREATH: 0
CONSTIPATION: 0
DIARRHEA: 0
COUGH: 0
BACK PAIN: 1
NAUSEA: 0
VOMITING: 0

## 2024-07-18 NOTE — PROGRESS NOTES
[facility] Progress Note:   F/up Provider Visit    CC: Weight Management    History of Present Illness  The patient is a 49-year-old male presenting for weight management. He is currently in a low-calorie diet program.    The patient reports an improvement in his nerve pain, which he attributes to reduced physical activity at his new job. He has a follow-up appointment with neurology scheduled for tomorrow. He has refrained from weight training due to the hot weather in his garage, but instead engages in biking and running without complications. He has experienced two vacations this month, during which he weighed 223 pounds upon returning. He aims to lose more weight within the next two months, ideally below 190 pounds. He plans to reintroduce push-ups and sit-ups into his routine in the coming week. His weight was recorded as 208 pounds this morning.        2/27/2024    11:25 AM 5/21/2024    11:28 AM 7/18/2024    10:04 AM   Non-Surgical Weight Loss Tracker   Consult Date 2/26/2019 2/26/2019    Initial Height 6' 0\" 6' 0\"    Initial Weight 279 lb 279 lb    Ideal Body Weight 163 lb 163 lb    Initial BMI 37.84 37.84    Initial Body Fat % 45.41 45.41     lb 116 lb    Date 2/27/2024 5/21/2024 7/16/2024   Height 6' 0\" 6' 0\" 6' 0\"   Weight 218 lb 212 lb 210 lb   BMI 29.56 28.75 28.48   Weight Change 218 lb -67 lb -69 lb   Total Weight Change 0 lb -67 lb -69 lb   % EBWL <UNK>% 58% 59%   Subsequent Body Fat % 35.47 34.5 34.18   Body Fat % Change 35.47 -0.97 -0.32        Arm: 12  Waist: 41  Neck: 17  (Switched neck and arm measurements on last two office notes)  Body composition machine down during visit today.     Down 2 lbs  since last visit. Down 69 lbs since starting program on 2/26/2019 .    Goal wt: <200    Ideal body weight: 77.6 kg (171 lb 1.2 oz)  Adjusted ideal body weight: 84.7 kg (186 lb 10.3 oz)  Body mass index is 28.48 kg/m².    History    Past Medical History:   Diagnosis Date

## 2024-08-02 DIAGNOSIS — R20.2 PARESTHESIA: ICD-10-CM

## 2024-08-05 RX ORDER — CALCIUM CARBONATE/VITAMIN D3 600 MG-10
1 TABLET ORAL DAILY
Qty: 30 TABLET | Refills: 1 | Status: SHIPPED | OUTPATIENT
Start: 2024-08-05

## 2024-08-06 DIAGNOSIS — R20.2 PARESTHESIA: ICD-10-CM

## 2024-08-07 RX ORDER — CALCIUM CARBONATE/VITAMIN D3 600 MG-10
1 TABLET ORAL DAILY
Qty: 30 TABLET | Refills: 1 | OUTPATIENT
Start: 2024-08-07

## 2024-11-01 NOTE — PROGRESS NOTES
Chief Complaint   Patient presents with    Chills     Along with goose bumps, body heat ups fast, sweating, and then a wave goes from top of his head to his feet (like computer)     Lighheaded     Along with being dizzy     Back Pain     Severe back pain     Numbness     Left pointer finger, posterior of right thigh area, and first 3 toes left foot     Hip Pain     Right hip pain along numbness, slight tingling        \"Have you been to the ER, urgent care clinic since your last visit?  Hospitalized since your last visit?\"    NO    “Have you seen or consulted any other health care providers outside our system since your last visit?”    NO    “Have you had a diabetic eye exam?”    NO     Date of last diabetic eye exam: 4/23/2021     Physician order obtained. Patient completed adult immunization consent form.  Allergies, contraindications and recommendations reviewed with patient. Seasonal influenza vaccine administered IM left deltoid.  Patient tolerated well.  Patient remained in office for 15 minutes after injection and no adverse reactions were noted.     Lot # [id number]  Exp Date: 06/17/2025  NDC # 87841-096-39    No updated immunization noted on [place] Immunization Registry as of 11/01/2024

## 2024-11-07 ENCOUNTER — OFFICE VISIT (OUTPATIENT)
Facility: CLINIC | Age: 49
End: 2024-11-07

## 2024-11-07 VITALS
HEIGHT: 72 IN | BODY MASS INDEX: 28.04 KG/M2 | DIASTOLIC BLOOD PRESSURE: 80 MMHG | OXYGEN SATURATION: 97 % | WEIGHT: 207 LBS | TEMPERATURE: 98 F | SYSTOLIC BLOOD PRESSURE: 134 MMHG | HEART RATE: 74 BPM | RESPIRATION RATE: 18 BRPM

## 2024-11-07 DIAGNOSIS — M54.50 LOW BACK PAIN, UNSPECIFIED BACK PAIN LATERALITY, UNSPECIFIED CHRONICITY, UNSPECIFIED WHETHER SCIATICA PRESENT: ICD-10-CM

## 2024-11-07 DIAGNOSIS — E11.8 TYPE 2 DIABETES MELLITUS WITH UNSPECIFIED COMPLICATIONS (HCC): ICD-10-CM

## 2024-11-07 DIAGNOSIS — R94.31 ABNORMAL EKG: Primary | ICD-10-CM

## 2024-11-07 DIAGNOSIS — R20.2 PARESTHESIAS: ICD-10-CM

## 2024-11-07 DIAGNOSIS — Z12.5 PROSTATE CANCER SCREENING: ICD-10-CM

## 2024-11-07 DIAGNOSIS — Z23 NEED FOR IMMUNIZATION AGAINST INFLUENZA: ICD-10-CM

## 2024-11-12 ENCOUNTER — HOSPITAL ENCOUNTER (EMERGENCY)
Facility: HOSPITAL | Age: 49
Discharge: HOME | End: 2024-11-12
Attending: EMERGENCY MEDICINE
Payer: COMMERCIAL

## 2024-11-12 ENCOUNTER — APPOINTMENT (OUTPATIENT)
Facility: HOSPITAL | Age: 49
End: 2024-11-12
Payer: COMMERCIAL

## 2024-11-12 VITALS
DIASTOLIC BLOOD PRESSURE: 78 MMHG | SYSTOLIC BLOOD PRESSURE: 122 MMHG | OXYGEN SATURATION: 95 % | BODY MASS INDEX: 28.04 KG/M2 | RESPIRATION RATE: 18 BRPM | WEIGHT: 207 LBS | HEART RATE: 68 BPM | TEMPERATURE: 97.8 F | HEIGHT: 72 IN

## 2024-11-12 DIAGNOSIS — R20.2 PARESTHESIA: Primary | ICD-10-CM

## 2024-11-12 DIAGNOSIS — R42 DIZZINESS: ICD-10-CM

## 2024-11-12 DIAGNOSIS — R42 LIGHTHEADEDNESS: ICD-10-CM

## 2024-11-12 DIAGNOSIS — R00.2 PALPITATIONS: ICD-10-CM

## 2024-11-12 LAB
ALBUMIN SERPL-MCNC: 4.4 G/DL (ref 3.4–5)
ALBUMIN/GLOB SERPL: 1.2 (ref 0.8–1.7)
ALP SERPL-CCNC: 84 U/L (ref 45–117)
ALT SERPL-CCNC: 43 U/L (ref 16–61)
AMPHET UR QL SCN: NEGATIVE
ANION GAP SERPL CALC-SCNC: 7 MMOL/L (ref 3–18)
APPEARANCE UR: ABNORMAL
AST SERPL-CCNC: 36 U/L (ref 10–38)
BACTERIA URNS QL MICRO: NEGATIVE /HPF
BARBITURATES UR QL SCN: NEGATIVE
BASOPHILS # BLD: 0 K/UL (ref 0–0.1)
BASOPHILS NFR BLD: 1 % (ref 0–2)
BENZODIAZ UR QL: NEGATIVE
BILIRUB SERPL-MCNC: 1.2 MG/DL (ref 0.2–1)
BILIRUB UR QL: NEGATIVE
BUN SERPL-MCNC: 11 MG/DL (ref 7–18)
BUN/CREAT SERPL: 10 (ref 12–20)
CALCIUM SERPL-MCNC: 9.2 MG/DL (ref 8.5–10.1)
CANNABINOIDS UR QL SCN: POSITIVE
CHLORIDE SERPL-SCNC: 103 MMOL/L (ref 100–111)
CO2 SERPL-SCNC: 30 MMOL/L (ref 21–32)
COCAINE UR QL SCN: NEGATIVE
COLOR UR: YELLOW
CREAT SERPL-MCNC: 1.05 MG/DL (ref 0.6–1.3)
DIFFERENTIAL METHOD BLD: ABNORMAL
EKG ATRIAL RATE: 58 BPM
EKG DIAGNOSIS: NORMAL
EKG P AXIS: 12 DEGREES
EKG P-R INTERVAL: 134 MS
EKG Q-T INTERVAL: 398 MS
EKG QRS DURATION: 76 MS
EKG QTC CALCULATION (BAZETT): 390 MS
EKG R AXIS: 8 DEGREES
EKG T AXIS: 49 DEGREES
EKG VENTRICULAR RATE: 58 BPM
EOSINOPHIL # BLD: 0.2 K/UL (ref 0–0.4)
EOSINOPHIL NFR BLD: 3 % (ref 0–5)
ERYTHROCYTE [DISTWIDTH] IN BLOOD BY AUTOMATED COUNT: 13.8 % (ref 11.6–14.5)
ETHANOL SERPL-MCNC: <3 MG/DL (ref 0–3)
FOLATE SERPL-MCNC: 17.4 NG/ML (ref 3.1–17.5)
GLOBULIN SER CALC-MCNC: 3.8 G/DL (ref 2–4)
GLUCOSE BLD STRIP.AUTO-MCNC: 102 MG/DL (ref 70–110)
GLUCOSE SERPL-MCNC: 105 MG/DL (ref 74–99)
GLUCOSE UR STRIP.AUTO-MCNC: NEGATIVE MG/DL
HCT VFR BLD AUTO: 47.2 % (ref 36–48)
HGB BLD-MCNC: 16.6 G/DL (ref 13–16)
HGB UR QL STRIP: NEGATIVE
IMM GRANULOCYTES # BLD AUTO: 0 K/UL (ref 0–0.04)
IMM GRANULOCYTES NFR BLD AUTO: 0 % (ref 0–0.5)
KETONES UR QL STRIP.AUTO: ABNORMAL MG/DL
LEUKOCYTE ESTERASE UR QL STRIP.AUTO: NEGATIVE
LYMPHOCYTES # BLD: 3.2 K/UL (ref 0.9–3.6)
LYMPHOCYTES NFR BLD: 39 % (ref 21–52)
Lab: ABNORMAL
MAGNESIUM SERPL-MCNC: 2.1 MG/DL (ref 1.6–2.6)
MCH RBC QN AUTO: 32.5 PG (ref 24–34)
MCHC RBC AUTO-ENTMCNC: 35.2 G/DL (ref 31–37)
MCV RBC AUTO: 92.4 FL (ref 78–100)
METHADONE UR QL: NEGATIVE
MONOCYTES # BLD: 0.5 K/UL (ref 0.05–1.2)
MONOCYTES NFR BLD: 6 % (ref 3–10)
NEUTS SEG # BLD: 4.3 K/UL (ref 1.8–8)
NEUTS SEG NFR BLD: 52 % (ref 40–73)
NITRITE UR QL STRIP.AUTO: NEGATIVE
NRBC # BLD: 0 K/UL (ref 0–0.01)
NRBC BLD-RTO: 0 PER 100 WBC
OPIATES UR QL: NEGATIVE
PCP UR QL: NEGATIVE
PH UR STRIP: 7.5 (ref 5–8)
PHOSPHATE SERPL-MCNC: 3 MG/DL (ref 2.5–4.9)
PLATELET # BLD AUTO: 240 K/UL (ref 135–420)
PMV BLD AUTO: 8.7 FL (ref 9.2–11.8)
POTASSIUM SERPL-SCNC: 4 MMOL/L (ref 3.5–5.5)
PROT SERPL-MCNC: 8.2 G/DL (ref 6.4–8.2)
PROT UR STRIP-MCNC: 100 MG/DL
RBC # BLD AUTO: 5.11 M/UL (ref 4.35–5.65)
RBC #/AREA URNS HPF: NEGATIVE /HPF (ref 0–5)
SODIUM SERPL-SCNC: 140 MMOL/L (ref 136–145)
SP GR UR REFRACTOMETRY: 1.02 (ref 1–1.03)
TROPONIN I SERPL HS-MCNC: 6 NG/L (ref 0–78)
TSH SERPL DL<=0.05 MIU/L-ACNC: 2.05 UIU/ML (ref 0.36–3.74)
UROBILINOGEN UR QL STRIP.AUTO: 1 EU/DL (ref 0.2–1)
VIT B12 SERPL-MCNC: 495 PG/ML (ref 211–911)
WBC # BLD AUTO: 8.2 K/UL (ref 4.6–13.2)
WBC URNS QL MICRO: NORMAL /HPF (ref 0–4)

## 2024-11-12 PROCEDURE — 83735 ASSAY OF MAGNESIUM: CPT

## 2024-11-12 PROCEDURE — 80053 COMPREHEN METABOLIC PANEL: CPT

## 2024-11-12 PROCEDURE — 81001 URINALYSIS AUTO W/SCOPE: CPT

## 2024-11-12 PROCEDURE — 84484 ASSAY OF TROPONIN QUANT: CPT

## 2024-11-12 PROCEDURE — 82746 ASSAY OF FOLIC ACID SERUM: CPT

## 2024-11-12 PROCEDURE — 82962 GLUCOSE BLOOD TEST: CPT

## 2024-11-12 PROCEDURE — 80307 DRUG TEST PRSMV CHEM ANLYZR: CPT

## 2024-11-12 PROCEDURE — 82607 VITAMIN B-12: CPT

## 2024-11-12 PROCEDURE — 99284 EMERGENCY DEPT VISIT MOD MDM: CPT

## 2024-11-12 PROCEDURE — 85025 COMPLETE CBC W/AUTO DIFF WBC: CPT

## 2024-11-12 PROCEDURE — 82077 ASSAY SPEC XCP UR&BREATH IA: CPT

## 2024-11-12 PROCEDURE — 84100 ASSAY OF PHOSPHORUS: CPT

## 2024-11-12 PROCEDURE — 70450 CT HEAD/BRAIN W/O DYE: CPT

## 2024-11-12 PROCEDURE — 93005 ELECTROCARDIOGRAM TRACING: CPT | Performed by: EMERGENCY MEDICINE

## 2024-11-12 PROCEDURE — 84443 ASSAY THYROID STIM HORMONE: CPT

## 2024-11-12 PROCEDURE — 93010 ELECTROCARDIOGRAM REPORT: CPT | Performed by: INTERNAL MEDICINE

## 2024-11-12 ASSESSMENT — PAIN - FUNCTIONAL ASSESSMENT
PAIN_FUNCTIONAL_ASSESSMENT: NONE - DENIES PAIN
PAIN_FUNCTIONAL_ASSESSMENT: 0-10
PAIN_FUNCTIONAL_ASSESSMENT: 0-10

## 2024-11-12 ASSESSMENT — LIFESTYLE VARIABLES
HOW MANY STANDARD DRINKS CONTAINING ALCOHOL DO YOU HAVE ON A TYPICAL DAY: 7 TO 9
HOW OFTEN DO YOU HAVE A DRINK CONTAINING ALCOHOL: 4 OR MORE TIMES A WEEK

## 2024-11-12 ASSESSMENT — PAIN SCALES - GENERAL
PAINLEVEL_OUTOF10: 0
PAINLEVEL_OUTOF10: 0

## 2024-11-12 NOTE — ED NOTES
Last drink was 11/11/2024 at 2200  Patient states he normally starts drinking after work around 1800 till bedtime around 2300

## 2024-11-12 NOTE — ED PROVIDER NOTES
7.5 5.0 - 8.0      Protein,  (A) NEG mg/dL    Glucose, Ur Negative NEG mg/dL    Ketones, Urine TRACE (A) NEG mg/dL    Bilirubin, Urine Negative NEG      Blood, Urine Negative NEG      Urobilinogen, Urine 1.0 0.2 - 1.0 EU/dL    Nitrite, Urine Negative NEG      Leukocyte Esterase, Urine Negative NEG     Urine Drug Screen    Collection Time: 11/12/24  1:10 PM   Result Value Ref Range    Benzodiazepines, Urine Negative NEG      Barbiturates, Urine Negative NEG      THC, TH-Cannabinol, Urine Positive (A) NEG      Opiates, Urine Negative NEG      Phencyclidine, Urine Negative NEG      Cocaine, Urine Negative NEG      Amphetamine, Urine Negative NEG      Methadone, Urine Negative NEG      Comments: (NOTE)    Urinalysis, Micro    Collection Time: 11/12/24  1:10 PM   Result Value Ref Range    WBC, UA 0-3 0 - 4 /hpf    RBC, UA Negative 0 - 5 /hpf    BACTERIA, URINE Negative NEG /hpf       RADIOLOGIC STUDIES:   Non x-ray images such as CT, Ultrasound and MRI are read by the radiologist. X-ray images are visualized and preliminarily interpreted by the ED Provider with the findings as listed in the ED Course section below.     Interpretation per the Radiologist is listed below, if available at the time of this note:    CT Head W/O Contrast   Final Result      1.  No evidence of acute intracranial process         Electronically signed by Karen Adams          Procedures     Procedures    ED Course and Medical Decision Making     2:15 PM MAYRA MATTHEWS (Minor Marin DO) am the first provider for this patient. Initial assessment performed. I reviewed the vital signs, available nursing notes, past medical history, past surgical history, family history and social history. The patients presenting problems have been discussed, and they are in agreement with the care plan formulated and outlined with them. I have encouraged them to ask questions as they arise throughout their visit.     My differential diagnosis included but was not

## 2024-11-12 NOTE — ED TRIAGE NOTES
Patient complains of of right sided toe, calf, finger numbness x 2 months worsening over the weekend. Patient complains of lightheadedness described as a passing out feeling. Patient also complains of a fluttering feeling in his chest that is intermittent. Increasing fatigue worsening over the weekend.

## 2024-11-13 NOTE — PROGRESS NOTES
SUBJECTIVE  Chief Complaint   Patient presents with    Chills     Along with goose bumps, body heat ups fast, sweating, and then a wave goes from top of his head to his feet (like computer)     Lighheaded     Along with being dizzy     Back Pain     Severe back pain     Numbness     Left pointer finger, posterior of right thigh area, and first 3 toes left foot     Hip Pain     Right hip pain along numbness, slight tingling      Patient presents for episodic chills, lightheadedness and paresthesias.  He does endorse a possible trigger of anxiety.  He has had a work-up from neuro for the paresthsias.   He has also followed with the spine center for back pain.      OBJECTIVE    Blood pressure 134/80, pulse 74, temperature 98 °F (36.7 °C), temperature source Tympanic, resp. rate 18, height 1.829 m (6'), weight 93.9 kg (207 lb), SpO2 97%.  General:  Alert, cooperative, well appearing, in no apparent distress.  CV:  The heart sounds are regular in rate and rhythm.  There is a normal S1 and S2.  There or no murmurs, rubs, or gallops.  Distal pulses are intact and equal.  Lungs:  Inspiratory and expiratory efforts are full and unlabored.  Lung sounds are clear and equal to auscultation throughout all lung fields without wheezing, rales, or rhonchi.  Psych: affect congruent with mood.  Oriented x 3.  Judgement and insight intact.       ASSESSMENT / PLAN   Diagnosis Orders   1. Abnormal EKG  Echo (TTE) complete (PRN contrast/bubble/strain/3D)      2. Type 2 diabetes mellitus with unspecified complications (HCC)  Lipid Panel    Comprehensive Metabolic Panel    Hemoglobin A1C    Microalbumin / Creatinine Urine Ratio      3. Prostate cancer screening  PSA Screening      4. Need for immunization against influenza  Influenza, FLUCELVAX Trivalent, (age 6 mo+) IM, Preservative Free, 0.5mL      5. Paresthesias        6. Low back pain, unspecified back pain laterality, unspecified chronicity, unspecified whether sciatica present

## 2024-11-19 ENCOUNTER — OFFICE VISIT (OUTPATIENT)
Age: 49
End: 2024-11-19
Payer: COMMERCIAL

## 2024-11-19 VITALS
DIASTOLIC BLOOD PRESSURE: 86 MMHG | SYSTOLIC BLOOD PRESSURE: 130 MMHG | HEART RATE: 62 BPM | OXYGEN SATURATION: 96 % | WEIGHT: 233 LBS | BODY MASS INDEX: 31.56 KG/M2 | HEIGHT: 72 IN

## 2024-11-19 DIAGNOSIS — Z82.49 FAMILY HISTORY OF PREMATURE CAD: ICD-10-CM

## 2024-11-19 DIAGNOSIS — M79.89 LEG SWELLING: ICD-10-CM

## 2024-11-19 DIAGNOSIS — L40.50 PSORIATIC ARTHRITIS (HCC): ICD-10-CM

## 2024-11-19 DIAGNOSIS — R00.2 PALPITATIONS: ICD-10-CM

## 2024-11-19 DIAGNOSIS — R06.09 DOE (DYSPNEA ON EXERTION): Primary | ICD-10-CM

## 2024-11-19 PROCEDURE — 99204 OFFICE O/P NEW MOD 45 MIN: CPT | Performed by: INTERNAL MEDICINE

## 2024-11-19 ASSESSMENT — ANXIETY QUESTIONNAIRES
7. FEELING AFRAID AS IF SOMETHING AWFUL MIGHT HAPPEN: NOT AT ALL
4. TROUBLE RELAXING: NOT AT ALL
GAD7 TOTAL SCORE: 0
2. NOT BEING ABLE TO STOP OR CONTROL WORRYING: NOT AT ALL
6. BECOMING EASILY ANNOYED OR IRRITABLE: NOT AT ALL
5. BEING SO RESTLESS THAT IT IS HARD TO SIT STILL: NOT AT ALL
1. FEELING NERVOUS, ANXIOUS, OR ON EDGE: NOT AT ALL
3. WORRYING TOO MUCH ABOUT DIFFERENT THINGS: NOT AT ALL

## 2024-11-19 ASSESSMENT — ENCOUNTER SYMPTOMS
SHORTNESS OF BREATH: 1
VOMITING: 0
ABDOMINAL DISTENTION: 0
COUGH: 0
ABDOMINAL PAIN: 0
SORE THROAT: 0
NAUSEA: 0

## 2024-11-19 ASSESSMENT — PATIENT HEALTH QUESTIONNAIRE - PHQ9
SUM OF ALL RESPONSES TO PHQ9 QUESTIONS 1 & 2: 0
SUM OF ALL RESPONSES TO PHQ QUESTIONS 1-9: 0
2. FEELING DOWN, DEPRESSED OR HOPELESS: NOT AT ALL
SUM OF ALL RESPONSES TO PHQ QUESTIONS 1-9: 0
1. LITTLE INTEREST OR PLEASURE IN DOING THINGS: NOT AT ALL

## 2024-11-19 NOTE — PROGRESS NOTES
Chano Gilman presents today for   Chief Complaint   Patient presents with    New Patient     Referred by er for palpitations and dizziness       Chano Gilman preferred language for health care discussion is english/other.    Is someone accompanying this pt? no    Is the patient using any DME equipment during OV? no    Depression Screening:  Depression: Not at risk (11/19/2024)    PHQ-2     PHQ-2 Score: 0        Learning Assessment:  Who is the primary learner? Patient    What is the preferred language for health care of the primary learner? ENGLISH    How does the primary learner prefer to learn new concepts? DEMONSTRATION    Answered By patient    Relationship to Learner SELF           Pt currently taking Anticoagulant therapy? no    Pt currently taking Antiplatelet therapy ? no      Coordination of Care:  1. Have you been to the ER, urgent care clinic since your last visit? Hospitalized since your last visit? no    2. Have you seen or consulted any other health care providers outside of the [facility] since your last visit? Include any pap smears or colon screening. no    
pain, nausea and vomiting.   Endocrine: Negative for cold intolerance and heat intolerance.   Genitourinary:  Negative for dysuria.   Musculoskeletal:  Negative for arthralgias.   Skin:  Negative for rash.   Neurological:  Positive for dizziness and numbness. Negative for syncope, weakness and headaches.   Hematological:  Does not bruise/bleed easily.   Psychiatric/Behavioral:  Negative for suicidal ideas.          /86 (Site: Left Upper Arm, Position: Sitting, Cuff Size: Medium Adult)   Pulse 62   Ht 1.829 m (6')   Wt 105.7 kg (233 lb)   SpO2 96%   BMI 31.60 kg/m²     Objective:   Physical Exam  Constitutional:       General: He is not in acute distress.  HENT:      Head: Normocephalic.   Neck:      Vascular: No carotid bruit or JVD.   Cardiovascular:      Rate and Rhythm: Normal rate and regular rhythm. No extrasystoles are present.     Heart sounds: No murmur heard.     No gallop.   Pulmonary:      Effort: Pulmonary effort is normal.      Breath sounds: No wheezing, rhonchi or rales.   Abdominal:      General: Bowel sounds are normal. There is no distension.      Palpations: Abdomen is soft.      Tenderness: There is no abdominal tenderness.   Musculoskeletal:         General: No swelling or deformity.   Skin:     General: Skin is warm and dry.      Findings: No rash.   Neurological:      General: No focal deficit present.      Mental Status: He is alert and oriented to person, place, and time.   Psychiatric:         Mood and Affect: Mood normal.         Behavior: Behavior normal.         November 12, 2024 EKG: Sinus bradycardia, normal axis, normal QTc interval, no ST or T wave changes.  Otherwise normal tracing.    Assessment / Plan:     Heart palpitations.  Unclear etiology at this point.  I have recommended a 14-day event monitor for further evaluation.  I will also like to arrange for an echocardiogram to evaluate for any structural heart disease.    Dyspnea on exertion.  Sporadic at best, I

## 2024-12-10 ENCOUNTER — TELEPHONE (OUTPATIENT)
Age: 49
End: 2024-12-10

## 2024-12-10 NOTE — TELEPHONE ENCOUNTER
Verbal order and read back per Pelon Marques MD  Please let the patient know that his heart function was normal on his echocardiogram.  His aortic root was mildly dilated so this will need to be monitored with repeat imaging in a year or 2.

## 2024-12-10 NOTE — TELEPHONE ENCOUNTER
----- Message from Dr. Pelon Marques MD sent at 12/9/2024  1:13 PM EST -----  Please let the patient know that his heart function was normal on his echocardiogram.  His aortic root was mildly dilated so this will need to be monitored with repeat imaging in a year or 2.  ----- Message -----  From: Shavon Rivera MA  Sent: 12/9/2024  10:51 AM EST  To: Pelon Marques MD    Per your last note \"  Heart palpitations.  Unclear etiology at this point.  I have recommended a 14-day event monitor for further evaluation.  I will also like to arrange for an echocardiogram to evaluate for any structural heart disease.

## 2024-12-10 NOTE — TELEPHONE ENCOUNTER
----- Message from Dr. Pelon Marques MD sent at 12/9/2024  1:12 PM EST -----  Please let the patient know that his stress test was overall low risk.  ----- Message -----  From: Shavon Rivera MA  Sent: 12/9/2024  10:50 AM EST  To: Pelon Marques MD    Per your last note \"  Dyspnea on exertion.  Sporadic at best, I recommended a treadmill stress test for further restratification especially given his family history of premature CAD.

## 2024-12-10 NOTE — TELEPHONE ENCOUNTER
Verbal order and read back per Pelon Marques MD  Please let the patient know that his stress test was overall low risk.

## 2024-12-24 ENCOUNTER — TELEPHONE (OUTPATIENT)
Age: 49
End: 2024-12-24

## 2024-12-24 RX ORDER — METOPROLOL SUCCINATE 25 MG/1
25 TABLET, EXTENDED RELEASE ORAL DAILY
COMMUNITY
End: 2024-12-24 | Stop reason: SDUPTHER

## 2024-12-24 NOTE — TELEPHONE ENCOUNTER
Verbal order and read back per Pelon Marques MD  Please let the patient know that his event monitor did show multiple short runs of nonsustained tachycardia which is causing his heart palpitations.  The arrhythmias are benign.  I would like to start him on metoprolol XL 25 mg to see if this improves his symptoms.  Please make sure he has a follow-up scheduled to see us in 4 months.       -had to leave a voicemail.

## 2024-12-24 NOTE — TELEPHONE ENCOUNTER
----- Message from Dr. Pelon Marques MD sent at 12/19/2024 12:29 PM EST -----  Please let the patient know that his event monitor did show multiple short runs of nonsustained tachycardia which is causing his heart palpitations.  The arrhythmias are benign.  I would like to start him on metoprolol XL 25 mg to see if this improves his symptoms.  Please make sure he has a follow-up scheduled to see us in 4 months.  ----- Message -----  From: Shavon Rivera MA  Sent: 12/19/2024   8:39 AM EST  To: Pelon Marques MD    Per your last note\"  Heart palpitations.  Unclear etiology at this point.  I have recommended a 14-day event monitor for further evaluation.  I will also like to arrange for an echocardiogram to evaluate for any structural heart disease.

## 2024-12-26 RX ORDER — METOPROLOL SUCCINATE 25 MG/1
25 TABLET, EXTENDED RELEASE ORAL DAILY
Qty: 30 TABLET | Refills: 6 | Status: SHIPPED | OUTPATIENT
Start: 2024-12-26

## 2025-01-14 ENCOUNTER — OFFICE VISIT (OUTPATIENT)
Age: 50
End: 2025-01-14
Payer: COMMERCIAL

## 2025-01-14 VITALS
BODY MASS INDEX: 32.07 KG/M2 | OXYGEN SATURATION: 97 % | HEIGHT: 72 IN | DIASTOLIC BLOOD PRESSURE: 81 MMHG | WEIGHT: 236.8 LBS | SYSTOLIC BLOOD PRESSURE: 114 MMHG | HEART RATE: 58 BPM

## 2025-01-14 DIAGNOSIS — E66.811 CLASS 1 OBESITY DUE TO EXCESS CALORIES WITH SERIOUS COMORBIDITY AND BODY MASS INDEX (BMI) OF 32.0 TO 32.9 IN ADULT: Primary | ICD-10-CM

## 2025-01-14 DIAGNOSIS — E66.09 CLASS 1 OBESITY DUE TO EXCESS CALORIES WITH SERIOUS COMORBIDITY AND BODY MASS INDEX (BMI) OF 32.0 TO 32.9 IN ADULT: Primary | ICD-10-CM

## 2025-01-14 DIAGNOSIS — Z71.3 ENCOUNTER FOR DIETARY COUNSELING AND SURVEILLANCE: ICD-10-CM

## 2025-01-14 DIAGNOSIS — Z71.3 ENCOUNTER FOR WEIGHT LOSS COUNSELING: ICD-10-CM

## 2025-01-14 PROCEDURE — 99213 OFFICE O/P EST LOW 20 MIN: CPT | Performed by: NURSE PRACTITIONER

## 2025-01-14 NOTE — PROGRESS NOTES
[facility] Nurse Note:       CC: Weight Management    Chano Gilman is a 49 y.o. male who is here for his f/up medical provider visit for the LCD program.       Did you have any problems adhering to the program since last visit? Yes  If yes, please explain: Stopped diet due to cardiac issues    Since your last visit, have you experienced any complications? No.    Have you received any other medical care since last visit? Yes  If yes, where and for what? Cardiology 11/19/2024 for ER follow up for dizziness and palpitations    Have you had any change in your medications since your last visit? Yes If yes what? Prescribed metoprolol    Are you taking an anti-obesity medication? No If yes what and are you experiencing any side effects?      Would you still like to continue your current medication and dosage? No    BP Readings from Last 3 Encounters:   12/05/24 130/86   11/19/24 130/86   11/12/24 122/78        Eating Habits Over Last Week:    How many oz of sugar-free fluids are you consuming?over 64 oz    Did you consume your prescribed meal replacement regimen each day? Yes    Physical Activity Routine:    Aerobic exercise: 0    Resistance exercise: 0

## 2025-01-16 NOTE — PROGRESS NOTES
[facility] Progress Note:   F/up Provider Visit    CC: Weight Management    History of Present Illness  The patient is a 49-year-old male presenting for weight management. He is currently in our low-calorie diet program.    He has been experiencing chest pain and palpitations, which led to a consultation with a cardiologist. An echocardiogram and stress test were performed, both of which yielded normal results. He was prescribed beta blockers and has since abstained from alcohol. His symptoms have significantly improved, with only one episode of chest pain or palpitations in the past three weeks. He believes these episodes may be stress-induced, as they often occur during periods of high work-related stress. He has a follow-up appointment with cardiology scheduled for 02/24/2025.    He has experienced some weight gain due to his inability to exercise, which he attributes to his cardiac issues. Despite this, he has been adhering to a regimen of meal replacements, consuming three per day and one regular meal at night. He acknowledges overeating during the holiday season but has since resumed his regular diet. He plans to reintroduce running into his routine this week. He reports no recent injuries but mentions back pain, which he believes is related to his weight gain. He has been avoiding weighing himself due to fear of the results but recently discovered that he weighs less than he did two days ago. This realization has motivated him to make healthier dietary choices. He has expressed interest in injectable weight loss medications and believes his insurance will cover them.        2/27/2024    11:25 AM 5/21/2024    11:28 AM 7/18/2024    10:04 AM 1/20/2025     9:00 AM   Non-Surgical Weight Loss Tracker   Consult Date 2/26/2019 2/26/2019     Initial Height 6' 0\" 6' 0\"     Initial Weight 279 lb 279 lb     Ideal Body Weight 163 lb 163 lb     Initial BMI 37.84 37.84     Initial Body Fat % 45.41

## 2025-01-19 DIAGNOSIS — R20.2 PARESTHESIA: ICD-10-CM

## 2025-01-20 ASSESSMENT — ENCOUNTER SYMPTOMS
VOMITING: 0
SHORTNESS OF BREATH: 0
DIARRHEA: 0
NAUSEA: 0
CONSTIPATION: 0
COUGH: 0
BACK PAIN: 1
ABDOMINAL PAIN: 0

## 2025-01-23 RX ORDER — LANOLIN ALCOHOL/MO/W.PET/CERES
100 CREAM (GRAM) TOPICAL DAILY
Qty: 90 TABLET | Refills: 1 | Status: SHIPPED | OUTPATIENT
Start: 2025-01-23

## 2025-02-24 ENCOUNTER — OFFICE VISIT (OUTPATIENT)
Age: 50
End: 2025-02-24
Payer: COMMERCIAL

## 2025-02-24 VITALS
WEIGHT: 240 LBS | HEART RATE: 59 BPM | BODY MASS INDEX: 32.51 KG/M2 | DIASTOLIC BLOOD PRESSURE: 88 MMHG | OXYGEN SATURATION: 96 % | SYSTOLIC BLOOD PRESSURE: 130 MMHG | HEIGHT: 72 IN

## 2025-02-24 DIAGNOSIS — M79.89 LEG SWELLING: ICD-10-CM

## 2025-02-24 DIAGNOSIS — I47.10 NONSUSTAINED PAROXYSMAL SUPRAVENTRICULAR TACHYCARDIA: Primary | ICD-10-CM

## 2025-02-24 DIAGNOSIS — L40.50 PSORIATIC ARTHRITIS (HCC): ICD-10-CM

## 2025-02-24 PROCEDURE — 99213 OFFICE O/P EST LOW 20 MIN: CPT | Performed by: INTERNAL MEDICINE

## 2025-02-24 PROCEDURE — 93000 ELECTROCARDIOGRAM COMPLETE: CPT | Performed by: INTERNAL MEDICINE

## 2025-02-24 RX ORDER — METOPROLOL SUCCINATE 25 MG/1
25 TABLET, EXTENDED RELEASE ORAL DAILY
Qty: 90 TABLET | Refills: 3 | Status: SHIPPED | OUTPATIENT
Start: 2025-02-24

## 2025-02-24 ASSESSMENT — ANXIETY QUESTIONNAIRES
2. NOT BEING ABLE TO STOP OR CONTROL WORRYING: NOT AT ALL
5. BEING SO RESTLESS THAT IT IS HARD TO SIT STILL: NOT AT ALL
6. BECOMING EASILY ANNOYED OR IRRITABLE: NOT AT ALL
1. FEELING NERVOUS, ANXIOUS, OR ON EDGE: NOT AT ALL
4. TROUBLE RELAXING: NOT AT ALL
3. WORRYING TOO MUCH ABOUT DIFFERENT THINGS: NOT AT ALL

## 2025-02-24 ASSESSMENT — ENCOUNTER SYMPTOMS
ABDOMINAL PAIN: 0
COUGH: 0
SORE THROAT: 0
VOMITING: 0
ABDOMINAL DISTENTION: 0
NAUSEA: 0
SHORTNESS OF BREATH: 0

## 2025-02-24 ASSESSMENT — PATIENT HEALTH QUESTIONNAIRE - PHQ9
SUM OF ALL RESPONSES TO PHQ9 QUESTIONS 1 & 2: 0
SUM OF ALL RESPONSES TO PHQ QUESTIONS 1-9: 0
2. FEELING DOWN, DEPRESSED OR HOPELESS: NOT AT ALL
1. LITTLE INTEREST OR PLEASURE IN DOING THINGS: NOT AT ALL

## 2025-02-24 NOTE — PROGRESS NOTES
Chano Gilman presents today for   Chief Complaint   Patient presents with    Follow-up     3 month       Chanomeng Gilman preferred language for health care discussion is english/other.    Is someone accompanying this pt? no    Is the patient using any DME equipment during OV? no    Depression Screening:  Depression: Not at risk (2/24/2025)    PHQ-2     PHQ-2 Score: 0        Learning Assessment:  Who is the primary learner? Patient    What is the preferred language for health care of the primary learner? ENGLISH    How does the primary learner prefer to learn new concepts? DEMONSTRATION    Answered By patient    Relationship to Learner SELF           Pt currently taking Anticoagulant therapy? no    Pt currently taking Antiplatelet therapy ? no      Coordination of Care:  1. Have you been to the ER, urgent care clinic since your last visit? Hospitalized since your last visit? no    2. Have you seen or consulted any other health care providers outside of the [facility] since your last visit? Include any pap smears or colon screening. no

## 2025-02-24 NOTE — PROGRESS NOTES
02/24/25     Chano Gilman  is a 50 y.o. male     Chief Complaint   Patient presents with    Follow-up     3 month       HPI    Patient presents for a follow-up office visit.  He was initially referred here by the emergency room for evaluation of dizziness/lightheadedness/heart palpitations and diaphoresis which he noted for the past several months.  He was seen in the ER for these complaints 1 week ago.  He had a negative workup in the ER including a normal EKG, lab work, and vital signs.    Patient is concerned with his family history of premature CAD.  He states his father had a fatal myocardial infarction in his late 40s as did his grandfather.  Patient has lost almost 100 pounds in weight several years ago with the nonsurgical weight loss program.  He states he lost most of this weight in 1 year.  He previously had high blood pressure and diabetes both of which have resolved.  He is trying exercise 7 days a week now he will jog for at least 40 minutes without any symptoms.  He also lifts weights for 3 days a week.  He has not noted any major change in his activity tolerance.      Patient subsequently underwent noninvasive cardiac testing in December 2024 including an echocardiogram and a regular treadmill stress test.  His stress test was normal and low risk.  He exercised for nearly 11 minutes using the [employer] protocol achieving a total workload of 13 METS.  No concerning ST segment changes or symptoms for ischemia.  His echocardiogram showed preserved EF of 60%, no valvular heart disease, and mildly dilated aortic root.  Lastly, he wore a 14-day event monitor at the end of last year in December 2024 which demonstrated several short nonsustained episodes of PSVT lasting between 13 and 15 beats with heart rates up to 182 bpm.  He was symptomatic with heart palpitations at the time.  He had no sustained arrhythmias.  As result, he was started on metoprolol XL 25 mg daily which she has remained on for nearly

## 2025-03-25 ENCOUNTER — OFFICE VISIT (OUTPATIENT)
Age: 50
End: 2025-03-25
Payer: COMMERCIAL

## 2025-03-25 VITALS
DIASTOLIC BLOOD PRESSURE: 83 MMHG | OXYGEN SATURATION: 94 % | HEIGHT: 72 IN | SYSTOLIC BLOOD PRESSURE: 133 MMHG | RESPIRATION RATE: 16 BRPM | HEART RATE: 89 BPM | BODY MASS INDEX: 32.23 KG/M2 | TEMPERATURE: 97.3 F | WEIGHT: 238 LBS

## 2025-03-25 DIAGNOSIS — Z71.3 ENCOUNTER FOR DIETARY COUNSELING AND SURVEILLANCE: ICD-10-CM

## 2025-03-25 DIAGNOSIS — E66.09 CLASS 1 OBESITY DUE TO EXCESS CALORIES WITH SERIOUS COMORBIDITY AND BODY MASS INDEX (BMI) OF 32.0 TO 32.9 IN ADULT: Primary | ICD-10-CM

## 2025-03-25 DIAGNOSIS — Z71.3 ENCOUNTER FOR WEIGHT LOSS COUNSELING: ICD-10-CM

## 2025-03-25 DIAGNOSIS — E66.811 CLASS 1 OBESITY DUE TO EXCESS CALORIES WITH SERIOUS COMORBIDITY AND BODY MASS INDEX (BMI) OF 32.0 TO 32.9 IN ADULT: Primary | ICD-10-CM

## 2025-03-25 PROCEDURE — 99213 OFFICE O/P EST LOW 20 MIN: CPT | Performed by: NURSE PRACTITIONER

## 2025-03-25 NOTE — PROGRESS NOTES
[facility] Nurse Note:       CC: Weight Management    Chano Gilman is a 50 y.o. male who is here for his f/up medical provider visit for the LCD program.       Did you have any problems adhering to the program since last visit? No  If yes, please explain:     Since your last visit, have you experienced any complications? No    Have you received any other medical care since last visit? yes If yes, where and for what? ortho    Have you had any change in your medications since your last visit? No If yes what?     Are you taking an anti-obesity medication?  N/A  If yes what and are you experiencing any side effects?      Would you still like to continue your current medication and dosage?  N/A    BP Readings from Last 3 Encounters:   02/24/25 130/88   01/14/25 114/81   12/05/24 130/86        Eating Habits Over Last Week:    How many oz of sugar-free fluids are you consuming? 80 oz    Did you consume your prescribed meal replacement regimen each day? Yes    Physical Activity Routine:    Aerobic exercise: running every other day     Resistance exercise: lifting every other day      no diarrhea

## 2025-05-22 RX ORDER — METOPROLOL SUCCINATE 25 MG/1
25 TABLET, EXTENDED RELEASE ORAL DAILY
Qty: 90 TABLET | Refills: 3 | Status: SHIPPED | OUTPATIENT
Start: 2025-05-22

## 2025-05-29 ENCOUNTER — HOSPITAL ENCOUNTER (OUTPATIENT)
Age: 50
Discharge: HOME | End: 2025-05-29
Payer: COMMERCIAL

## 2025-05-29 DIAGNOSIS — R31.9 LOIN PAIN-HEMATURIA SYNDROME: ICD-10-CM

## 2025-05-29 DIAGNOSIS — M25.552 BILATERAL HIP PAIN: ICD-10-CM

## 2025-05-29 DIAGNOSIS — M25.551 BILATERAL HIP PAIN: ICD-10-CM

## 2025-05-29 DIAGNOSIS — M54.50 LOIN PAIN-HEMATURIA SYNDROME: ICD-10-CM

## 2025-05-29 PROCEDURE — 73522 X-RAY EXAM HIPS BI 3-4 VIEWS: CPT

## 2025-05-29 PROCEDURE — 72110 X-RAY EXAM L-2 SPINE 4/>VWS: CPT

## 2025-05-29 PROCEDURE — 72202 X-RAY EXAM SI JOINTS 3/> VWS: CPT

## 2025-06-03 ENCOUNTER — OFFICE VISIT (OUTPATIENT)
Age: 50
End: 2025-06-03
Payer: COMMERCIAL

## 2025-06-03 VITALS
TEMPERATURE: 97.8 F | RESPIRATION RATE: 20 BRPM | HEART RATE: 64 BPM | OXYGEN SATURATION: 98 % | BODY MASS INDEX: 32.1 KG/M2 | HEIGHT: 72 IN | SYSTOLIC BLOOD PRESSURE: 127 MMHG | DIASTOLIC BLOOD PRESSURE: 88 MMHG | WEIGHT: 237 LBS

## 2025-06-03 DIAGNOSIS — Z71.3 ENCOUNTER FOR DIETARY COUNSELING AND SURVEILLANCE: ICD-10-CM

## 2025-06-03 DIAGNOSIS — E66.811 CLASS 1 OBESITY DUE TO EXCESS CALORIES WITH SERIOUS COMORBIDITY AND BODY MASS INDEX (BMI) OF 32.0 TO 32.9 IN ADULT: Primary | ICD-10-CM

## 2025-06-03 DIAGNOSIS — E66.09 CLASS 1 OBESITY DUE TO EXCESS CALORIES WITH SERIOUS COMORBIDITY AND BODY MASS INDEX (BMI) OF 32.0 TO 32.9 IN ADULT: Primary | ICD-10-CM

## 2025-06-03 DIAGNOSIS — Z71.3 ENCOUNTER FOR WEIGHT LOSS COUNSELING: ICD-10-CM

## 2025-06-03 PROCEDURE — 99213 OFFICE O/P EST LOW 20 MIN: CPT | Performed by: NURSE PRACTITIONER

## 2025-06-03 PROCEDURE — 3017F COLORECTAL CA SCREEN DOC REV: CPT | Performed by: NURSE PRACTITIONER

## 2025-06-03 PROCEDURE — G8417 CALC BMI ABV UP PARAM F/U: HCPCS | Performed by: NURSE PRACTITIONER

## 2025-06-03 PROCEDURE — G8427 DOCREV CUR MEDS BY ELIG CLIN: HCPCS | Performed by: NURSE PRACTITIONER

## 2025-06-03 PROCEDURE — 1036F TOBACCO NON-USER: CPT | Performed by: NURSE PRACTITIONER

## 2025-06-03 ASSESSMENT — ENCOUNTER SYMPTOMS
VOMITING: 0
ABDOMINAL PAIN: 0
DIARRHEA: 0
COUGH: 0
CONSTIPATION: 0
NAUSEA: 0
SHORTNESS OF BREATH: 0

## 2025-06-03 NOTE — PROGRESS NOTES
[facility] Nurse Note:       CC: Weight Management    Chano Gilman is a 50 y.o. male who is here for his f/up medical provider visit for the LCD program.       Did you have any problems adhering to the program since last visit? No  If yes, please explain:     Since your last visit, have you experienced any complications? Yes numbness and tingling in both feet.     Have you received any other medical care since last visit? No  If yes, where and for what?     Have you had any change in your medications since your last visit? Yes. If yes what? Cosentyx for arthritis.     Are you taking an anti-obesity medication? No If yes what and are you experiencing any side effects?      Would you still like to continue your current medication and dosage? N/A    BP Readings from Last 3 Encounters:   03/25/25 133/83   02/24/25 130/88   01/14/25 114/81        Eating Habits Over Last Week:    How many oz of sugar-free fluids are you consuming? 80 oz    Did you consume your prescribed meal replacement regimen each day?  Yes    Physical Activity Routine:    Aerobic exercise: Running every other day.    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    
6' 0\" 6' 0\"   6' 0\" 6' 0\"   Initial Weight 279 lbs 279 lbs 279 lbs   279 lbs 279 lbs   Ideal Body Weight 163 lb 163 lb 163 lb   163 lb 163 lb   Initial BMI 37.8 37.8 37.8   37.84 37.84   Initial  lb 116 lb 116 lb   116 lb 116 lb   Date 6/3/2025  3/25/2025 1/14/2025 7/16/2024 5/21/2024 2/27/2024   Weight 237 lb  238 lb 236 lb 210 lb 212 lb 218 lb   BMI 32.1  32.3 32 28.48 28.75 29.56   Weight Change since last Visit -1 lb  2 lb 26 lb -69 lb -67 lb 218 lb   Weight Change since Initial Consult -42 lb  -41 lb -43 lb -69 lb -67 lb 0 lb   % EBWL 36%  35% 37% 59% 58% <UNK>%   % Total Body Weight Loss  15%  15%       Non-Surgical Subsequent Eval % Body Fat    28.7%       Subseq. Anti-obesity Meds?   No           Arm: 12.25  Waist: 43  Neck: 17  Body Fat Percentage: 27.9    Down 1 lb since last visit. Down 42 lbs since starting program on 2/26/2019 .    Goal wt: <200    Ideal body weight: 77.6 kg (171 lb 1.2 oz)  Adjusted ideal body weight: 89.6 kg (197 lb 7.1 oz)  Body mass index is 32.14 kg/m².    History    Past Medical History:   Diagnosis Date    Asthma     childhood    Broken leg left    broke left leg and tore ligaments    Diabetes mellitus (HCC)     no meds    Dupuytren's contracture     Essential hypertension     no meds    Fracture of left leg 08/2020    HTN (hypertension)     Hx of colonoscopy with polypectomy 08/22/2022    Dr. Moreno;internal hemorrhoids,mild diverticulosis; polyps (adenoma); repeat colonoscopy in 5 years    Psoriatic arthritis, destructive type (HCC)     Sarcoidosis     says he had an arm mass removed and the path showed sarcoid, negative CXR       Past Surgical History:   Procedure Laterality Date    CARPAL TUNNEL RELEASE      EXC SKIN BENIG 0.6-1CM TRUNK,ARM,LEG N/A 10/11/2018    Dr. Haynes    EXC SKIN BENIG 1.1-2CM TRUNK,ARM,LEG N/A 10/11/2018    Dr. Haynes    EXC SKIN BENIG 2.1-3CM TRUNK,ARM,LEG N/A 10/11/2018    Dr. Haynes    ORTHOPEDIC SURGERY      fx skull/ broken jaw

## 2025-07-29 ENCOUNTER — OFFICE VISIT (OUTPATIENT)
Age: 50
End: 2025-07-29
Payer: COMMERCIAL

## 2025-07-29 VITALS
WEIGHT: 232 LBS | HEART RATE: 70 BPM | HEIGHT: 72 IN | OXYGEN SATURATION: 95 % | TEMPERATURE: 97.6 F | SYSTOLIC BLOOD PRESSURE: 135 MMHG | BODY MASS INDEX: 31.42 KG/M2 | DIASTOLIC BLOOD PRESSURE: 92 MMHG | RESPIRATION RATE: 16 BRPM

## 2025-07-29 DIAGNOSIS — Z71.3 ENCOUNTER FOR WEIGHT LOSS COUNSELING: ICD-10-CM

## 2025-07-29 DIAGNOSIS — E66.09 CLASS 1 OBESITY DUE TO EXCESS CALORIES WITH SERIOUS COMORBIDITY AND BODY MASS INDEX (BMI) OF 31.0 TO 31.9 IN ADULT: Primary | ICD-10-CM

## 2025-07-29 DIAGNOSIS — Z71.3 ENCOUNTER FOR DIETARY COUNSELING AND SURVEILLANCE: ICD-10-CM

## 2025-07-29 DIAGNOSIS — E66.811 CLASS 1 OBESITY DUE TO EXCESS CALORIES WITH SERIOUS COMORBIDITY AND BODY MASS INDEX (BMI) OF 31.0 TO 31.9 IN ADULT: Primary | ICD-10-CM

## 2025-07-29 PROCEDURE — G8428 CUR MEDS NOT DOCUMENT: HCPCS | Performed by: NURSE PRACTITIONER

## 2025-07-29 PROCEDURE — 99212 OFFICE O/P EST SF 10 MIN: CPT | Performed by: NURSE PRACTITIONER

## 2025-07-29 PROCEDURE — 3017F COLORECTAL CA SCREEN DOC REV: CPT | Performed by: NURSE PRACTITIONER

## 2025-07-29 PROCEDURE — 1036F TOBACCO NON-USER: CPT | Performed by: NURSE PRACTITIONER

## 2025-07-29 PROCEDURE — G8417 CALC BMI ABV UP PARAM F/U: HCPCS | Performed by: NURSE PRACTITIONER

## 2025-07-29 RX ORDER — LOTEPREDNOL ETABONATE 5 MG/ML
1 SUSPENSION/ DROPS OPHTHALMIC 4 TIMES DAILY
COMMUNITY

## 2025-07-29 NOTE — PROGRESS NOTES
[facility] Nurse Note:       CC: Weight Management    Chano Gilman is a 50 y.o. male who is here for his f/up medical provider visit for the LCD program.       Did you have any problems adhering to the program since last visit? No  If yes, please explain:     Since your last visit, have you experienced any complications? No    Have you received any other medical care since last visit? Yes  If yes, where and for what? Yes doctor    Have you had any change in your medications since your last visit? Yes If yes what? Eye drops     Are you taking an anti-obesity medication? No If yes what and are you experiencing any side effects?      Would you still like to continue your current medication and dosage? N/A    BP Readings from Last 3 Encounters:   06/03/25 127/88   03/25/25 133/83   02/24/25 130/88        Eating Habits Over Last Week:    How many oz of sugar-free fluids are you consuming? 80 oz    Did you consume your prescribed meal replacement regimen each day? Yes    Physical Activity Routine:    Aerobic exercise: running 30-40 minutes daily     Resistance exercise: none

## 2025-07-30 NOTE — PROGRESS NOTES
[facility] Progress Note:   F/up Provider Visit    CC: Weight Management    History of Present Illness  The patient is a 50-year-old male presenting for weight management. He is currently participating in our low-calorie diet program.    He has lost 5 pounds since his last visit on 06/03/2025, when he weighed 237 pounds. He thought he had gained weight because he was not running much due to his back issues. He has improved his snacking habits and continues to use meal replacements. He plans to start strength training next week. He is not currently on any medication for weight loss as his insurance does not cover it. He liked diethylpropion, but it caused palpitations.    He has been experiencing issues with his eyes, which he believes are related to his psoriatic arthritis. He has seen an ophthalmologist but not his rheumatologist. He has been prescribed antiviral medication and steroid drops.    He reports that his hip condition has improved after a week of ball massage therapy. He experiences constant pain in two of his toes, which are longer than the others, even when walking. He runs 2 to 3 miles daily and has tried [employer] shoes. He also mentions that one leg is half an inch longer than the other, but he has not sought medical attention for this or obtained orthotics.    He has been using methylene blue for anxiety, which he finds helpful. However, he has noticed some heart issues since starting this treatment. He continues to take metoprolol and reports increased energy levels, which aid his workouts.    He has previously consulted a sleep medicine specialist for sleepwalking but no cause was identified. He has not experienced sleepwalking recently and does not take Ambien or any other sleep aid.    Last Non-Surgical Weight Loss:      7/29/2025     1:29 PM 6/3/2025     9:34 AM 6/3/2025     9:25 AM 3/25/2025     9:20 AM 1/20/2025     9:00 AM 7/18/2024    10:04 AM 5/21/2024    11:28 AM

## 2025-07-31 ASSESSMENT — ENCOUNTER SYMPTOMS
NAUSEA: 0
VOMITING: 0
DIARRHEA: 0
CONSTIPATION: 0
COUGH: 0
BACK PAIN: 1
SHORTNESS OF BREATH: 0
ABDOMINAL PAIN: 0

## 2025-08-22 DIAGNOSIS — R20.2 PARESTHESIA: ICD-10-CM

## 2025-08-25 RX ORDER — LANOLIN ALCOHOL/MO/W.PET/CERES
100 CREAM (GRAM) TOPICAL DAILY
Qty: 90 TABLET | Refills: 1 | Status: SHIPPED | OUTPATIENT
Start: 2025-08-25

## 2025-08-27 ENCOUNTER — OFFICE VISIT (OUTPATIENT)
Facility: CLINIC | Age: 50
End: 2025-08-27
Payer: COMMERCIAL

## 2025-08-27 VITALS
TEMPERATURE: 97 F | HEART RATE: 58 BPM | RESPIRATION RATE: 18 BRPM | BODY MASS INDEX: 32.1 KG/M2 | HEIGHT: 72 IN | SYSTOLIC BLOOD PRESSURE: 127 MMHG | WEIGHT: 237 LBS | DIASTOLIC BLOOD PRESSURE: 90 MMHG | OXYGEN SATURATION: 95 %

## 2025-08-27 DIAGNOSIS — T78.3XXD ANGIOEDEMA, SUBSEQUENT ENCOUNTER: Primary | ICD-10-CM

## 2025-08-27 DIAGNOSIS — Z12.5 PROSTATE CANCER SCREENING: ICD-10-CM

## 2025-08-27 DIAGNOSIS — E55.9 VITAMIN D DEFICIENCY: ICD-10-CM

## 2025-08-27 DIAGNOSIS — Z13.0 SCREENING FOR BLOOD DISEASE: ICD-10-CM

## 2025-08-27 DIAGNOSIS — Z13.220 SCREENING FOR LIPID DISORDERS: ICD-10-CM

## 2025-08-27 DIAGNOSIS — Z13.29 SCREENING FOR THYROID DISORDER: ICD-10-CM

## 2025-08-27 DIAGNOSIS — E11.9 TYPE 2 DIABETES MELLITUS WITHOUT COMPLICATION, WITHOUT LONG-TERM CURRENT USE OF INSULIN (HCC): ICD-10-CM

## 2025-08-27 DIAGNOSIS — Z13.228 SCREENING FOR METABOLIC DISORDER: ICD-10-CM

## 2025-08-27 DIAGNOSIS — Z13.89 SCREENING FOR BLOOD OR PROTEIN IN URINE: ICD-10-CM

## 2025-08-27 PROCEDURE — 3017F COLORECTAL CA SCREEN DOC REV: CPT

## 2025-08-27 PROCEDURE — 3046F HEMOGLOBIN A1C LEVEL >9.0%: CPT

## 2025-08-27 PROCEDURE — 2022F DILAT RTA XM EVC RTNOPTHY: CPT

## 2025-08-27 PROCEDURE — 99214 OFFICE O/P EST MOD 30 MIN: CPT

## 2025-08-27 PROCEDURE — G8427 DOCREV CUR MEDS BY ELIG CLIN: HCPCS

## 2025-08-27 PROCEDURE — 1036F TOBACCO NON-USER: CPT

## 2025-08-27 PROCEDURE — G8417 CALC BMI ABV UP PARAM F/U: HCPCS

## 2025-08-27 RX ORDER — EPINEPHRINE 0.3 MG/.3ML
INJECTION SUBCUTANEOUS
Qty: 1 EACH | Refills: 0 | Status: SHIPPED | OUTPATIENT
Start: 2025-08-27

## 2025-08-27 SDOH — ECONOMIC STABILITY: FOOD INSECURITY: WITHIN THE PAST 12 MONTHS, THE FOOD YOU BOUGHT JUST DIDN'T LAST AND YOU DIDN'T HAVE MONEY TO GET MORE.: NEVER TRUE

## 2025-08-27 SDOH — ECONOMIC STABILITY: FOOD INSECURITY: WITHIN THE PAST 12 MONTHS, YOU WORRIED THAT YOUR FOOD WOULD RUN OUT BEFORE YOU GOT MONEY TO BUY MORE.: NEVER TRUE

## 2025-08-27 SDOH — HEALTH STABILITY: PHYSICAL HEALTH: ON AVERAGE, HOW MANY DAYS PER WEEK DO YOU ENGAGE IN MODERATE TO STRENUOUS EXERCISE (LIKE A BRISK WALK)?: 2 DAYS

## 2025-08-27 SDOH — HEALTH STABILITY: PHYSICAL HEALTH: ON AVERAGE, HOW MANY MINUTES DO YOU ENGAGE IN EXERCISE AT THIS LEVEL?: 60 MIN

## 2025-08-27 ASSESSMENT — LIFESTYLE VARIABLES
HOW MANY STANDARD DRINKS CONTAINING ALCOHOL DO YOU HAVE ON A TYPICAL DAY: 7 TO 9
HOW OFTEN DO YOU HAVE A DRINK CONTAINING ALCOHOL: 2-4 TIMES A MONTH

## 2025-08-27 ASSESSMENT — PATIENT HEALTH QUESTIONNAIRE - PHQ9
SUM OF ALL RESPONSES TO PHQ QUESTIONS 1-9: 0
SUM OF ALL RESPONSES TO PHQ QUESTIONS 1-9: 0
1. LITTLE INTEREST OR PLEASURE IN DOING THINGS: NOT AT ALL
SUM OF ALL RESPONSES TO PHQ QUESTIONS 1-9: 0
SUM OF ALL RESPONSES TO PHQ QUESTIONS 1-9: 0
2. FEELING DOWN, DEPRESSED OR HOPELESS: NOT AT ALL

## 2025-08-29 ENCOUNTER — OFFICE VISIT (OUTPATIENT)
Age: 50
End: 2025-08-29
Payer: COMMERCIAL

## 2025-08-29 VITALS
SYSTOLIC BLOOD PRESSURE: 114 MMHG | HEIGHT: 72 IN | OXYGEN SATURATION: 97 % | DIASTOLIC BLOOD PRESSURE: 74 MMHG | HEART RATE: 64 BPM | WEIGHT: 235 LBS | BODY MASS INDEX: 31.83 KG/M2

## 2025-08-29 DIAGNOSIS — M79.89 LEG SWELLING: ICD-10-CM

## 2025-08-29 DIAGNOSIS — I47.10 NONSUSTAINED PAROXYSMAL SUPRAVENTRICULAR TACHYCARDIA: Primary | ICD-10-CM

## 2025-08-29 DIAGNOSIS — L40.50 PSORIATIC ARTHRITIS (HCC): ICD-10-CM

## 2025-08-29 PROCEDURE — 99213 OFFICE O/P EST LOW 20 MIN: CPT | Performed by: INTERNAL MEDICINE

## 2025-08-29 PROCEDURE — G8417 CALC BMI ABV UP PARAM F/U: HCPCS | Performed by: INTERNAL MEDICINE

## 2025-08-29 PROCEDURE — 3017F COLORECTAL CA SCREEN DOC REV: CPT | Performed by: INTERNAL MEDICINE

## 2025-08-29 PROCEDURE — 93000 ELECTROCARDIOGRAM COMPLETE: CPT | Performed by: INTERNAL MEDICINE

## 2025-08-29 PROCEDURE — 1036F TOBACCO NON-USER: CPT | Performed by: INTERNAL MEDICINE

## 2025-08-29 PROCEDURE — G8428 CUR MEDS NOT DOCUMENT: HCPCS | Performed by: INTERNAL MEDICINE

## 2025-08-29 ASSESSMENT — ENCOUNTER SYMPTOMS
COUGH: 0
ABDOMINAL DISTENTION: 0
SORE THROAT: 0
NAUSEA: 0
SHORTNESS OF BREATH: 0
ABDOMINAL PAIN: 0
VOMITING: 0

## 2025-09-04 ASSESSMENT — ENCOUNTER SYMPTOMS
SORE THROAT: 0
SINUS PRESSURE: 0
NAUSEA: 0
VOICE CHANGE: 0
WHEEZING: 0
COUGH: 0
CONSTIPATION: 0
BLOOD IN STOOL: 0
DIARRHEA: 0
SHORTNESS OF BREATH: 0
TROUBLE SWALLOWING: 0
VOMITING: 0
EYES NEGATIVE: 1

## 2025-09-05 ENCOUNTER — HOSPITAL ENCOUNTER (OUTPATIENT)
Facility: HOSPITAL | Age: 50
Setting detail: SPECIMEN
Discharge: HOME | End: 2025-09-05
Payer: COMMERCIAL

## 2025-09-05 LAB
25(OH)D3 SERPL-MCNC: 97.1 NG/ML (ref 30–100)
ALBUMIN SERPL-MCNC: 4.3 G/DL (ref 3.4–5)
ALBUMIN/GLOB SERPL: 1.4 (ref 0.8–1.7)
ALP SERPL-CCNC: 76 U/L (ref 45–117)
ALT SERPL-CCNC: 44 U/L (ref 10–50)
ANION GAP SERPL CALC-SCNC: 11 MMOL/L (ref 3–18)
APPEARANCE UR: CLEAR
AST SERPL-CCNC: 36 U/L (ref 10–38)
BACTERIA URNS QL MICRO: NEGATIVE /HPF
BASOPHILS # BLD: 0.02 K/UL (ref 0–0.1)
BASOPHILS NFR BLD: 0.3 % (ref 0–2)
BILIRUB SERPL-MCNC: 1 MG/DL (ref 0.2–1)
BILIRUB UR QL: NEGATIVE
BUN SERPL-MCNC: 16 MG/DL (ref 6–23)
BUN/CREAT SERPL: 14 (ref 12–20)
CALCIUM SERPL-MCNC: 10.1 MG/DL (ref 8.5–10.1)
CHLORIDE SERPL-SCNC: 99 MMOL/L (ref 98–107)
CHOLEST SERPL-MCNC: 204 MG/DL
CO2 SERPL-SCNC: 29 MMOL/L (ref 21–32)
COLOR UR: YELLOW
CREAT SERPL-MCNC: 1.11 MG/DL (ref 0.6–1.3)
CREAT UR-MCNC: 136 MG/DL (ref 30–125)
DIFFERENTIAL METHOD BLD: NORMAL
EOSINOPHIL # BLD: 0.2 K/UL (ref 0–0.4)
EOSINOPHIL NFR BLD: 3.1 % (ref 0–5)
EPITH CASTS URNS QL MICRO: NORMAL /LPF (ref 0–5)
ERYTHROCYTE [DISTWIDTH] IN BLOOD BY AUTOMATED COUNT: 11.8 % (ref 11.6–14.5)
GLOBULIN SER CALC-MCNC: 3.1 G/DL (ref 2–4)
GLUCOSE SERPL-MCNC: 119 MG/DL (ref 74–108)
GLUCOSE UR STRIP.AUTO-MCNC: NEGATIVE MG/DL
HCT VFR BLD AUTO: 45.6 % (ref 36–48)
HDLC SERPL-MCNC: 50 MG/DL (ref 40–60)
HDLC SERPL: 4.1 (ref 0–5)
HGB BLD-MCNC: 15.5 G/DL (ref 13–16)
HGB UR QL STRIP: NEGATIVE
IMM GRANULOCYTES # BLD AUTO: 0.01 K/UL (ref 0–0.04)
IMM GRANULOCYTES NFR BLD AUTO: 0.2 % (ref 0–0.5)
KETONES UR QL STRIP.AUTO: NEGATIVE MG/DL
LDLC SERPL CALC-MCNC: 134 MG/DL (ref 0–100)
LEUKOCYTE ESTERASE UR QL STRIP.AUTO: NEGATIVE
LYMPHOCYTES # BLD: 2.67 K/UL (ref 0.9–3.6)
LYMPHOCYTES NFR BLD: 41.8 % (ref 21–52)
MCH RBC QN AUTO: 32.6 PG (ref 24–34)
MCHC RBC AUTO-ENTMCNC: 34 G/DL (ref 31–37)
MCV RBC AUTO: 96 FL (ref 78–100)
MICROALBUMIN UR-MCNC: <1.2 MG/DL (ref 0–3)
MICROALBUMIN/CREAT UR-RTO: ABNORMAL MG/G (ref 0–30)
MONOCYTES # BLD: 0.47 K/UL (ref 0.05–1.2)
MONOCYTES NFR BLD: 7.4 % (ref 3–10)
NEUTS SEG # BLD: 3.02 K/UL (ref 1.8–8)
NEUTS SEG NFR BLD: 47.2 % (ref 40–73)
NITRITE UR QL STRIP.AUTO: NEGATIVE
NRBC # BLD: 0 K/UL (ref 0–0.01)
NRBC BLD-RTO: 0 PER 100 WBC
PH UR STRIP: 7 (ref 5–8)
PLATELET # BLD AUTO: 223 K/UL (ref 135–420)
PMV BLD AUTO: 9.8 FL (ref 9.2–11.8)
POTASSIUM SERPL-SCNC: 4.9 MMOL/L (ref 3.5–5.5)
PROT SERPL-MCNC: 7.3 G/DL (ref 6.4–8.2)
PROT UR STRIP-MCNC: NEGATIVE MG/DL
RBC # BLD AUTO: 4.75 M/UL (ref 4.35–5.65)
RBC #/AREA URNS HPF: NEGATIVE /HPF (ref 0–5)
SODIUM SERPL-SCNC: 139 MMOL/L (ref 136–145)
SP GR UR REFRACTOMETRY: 1.02 (ref 1–1.03)
T4 FREE SERPL-MCNC: 1 NG/DL (ref 0.9–1.7)
TRIGL SERPL-MCNC: 103 MG/DL (ref 0–150)
TSH, 3RD GENERATION: 1.74 UIU/ML (ref 0.27–4.2)
UROBILINOGEN UR QL STRIP.AUTO: 0.2 EU/DL (ref 0.2–1)
VLDLC SERPL CALC-MCNC: 21 MG/DL
WBC # BLD AUTO: 6.4 K/UL (ref 4.6–13.2)
WBC URNS QL MICRO: NORMAL /HPF (ref 0–5)

## 2025-09-05 PROCEDURE — 82306 VITAMIN D 25 HYDROXY: CPT

## 2025-09-05 PROCEDURE — 81001 URINALYSIS AUTO W/SCOPE: CPT

## 2025-09-05 PROCEDURE — 82043 UR ALBUMIN QUANTITATIVE: CPT

## 2025-09-05 PROCEDURE — 36415 COLL VENOUS BLD VENIPUNCTURE: CPT

## 2025-09-05 PROCEDURE — 84439 ASSAY OF FREE THYROXINE: CPT

## 2025-09-05 PROCEDURE — 80053 COMPREHEN METABOLIC PANEL: CPT

## 2025-09-05 PROCEDURE — 84443 ASSAY THYROID STIM HORMONE: CPT

## 2025-09-05 PROCEDURE — 82570 ASSAY OF URINE CREATININE: CPT

## 2025-09-05 PROCEDURE — 85025 COMPLETE CBC W/AUTO DIFF WBC: CPT

## 2025-09-05 PROCEDURE — 80061 LIPID PANEL: CPT

## (undated) DEVICE — SYR 10ML LUER LOK 1/5ML GRAD --

## (undated) DEVICE — MEDIA CONTRAST 10ML 200MG/ML 41%

## (undated) DEVICE — NEEDLE HYPO 25GA L1.5IN BVL ORIENTED ECLIPSE

## (undated) DEVICE — DRAPE TOWEL: Brand: CONVERTORS

## (undated) DEVICE — KIT CLN UP BON SECOURS MARYV

## (undated) DEVICE — BANDAGE ADH W0.75XL3IN UNIV WVN FAB NAT GEN USE STRP N ADH

## (undated) DEVICE — TRAY MYEL SFTY +

## (undated) DEVICE — DRAPE,TOP,102X53,STERILE: Brand: MEDLINE

## (undated) DEVICE — INTENDED FOR TISSUE SEPARATION, AND OTHER PROCEDURES THAT REQUIRE A SHARP SURGICAL BLADE TO PUNCTURE OR CUT.: Brand: BARD-PARKER ®  SAFETY SCALPED

## (undated) DEVICE — PACK PROCEDURE SURG MAJ W/ BASIN LF

## (undated) DEVICE — NDL SPNE MANAN 22GX6IN --

## (undated) DEVICE — SUTURE VCRL SZ 3-0 L27IN ABSRB UD L36MM CT-1 1/2 CIR J258H

## (undated) DEVICE — THREE-QUARTER SHEET: Brand: CONVERTORS

## (undated) DEVICE — SMOKE EVACUATION PENCIL: Brand: VALLEYLAB

## (undated) DEVICE — 3M™ BAIR PAWS FLEX™ WARMING GOWN, STANDARD, 20 PER CASE 81003: Brand: BAIR PAWS™

## (undated) DEVICE — SHEET, DRAPE, SPLIT, STERILE: Brand: MEDLINE

## (undated) DEVICE — APPLICATOR BNDG 1MM ADH PREMIERPRO EXOFIN

## (undated) DEVICE — REM POLYHESIVE ADULT PATIENT RETURN ELECTRODE: Brand: VALLEYLAB

## (undated) DEVICE — STERILE POLYISOPRENE POWDER-FREE SURGICAL GLOVES: Brand: PROTEXIS

## (undated) DEVICE — INTENDED FOR TISSUE SEPARATION, AND OTHER PROCEDURES THAT REQUIRE A SHARP SURGICAL BLADE TO PUNCTURE OR CUT.: Brand: BARD-PARKER SAFETY BLADES SIZE 10, STERILE

## (undated) DEVICE — SUTURE MCRYL SZ 4-0 L27IN ABSRB UD L24MM PS-1 3/8 CIR PRIM Y935H